# Patient Record
Sex: FEMALE | Race: WHITE | NOT HISPANIC OR LATINO | Employment: FULL TIME | URBAN - METROPOLITAN AREA
[De-identification: names, ages, dates, MRNs, and addresses within clinical notes are randomized per-mention and may not be internally consistent; named-entity substitution may affect disease eponyms.]

---

## 2017-01-26 ENCOUNTER — APPOINTMENT (OUTPATIENT)
Dept: LAB | Facility: HOSPITAL | Age: 32
End: 2017-01-26
Attending: OBSTETRICS & GYNECOLOGY
Payer: COMMERCIAL

## 2017-01-26 ENCOUNTER — TRANSCRIBE ORDERS (OUTPATIENT)
Dept: ADMINISTRATIVE | Facility: HOSPITAL | Age: 32
End: 2017-01-26

## 2017-01-26 DIAGNOSIS — N97.0 FEMALE INFERTILITY ASSOCIATED WITH ANOVULATION: Primary | ICD-10-CM

## 2017-01-26 DIAGNOSIS — N97.0 FEMALE INFERTILITY ASSOCIATED WITH ANOVULATION: ICD-10-CM

## 2017-01-26 LAB — PROGEST SERPL-MCNC: 13.8 NG/ML

## 2017-01-26 PROCEDURE — 36415 COLL VENOUS BLD VENIPUNCTURE: CPT

## 2017-01-26 PROCEDURE — 84144 ASSAY OF PROGESTERONE: CPT

## 2017-02-06 ENCOUNTER — APPOINTMENT (OUTPATIENT)
Dept: LAB | Facility: HOSPITAL | Age: 32
End: 2017-02-06
Attending: OBSTETRICS & GYNECOLOGY
Payer: COMMERCIAL

## 2017-02-06 ENCOUNTER — TRANSCRIBE ORDERS (OUTPATIENT)
Dept: ADMINISTRATIVE | Facility: HOSPITAL | Age: 32
End: 2017-02-06

## 2017-02-06 DIAGNOSIS — N91.2 ABSENCE OF MENSTRUATION: ICD-10-CM

## 2017-02-06 DIAGNOSIS — N91.2 ABSENCE OF MENSTRUATION: Primary | ICD-10-CM

## 2017-02-06 LAB
B-HCG SERPL-ACNC: 1948.9 MIU/ML
PROGEST SERPL-MCNC: 26.1 NG/ML
TSH SERPL DL<=0.05 MIU/L-ACNC: 1.98 UIU/ML (ref 0.36–3.74)

## 2017-02-06 PROCEDURE — 84702 CHORIONIC GONADOTROPIN TEST: CPT

## 2017-02-06 PROCEDURE — 84144 ASSAY OF PROGESTERONE: CPT

## 2017-02-06 PROCEDURE — 84443 ASSAY THYROID STIM HORMONE: CPT

## 2017-02-06 PROCEDURE — 36415 COLL VENOUS BLD VENIPUNCTURE: CPT

## 2017-02-10 ENCOUNTER — TRANSCRIBE ORDERS (OUTPATIENT)
Dept: ADMINISTRATIVE | Facility: HOSPITAL | Age: 32
End: 2017-02-10

## 2017-02-10 ENCOUNTER — APPOINTMENT (OUTPATIENT)
Dept: LAB | Facility: HOSPITAL | Age: 32
End: 2017-02-10
Attending: OBSTETRICS & GYNECOLOGY
Payer: COMMERCIAL

## 2017-02-10 DIAGNOSIS — Z34.01 ENCOUNTER FOR SUPERVISION OF NORMAL FIRST PREGNANCY IN FIRST TRIMESTER: ICD-10-CM

## 2017-02-10 DIAGNOSIS — Z34.01 ENCOUNTER FOR SUPERVISION OF NORMAL FIRST PREGNANCY IN FIRST TRIMESTER: Primary | ICD-10-CM

## 2017-02-10 LAB
B-HCG SERPL-ACNC: ABNORMAL MIU/ML
PROGEST SERPL-MCNC: 31.8 NG/ML

## 2017-02-10 PROCEDURE — 36415 COLL VENOUS BLD VENIPUNCTURE: CPT

## 2017-02-10 PROCEDURE — 84144 ASSAY OF PROGESTERONE: CPT

## 2017-02-10 PROCEDURE — 84702 CHORIONIC GONADOTROPIN TEST: CPT

## 2017-03-01 ENCOUNTER — APPOINTMENT (OUTPATIENT)
Dept: LAB | Facility: HOSPITAL | Age: 32
End: 2017-03-01
Attending: OBSTETRICS & GYNECOLOGY
Payer: COMMERCIAL

## 2017-03-01 ENCOUNTER — ALLSCRIPTS OFFICE VISIT (OUTPATIENT)
Dept: OTHER | Facility: OTHER | Age: 32
End: 2017-03-01

## 2017-03-01 DIAGNOSIS — Z34.90 ENCOUNTER FOR SUPERVISION OF NORMAL PREGNANCY: ICD-10-CM

## 2017-03-01 LAB
ABO GROUP BLD: NORMAL
BASOPHILS # BLD AUTO: 0.01 THOUSANDS/ΜL (ref 0–0.1)
BASOPHILS NFR BLD AUTO: 0 % (ref 0–1)
BILIRUB UR QL STRIP: NEGATIVE
BLD GP AB SCN SERPL QL: NEGATIVE
CLARITY UR: CLEAR
COLOR UR: YELLOW
EOSINOPHIL # BLD AUTO: 0.06 THOUSAND/ΜL (ref 0–0.61)
EOSINOPHIL NFR BLD AUTO: 1 % (ref 0–6)
ERYTHROCYTE [DISTWIDTH] IN BLOOD BY AUTOMATED COUNT: 11.8 % (ref 11.6–15.1)
GLUCOSE UR STRIP-MCNC: NEGATIVE MG/DL
HCT VFR BLD AUTO: 39.4 % (ref 34.8–46.1)
HGB BLD-MCNC: 13.9 G/DL (ref 11.5–15.4)
HGB UR QL STRIP.AUTO: NEGATIVE
KETONES UR STRIP-MCNC: NEGATIVE MG/DL
LEUKOCYTE ESTERASE UR QL STRIP: NEGATIVE
LYMPHOCYTES # BLD AUTO: 2.26 THOUSANDS/ΜL (ref 0.6–4.47)
LYMPHOCYTES NFR BLD AUTO: 28 % (ref 14–44)
MCH RBC QN AUTO: 30.1 PG (ref 26.8–34.3)
MCHC RBC AUTO-ENTMCNC: 35.3 G/DL (ref 31.4–37.4)
MCV RBC AUTO: 85 FL (ref 82–98)
MONOCYTES # BLD AUTO: 0.5 THOUSAND/ΜL (ref 0.17–1.22)
MONOCYTES NFR BLD AUTO: 6 % (ref 4–12)
NEUTROPHILS # BLD AUTO: 5.26 THOUSANDS/ΜL (ref 1.85–7.62)
NEUTS SEG NFR BLD AUTO: 65 % (ref 43–75)
NITRITE UR QL STRIP: NEGATIVE
PH UR STRIP.AUTO: 6 [PH] (ref 4.5–8)
PLATELET # BLD AUTO: 227 THOUSANDS/UL (ref 149–390)
PMV BLD AUTO: 10.5 FL (ref 8.9–12.7)
PROT UR STRIP-MCNC: NEGATIVE MG/DL
RBC # BLD AUTO: 4.62 MILLION/UL (ref 3.81–5.12)
RH BLD: POSITIVE
RUBV IGG SERPL IA-ACNC: >175 IU/ML
SP GR UR STRIP.AUTO: 1.02 (ref 1–1.03)
UROBILINOGEN UR QL STRIP.AUTO: 0.2 E.U./DL
WBC # BLD AUTO: 8.09 THOUSAND/UL (ref 4.31–10.16)

## 2017-03-01 PROCEDURE — 81220 CFTR GENE COM VARIANTS: CPT

## 2017-03-01 PROCEDURE — 36415 COLL VENOUS BLD VENIPUNCTURE: CPT

## 2017-03-01 PROCEDURE — 81003 URINALYSIS AUTO W/O SCOPE: CPT

## 2017-03-01 PROCEDURE — 80081 OBSTETRIC PANEL INC HIV TSTG: CPT

## 2017-03-01 PROCEDURE — 87086 URINE CULTURE/COLONY COUNT: CPT

## 2017-03-02 LAB
BACTERIA UR CULT: NORMAL
HBV SURFACE AG SER QL: NORMAL
RPR SER QL: NORMAL

## 2017-03-03 LAB — HIV 1+2 AB+HIV1 P24 AG SERPL QL IA: NORMAL

## 2017-03-07 LAB
CF COMMENT: NORMAL
CFTR MUT ANL BLD/T: NORMAL

## 2017-03-22 ENCOUNTER — GENERIC CONVERSION - ENCOUNTER (OUTPATIENT)
Dept: OTHER | Facility: OTHER | Age: 32
End: 2017-03-22

## 2017-03-22 PROCEDURE — 87591 N.GONORRHOEAE DNA AMP PROB: CPT | Performed by: OBSTETRICS & GYNECOLOGY

## 2017-03-22 PROCEDURE — G0145 SCR C/V CYTO,THINLAYER,RESCR: HCPCS | Performed by: OBSTETRICS & GYNECOLOGY

## 2017-03-22 PROCEDURE — 87491 CHLMYD TRACH DNA AMP PROBE: CPT | Performed by: OBSTETRICS & GYNECOLOGY

## 2017-03-23 ENCOUNTER — LAB REQUISITION (OUTPATIENT)
Dept: LAB | Facility: HOSPITAL | Age: 32
End: 2017-03-23
Payer: COMMERCIAL

## 2017-03-23 DIAGNOSIS — Z34.90 ENCOUNTER FOR SUPERVISION OF NORMAL PREGNANCY: ICD-10-CM

## 2017-03-27 LAB
CHLAMYDIA DNA CVX QL NAA+PROBE: NORMAL
N GONORRHOEA DNA GENITAL QL NAA+PROBE: NORMAL

## 2017-04-04 LAB
LAB AP GYN PRIMARY INTERPRETATION: NORMAL
LAB AP LMP: NORMAL
Lab: NORMAL
PATH INTERP SPEC-IMP: NORMAL

## 2017-04-06 ENCOUNTER — GENERIC CONVERSION - ENCOUNTER (OUTPATIENT)
Dept: OTHER | Facility: OTHER | Age: 32
End: 2017-04-06

## 2017-04-06 ENCOUNTER — ALLSCRIPTS OFFICE VISIT (OUTPATIENT)
Dept: PERINATAL CARE | Facility: CLINIC | Age: 32
End: 2017-04-06
Payer: COMMERCIAL

## 2017-04-06 PROCEDURE — 76813 OB US NUCHAL MEAS 1 GEST: CPT | Performed by: OBSTETRICS & GYNECOLOGY

## 2017-04-19 ENCOUNTER — GENERIC CONVERSION - ENCOUNTER (OUTPATIENT)
Dept: OTHER | Facility: OTHER | Age: 32
End: 2017-04-19

## 2017-04-29 ENCOUNTER — TRANSCRIBE ORDERS (OUTPATIENT)
Dept: ADMINISTRATIVE | Facility: HOSPITAL | Age: 32
End: 2017-04-29

## 2017-04-29 ENCOUNTER — APPOINTMENT (OUTPATIENT)
Dept: LAB | Facility: HOSPITAL | Age: 32
End: 2017-04-29
Attending: OBSTETRICS & GYNECOLOGY
Payer: COMMERCIAL

## 2017-04-29 DIAGNOSIS — Z3A.16 16 WEEKS GESTATION OF PREGNANCY: Primary | ICD-10-CM

## 2017-04-29 DIAGNOSIS — Z3A.16 16 WEEKS GESTATION OF PREGNANCY: ICD-10-CM

## 2017-04-29 PROCEDURE — 36415 COLL VENOUS BLD VENIPUNCTURE: CPT

## 2017-04-30 LAB — SCAN RESULT: NORMAL

## 2017-05-02 ENCOUNTER — GENERIC CONVERSION - ENCOUNTER (OUTPATIENT)
Dept: OTHER | Facility: OTHER | Age: 32
End: 2017-05-02

## 2017-05-17 ENCOUNTER — ALLSCRIPTS OFFICE VISIT (OUTPATIENT)
Dept: OTHER | Facility: OTHER | Age: 32
End: 2017-05-17

## 2017-05-31 ENCOUNTER — ALLSCRIPTS OFFICE VISIT (OUTPATIENT)
Dept: PERINATAL CARE | Facility: CLINIC | Age: 32
End: 2017-05-31
Payer: COMMERCIAL

## 2017-05-31 ENCOUNTER — GENERIC CONVERSION - ENCOUNTER (OUTPATIENT)
Dept: OTHER | Facility: OTHER | Age: 32
End: 2017-05-31

## 2017-05-31 PROCEDURE — 76811 OB US DETAILED SNGL FETUS: CPT | Performed by: OBSTETRICS & GYNECOLOGY

## 2017-05-31 PROCEDURE — 76817 TRANSVAGINAL US OBSTETRIC: CPT | Performed by: OBSTETRICS & GYNECOLOGY

## 2017-06-13 ENCOUNTER — ALLSCRIPTS OFFICE VISIT (OUTPATIENT)
Dept: OTHER | Facility: OTHER | Age: 32
End: 2017-06-13

## 2017-06-13 DIAGNOSIS — Z11.3 ENCOUNTER FOR SCREENING FOR INFECTIONS WITH PREDOMINANTLY SEXUAL MODE OF TRANSMISSION: ICD-10-CM

## 2017-06-13 DIAGNOSIS — O24.414 INSULIN CONTROLLED GESTATIONAL DIABETES MELLITUS DURING PREGNANCY: ICD-10-CM

## 2017-06-13 DIAGNOSIS — O99.212 OBESITY COMPLICATING PREGNANCY IN SECOND TRIMESTER: ICD-10-CM

## 2017-06-13 DIAGNOSIS — R73.09 OTHER ABNORMAL GLUCOSE: ICD-10-CM

## 2017-06-15 ENCOUNTER — APPOINTMENT (OUTPATIENT)
Dept: LAB | Facility: HOSPITAL | Age: 32
End: 2017-06-15
Attending: OBSTETRICS & GYNECOLOGY
Payer: COMMERCIAL

## 2017-06-15 PROCEDURE — 82950 GLUCOSE TEST: CPT | Performed by: OBSTETRICS & GYNECOLOGY

## 2017-06-15 PROCEDURE — 36415 COLL VENOUS BLD VENIPUNCTURE: CPT | Performed by: OBSTETRICS & GYNECOLOGY

## 2017-06-15 PROCEDURE — 86592 SYPHILIS TEST NON-TREP QUAL: CPT | Performed by: OBSTETRICS & GYNECOLOGY

## 2017-06-15 PROCEDURE — 85027 COMPLETE CBC AUTOMATED: CPT | Performed by: OBSTETRICS & GYNECOLOGY

## 2017-06-16 ENCOUNTER — TRANSCRIBE ORDERS (OUTPATIENT)
Dept: LAB | Facility: CLINIC | Age: 32
End: 2017-06-16

## 2017-06-16 ENCOUNTER — APPOINTMENT (OUTPATIENT)
Dept: LAB | Facility: CLINIC | Age: 32
End: 2017-06-16
Payer: COMMERCIAL

## 2017-06-16 DIAGNOSIS — R73.09 OTHER ABNORMAL GLUCOSE: ICD-10-CM

## 2017-06-16 LAB
GLUCOSE 1H P 100 G GLC PO SERPL-MCNC: 186 MG/DL (ref 65–179)
GLUCOSE 2H P 100 G GLC PO SERPL-MCNC: 149 MG/DL (ref 65–154)
GLUCOSE 3H P 100 G GLC PO SERPL-MCNC: 78 MG/DL (ref 65–139)
GLUCOSE P FAST SERPL-MCNC: 96 MG/DL (ref 65–99)
GLUCOSE SERPL-MCNC: 95 MG/DL (ref 65–140)

## 2017-06-16 PROCEDURE — 82948 REAGENT STRIP/BLOOD GLUCOSE: CPT

## 2017-06-16 PROCEDURE — 36415 COLL VENOUS BLD VENIPUNCTURE: CPT

## 2017-06-16 PROCEDURE — 82952 GTT-ADDED SAMPLES: CPT

## 2017-06-16 PROCEDURE — 82951 GLUCOSE TOLERANCE TEST (GTT): CPT

## 2017-06-26 ENCOUNTER — GENERIC CONVERSION - ENCOUNTER (OUTPATIENT)
Dept: OTHER | Facility: OTHER | Age: 32
End: 2017-06-26

## 2017-06-26 ENCOUNTER — APPOINTMENT (OUTPATIENT)
Dept: PERINATAL CARE | Facility: CLINIC | Age: 32
End: 2017-06-26
Payer: COMMERCIAL

## 2017-06-26 PROCEDURE — G0109 DIAB MANAGE TRN IND/GROUP: HCPCS | Performed by: OBSTETRICS & GYNECOLOGY

## 2017-06-29 ENCOUNTER — GENERIC CONVERSION - ENCOUNTER (OUTPATIENT)
Dept: OTHER | Facility: OTHER | Age: 32
End: 2017-06-29

## 2017-07-05 ENCOUNTER — APPOINTMENT (OUTPATIENT)
Dept: PERINATAL CARE | Facility: CLINIC | Age: 32
End: 2017-07-05
Payer: COMMERCIAL

## 2017-07-05 ENCOUNTER — LAB CONVERSION - ENCOUNTER (OUTPATIENT)
Dept: OTHER | Facility: OTHER | Age: 32
End: 2017-07-05

## 2017-07-05 PROCEDURE — G0108 DIAB MANAGE TRN  PER INDIV: HCPCS | Performed by: OBSTETRICS & GYNECOLOGY

## 2017-07-11 ENCOUNTER — APPOINTMENT (OUTPATIENT)
Dept: LAB | Facility: HOSPITAL | Age: 32
End: 2017-07-11
Payer: COMMERCIAL

## 2017-07-11 ENCOUNTER — TRANSCRIBE ORDERS (OUTPATIENT)
Dept: ADMINISTRATIVE | Facility: HOSPITAL | Age: 32
End: 2017-07-11

## 2017-07-11 DIAGNOSIS — O24.419 GESTATIONAL DIABETES MELLITUS, CLASS A2: ICD-10-CM

## 2017-07-11 DIAGNOSIS — Z00.8 HEALTH EXAMINATION IN POPULATION SURVEY: ICD-10-CM

## 2017-07-11 DIAGNOSIS — O24.414 INSULIN CONTROLLED GESTATIONAL DIABETES MELLITUS DURING PREGNANCY: ICD-10-CM

## 2017-07-11 DIAGNOSIS — O24.419 GESTATIONAL DIABETES MELLITUS, CLASS A2: Primary | ICD-10-CM

## 2017-07-11 DIAGNOSIS — Z00.8 HEALTH EXAMINATION IN POPULATION SURVEY: Primary | ICD-10-CM

## 2017-07-11 LAB
ALBUMIN SERPL BCP-MCNC: 2.8 G/DL (ref 3.5–5)
ALP SERPL-CCNC: 80 U/L (ref 46–116)
ALT SERPL W P-5'-P-CCNC: 11 U/L (ref 12–78)
ANION GAP SERPL CALCULATED.3IONS-SCNC: 9 MMOL/L (ref 4–13)
AST SERPL W P-5'-P-CCNC: 15 U/L (ref 5–45)
BILIRUB SERPL-MCNC: 0.21 MG/DL (ref 0.2–1)
BUN SERPL-MCNC: 7 MG/DL (ref 5–25)
CALCIUM SERPL-MCNC: 8.2 MG/DL (ref 8.3–10.1)
CHLORIDE SERPL-SCNC: 105 MMOL/L (ref 100–108)
CHOLEST SERPL-MCNC: 230 MG/DL (ref 50–200)
CO2 SERPL-SCNC: 23 MMOL/L (ref 21–32)
CREAT SERPL-MCNC: 0.44 MG/DL (ref 0.6–1.3)
EST. AVERAGE GLUCOSE BLD GHB EST-MCNC: 100 MG/DL
GFR SERPL CREATININE-BSD FRML MDRD: >60 ML/MIN/1.73SQ M
GLUCOSE P FAST SERPL-MCNC: 85 MG/DL (ref 65–99)
HBA1C MFR BLD: 5.1 % (ref 4.2–6.3)
HDLC SERPL-MCNC: 66 MG/DL (ref 40–60)
LDLC SERPL CALC-MCNC: 135 MG/DL (ref 0–100)
POTASSIUM SERPL-SCNC: 3.8 MMOL/L (ref 3.5–5.3)
PROT SERPL-MCNC: 6.5 G/DL (ref 6.4–8.2)
SODIUM SERPL-SCNC: 137 MMOL/L (ref 136–145)
TRIGL SERPL-MCNC: 146 MG/DL

## 2017-07-11 PROCEDURE — 83036 HEMOGLOBIN GLYCOSYLATED A1C: CPT

## 2017-07-11 PROCEDURE — 80061 LIPID PANEL: CPT

## 2017-07-11 PROCEDURE — 80053 COMPREHEN METABOLIC PANEL: CPT

## 2017-07-11 PROCEDURE — 36415 COLL VENOUS BLD VENIPUNCTURE: CPT

## 2017-07-12 ENCOUNTER — GENERIC CONVERSION - ENCOUNTER (OUTPATIENT)
Dept: OTHER | Facility: OTHER | Age: 32
End: 2017-07-12

## 2017-07-20 ENCOUNTER — ALLSCRIPTS OFFICE VISIT (OUTPATIENT)
Dept: PERINATAL CARE | Facility: CLINIC | Age: 32
End: 2017-07-20
Payer: COMMERCIAL

## 2017-07-20 ENCOUNTER — GENERIC CONVERSION - ENCOUNTER (OUTPATIENT)
Dept: OTHER | Facility: OTHER | Age: 32
End: 2017-07-20

## 2017-07-20 PROCEDURE — 76816 OB US FOLLOW-UP PER FETUS: CPT | Performed by: OBSTETRICS & GYNECOLOGY

## 2017-07-26 ENCOUNTER — GENERIC CONVERSION - ENCOUNTER (OUTPATIENT)
Dept: OTHER | Facility: OTHER | Age: 32
End: 2017-07-26

## 2017-08-03 ENCOUNTER — GENERIC CONVERSION - ENCOUNTER (OUTPATIENT)
Dept: OTHER | Facility: OTHER | Age: 32
End: 2017-08-03

## 2017-08-07 ENCOUNTER — GENERIC CONVERSION - ENCOUNTER (OUTPATIENT)
Dept: OTHER | Facility: OTHER | Age: 32
End: 2017-08-07

## 2017-08-18 ENCOUNTER — ALLSCRIPTS OFFICE VISIT (OUTPATIENT)
Dept: OTHER | Facility: OTHER | Age: 32
End: 2017-08-18

## 2017-08-22 ENCOUNTER — ALLSCRIPTS OFFICE VISIT (OUTPATIENT)
Dept: OTHER | Facility: OTHER | Age: 32
End: 2017-08-22

## 2017-08-24 ENCOUNTER — GENERIC CONVERSION - ENCOUNTER (OUTPATIENT)
Dept: OTHER | Facility: OTHER | Age: 32
End: 2017-08-24

## 2017-08-24 ENCOUNTER — APPOINTMENT (OUTPATIENT)
Dept: PERINATAL CARE | Facility: CLINIC | Age: 32
End: 2017-08-24
Payer: COMMERCIAL

## 2017-08-24 ENCOUNTER — ALLSCRIPTS OFFICE VISIT (OUTPATIENT)
Dept: PERINATAL CARE | Facility: CLINIC | Age: 32
End: 2017-08-24
Payer: COMMERCIAL

## 2017-08-24 PROCEDURE — 76817 TRANSVAGINAL US OBSTETRIC: CPT | Performed by: OBSTETRICS & GYNECOLOGY

## 2017-08-24 PROCEDURE — 59025 FETAL NON-STRESS TEST: CPT | Performed by: OBSTETRICS & GYNECOLOGY

## 2017-08-28 DIAGNOSIS — O24.414 INSULIN CONTROLLED GESTATIONAL DIABETES MELLITUS DURING PREGNANCY: ICD-10-CM

## 2017-08-29 ENCOUNTER — ALLSCRIPTS OFFICE VISIT (OUTPATIENT)
Dept: OTHER | Facility: OTHER | Age: 32
End: 2017-08-29

## 2017-08-31 ENCOUNTER — ALLSCRIPTS OFFICE VISIT (OUTPATIENT)
Dept: OTHER | Facility: OTHER | Age: 32
End: 2017-08-31

## 2017-09-07 ENCOUNTER — GENERIC CONVERSION - ENCOUNTER (OUTPATIENT)
Dept: OTHER | Facility: OTHER | Age: 32
End: 2017-09-07

## 2017-09-09 ENCOUNTER — GENERIC CONVERSION - ENCOUNTER (OUTPATIENT)
Dept: OTHER | Facility: OTHER | Age: 32
End: 2017-09-09

## 2017-09-09 ENCOUNTER — HOSPITAL ENCOUNTER (OUTPATIENT)
Facility: HOSPITAL | Age: 32
Discharge: HOME | End: 2017-09-09
Attending: OBSTETRICS & GYNECOLOGY | Admitting: OBSTETRICS & GYNECOLOGY
Payer: COMMERCIAL

## 2017-09-09 VITALS
RESPIRATION RATE: 17 BRPM | TEMPERATURE: 98 F | DIASTOLIC BLOOD PRESSURE: 82 MMHG | HEART RATE: 81 BPM | SYSTOLIC BLOOD PRESSURE: 135 MMHG

## 2017-09-09 DIAGNOSIS — R10.9 ABDOMINAL PAIN AFFECTING PREGNANCY: ICD-10-CM

## 2017-09-09 DIAGNOSIS — O26.899 ABDOMINAL PAIN AFFECTING PREGNANCY: ICD-10-CM

## 2017-09-09 PROCEDURE — 99203 OFFICE O/P NEW LOW 30 MIN: CPT

## 2017-09-13 ENCOUNTER — LAB REQUISITION (OUTPATIENT)
Dept: LAB | Facility: HOSPITAL | Age: 32
End: 2017-09-13
Payer: COMMERCIAL

## 2017-09-13 ENCOUNTER — GENERIC CONVERSION - ENCOUNTER (OUTPATIENT)
Dept: OTHER | Facility: OTHER | Age: 32
End: 2017-09-13

## 2017-09-13 DIAGNOSIS — O24.414 INSULIN CONTROLLED GESTATIONAL DIABETES MELLITUS DURING PREGNANCY: ICD-10-CM

## 2017-09-13 PROCEDURE — 87653 STREP B DNA AMP PROBE: CPT | Performed by: OBSTETRICS & GYNECOLOGY

## 2017-09-13 PROCEDURE — 87184 SC STD DISK METHOD PER PLATE: CPT | Performed by: OBSTETRICS & GYNECOLOGY

## 2017-09-14 ENCOUNTER — GENERIC CONVERSION - ENCOUNTER (OUTPATIENT)
Dept: OTHER | Facility: OTHER | Age: 32
End: 2017-09-14

## 2017-09-14 ENCOUNTER — APPOINTMENT (OUTPATIENT)
Dept: LAB | Facility: HOSPITAL | Age: 32
End: 2017-09-14
Attending: OBSTETRICS & GYNECOLOGY
Payer: COMMERCIAL

## 2017-09-14 DIAGNOSIS — O24.414 INSULIN CONTROLLED GESTATIONAL DIABETES MELLITUS DURING PREGNANCY: ICD-10-CM

## 2017-09-14 LAB
ALBUMIN SERPL BCP-MCNC: 2.6 G/DL (ref 3.5–5)
ALP SERPL-CCNC: 158 U/L (ref 46–116)
ALT SERPL W P-5'-P-CCNC: 14 U/L (ref 12–78)
ANION GAP SERPL CALCULATED.3IONS-SCNC: 9 MMOL/L (ref 4–13)
AST SERPL W P-5'-P-CCNC: 19 U/L (ref 5–45)
BILIRUB SERPL-MCNC: 0.2 MG/DL (ref 0.2–1)
BUN SERPL-MCNC: 12 MG/DL (ref 5–25)
CALCIUM SERPL-MCNC: 8.8 MG/DL (ref 8.3–10.1)
CHLORIDE SERPL-SCNC: 104 MMOL/L (ref 100–108)
CO2 SERPL-SCNC: 23 MMOL/L (ref 21–32)
CREAT SERPL-MCNC: 0.55 MG/DL (ref 0.6–1.3)
EST. AVERAGE GLUCOSE BLD GHB EST-MCNC: 108 MG/DL
GFR SERPL CREATININE-BSD FRML MDRD: 125 ML/MIN/1.73SQ M
GLUCOSE SERPL-MCNC: 76 MG/DL (ref 65–140)
HBA1C MFR BLD: 5.4 % (ref 4.2–6.3)
POTASSIUM SERPL-SCNC: 3.9 MMOL/L (ref 3.5–5.3)
PROT SERPL-MCNC: 6.5 G/DL (ref 6.4–8.2)
SODIUM SERPL-SCNC: 136 MMOL/L (ref 136–145)

## 2017-09-14 PROCEDURE — 80053 COMPREHEN METABOLIC PANEL: CPT

## 2017-09-14 PROCEDURE — 83036 HEMOGLOBIN GLYCOSYLATED A1C: CPT

## 2017-09-14 PROCEDURE — 36415 COLL VENOUS BLD VENIPUNCTURE: CPT

## 2017-09-18 ENCOUNTER — GENERIC CONVERSION - ENCOUNTER (OUTPATIENT)
Dept: OTHER | Facility: OTHER | Age: 32
End: 2017-09-18

## 2017-09-18 ENCOUNTER — ALLSCRIPTS OFFICE VISIT (OUTPATIENT)
Dept: PERINATAL CARE | Facility: CLINIC | Age: 32
End: 2017-09-18
Payer: COMMERCIAL

## 2017-09-18 ENCOUNTER — APPOINTMENT (OUTPATIENT)
Dept: PERINATAL CARE | Facility: CLINIC | Age: 32
End: 2017-09-18
Payer: COMMERCIAL

## 2017-09-18 LAB
GP B STREP DNA SPEC QL NAA+PROBE: ABNORMAL
GP B STREP DNA SPEC QL NAA+PROBE: ABNORMAL

## 2017-09-18 PROCEDURE — 59025 FETAL NON-STRESS TEST: CPT | Performed by: OBSTETRICS & GYNECOLOGY

## 2017-09-18 PROCEDURE — 76816 OB US FOLLOW-UP PER FETUS: CPT | Performed by: OBSTETRICS & GYNECOLOGY

## 2017-09-21 ENCOUNTER — GENERIC CONVERSION - ENCOUNTER (OUTPATIENT)
Dept: OTHER | Facility: OTHER | Age: 32
End: 2017-09-21

## 2017-09-25 ENCOUNTER — GENERIC CONVERSION - ENCOUNTER (OUTPATIENT)
Dept: OTHER | Facility: OTHER | Age: 32
End: 2017-09-25

## 2017-09-29 ENCOUNTER — GENERIC CONVERSION - ENCOUNTER (OUTPATIENT)
Dept: OTHER | Facility: OTHER | Age: 32
End: 2017-09-29

## 2017-10-02 ENCOUNTER — HOSPITAL ENCOUNTER (OUTPATIENT)
Facility: HOSPITAL | Age: 32
Discharge: HOME | End: 2017-10-02
Attending: OBSTETRICS & GYNECOLOGY | Admitting: OBSTETRICS & GYNECOLOGY
Payer: COMMERCIAL

## 2017-10-02 VITALS
TEMPERATURE: 98 F | RESPIRATION RATE: 16 BRPM | DIASTOLIC BLOOD PRESSURE: 75 MMHG | HEART RATE: 79 BPM | SYSTOLIC BLOOD PRESSURE: 121 MMHG

## 2017-10-02 PROCEDURE — 99211 OFF/OP EST MAY X REQ PHY/QHP: CPT

## 2017-10-02 NOTE — PROGRESS NOTES
OB Triage Note  Jeannine English 33 y o  female MRN: [id number]  Unit/Bed#: L&D 327-01     CASEY: Estimated Date of Delivery: 10/13/17    HPI: 33 y o   at 36w4d with c/o decreased fetal movement  She states she has episodic chapito kaba contractions , no LOF, and no VB  She states she has noted baby has been moving less than usual in the last 24 hrs  She states yesterday she did her kick counts and baby would not move 10 times in 2 hours until she ate some berries and had something to drink  Today she mentions baby started moving more after she had some juice  States her BS have been well controled and has been having twice weekly NST's and AFIs at office  Patient has IOL scheduled for next      OB Cx:   A2GDM on insulin    FHT: 120's Reactive, no decels, accels 15 x 15 BPM       TOCO:   None seen        Vitals:     /76  Pulse 75       Physical Exam:  General: AAOX3  No acute distress   Abdominal: Soft  NT/ND  Gravid      SVE: deferred     TAUS: JAZZY 8 4 , vertex, Anterior placenta, Vertex presentation, fetal movement observed during US      A/P:  at 38w3d here for decreased fetal movement  After evaluation, reassuring modified BPP  Fetal testing reassuring  · Discharge home with precautions  Instructions reviewed with patient  · F/u at her Dr's office on Thursday     Dr Deng Santiago aware  Signature/Title:  Donald Caceres  Date: 10/2/2017  Time: 1:11 PM

## 2017-10-05 ENCOUNTER — GENERIC CONVERSION - ENCOUNTER (OUTPATIENT)
Dept: OTHER | Facility: OTHER | Age: 32
End: 2017-10-05

## 2017-10-05 ENCOUNTER — HOSPITAL ENCOUNTER (INPATIENT)
Facility: HOSPITAL | Age: 32
LOS: 4 days | Discharge: HOME | End: 2017-10-09
Attending: OBSTETRICS & GYNECOLOGY | Admitting: OBSTETRICS & GYNECOLOGY
Payer: COMMERCIAL

## 2017-10-05 DIAGNOSIS — Z3A.38 38 WEEKS GESTATION OF PREGNANCY: ICD-10-CM

## 2017-10-05 DIAGNOSIS — O24.419 GESTATIONAL DIABETES MELLITUS, CLASS A2: ICD-10-CM

## 2017-10-05 DIAGNOSIS — O13.9 GESTATIONAL HYPERTENSION: ICD-10-CM

## 2017-10-05 LAB
ABO GROUP BLD: NORMAL
ALBUMIN SERPL BCP-MCNC: 2.7 G/DL (ref 3.5–5)
ALP SERPL-CCNC: 186 U/L (ref 46–116)
ALT SERPL W P-5'-P-CCNC: 15 U/L (ref 12–78)
ANION GAP SERPL CALCULATED.3IONS-SCNC: 9 MMOL/L (ref 4–13)
AST SERPL W P-5'-P-CCNC: 24 U/L (ref 5–45)
BASOPHILS # BLD AUTO: 0.02 THOUSANDS/ΜL (ref 0–0.1)
BASOPHILS NFR BLD AUTO: 0 % (ref 0–1)
BILIRUB SERPL-MCNC: 0.18 MG/DL (ref 0.2–1)
BILIRUB UR QL STRIP: NEGATIVE
BLD GP AB SCN SERPL QL: NEGATIVE
BUN SERPL-MCNC: 15 MG/DL (ref 5–25)
CALCIUM SERPL-MCNC: 9 MG/DL (ref 8.3–10.1)
CHLORIDE SERPL-SCNC: 104 MMOL/L (ref 100–108)
CLARITY UR: CLEAR
CO2 SERPL-SCNC: 23 MMOL/L (ref 21–32)
COLOR UR: YELLOW
CREAT SERPL-MCNC: 0.55 MG/DL (ref 0.6–1.3)
CREAT UR-MCNC: 58.3 MG/DL
EOSINOPHIL # BLD AUTO: 0.07 THOUSAND/ΜL (ref 0–0.61)
EOSINOPHIL NFR BLD AUTO: 1 % (ref 0–6)
ERYTHROCYTE [DISTWIDTH] IN BLOOD BY AUTOMATED COUNT: 13.2 % (ref 11.6–15.1)
GFR SERPL CREATININE-BSD FRML MDRD: 125 ML/MIN/1.73SQ M
GLUCOSE SERPL-MCNC: 71 MG/DL (ref 65–140)
GLUCOSE UR STRIP-MCNC: NEGATIVE MG/DL
HCT VFR BLD AUTO: 40.1 % (ref 34.8–46.1)
HGB BLD-MCNC: 13.7 G/DL (ref 11.5–15.4)
HGB UR QL STRIP.AUTO: NEGATIVE
KETONES UR STRIP-MCNC: NEGATIVE MG/DL
LEUKOCYTE ESTERASE UR QL STRIP: NEGATIVE
LYMPHOCYTES # BLD AUTO: 2.56 THOUSANDS/ΜL (ref 0.6–4.47)
LYMPHOCYTES NFR BLD AUTO: 21 % (ref 14–44)
MCH RBC QN AUTO: 29.5 PG (ref 26.8–34.3)
MCHC RBC AUTO-ENTMCNC: 34.2 G/DL (ref 31.4–37.4)
MCV RBC AUTO: 86 FL (ref 82–98)
MONOCYTES # BLD AUTO: 0.83 THOUSAND/ΜL (ref 0.17–1.22)
MONOCYTES NFR BLD AUTO: 7 % (ref 4–12)
NEUTROPHILS # BLD AUTO: 8.99 THOUSANDS/ΜL (ref 1.85–7.62)
NEUTS SEG NFR BLD AUTO: 71 % (ref 43–75)
NITRITE UR QL STRIP: NEGATIVE
NRBC BLD AUTO-RTO: 0 /100 WBCS
PH UR STRIP.AUTO: 5.5 [PH] (ref 4.5–8)
PLATELET # BLD AUTO: 142 THOUSANDS/UL (ref 149–390)
PMV BLD AUTO: 12.8 FL (ref 8.9–12.7)
POTASSIUM SERPL-SCNC: 4 MMOL/L (ref 3.5–5.3)
PROT SERPL-MCNC: 6.5 G/DL (ref 6.4–8.2)
PROT UR STRIP-MCNC: NEGATIVE MG/DL
PROT UR-MCNC: 17 MG/DL
PROT/CREAT UR: 0.29 MG/G{CREAT} (ref 0–0.1)
RBC # BLD AUTO: 4.64 MILLION/UL (ref 3.81–5.12)
RH BLD: POSITIVE
SODIUM SERPL-SCNC: 136 MMOL/L (ref 136–145)
SP GR UR STRIP.AUTO: 1.01 (ref 1–1.03)
SPECIMEN EXPIRATION DATE: NORMAL
UROBILINOGEN UR QL STRIP.AUTO: 0.2 E.U./DL
WBC # BLD AUTO: 12.47 THOUSAND/UL (ref 4.31–10.16)

## 2017-10-05 PROCEDURE — 86592 SYPHILIS TEST NON-TREP QUAL: CPT | Performed by: OBSTETRICS & GYNECOLOGY

## 2017-10-05 PROCEDURE — 3E0P7VZ INTRODUCTION OF HORMONE INTO FEMALE REPRODUCTIVE, VIA NATURAL OR ARTIFICIAL OPENING: ICD-10-PCS | Performed by: OBSTETRICS & GYNECOLOGY

## 2017-10-05 PROCEDURE — 4A1HXCZ MONITORING OF PRODUCTS OF CONCEPTION, CARDIAC RATE, EXTERNAL APPROACH: ICD-10-PCS | Performed by: OBSTETRICS & GYNECOLOGY

## 2017-10-05 PROCEDURE — 10H073Z INSERTION OF MONITORING ELECTRODE INTO PRODUCTS OF CONCEPTION, VIA NATURAL OR ARTIFICIAL OPENING: ICD-10-PCS | Performed by: OBSTETRICS & GYNECOLOGY

## 2017-10-05 PROCEDURE — 81003 URINALYSIS AUTO W/O SCOPE: CPT | Performed by: STUDENT IN AN ORGANIZED HEALTH CARE EDUCATION/TRAINING PROGRAM

## 2017-10-05 PROCEDURE — 4A1H7CZ MONITORING OF PRODUCTS OF CONCEPTION, CARDIAC RATE, VIA NATURAL OR ARTIFICIAL OPENING: ICD-10-PCS | Performed by: OBSTETRICS & GYNECOLOGY

## 2017-10-05 PROCEDURE — 85025 COMPLETE CBC W/AUTO DIFF WBC: CPT | Performed by: STUDENT IN AN ORGANIZED HEALTH CARE EDUCATION/TRAINING PROGRAM

## 2017-10-05 PROCEDURE — 86850 RBC ANTIBODY SCREEN: CPT | Performed by: OBSTETRICS & GYNECOLOGY

## 2017-10-05 PROCEDURE — 80053 COMPREHEN METABOLIC PANEL: CPT | Performed by: STUDENT IN AN ORGANIZED HEALTH CARE EDUCATION/TRAINING PROGRAM

## 2017-10-05 PROCEDURE — 99203 OFFICE O/P NEW LOW 30 MIN: CPT

## 2017-10-05 PROCEDURE — 86901 BLOOD TYPING SEROLOGIC RH(D): CPT | Performed by: OBSTETRICS & GYNECOLOGY

## 2017-10-05 PROCEDURE — 84156 ASSAY OF PROTEIN URINE: CPT | Performed by: STUDENT IN AN ORGANIZED HEALTH CARE EDUCATION/TRAINING PROGRAM

## 2017-10-05 PROCEDURE — 86900 BLOOD TYPING SEROLOGIC ABO: CPT | Performed by: OBSTETRICS & GYNECOLOGY

## 2017-10-05 PROCEDURE — 82570 ASSAY OF URINE CREATININE: CPT | Performed by: STUDENT IN AN ORGANIZED HEALTH CARE EDUCATION/TRAINING PROGRAM

## 2017-10-05 RX ORDER — OXYTOCIN/RINGER'S LACTATE 30/500 ML
2 PLASTIC BAG, INJECTION (ML) INTRAVENOUS
Status: DISCONTINUED | OUTPATIENT
Start: 2017-10-05 | End: 2017-10-06

## 2017-10-05 RX ORDER — INSULIN GLARGINE 100 [IU]/ML
33 INJECTION, SOLUTION SUBCUTANEOUS
Status: COMPLETED | OUTPATIENT
Start: 2017-10-05 | End: 2017-10-05

## 2017-10-05 RX ORDER — SODIUM CHLORIDE 9 MG/ML
125 INJECTION, SOLUTION INTRAVENOUS CONTINUOUS
Status: DISCONTINUED | OUTPATIENT
Start: 2017-10-05 | End: 2017-10-06

## 2017-10-05 RX ADMIN — MISOPROSTOL 25 MCG: 100 TABLET ORAL at 15:56

## 2017-10-05 RX ADMIN — INSULIN GLARGINE 33 UNITS: 100 INJECTION, SOLUTION SUBCUTANEOUS at 22:52

## 2017-10-05 NOTE — H&P
H&P Exam - Obstetrics   Frederic Sharma 33 y o  female MRN: [id number]  Unit/Bed#: L&D 321-01 Encounter: [id number]    Assessment/Plan     Assessment:  32 yo  at 38 6 weeks, with gHTN and A2GDM here for IOL  Plan:  Admit  CBC, RPR, Type and screen, CMP, UA, Uric acid, P/Cr ratio  Glucose monitoring per protocol w/ possible insulin   Epidural upon request  Cytotec for ripening then pitocin in the morning  Clindamycin for GBS + status        History of Present Illness   Chief Complaint: im here for IOL    HPI:  Frederic Sharma is a 33 y o   female with an CASEY of 10/13/2017, by Other Basis at 38w6d weeks gestation who is being admitted for IOL for gHTN  Her current obstetrical history is significant for A2GDM in insulin, gHTN, GBS pos with PCN allergy  Contractions: None  Leakage of fluid: None  Bleeding: None  Fetal movement: present  Pregnancy complications:   L0RTH on insulin  gHTN  GBS +      Review of Systems   Constitutional: Negative  Respiratory: Negative  Cardiovascular: Negative  Gastrointestinal: Negative  Genitourinary: Negative  Musculoskeletal: Negative  Historical Information   OB History    Para Term  AB Living   1             SAB TAB Ectopic Multiple Live Births                  # Outcome Date GA Lbr Vish/2nd Weight Sex Delivery Anes PTL Lv   1 Current                 Baby complications/comments: last Growth 54 percentile  Past Medical History:   Diagnosis Date    Abnormal Pap smear of cervix     Arthritis     Diabetes mellitus ([address] )     Irritable bowel syndrome     Migraine      Past Surgical History:   Procedure Laterality Date    COLPOSCOPY      OH COLONOSCOPY FLX DX W/COLLJ SPEC WHEN PFRMD N/A 2016    Procedure: COLONOSCOPY;  Surgeon: Sal Suazo MD;  Location: [facility];   Service: Gastroenterology    WISDOM TOOTH EXTRACTION       Social History   History   Alcohol Use    Yes     Comment: socially     History   Drug Use No     History   Smoking Status    Never Smoker   Smokeless Tobacco    Never Used     Family History: non-contributory    Meds/Allergies   all medications and allergies reviewed  Allergies   Allergen Reactions    Amoxicillin Rash    Penicillins Rash       Objective   Vitals: Blood pressure 137/85, pulse 85, temperature 97 9 °F (36 6 °C), temperature source Oral, last menstrual period 01/04/2016, currently breastfeeding  There is no height or weight on file to calculate BMI  Invasive Devices     Peripheral Intravenous Line            Peripheral IV [id number] days    Peripheral IV 10/05/17 Right Hand less than 1 day                Physical Exam   Constitutional: She is oriented to person, place, and time  She appears well-developed and well-nourished  HENT:   Head: Normocephalic and atraumatic  Cardiovascular: Normal rate, regular rhythm and normal heart sounds  Pulmonary/Chest: Effort normal and breath sounds normal    Abdominal: Soft  Bowel sounds are normal    Genitourinary: Vagina normal and uterus normal    Genitourinary Comments: Ft/Th/hi soft, mid position by Dr Jim Kent   Neurological: She is alert and oriented to person, place, and time  She has normal reflexes  Prenatal Labs:   Blood Type: O +  Antibody screen: Neg   RPR: Neg   Hep B: Neg   HIV: neg   Rubella: Immune  GBS: pos, sensitive to clindamycin   GC chlamydia: Neg   Glucola: 138  3h GTT: 96, 186, 149, 78        Imaging, EKG, Pathology, and Other Studies: I have personally reviewed pertinent reports

## 2017-10-06 ENCOUNTER — ANESTHESIA EVENT (INPATIENT)
Dept: LABOR AND DELIVERY | Facility: HOSPITAL | Age: 32
End: 2017-10-06
Payer: COMMERCIAL

## 2017-10-06 ENCOUNTER — ANESTHESIA (INPATIENT)
Dept: LABOR AND DELIVERY | Facility: HOSPITAL | Age: 32
End: 2017-10-06
Payer: COMMERCIAL

## 2017-10-06 LAB
BASE EXCESS BLDCOV CALC-SCNC: -3.3 MMOL/L (ref 1–9)
GLUCOSE SERPL-MCNC: 42 MG/DL (ref 65–140)
GLUCOSE SERPL-MCNC: 51 MG/DL (ref 65–140)
GLUCOSE SERPL-MCNC: 52 MG/DL (ref 65–140)
GLUCOSE SERPL-MCNC: 60 MG/DL (ref 65–140)
GLUCOSE SERPL-MCNC: 67 MG/DL (ref 65–140)
GLUCOSE SERPL-MCNC: 72 MG/DL (ref 65–140)
GLUCOSE SERPL-MCNC: 73 MG/DL (ref 65–140)
GLUCOSE SERPL-MCNC: 74 MG/DL (ref 65–140)
HCO3 BLDCOV-SCNC: 20.3 MMOL/L (ref 12.2–28.6)
OXYHGB MFR BLDCOV: 77.1 %
PCO2 BLDCOV: 32.7 MM HG (ref 27–43)
PH BLDCOV: 7.41 [PH] (ref 7.19–7.49)
PO2 BLDCOV: 33.3 MM HG (ref 15–45)
RPR SER QL: NORMAL
SAO2 % BLDCOV: 17.4 ML/DL

## 2017-10-06 PROCEDURE — 82805 BLOOD GASES W/O2 SATURATION: CPT | Performed by: OBSTETRICS & GYNECOLOGY

## 2017-10-06 PROCEDURE — 82948 REAGENT STRIP/BLOOD GLUCOSE: CPT

## 2017-10-06 PROCEDURE — 88307 TISSUE EXAM BY PATHOLOGIST: CPT | Performed by: OBSTETRICS & GYNECOLOGY

## 2017-10-06 PROCEDURE — 10907ZC DRAINAGE OF AMNIOTIC FLUID, THERAPEUTIC FROM PRODUCTS OF CONCEPTION, VIA NATURAL OR ARTIFICIAL OPENING: ICD-10-PCS | Performed by: OBSTETRICS & GYNECOLOGY

## 2017-10-06 RX ORDER — DIPHENHYDRAMINE HYDROCHLORIDE 50 MG/ML
25 INJECTION INTRAMUSCULAR; INTRAVENOUS EVERY 6 HOURS PRN
Status: DISCONTINUED | OUTPATIENT
Start: 2017-10-06 | End: 2017-10-06

## 2017-10-06 RX ORDER — ONDANSETRON 2 MG/ML
4 INJECTION INTRAMUSCULAR; INTRAVENOUS EVERY 8 HOURS PRN
Status: DISCONTINUED | OUTPATIENT
Start: 2017-10-06 | End: 2017-10-09 | Stop reason: HOSPADM

## 2017-10-06 RX ORDER — PROMETHAZINE HYDROCHLORIDE 25 MG/ML
25 INJECTION, SOLUTION INTRAMUSCULAR; INTRAVENOUS ONCE
Status: DISCONTINUED | OUTPATIENT
Start: 2017-10-06 | End: 2017-10-06

## 2017-10-06 RX ORDER — MEPERIDINE HYDROCHLORIDE 50 MG/ML
12.5 INJECTION INTRAMUSCULAR; INTRAVENOUS; SUBCUTANEOUS AS NEEDED
Status: DISCONTINUED | OUTPATIENT
Start: 2017-10-06 | End: 2017-10-06

## 2017-10-06 RX ORDER — MIDAZOLAM HYDROCHLORIDE 1 MG/ML
INJECTION INTRAMUSCULAR; INTRAVENOUS AS NEEDED
Status: DISCONTINUED | OUTPATIENT
Start: 2017-10-06 | End: 2017-10-06 | Stop reason: SURG

## 2017-10-06 RX ORDER — NALBUPHINE HCL 10 MG/ML
5 AMPUL (ML) INJECTION
Status: DISCONTINUED | OUTPATIENT
Start: 2017-10-06 | End: 2017-10-06

## 2017-10-06 RX ORDER — KETOROLAC TROMETHAMINE 30 MG/ML
INJECTION, SOLUTION INTRAMUSCULAR; INTRAVENOUS AS NEEDED
Status: DISCONTINUED | OUTPATIENT
Start: 2017-10-06 | End: 2017-10-06 | Stop reason: SURG

## 2017-10-06 RX ORDER — KETAMINE HYDROCHLORIDE 50 MG/ML
INJECTION, SOLUTION, CONCENTRATE INTRAMUSCULAR; INTRAVENOUS
Status: DISPENSED
Start: 2017-10-06 | End: 2017-10-07

## 2017-10-06 RX ORDER — DIPHENHYDRAMINE HCL 25 MG
25 TABLET ORAL EVERY 6 HOURS PRN
Status: DISCONTINUED | OUTPATIENT
Start: 2017-10-06 | End: 2017-10-09 | Stop reason: HOSPADM

## 2017-10-06 RX ORDER — LIDOCAINE HYDROCHLORIDE AND EPINEPHRINE 15; 5 MG/ML; UG/ML
INJECTION, SOLUTION EPIDURAL AS NEEDED
Status: DISCONTINUED | OUTPATIENT
Start: 2017-10-06 | End: 2017-10-06 | Stop reason: SURG

## 2017-10-06 RX ORDER — ONDANSETRON 2 MG/ML
4 INJECTION INTRAMUSCULAR; INTRAVENOUS EVERY 4 HOURS PRN
Status: DISCONTINUED | OUTPATIENT
Start: 2017-10-06 | End: 2017-10-06

## 2017-10-06 RX ORDER — HYDROMORPHONE HCL 110MG/55ML
0.5 PATIENT CONTROLLED ANALGESIA SYRINGE INTRAVENOUS
Status: DISCONTINUED | OUTPATIENT
Start: 2017-10-06 | End: 2017-10-06

## 2017-10-06 RX ORDER — BUTORPHANOL TARTRATE 1 MG/ML
1 INJECTION, SOLUTION INTRAMUSCULAR; INTRAVENOUS ONCE
Status: COMPLETED | OUTPATIENT
Start: 2017-10-06 | End: 2017-10-06

## 2017-10-06 RX ORDER — CLINDAMYCIN PHOSPHATE 150 MG/ML
INJECTION, SOLUTION INTRAVENOUS AS NEEDED
Status: DISCONTINUED | OUTPATIENT
Start: 2017-10-06 | End: 2017-10-06 | Stop reason: SURG

## 2017-10-06 RX ORDER — OXYTOCIN 10 [USP'U]/ML
INJECTION, SOLUTION INTRAMUSCULAR; INTRAVENOUS AS NEEDED
Status: DISCONTINUED | OUTPATIENT
Start: 2017-10-06 | End: 2017-10-06 | Stop reason: SURG

## 2017-10-06 RX ORDER — LIDOCAINE HYDROCHLORIDE AND EPINEPHRINE 20; 5 MG/ML; UG/ML
INJECTION, SOLUTION EPIDURAL; INFILTRATION; INTRACAUDAL; PERINEURAL AS NEEDED
Status: DISCONTINUED | OUTPATIENT
Start: 2017-10-06 | End: 2017-10-06 | Stop reason: SURG

## 2017-10-06 RX ORDER — CHLOROPROCAINE HYDROCHLORIDE 30 MG/ML
INJECTION, SOLUTION EPIDURAL; INFILTRATION; INTRACAUDAL; PERINEURAL AS NEEDED
Status: DISCONTINUED | OUTPATIENT
Start: 2017-10-06 | End: 2017-10-06 | Stop reason: SURG

## 2017-10-06 RX ORDER — KETAMINE HYDROCHLORIDE 50 MG/ML
INJECTION, SOLUTION, CONCENTRATE INTRAMUSCULAR; INTRAVENOUS AS NEEDED
Status: DISCONTINUED | OUTPATIENT
Start: 2017-10-06 | End: 2017-10-06 | Stop reason: SURG

## 2017-10-06 RX ORDER — METOCLOPRAMIDE HYDROCHLORIDE 5 MG/ML
5 INJECTION INTRAMUSCULAR; INTRAVENOUS EVERY 6 HOURS PRN
Status: DISCONTINUED | OUTPATIENT
Start: 2017-10-06 | End: 2017-10-06

## 2017-10-06 RX ORDER — DOCUSATE SODIUM 100 MG/1
100 CAPSULE, LIQUID FILLED ORAL 2 TIMES DAILY
Status: DISCONTINUED | OUTPATIENT
Start: 2017-10-06 | End: 2017-10-09 | Stop reason: HOSPADM

## 2017-10-06 RX ORDER — SODIUM CHLORIDE 9 MG/ML
125 INJECTION, SOLUTION INTRAVENOUS CONTINUOUS
Status: DISCONTINUED | OUTPATIENT
Start: 2017-10-06 | End: 2017-10-09 | Stop reason: HOSPADM

## 2017-10-06 RX ORDER — ONDANSETRON 2 MG/ML
INJECTION INTRAMUSCULAR; INTRAVENOUS AS NEEDED
Status: DISCONTINUED | OUTPATIENT
Start: 2017-10-06 | End: 2017-10-06 | Stop reason: SURG

## 2017-10-06 RX ORDER — TERBUTALINE SULFATE 1 MG/ML
INJECTION, SOLUTION SUBCUTANEOUS
Status: DISPENSED
Start: 2017-10-06 | End: 2017-10-06

## 2017-10-06 RX ORDER — OXYTOCIN/RINGER'S LACTATE 30/500 ML
62.5 PLASTIC BAG, INJECTION (ML) INTRAVENOUS CONTINUOUS
Status: ACTIVE | OUTPATIENT
Start: 2017-10-06 | End: 2017-10-07

## 2017-10-06 RX ORDER — FENTANYL CITRATE/PF 50 MCG/ML
25 SYRINGE (ML) INJECTION AS NEEDED
Status: DISCONTINUED | OUTPATIENT
Start: 2017-10-06 | End: 2017-10-06

## 2017-10-06 RX ORDER — CLINDAMYCIN PHOSPHATE 900 MG/50ML
900 INJECTION INTRAVENOUS EVERY 8 HOURS
Status: DISCONTINUED | OUTPATIENT
Start: 2017-10-06 | End: 2017-10-06

## 2017-10-06 RX ORDER — CALCIUM CARBONATE 200(500)MG
1000 TABLET,CHEWABLE ORAL DAILY PRN
Status: DISCONTINUED | OUTPATIENT
Start: 2017-10-06 | End: 2017-10-09 | Stop reason: HOSPADM

## 2017-10-06 RX ORDER — GENTAMICIN SULFATE 40 MG/ML
INJECTION, SOLUTION INTRAMUSCULAR; INTRAVENOUS AS NEEDED
Status: DISCONTINUED | OUTPATIENT
Start: 2017-10-06 | End: 2017-10-06 | Stop reason: SURG

## 2017-10-06 RX ORDER — KETOROLAC TROMETHAMINE 30 MG/ML
30 INJECTION, SOLUTION INTRAMUSCULAR; INTRAVENOUS EVERY 6 HOURS
Status: DISCONTINUED | OUTPATIENT
Start: 2017-10-06 | End: 2017-10-06

## 2017-10-06 RX ORDER — ROPIVACAINE HYDROCHLORIDE 5 MG/ML
INJECTION, SOLUTION EPIDURAL; INFILTRATION; PERINEURAL AS NEEDED
Status: DISCONTINUED | OUTPATIENT
Start: 2017-10-06 | End: 2017-10-06 | Stop reason: SURG

## 2017-10-06 RX ORDER — MORPHINE SULFATE 1 MG/ML
INJECTION, SOLUTION EPIDURAL; INTRATHECAL; INTRAVENOUS AS NEEDED
Status: DISCONTINUED | OUTPATIENT
Start: 2017-10-06 | End: 2017-10-06 | Stop reason: SURG

## 2017-10-06 RX ORDER — ACETAMINOPHEN 325 MG/1
650 TABLET ORAL EVERY 6 HOURS PRN
Status: DISCONTINUED | OUTPATIENT
Start: 2017-10-06 | End: 2017-10-09 | Stop reason: HOSPADM

## 2017-10-06 RX ORDER — ACETAMINOPHEN 325 MG/1
650 TABLET ORAL EVERY 6 HOURS
Status: DISCONTINUED | OUTPATIENT
Start: 2017-10-06 | End: 2017-10-06

## 2017-10-06 RX ORDER — MORPHINE SULFATE 1 MG/ML
INJECTION, SOLUTION EPIDURAL; INTRATHECAL; INTRAVENOUS
Status: DISPENSED
Start: 2017-10-06 | End: 2017-10-07

## 2017-10-06 RX ORDER — DIAPER,BRIEF,INFANT-TODD,DISP
1 EACH MISCELLANEOUS DAILY PRN
Status: DISCONTINUED | OUTPATIENT
Start: 2017-10-06 | End: 2017-10-09 | Stop reason: HOSPADM

## 2017-10-06 RX ORDER — NALOXONE HYDROCHLORIDE 0.4 MG/ML
0.1 INJECTION, SOLUTION INTRAMUSCULAR; INTRAVENOUS; SUBCUTANEOUS
Status: DISCONTINUED | OUTPATIENT
Start: 2017-10-06 | End: 2017-10-06

## 2017-10-06 RX ORDER — ALBUTEROL SULFATE 2.5 MG/3ML
2.5 SOLUTION RESPIRATORY (INHALATION) ONCE AS NEEDED
Status: DISCONTINUED | OUTPATIENT
Start: 2017-10-06 | End: 2017-10-06

## 2017-10-06 RX ORDER — MIDAZOLAM HYDROCHLORIDE 1 MG/ML
INJECTION INTRAMUSCULAR; INTRAVENOUS
Status: DISPENSED
Start: 2017-10-06 | End: 2017-10-07

## 2017-10-06 RX ADMIN — CLINDAMYCIN PHOSPHATE 900 MG: 150 INJECTION, SOLUTION INTRAMUSCULAR; INTRAVENOUS at 17:43

## 2017-10-06 RX ADMIN — ROPIVACAINE HYDROCHLORIDE 5 ML: 5 INJECTION, SOLUTION EPIDURAL; INFILTRATION; PERINEURAL at 15:44

## 2017-10-06 RX ADMIN — GENTAMICIN SULFATE 150 MG: 40 INJECTION, SOLUTION INTRAMUSCULAR; INTRAVENOUS at 17:18

## 2017-10-06 RX ADMIN — ROPIVACAINE HYDROCHLORIDE 10 ML: 5 INJECTION, SOLUTION EPIDURAL; INFILTRATION; PERINEURAL at 12:10

## 2017-10-06 RX ADMIN — ROPIVACAINE HYDROCHLORIDE: 2 INJECTION, SOLUTION EPIDURAL; INFILTRATION at 03:05

## 2017-10-06 RX ADMIN — ROPIVACAINE HYDROCHLORIDE 10 ML: 5 INJECTION, SOLUTION EPIDURAL; INFILTRATION; PERINEURAL at 08:41

## 2017-10-06 RX ADMIN — OXYTOCIN 30 UNITS: 10 INJECTION, SOLUTION INTRAMUSCULAR; INTRAVENOUS at 17:59

## 2017-10-06 RX ADMIN — LIDOCAINE HYDROCHLORIDE,EPINEPHRINE BITARTRATE 10 ML: 20; .005 INJECTION, SOLUTION EPIDURAL; INFILTRATION; INTRACAUDAL; PERINEURAL at 17:12

## 2017-10-06 RX ADMIN — CHLOROPROCAINE HYDROCHLORIDE 10 ML: 30 INJECTION, SOLUTION EPIDURAL; INFILTRATION at 18:02

## 2017-10-06 RX ADMIN — MORPHINE SULFATE 3 MG: 1 INJECTION, SOLUTION EPIDURAL; INTRATHECAL; INTRAVENOUS at 17:56

## 2017-10-06 RX ADMIN — ROPIVACAINE HYDROCHLORIDE 5 ML: 5 INJECTION, SOLUTION EPIDURAL; INFILTRATION; PERINEURAL at 15:40

## 2017-10-06 RX ADMIN — MORPHINE SULFATE 3 MG: 1 INJECTION, SOLUTION EPIDURAL; INTRATHECAL; INTRAVENOUS at 18:00

## 2017-10-06 RX ADMIN — KETAMINE HYDROCHLORIDE 30 MG: 50 INJECTION INTRAMUSCULAR; INTRAVENOUS at 18:08

## 2017-10-06 RX ADMIN — KETOROLAC TROMETHAMINE 30 MG: 30 INJECTION, SOLUTION INTRAMUSCULAR at 18:36

## 2017-10-06 RX ADMIN — ROPIVACAINE HYDROCHLORIDE: 2 INJECTION, SOLUTION EPIDURAL; INFILTRATION at 11:58

## 2017-10-06 RX ADMIN — LIDOCAINE HYDROCHLORIDE AND EPINEPHRINE 5 ML: 15; 5 INJECTION, SOLUTION EPIDURAL at 15:39

## 2017-10-06 RX ADMIN — FENTANYL CITRATE 25 MCG: 50 INJECTION INTRAMUSCULAR; INTRAVENOUS at 19:42

## 2017-10-06 RX ADMIN — ONDANSETRON HYDROCHLORIDE 4 MG: 2 INJECTION, SOLUTION INTRAVENOUS at 17:59

## 2017-10-06 RX ADMIN — SODIUM CHLORIDE 125 ML/HR: 0.9 INJECTION, SOLUTION INTRAVENOUS at 04:44

## 2017-10-06 RX ADMIN — GENTAMICIN SULFATE 150 MG: 40 INJECTION, SOLUTION INTRAMUSCULAR; INTRAVENOUS at 17:41

## 2017-10-06 RX ADMIN — BUTORPHANOL TARTRATE 1 MG: 1 INJECTION, SOLUTION INTRAMUSCULAR; INTRAVENOUS at 00:28

## 2017-10-06 RX ADMIN — Medication: at 22:16

## 2017-10-06 RX ADMIN — FENTANYL CITRATE 25 MCG: 50 INJECTION INTRAMUSCULAR; INTRAVENOUS at 19:11

## 2017-10-06 RX ADMIN — CLINDAMYCIN PHOSPHATE 900 MG: 18 INJECTION, SOLUTION INTRAMUSCULAR; INTRAVENOUS at 11:25

## 2017-10-06 RX ADMIN — ONDANSETRON 4 MG: 2 INJECTION INTRAMUSCULAR; INTRAVENOUS at 10:23

## 2017-10-06 RX ADMIN — SODIUM CHLORIDE 125 ML/HR: 0.9 INJECTION, SOLUTION INTRAVENOUS at 22:49

## 2017-10-06 RX ADMIN — MIDAZOLAM HYDROCHLORIDE 2 MG: 1 INJECTION, SOLUTION INTRAMUSCULAR; INTRAVENOUS at 18:08

## 2017-10-06 RX ADMIN — CLINDAMYCIN PHOSPHATE 900 MG: 18 INJECTION, SOLUTION INTRAMUSCULAR; INTRAVENOUS at 03:55

## 2017-10-06 RX ADMIN — Medication 2 MILLI-UNITS/MIN: at 00:15

## 2017-10-06 RX ADMIN — SODIUM CHLORIDE 125 ML/HR: 0.9 INJECTION, SOLUTION INTRAVENOUS at 00:28

## 2017-10-06 NOTE — OB LABOR/OXYTOCIN SAFETY PROGRESS
Patient was seen and evaluated by attending physician, Dr Bill Beyer  Patient is comfortable with epidural      Pitocin is currently at rate of 4 milliunits/min  FHT have improved with baseline of 120bpm, moderate variability  Intermittent late decelerations were observed earlier, but have since resolved  Currently FHT category 1  SVE 4-5/80/0  Doctor Phillips: q2-3min    Will continue with pitocin titration for induction of labor  Close monitoring of fetal heart tones  Kia Ramos MD  OBGYN, PGY3  10/6/2017 7:31 AM

## 2017-10-06 NOTE — OP NOTE
OPERATIVE REPORT  PATIENT NAME: Rhonda King    :  1985  MRN: [id number]  Pt Location: [facility] OR ROOM 01    SURGERY DATE: 10/6/2017    Surgeon(s) and Role:     * Gary Malave MD - Primary     * Jose Daniel Collier MD - Co-surgeon    Preop Diagnosis:  Pregnancy at 44 weeks  Gestational Hypertension  Gestational Diabetes Controlled with Insulin  Arrest of Dilatation  Fetal Intolerance to Labor  Intermittent Category 2 tracing    Post-Op Diagnosis  Same  Occiput transverse    Procedure(s) (LRB):   SECTION () (N/A)  Primary  Section via Pfannensteil skin incision    Specimen(s):  Arterial cord blood gas  Venous Cord blood gas  Cord Blood  Placenta to Pathology    Estimated Blood Loss:   700 mL    Drains:  Urethral Catheter 16 Fr  (Active)   Site Assessment Clean;Skin intact; Patent 10/6/2017  2:30 PM   Collection Container Leg bag 10/6/2017  3:00 PM   Output (mL) 200 mL 10/6/2017  5:25 PM   Number of days: 0       Anesthesia Type:   Epidural    Operative Indications:  Arrest of Dilatation  Fetal Intolerance to Labor  Intermittent Category 2 tracing    Operative Findings:  Female, occiput transverse  Normal uterus, tubes and ovaries  Clear amniotic fluid  No nuchal cord    Complications:   None    Procedure and Technique:    The patient was correctly identified and was taken to the OR where  epidural anesthesia was found to be adequate  A Medrano catheter was aseptically placed preoperatively during her labor course  She also had pneumatic compression boots placed   Her abdomen was then prepped using Chloraprep and was draped in the normal sterile fashion in the dorsal supine position with a leftward tilt   Anesthesia was tested and was found to be adequate          A Pfannensteil skin incision was then made with the scapel and carried through to the underlying layer of fascia with the scalpel       The fascia was incised in the midline and the incision extended laterally with the Albarado scissors   The superior aspect of the fascial incision was grasped with a pair of Kochers, elevated, and the underlying rectus muscles dissected off bluntly   The rectus muscles were then  in the midline, and the peritoneum identified, tented up, and entered sharply with the Metzenbaum scissors   The peritoneal incision was extended superiorly and inferiorly with good visualization of the bladder   The bladder blade was then inserted and the vesicouterine peritoneum identified, grasped with the pick-ups and entered sharply with the Metzenbaum scissors   The incision was then extended laterally and the bladder flap created digitally  The bladder blade was then reinserted and the lower uterine segment incised in a transverse fashion with the scalpel   Upon reaching the level of the membranes, the membranes were ruptured using a clamp  Clear fluid was noted  The vertex was presenting  The uterine incision was extended laterally in a curvilinear fashion by blunt dlssection  The bladder blade was removed and the infant's head was grasped, stabilized and was delivered through the uterine incision using gentle fundal pressure   The rest of the body was delivered without difficulty  There was no shoulder dystocia encountered  The nose and mouth were bulb suctioned, and the cord was doubly clamped and cut  The infant had spontaneous cry,color and tone  The infant was handed to a waiting nurse resuscitator  The fetus was delivered atraumatically  Cord gases, both arterial & venous as well as cord blood was sent to pathology for analysis  The placenta was gently removed from the patients uterus using gentle traction  Oxytocin infusion was started at this point to control postpartum bleeding and uterine atony  The uterus was repaired in situ due to patient discomfort  Hudson retractor was placed  Interiorly it was cleared of all clots and debris and curetted using a sponge       The uterus was closed in a 3 layer fashion  The first layer was repaired with  0 Vicryl in a running, interlocking stitch   The second layer was closed using an imbricating stitch of the same suture was used to obtain excellent hemostasis    3 o Vicryl was used to re approximated the uterine serosa  [place] pelvic lavage was performed at the posterior culdesac and this area was carefully suctioned and cleared of all clots  The uterus,bilateral tubes and ovaries appeared normal   The paracolic gutters were lavaged with warm saline, aspirated with a suction and cleared of all clots  Careful inspection of the uterine incision line revealed excellent hemostasis       The rectus muscles were re-approximated using 3 0 Vicryl  The fascia was reapproximated with 0 Vicryl in a running fashion       The subcutaneous layer was flushed with warm NSS  Hemostasis of bleeding small blood vessels was done using electrocautery  Subcutaneous layer was reappoximated with interrupted sutures using Vicryl 3 0  The skin was closed using running subcuticular sutures of Monocryl 4 0  Histoacryl was applied over the skin incision  Drapes were removed and patient was cleansed  Sterile dressing was applied  Medrano catheter was draining clear urine  The patient tolerated the procedure well  Sponge,instrument,needle counts were correct times 2            I was present for the entire procedure and A qualified resident physician was not available    Patient Disposition:  PACU  and hemodynamically stable    SIGNATURE: Anne Carrington MD  DATE: October 6, 2017  TIME: 6:57 PM

## 2017-10-06 NOTE — OB LABOR/OXYTOCIN SAFETY PROGRESS
Patient continues to be more more uncomfortable with contractions  Pitocin still at rate of 2 milliunits per minute  Patient reports short relief from contraction pain with IV medications stable and Phenergan  She is requesting epidural placement at this time for pain control  SVE:  3/70/-2  [place]:  Q 1-2 minutes  FHT:  120 bpm, moderate variability, no deceleration visualized, accelerations noted  Category 1    Will consult with anesthesia for epidural placement  While waiting for epidural placement will place hold on Pitocin for patient comfort and relief from uterine contractions  Patient evaluation plan discussed with Dr Whitney Arroyo MD  OBGYN, PGY3  10/6/2017 2:42 AM

## 2017-10-06 NOTE — PROGRESS NOTES
Fetal deceleration was observed on fetal heart tracings  Fetal deceleration began at 03:54 with deceleration kobe down to 65 bpm   Fetal heart tones return to baseline 130 beats per minute for approximately 40 seconds prior to having 2nd deceleration down to 70 beats per minute  Fetal heart tones did not return to baseline and deceleration was prolonged for approximately for 5 minutes  Prior to the occurrence of prolonged decelerations, late decelerations were observed and fetal heart tracings  Maternal repositioning and increase in IV fluids were being done and Pitocin had been turned off  Pitocin a previously been at rate of 2 milliunits per minute  During deceleration maternal resuscitation measures were performed with maternal repositioning,  supplemental O2 administered, increase in IV fluids  Cervical exam noted patient was dilated to 3-4 cm, 70% effacement, -2 station of fetus  With those resuscitative efforts not producing return to baseline of fetal heart tones, patient was repositioned to hands and knees  Fetal heart tones were difficult to assess severe external Doppler  At that time attending physician was paged to be alerted of situation and fetal scalp lead was attempted to be placed in order to assess more accurately fetal heart tones  Patient was repositioned on side for placement of fetal scalp lead fetal scalp lead was a site successfully placed however was not transmitting fetal heart tones consistently  External Doppler was replaced and fetal heart tones were noted to returned to baseline of 130 beats per minute with moderate variability  Situation was discussed with attending and plan going forward was to continue to hold Pitocin to allow for recovery of fetus  We will continue to closely monitor fetal heart tones as well as uterine contractions    Consider resumption of Pitocin for induction of labor once fetal heart tones returned to category 1 fetal heart tracings with moderate variability and normal baseline  Kia Rodriguez MD  OBGYN, PGY3  10/6/2017 5:15 AM

## 2017-10-06 NOTE — OB LABOR/OXYTOCIN SAFETY PROGRESS
Oxytocin Safety Progress Check Note - Matthew Cornell 33 y o  female MRN: [id number]    Unit/Bed#: L&D 321-01 Encounter: [id number]    Obstetric History       T0      L0     SAB0   TAB0   Ectopic0   Multiple0   Live Births0      Gestational Age: 39w0d  Dose (christen-units/min) Oxytocin: 8 christen-units/min  Contraction Frequency (minutes): 1-3  Contraction Quality: Moderate, Strong  Tachysystole: No   Dilation: 5        Effacement (%): 90  Station: 0  Baseline Rate: 120 bpm  Fetal Heart Rate: 140 BPM  FHR Category: Category II          Notes/comments:   Pt comfortable with epidural  Making small amounts of cervical change  FHT with prolonged variable deceleration at 1011 to 90s for 6 minutes  Maternal position changes, O2, IV fluid bolus, and pitocin held  Terbutaline obtained and in room but not administered as fetus recovered upon its arrival  Pitocin held at this time  There will be a Cat II huddle performed as soon as Dr Bhavna Tapia can be notified; he is currently in the OR  Plan at this time is to hold pitocin to give mother and fetus time to recover, then restart at half the previous dose and titrate accordingly  Recheck in 1-2hr, sooner if uncomfortable             [facility],  10/6/2017 10:23 AM

## 2017-10-06 NOTE — PROGRESS NOTES
Patient with minimal to no cervical change since 10:23AM   Fetal heart rate tracing also now Category 1 but has had Category 2 intermittently throughout the day  Patient with SROM since 8 am with oxytocin on and off throughout the day due to intermittent prolonged decelerations  Patient also with a very edematous cervix and caput noted on exam   Patient now exhausted and requests delivery by  section and refuses continued trial of labor  Because, fetus has exhibited fetal intolerance to labor, with category 2 tracing in the latent phase of labor and no cervical change for over 6 hours, I agreed to delivery by   Discussed with pt risks and benefits and possible complications

## 2017-10-06 NOTE — OB LABOR/OXYTOCIN SAFETY PROGRESS
Oxytocin Safety Progress Check Note - Santoervni Subhash 33 y o  female MRN: [id number]    Unit/Bed#: L&D 321-01 Encounter: [id number]    Obstetric History       T0      L0     SAB0   TAB0   Ectopic0   Multiple0   Live Births0      Gestational Age: 39w0d  Dose (christen-units/min) Oxytocin: 0 christen-units/min  Contraction Frequency (minutes): 3-4  Contraction Quality: Moderate, Strong  Tachysystole: No   Dilation: 5        Effacement (%): 80  Station: 0  Baseline Rate: 125 bpm  Fetal Heart Rate: 130 BPM  FHR Category: Category I          Notes/comments:   Pt uncomfortable with contractions, feeling rectal pressure  Minimal cervical change appreciated  Pit recently restarted  FHT Cat I currently  There was an episode of minimal variability with compensatory tachycardia after most recent variable deceleration   Now with moderate variability and accelerations with fetal scalp stimulation  Recheck in 1-2hr, sooner if uncomfortable      Carmen Bucio DO 10/6/2017 11:58 AM

## 2017-10-06 NOTE — DISCHARGE INSTRUCTIONS
Section   WHAT YOU SHOULD KNOW:   A  delivery, or , is abdominal surgery to deliver your baby  There are many reasons you may need a   · A  may be scheduled before labor if you had a  with your last baby  It may be scheduled if your baby is not positioned normally, or you are pregnant with more than 1 baby  · Your caregiver may perform an emergency  during labor to prevent life-threatening complications for you or your baby  A  may be done if your cervix does not dilate after several hours of active labor  · Other reasons for a  include maternal infections and problems with the placenta  AFTER YOU LEAVE:   Medicines:   · Prescription pain medicine  may be given  Ask how to take this medicine safely  · Acetaminophen  decreases pain and fever  It is available without a doctor's order  Ask how much to take and how often to take it  Follow directions  Acetaminophen can cause liver damage if not taken correctly  · NSAIDs  help decrease swelling and pain or fever  This medicine is available with or without a doctor's order  NSAIDs can cause stomach bleeding or kidney problems in certain people  If you take blood thinner medicine, always ask your obstetrician if NSAIDs are safe for you  Always read the medicine label and follow directions  · Take your medicine as directed  Contact your obstetrician (OB) if you think your medicine is not helping or if you have side effects  Tell him if you are allergic to any medicine  Keep a list of the medicines, vitamins, and herbs you take  Include the amounts, and when and why you take them  Bring the list or the pill bottles to follow-up visits  Carry your medicine list with you in case of an emergency  Follow up with your OB as directed: You may need to return to have your stitches or staples removed   Write down your questions so you remember to ask them during your visits  Wound care:  Carefully wash your wound with soap and water every day  Keep your wound clean and dry  Wear loose, comfortable clothes that do not rub against your wound  Ask your OB about bathing and showering  Drink plenty of liquids: You can lower your risk for a blood clot if you drink plenty of liquids  Ask how much liquid to drink each day and which liquids are best for you  Limit activity until you have fully recovered from surgery:   · Ask when it is safe for you to drive, walk up stairs, lift heavy objects, and have sex  · Ask when it is okay to exercise, and what types of exercise to do  Start slowly and do more as you get stronger  Contact your OB if:   · You have heavy vaginal bleeding that fills 1 or more sanitary pads in 1 hour  · You have a fever  · Your incision is swollen, red, or draining pus  · You have questions or concerns about yourself or your baby  Seek care immediately or call 911 if:   · Blood soaks through your bandage  · Your stitches come apart  · You feel lightheaded, short of breath, and have chest pain  · You cough up blood  · Your arm or leg feels warm, tender, and painful  It may look swollen and red  © 2014 [address] is for End User's use only and may not be sold, redistributed or otherwise used for commercial purposes  All illustrations and images included in CareNotes® are the copyrighted property of A D A M [employer] Inc  or [employer]  The above information is an  only  It is not intended as medical advice for individual conditions or treatments  Talk to your doctor, nurse or pharmacist before following any medical regimen to see if it is safe and effective for you

## 2017-10-06 NOTE — PROGRESS NOTES
Cat II Huddle    In attendance:  Dr Nohemi Velasquez Model    Pt had a prolonged deceleration to 90s for 6 minutes  Maternal position changes, O2, fluid bolus, and SVE obtained  Making small amounts of change  Pitocin stopped  Terbutaline obtained, in room but not given as pt recovered  Compensatory tachycardia present with minimal variability  Will let fetus recover and attempt another trial of labor  All in agreement with plan      Erum Gillette DO  OB/GYN, PGY2  10/6/2017, 10:35 AM

## 2017-10-06 NOTE — ANESTHESIA POSTPROCEDURE EVALUATION
Post-Op Assessment Note      CV Status:  Stable    Mental Status:  Alert and awake    Hydration Status:  Euvolemic    PONV Controlled:  Controlled    Airway Patency:  Patent    Post Op Vitals Reviewed: Yes          Staff: Anesthesiologist     Post-op block assessment: no complications        BP     Temp      Pulse     Resp      SpO2

## 2017-10-06 NOTE — OB LABOR/OXYTOCIN SAFETY PROGRESS
Patient seen and evaluated at both 20:00 and 22:00  Patient had received 1 dose of misoprostol 25 mcg per vagina at 16:00 today  Since that time patient states that her contractions began and have become closer together and more uncomfortable  She notes good fetal movement  SVE:  1-2/60/-2, soft, midposition  [place]:  Q 1-2 minutes, lasting approximately 60-90 seconds in length  FHT:  115 BP a m , moderate variability, 15 x 15 accelerations, no decelerations visualized  Category 1    Due to frequent contractions during evaluation at 20:00, 2nd dose of Cytotec was not placed and patient was observed for 2 hours prior to her evaluation at 22:00  At 22:00 patient notes that her contractions continued to be every still couple minutes and have become stronger  Cervical exam remained the same at 1-2 cm dilation  Patient evaluation was discussed with her primary OB gyn, Dr Catie Sabillon  At this time we will start low-dose Pitocin at a rate of 2 milliunits per minute for continued cervical ripening  Fetal heart tone tracing to be changed to continuous monitoring due to administration of Pitocin infusion  Kia La MD  OBGYN, PGY3  10/5/2017 10:37 PM

## 2017-10-06 NOTE — OB LABOR/OXYTOCIN SAFETY PROGRESS
Oxytocin Safety Progress Check Note - Sheba Singh 33 y o  female MRN: [id number]    Unit/Bed#: L&D 321-01 Encounter: [id number]    Obstetric History       T0      L0     SAB0   TAB0   Ectopic0   Multiple0   Live Births0      Gestational Age: 39w0d  Dose (christen-units/min) Oxytocin: 6 christen-units/min  Contraction Frequency (minutes): 4-5  Contraction Quality: Moderate  Tachysystole: No   Dilation: 5        Effacement (%): 90  Station: -1  Baseline Rate: 120 bpm  Fetal Heart Rate: 130 BPM  FHR Category: Category II          Notes/comments:   Pt with intermittent category 2 tracing with minimal change  Fetus is unable to tolerate levels of greater than 8 mu/min of oxytocin  Off oxytocin pt has a category 1 tracing  I discussed with pt high likelihood of needing CS for fetal intolerance to labor  Pt would like to try one more trial of oxytocin      Provider Notified: Yes  Provider Name: Dr Devin Hodgson MD 10/6/2017 2:10 PM

## 2017-10-06 NOTE — ANESTHESIA PROCEDURE NOTES
Epidural Block    Patient location during procedure: OB  Start time: 10/6/2017 3:38 PM  Staffing  Anesthesiologist: Lawrence Peters  Performed: anesthesiologist   Preanesthetic Checklist  Completed: patient identified, site marked, surgical consent, pre-op evaluation, timeout performed, IV checked, risks and benefits discussed and monitors and equipment checked  Epidural  Patient position: sitting  Prep: Betadine  Patient monitoring: frequent blood pressure checks  Approach: midline  Location: lumbar (1-5)  Injection technique: BRENDEN saline  Needle  Needle type: Tuohy   Needle gauge: 18 G  Catheter type: side hole  Catheter size: 20 G  Catheter at skin depth: 12 cm  Test dose: negative and lidocaine 1 5% with epinephrine 1-to-200,000negative aspiration for heme, negative aspiration for CSF and no paresthesia on injection  patient tolerated the procedure well with no immediate complications

## 2017-10-06 NOTE — DISCHARGE SUMMARY
Discharge Summary -   Rajan Rodriguez 33 y o  female MRN: [id number]  Unit/Bed#: LD OR  Encounter: [id number]    Admission Date: 10/5/2017     Discharge Date: 10/9/17    Attending: Belle Billy MD    Principal Diagnosis: Encounter for full-term uncomplicated delivery [M67]    Secondary Diagnosis: Gestational HTN  Gestational DM  Arrest of dilation  Category 2 tracing  Fetal intolerance to labor    Procedures: primary  section, low transverse incision    Hospital Course: Patient delivered via uncomplicated 1LTCS 2/2 fetal intolerance to labor and arrest of dilation and underwent normal post delivery care  She is ambulating well, voiding on her own, passing flatus, and tolerating oral intake  Will require 6 week oral glucose challenge 6 weeks postpartum  Her BPs continued to stay elevated though non-severe range  She was discharged home on nifedepine 30 Xl daily  Results from last 7 days  Lab Units 10/07/17  0902 10/05/17  1413   WBC Thousand/uL 16 33* 12 47*   HEMOGLOBIN g/dL 11 5 13 7   PLATELETS Thousands/uL 380* 854*     Complications: None    Condition at discharge: stable    Discharge Medications: For a complete list of the patient's medications, please refer to her med rec  Discharge instructions/Information to patient and family:   See after visit summary for information provided to patient and family  Provisions for Follow-Up Care:  See after visit summary for information related to follow-up care and any pertinent home health orders  Disposition: See After Visit Summary for discharge disposition information      Planned Readmission: No

## 2017-10-06 NOTE — PROGRESS NOTES
Alerted by nursing the patient becoming more uncomfortable with contractions  The patient is now requesting medication for pain  Pitocin infusion at rate of 2 milliunits per minute was initiated at 00:00  Due to patient request for pain medications, 1 mg stadol and 25mg Phenergan were administered intravenously for pain control  Will recheck patient if she becomes more uncomfortable, requires more pain medication, or other concerns for maternal or fetal distress  Kia Coleman MD  OBGYN, PGY3  10/6/2017 12:40 AM

## 2017-10-06 NOTE — OB LABOR/OXYTOCIN SAFETY PROGRESS
Oxytocin Safety Progress Check Note - Telly Bey 33 y o  female MRN: [id number]    Unit/Bed#: L&D 321-01 Encounter: [id number]    Obstetric History       T0      L0     SAB0   TAB0   Ectopic0   Multiple0   Live Births0      Gestational Age: 39w0d  Dose (christen-units/min) Oxytocin: 5 christen-units/min  Contraction Frequency (minutes): 4-5  Contraction Quality: Moderate  Tachysystole: No   Dilation: 5-6        Effacement (%): 80  Station: 0  Baseline Rate: 120 bpm  Fetal Heart Rate: 130 BPM  FHR Category: Category I          Notes/comments:   Pt with minimal cervical change  MVUS 180s, fetal strip Category 1  Continue oxytocin      Provider Notified: Yes  Provider Name: Dr Meseret Copeland MD 10/6/2017 3:37 PM

## 2017-10-06 NOTE — OB LABOR/OXYTOCIN SAFETY PROGRESS
Oxytocin Safety Progress Check Note - Rajan Rodriguez 33 y o  female MRN: [id number]    Unit/Bed#: L&D 321-01 Encounter: [id number]    Obstetric History       T0      L0     SAB0   TAB0   Ectopic0   Multiple0   Live Births0      Gestational Age: 39w0d  Dose (christen-units/min) Oxytocin: 5 christen-units/min  Contraction Frequency (minutes): 1-3  Contraction Quality: Moderate, Strong  Tachysystole: No   Dilation: 4-5        Effacement (%): 80  Station: 0  Baseline Rate: 120 bpm  Fetal Heart Rate: 125 BPM  FHR Category: Category I          Notes/comments:   Pt feeling uncomfortable with contractions despite epidural  Pt just received a bolus from anesthesia  Minimal cervical change  Pt felt large gush of fluid  FSE was placed earlier for ruptured membranes at 0500; this was her first large gush since placement  FHT Cat I currently  Occ variable decelerations noted earlier, however, these appear to have resolved    Cont gentle pitocin titration  Recheck in 2hr, sooner if uncomfortable          Marquita Ellis DO 10/6/2017 8:50 AM

## 2017-10-06 NOTE — OB LABOR/OXYTOCIN SAFETY PROGRESS
Oxytocin Safety Progress Check Note - Sheba Singh 33 y o  female MRN: [id number]    Unit/Bed#: L&D 321-01 Encounter: [id number]    Obstetric History       T0      L0     SAB0   TAB0   Ectopic0   Multiple0   Live Births0      Gestational Age: 39w0d  Dose (christen-units/min) Oxytocin: 9 christen-units/min  Contraction Frequency (minutes): 6-8  Contraction Quality: Moderate  Tachysystole: No   Dilation: 5-6        Effacement (%): 80  Station: 0  Baseline Rate: 120 bpm  Fetal Heart Rate: 120 BPM  FHR Category: Category I          Notes/comments:   No cervical change    Provider Notified: Yes  Provider Name: Dr Devin Hodgson MD 10/6/2017 5:07 PM

## 2017-10-06 NOTE — ANESTHESIA PREPROCEDURE EVALUATION
Review of Systems/Medical History  Patient summary reviewed        Cardiovascular  Negative cardio ROS Hypertension controlled,    Pulmonary  Negative pulmonary ROS ,        GI/Hepatic  Negative GI/hepatic ROS          Negative  ROS        Endo/Other  Negative endo/other ROS Diabetes well controlled gestational ,      GYN  Negative gynecology ROS          Hematology  Negative hematology ROS      Musculoskeletal  Negative musculoskeletal ROS        Neurology  Negative neurology ROS      Psychology   Negative psychology ROS            Physical Exam    Airway    Mallampati score: II  TM Distance: >3 FB  Neck ROM: full     Dental       Cardiovascular  Comment: Negative ROS, Cardiovascular exam normal    Pulmonary  Pulmonary exam normal     Other Findings        Anesthesia Plan  ASA Score- 3       Anesthesia Type- epidural        Induction-       Informed Consent  Anesthetic plan and risks discussed with patient

## 2017-10-06 NOTE — ANESTHESIA PROCEDURE NOTES
Epidural Block    Patient location during procedure: OB  Start time: 10/6/2017 2:56 AM  Reason for block: procedure for pain, at surgeon's request, post-op pain management and primary anesthetic  Staffing  Anesthesiologist: Sohail Murillo  Performed: anesthesiologist   Preanesthetic Checklist  Completed: patient identified, site marked, surgical consent, pre-op evaluation, timeout performed, IV checked, risks and benefits discussed and monitors and equipment checked  Epidural  Patient position: sitting  Prep: ChloraPrep  Patient monitoring: cardiac monitor and frequent blood pressure checks  Approach: midline  Location: lumbar (1-5)  Injection technique: BRENDEN air  Needle  Needle type: Tuohy   Needle gauge: 18 G  Catheter type: side hole  Catheter size: 20 G  Test dose: negative and lidocaine 1 5% with epinephrine 1-to-200,000  Assessment  Sensory level: [id number] aspiration for CSF, negative aspiration for heme and no paresthesia on injection  patient tolerated the procedure well with no immediate complications

## 2017-10-06 NOTE — OB LABOR/OXYTOCIN SAFETY PROGRESS
Oxytocin Safety Progress Check Note - Violetat Beard 33 y o  female MRN: [id number]    Unit/Bed#: L&D 321-01 Encounter: [id number]    Obstetric History       T0      L0     SAB0   TAB0   Ectopic0   Multiple0   Live Births0      Gestational Age: 39w0d  Dose (christen-units/min) Oxytocin: 8 christen-units/min  Contraction Frequency (minutes): 1-3  Contraction Quality: Moderate, Strong  Tachysystole: No   Dilation: 5        Effacement (%): 90  Station: 0  Baseline Rate: 120 bpm  Fetal Heart Rate: 130 BPM  FHR Category: Category I          Notes/comments:   Pt comfortable  Pitocin off  Having difficulty tracing FHT, FSE placed   Unchanged from previous exam  Will continue to closely monitor    Edilia Torre DO  OB/GYN, PGY2  10/6/2017, 10:48 AM            Edilia Torre DO 10/6/2017 10:47 AM

## 2017-10-07 LAB
BASOPHILS # BLD AUTO: 0.02 THOUSANDS/ΜL (ref 0–0.1)
BASOPHILS NFR BLD AUTO: 0 % (ref 0–1)
EOSINOPHIL # BLD AUTO: 0.04 THOUSAND/ΜL (ref 0–0.61)
EOSINOPHIL NFR BLD AUTO: 0 % (ref 0–6)
ERYTHROCYTE [DISTWIDTH] IN BLOOD BY AUTOMATED COUNT: 13.8 % (ref 11.6–15.1)
HCT VFR BLD AUTO: 34.5 % (ref 34.8–46.1)
HGB BLD-MCNC: 11.5 G/DL (ref 11.5–15.4)
LYMPHOCYTES # BLD AUTO: 2.5 THOUSANDS/ΜL (ref 0.6–4.47)
LYMPHOCYTES NFR BLD AUTO: 15 % (ref 14–44)
MCH RBC QN AUTO: 29.7 PG (ref 26.8–34.3)
MCHC RBC AUTO-ENTMCNC: 33.3 G/DL (ref 31.4–37.4)
MCV RBC AUTO: 89 FL (ref 82–98)
MONOCYTES # BLD AUTO: 1.13 THOUSAND/ΜL (ref 0.17–1.22)
MONOCYTES NFR BLD AUTO: 7 % (ref 4–12)
NEUTROPHILS # BLD AUTO: 12.64 THOUSANDS/ΜL (ref 1.85–7.62)
NEUTS SEG NFR BLD AUTO: 78 % (ref 43–75)
NRBC BLD AUTO-RTO: 0 /100 WBCS
PLATELET # BLD AUTO: 120 THOUSANDS/UL (ref 149–390)
PMV BLD AUTO: 12.4 FL (ref 8.9–12.7)
RBC # BLD AUTO: 3.87 MILLION/UL (ref 3.81–5.12)
WBC # BLD AUTO: 16.33 THOUSAND/UL (ref 4.31–10.16)

## 2017-10-07 PROCEDURE — 85025 COMPLETE CBC W/AUTO DIFF WBC: CPT | Performed by: OBSTETRICS & GYNECOLOGY

## 2017-10-07 RX ORDER — OXYCODONE HYDROCHLORIDE AND ACETAMINOPHEN 5; 325 MG/1; MG/1
2 TABLET ORAL EVERY 4 HOURS PRN
Status: DISCONTINUED | OUTPATIENT
Start: 2017-10-07 | End: 2017-10-09 | Stop reason: HOSPADM

## 2017-10-07 RX ORDER — KETOROLAC TROMETHAMINE 30 MG/ML
30 INJECTION, SOLUTION INTRAMUSCULAR; INTRAVENOUS EVERY 6 HOURS SCHEDULED
Status: COMPLETED | OUTPATIENT
Start: 2017-10-07 | End: 2017-10-08

## 2017-10-07 RX ORDER — OXYCODONE HYDROCHLORIDE AND ACETAMINOPHEN 5; 325 MG/1; MG/1
1 TABLET ORAL EVERY 4 HOURS PRN
Status: DISCONTINUED | OUTPATIENT
Start: 2017-10-07 | End: 2017-10-09 | Stop reason: HOSPADM

## 2017-10-07 RX ORDER — KETOROLAC TROMETHAMINE 30 MG/ML
30 INJECTION, SOLUTION INTRAMUSCULAR; INTRAVENOUS EVERY 6 HOURS PRN
Status: DISPENSED | OUTPATIENT
Start: 2017-10-07 | End: 2017-10-08

## 2017-10-07 RX ORDER — HYDROMORPHONE HCL 110MG/55ML
1 PATIENT CONTROLLED ANALGESIA SYRINGE INTRAVENOUS EVERY 6 HOURS PRN
Status: DISCONTINUED | OUTPATIENT
Start: 2017-10-07 | End: 2017-10-09 | Stop reason: HOSPADM

## 2017-10-07 RX ADMIN — DOCUSATE SODIUM 100 MG: 100 CAPSULE, LIQUID FILLED ORAL at 08:12

## 2017-10-07 RX ADMIN — KETOROLAC TROMETHAMINE 30 MG: 30 INJECTION, SOLUTION INTRAMUSCULAR at 20:02

## 2017-10-07 RX ADMIN — OXYCODONE HYDROCHLORIDE AND ACETAMINOPHEN 2 TABLET: 5; 325 TABLET ORAL at 20:56

## 2017-10-07 RX ADMIN — KETOROLAC TROMETHAMINE 30 MG: 30 INJECTION, SOLUTION INTRAMUSCULAR at 14:20

## 2017-10-07 RX ADMIN — DOCUSATE SODIUM 100 MG: 100 CAPSULE, LIQUID FILLED ORAL at 17:05

## 2017-10-07 RX ADMIN — OXYCODONE HYDROCHLORIDE AND ACETAMINOPHEN 2 TABLET: 5; 325 TABLET ORAL at 08:11

## 2017-10-07 RX ADMIN — SODIUM CHLORIDE 125 ML/HR: 0.9 INJECTION, SOLUTION INTRAVENOUS at 06:25

## 2017-10-07 RX ADMIN — OXYCODONE HYDROCHLORIDE AND ACETAMINOPHEN 2 TABLET: 5; 325 TABLET ORAL at 12:44

## 2017-10-07 RX ADMIN — OXYCODONE HYDROCHLORIDE AND ACETAMINOPHEN 2 TABLET: 5; 325 TABLET ORAL at 17:05

## 2017-10-07 RX ADMIN — KETOROLAC TROMETHAMINE 30 MG: 30 INJECTION, SOLUTION INTRAMUSCULAR at 07:58

## 2017-10-07 NOTE — PROGRESS NOTES
Progress Note - OB/GYN   Avery Rico 33 y o  female MRN: [id number]  Unit/Bed#: L&D 309-01 Encounter: [id number]    Subjective/Objective   Chief Complaint: Postpartum    Subjective: Pain is controlled with current analgesics  Medications being used: narcotic analgesics including Percocet  Eating a regular diet without difficulty  Flatus yes  Bowel movements absent    Voiding without difficulty  Ambulating yes Breastfeeding yes     Objective:    Vitals: Blood pressure 159/98, pulse 85, temperature 98 5 °F (36 9 °C), temperature source Oral, resp  rate 16, height 5' 3" (1 6 m), weight 97 5 kg (215 lb), last menstrual period 01/04/2016, SpO2 97 %, currently breastfeeding  ,Body mass index is 38 09 kg/m²  Intake/Output Summary (Last 24 hours) at 10/07/17 1002  Last data filed at 10/07/17 0201   Gross per 24 hour   Intake           2400 6 ml   Output             2950 ml   Net           -549 4 ml       Invasive Devices     Peripheral Intravenous Line            Peripheral IV 49038 days    Peripheral IV 10/06/17 Left Hand less than 1 day    Peripheral IV 10/06/17 Right Hand less than 1 day          Drain            Urethral Catheter 16 Fr  1 day                Physical Exam:  GEN: Avery Rico appears well, alert and oriented x 3, pleasant and cooperative      ABDOMEN: soft, no tenderness, no distention, Fundus non-tender, Incision  clean, dry, and intact  :Lochia minimal   EXTREMITIES: peripheral pulses normal; no clubbing, cyanosis, or edema  Negative Laurel's sign        Labs:   Admission on 10/05/2017   Component Date Value    WBC 10/05/2017 12 47*    RBC 10/05/2017 4 64     Hemoglobin 10/05/2017 13 7     Hematocrit 10/05/2017 40 1     MCV 10/05/2017 86     MCH 10/05/2017 29 5     MCHC 10/05/2017 34 2     RDW 10/05/2017 13 2     MPV 10/05/2017 12 8*    Platelets 05/26/6886 142*    nRBC 10/05/2017 0     Neutrophils Relative 10/05/2017 71     Lymphocytes Relative 10/05/2017 21     Monocytes Relative 10/05/2017 7     Eosinophils Relative 10/05/2017 1     Basophils Relative 10/05/2017 0     Neutrophils Absolute 10/05/2017 8 99*    Lymphocytes Absolute 10/05/2017 2 56     Monocytes Absolute 10/05/2017 0 83     Eosinophils Absolute 10/05/2017 0 07     Basophils Absolute 10/05/2017 0 02     Sodium 10/05/2017 136     Potassium 10/05/2017 4 0     Chloride 10/05/2017 104     CO2 10/05/2017 23     Anion Gap 10/05/2017 9     BUN 10/05/2017 15     Creatinine 10/05/2017 0 55*    Glucose 10/05/2017 71     Calcium 10/05/2017 9 0     AST 10/05/2017 24     ALT 10/05/2017 15     Alkaline Phosphatase 10/05/2017 186*    Total Protein 10/05/2017 6 5     Albumin 10/05/2017 2 7*    Total Bilirubin 10/05/2017 0 18*    eGFR 10/05/2017 125     Color, UA 10/05/2017 Yellow     Clarity, UA 10/05/2017 Clear     Specific Gravity, UA 10/05/2017 1 010     pH, UA 10/05/2017 5 5     Leukocytes, UA 10/05/2017 Negative     Nitrite, UA 10/05/2017 Negative     Protein, UA 10/05/2017 Negative     Glucose, UA 10/05/2017 Negative     Ketones, UA 10/05/2017 Negative     Urobilinogen, UA 10/05/2017 0 2     Bilirubin, UA 10/05/2017 Negative     Blood, UA 10/05/2017 Negative     Creatinine, Ur 10/05/2017 58 3     Protein Urine Random 10/05/2017 17     Prot/Creat Ratio, Ur 10/05/2017 0 29*    RPR 10/06/2017 Non-Reactive     ABO Grouping 10/05/2017 O     Rh Factor 10/05/2017 Positive     Antibody Screen 10/05/2017 Negative     Specimen Expiration Date 10/05/2017 [id number]     POC Glucose 10/06/2017 42*    POC Glucose 10/06/2017 72     pH, Cord Javier 10/06/2017 7 410     pCO2, Cord Javier 10/06/2017 32 7     pO2, Cord Javier 10/06/2017 33 3     HCO3, Cord Javier 10/06/2017 20 3     Base Exc, Cord Javier 10/06/2017 -3 3*    O2 Cont, Cord Javier 10/06/2017 17 4     O2 HGB,VENOUS CORD 10/06/2017 77 1     POC Glucose 10/06/2017 51*    POC Glucose 10/06/2017 52*    POC Glucose 10/06/2017 74     POC Glucose 10/06/2017 67     POC Glucose 10/06/2017 60*    POC Glucose 10/06/2017 73     WBC 10/07/2017 16 33*    RBC 10/07/2017 3 87     Hemoglobin 10/07/2017 11 5     Hematocrit 10/07/2017 34 5*    MCV 10/07/2017 89     MCH 10/07/2017 29 7     MCHC 10/07/2017 33 3     RDW 10/07/2017 13 8     MPV 10/07/2017 12 4     Platelets 00/24/0647 120*    nRBC 10/07/2017 0     Neutrophils Relative 10/07/2017 78*    Lymphocytes Relative 10/07/2017 15     Monocytes Relative 10/07/2017 7     Eosinophils Relative 10/07/2017 0     Basophils Relative 10/07/2017 0     Neutrophils Absolute 10/07/2017 12 64*    Lymphocytes Absolute 10/07/2017 2 50     Monocytes Absolute 10/07/2017 1 13     Eosinophils Absolute 10/07/2017 0 04     Basophils Absolute 10/07/2017 0 02        Lab, Imaging and other studies: I have personally reviewed pertinent reports      MEDS:   Current Facility-Administered Medications   Medication Dose Route Frequency    acetaminophen (TYLENOL) tablet 650 mg  650 mg Oral Q6H PRN    benzocaine-menthol-lanolin-aloe (DERMOPLAST) 20-0 5 % topical spray 1 application  1 application Topical O4X PRN    calcium carbonate (TUMS) chewable tablet 1,000 mg  1,000 mg Oral Daily PRN    diphenhydrAMINE (BENADRYL) tablet 25 mg  25 mg Oral Q6H PRN    docusate sodium (COLACE) capsule 100 mg  100 mg Oral BID    hydrocortisone 1 % cream 1 application  1 application Topical Daily PRN    HYDROmorphone (DILAUDID) 2 mg/mL injection 1 mg  1 mg Intravenous Q6H PRN    ketorolac (TORADOL) 30 mg/mL injection 30 mg  30 mg Intravenous Q6H [address]    ketorolac (TORADOL) 30 mg/mL injection 30 mg  30 mg Intravenous Q6H PRN    naloxone (NARCAN) 0 04 mg/mL syringe 0 04 mg  0 04 mg Intravenous Q1MIN PRN    ondansetron (ZOFRAN) injection 4 mg  4 mg Intravenous Q8H PRN    oxyCODONE-acetaminophen (PERCOCET) 5-325 mg per tablet 1 tablet  1 tablet Oral Q4H PRN    oxyCODONE-acetaminophen (PERCOCET) 5-325 mg per tablet 2 tablet  2 tablet Oral Q4H PRN    ROPivacaine 0 2% PCEA   Epidural Continuous    sodium chloride 0 9 % infusion  125 mL/hr Intravenous Continuous    witch hazel-glycerin (TUCKS) topical pad 1 pad  1 pad Topical Q4H PRN         Assessment:  38 weeks gestation of pregnancy     Plan:  POD #1  normal diet as tolerated  activity as tolerated  Pain control is adequate with Percocet and Toradol  PCA pump was stopped  She was encouraged to ambulate as tolerated    Routine post  care

## 2017-10-08 RX ORDER — SIMETHICONE 80 MG
80 TABLET,CHEWABLE ORAL EVERY 6 HOURS PRN
Status: DISCONTINUED | OUTPATIENT
Start: 2017-10-08 | End: 2017-10-09 | Stop reason: HOSPADM

## 2017-10-08 RX ORDER — IBUPROFEN 600 MG/1
600 TABLET ORAL EVERY 6 HOURS PRN
Status: DISCONTINUED | OUTPATIENT
Start: 2017-10-08 | End: 2017-10-09 | Stop reason: HOSPADM

## 2017-10-08 RX ADMIN — IBUPROFEN 600 MG: 600 TABLET, FILM COATED ORAL at 22:52

## 2017-10-08 RX ADMIN — IBUPROFEN 600 MG: 600 TABLET, FILM COATED ORAL at 10:03

## 2017-10-08 RX ADMIN — OXYCODONE HYDROCHLORIDE AND ACETAMINOPHEN 2 TABLET: 5; 325 TABLET ORAL at 07:14

## 2017-10-08 RX ADMIN — OXYCODONE HYDROCHLORIDE AND ACETAMINOPHEN 2 TABLET: 5; 325 TABLET ORAL at 10:38

## 2017-10-08 RX ADMIN — OXYCODONE HYDROCHLORIDE AND ACETAMINOPHEN 2 TABLET: 5; 325 TABLET ORAL at 22:53

## 2017-10-08 RX ADMIN — IBUPROFEN 600 MG: 600 TABLET, FILM COATED ORAL at 15:59

## 2017-10-08 RX ADMIN — OXYCODONE HYDROCHLORIDE AND ACETAMINOPHEN 2 TABLET: 5; 325 TABLET ORAL at 14:28

## 2017-10-08 RX ADMIN — OXYCODONE HYDROCHLORIDE AND ACETAMINOPHEN 2 TABLET: 5; 325 TABLET ORAL at 18:26

## 2017-10-08 RX ADMIN — KETOROLAC TROMETHAMINE 30 MG: 30 INJECTION, SOLUTION INTRAMUSCULAR at 02:52

## 2017-10-08 RX ADMIN — HYDROMORPHONE HYDROCHLORIDE 1 MG: 2 INJECTION INTRAMUSCULAR; INTRAVENOUS; SUBCUTANEOUS at 02:47

## 2017-10-08 RX ADMIN — SIMETHICONE CHEW TAB 80 MG 80 MG: 80 TABLET ORAL at 10:39

## 2017-10-08 RX ADMIN — DOCUSATE SODIUM 100 MG: 100 CAPSULE, LIQUID FILLED ORAL at 18:27

## 2017-10-08 RX ADMIN — DOCUSATE SODIUM 100 MG: 100 CAPSULE, LIQUID FILLED ORAL at 07:15

## 2017-10-08 NOTE — PROGRESS NOTES
Progress Note - OB/GYN   Santa Galvez 33 y o  female MRN: [id number]  Unit/Bed#: L&D 309-01 Encounter: [id number]    Assessment:  Term pregnancy, s/p 1LTCS, POD#2  A2GDM    Plan:  Continue routine postpartum care  Encourage ambulation  Encourage increased PO water intake  Pain control as needed  A2GDM: Will need 6w glucola postpartum    Subjective/Objective   Chief Complaint: I'm doing better    Subjective: Pt reports feeling well today  Ambulating  Tolerating PO  Voiding without difficulty  No BM yet but passing flatus  Lochia improving  Denies HA, CP, SOB, extremity pain  Objective:   Vitals: Blood pressure 145/100, pulse 96, temperature 97 6 °F (36 4 °C), temperature source Oral, resp  rate 19, height 5' 3" (1 6 m), weight 97 5 kg (215 lb), last menstrual period 01/04/2016, SpO2 95 %, currently breastfeeding  ,Body mass index is 38 09 kg/m²  Intake/Output Summary (Last 24 hours) at 10/08/17 0835  Last data filed at 10/07/17 2300   Gross per 24 hour   Intake                0 ml   Output             2200 ml   Net            -2200 ml       Invasive Devices     Peripheral Intravenous Line            Peripheral IV 07571 days    Peripheral IV 10/06/17 Left Hand 1 day    Peripheral IV 10/06/17 Right Hand 1 day                Physical Exam:   Gen: AaOx3, NAD, pleasant  Abd: Soft, nontender, nondistended  Incision is clean, dry, and intact  : Fundus firm, nontender, located at umbilicus  Extremities: +1 nonpitting lower extremity edema, nontender, Negative Laurel's bilaterally      Lab, Imaging and other studies: I have personally reviewed pertinent reports              Glenis Dunaway DO  OB/GYN, PGY2  10/8/2017, 8:37 AM

## 2017-10-09 VITALS
HEIGHT: 63 IN | SYSTOLIC BLOOD PRESSURE: 149 MMHG | BODY MASS INDEX: 38.09 KG/M2 | OXYGEN SATURATION: 97 % | WEIGHT: 215 LBS | HEART RATE: 102 BPM | RESPIRATION RATE: 20 BRPM | DIASTOLIC BLOOD PRESSURE: 103 MMHG | TEMPERATURE: 97.8 F

## 2017-10-09 RX ORDER — IBUPROFEN 600 MG/1
600 TABLET ORAL EVERY 6 HOURS PRN
Qty: 40 TABLET | Refills: 1 | Status: SHIPPED | OUTPATIENT
Start: 2017-10-09 | End: 2018-08-15

## 2017-10-09 RX ORDER — OXYCODONE HYDROCHLORIDE AND ACETAMINOPHEN 5; 325 MG/1; MG/1
1 TABLET ORAL EVERY 4 HOURS PRN
Qty: 20 TABLET | Refills: 0 | Status: SHIPPED | OUTPATIENT
Start: 2017-10-09 | End: 2017-10-09 | Stop reason: HOSPADM

## 2017-10-09 RX ORDER — IBUPROFEN 600 MG/1
600 TABLET ORAL EVERY 6 HOURS PRN
Qty: 30 TABLET | Refills: 0 | Status: SHIPPED | OUTPATIENT
Start: 2017-10-09 | End: 2017-10-09

## 2017-10-09 RX ORDER — ACETAMINOPHEN 325 MG/1
TABLET ORAL
Qty: 30 TABLET | Refills: 0 | Status: SHIPPED | OUTPATIENT
Start: 2017-10-09 | End: 2019-03-24 | Stop reason: HOSPADM

## 2017-10-09 RX ORDER — NIFEDIPINE 30 MG/1
30 TABLET, FILM COATED, EXTENDED RELEASE ORAL DAILY
Qty: 30 TABLET | Refills: 1 | Status: SHIPPED | OUTPATIENT
Start: 2017-10-09 | End: 2018-08-15

## 2017-10-09 RX ORDER — DOCUSATE SODIUM 100 MG/1
100 CAPSULE, LIQUID FILLED ORAL 2 TIMES DAILY
Qty: 10 CAPSULE | Refills: 0 | Status: SHIPPED | OUTPATIENT
Start: 2017-10-09 | End: 2019-02-14 | Stop reason: ALTCHOICE

## 2017-10-09 RX ORDER — NIFEDIPINE 30 MG/1
30 TABLET, EXTENDED RELEASE ORAL DAILY
Status: DISCONTINUED | OUTPATIENT
Start: 2017-10-09 | End: 2017-10-09 | Stop reason: HOSPADM

## 2017-10-09 RX ORDER — OXYCODONE HYDROCHLORIDE AND ACETAMINOPHEN 5; 325 MG/1; MG/1
2 TABLET ORAL EVERY 4 HOURS PRN
Qty: 30 TABLET | Refills: 0 | Status: SHIPPED | OUTPATIENT
Start: 2017-10-09 | End: 2017-10-19

## 2017-10-09 RX ADMIN — OXYCODONE HYDROCHLORIDE AND ACETAMINOPHEN 2 TABLET: 5; 325 TABLET ORAL at 04:05

## 2017-10-09 RX ADMIN — IBUPROFEN 600 MG: 600 TABLET, FILM COATED ORAL at 07:52

## 2017-10-09 RX ADMIN — OXYCODONE HYDROCHLORIDE AND ACETAMINOPHEN 2 TABLET: 5; 325 TABLET ORAL at 13:08

## 2017-10-09 RX ADMIN — OXYCODONE HYDROCHLORIDE AND ACETAMINOPHEN 2 TABLET: 5; 325 TABLET ORAL at 08:03

## 2017-10-09 RX ADMIN — SIMETHICONE CHEW TAB 80 MG 80 MG: 80 TABLET ORAL at 07:52

## 2017-10-09 RX ADMIN — DOCUSATE SODIUM 100 MG: 100 CAPSULE, LIQUID FILLED ORAL at 07:52

## 2017-10-09 NOTE — LACTATION NOTE
This note was copied from a baby's chart  CONSULT - LACTATION  Baby [place] 3 days female MRN: [id number]    [address] Room / Bed: L&D 309(N)/L&D 309(N) Encounter: [id number]    Maternal Information     MOTHER:  Pepe Rivera  Maternal Age: 33 y o    OB History: #: 1, Date: 10/06/17, Sex: Female, Weight: 3232 g (7 lb 2 oz), GA: 39w0d, Delivery: , Low Transverse, Apgar1: 8, Apgar5: 9, Living: Living, Birth Comments: None   Previouse breast reduction surgery? No    Lactation history:   Has patient previously breast fed: No   How long had patient previously breast fed:     Previous breast feeding complications:       Past Surgical History:   Procedure Laterality Date    COLPOSCOPY      MT  DELIVERY ONLY Bilateral 10/6/2017    Procedure:  SECTION (); Surgeon: Heraclio Armstrong MD;  Location: [facility] ;  Service: Obstetrics    MT COLONOSCOPY FLX DX W/[facility] WHEN PFRMD N/A 2016    Procedure: COLONOSCOPY;  Surgeon: Heaven Romero MD;  [facility];   Service: Gastroenterology    WISDOM TOOTH EXTRACTION         Birth information:  YOB: 2016   Time of birth: 5:57 PM   Sex: female   Delivery type: , Low Transverse   Birth Weight: 3232 g (7 lb 2 oz)   Percent of Weight Change: -9%     Gestational Age: 39w0d   [unfilled]    Assessment     Breast and nipple assessment: normal assessment     Assessment: normal assessment    Feeding assessment: feeding well  LATCH:  Latch: Grasps breast, tongue down, lips flanged, rhythmic sucking   Audible Swallowing: Spontaneous and intermittent (24 hours old)   Type of Nipple: Everted (After stimulation)   Comfort (Breast/Nipple): Filling, red/small blisters/bruises, mild/moderate discomfort   Hold (Positioning): Full assist, teach one side, mother does other, staff holds   [facility] Score: 8          Feeding recommendations:  breast feed on demand Breastfeeding booklet given and reviewed with patient  Patient verbalized breastfeeding is going well  Observed baby at breast with a good latch  Enc to call for assistance as needed,phone # given      Xavier Valentin RN 10/9/2017 10:22 AM

## 2017-10-09 NOTE — PROGRESS NOTES
All discharge materials and AVS reviewed with pt per unit routine  Pt verbalized understanding and has no questions

## 2017-10-09 NOTE — PROGRESS NOTES
Progress Note - OB/GYN   [place] 33 y o  female MRN: [id number]  Unit/Bed#: L&D 309-01 Encounter: [id number]    Assessment:  33 y o   s/p Primary low transverse  section and Repeat low transverse  section post-operative day 3  A2GDM  Gestational HTN- continue to have elevated BP postpartum in 150s/80-90s overnight, no severe range requiring treatment  Patient recovering well, Stable    Plan:  Continue routine post partum care  Pain management PRN  Encourage ambulation  Encourage breastfeeding  6 week glucola postpartum    Subjective/Objective   Chief Complaint:    Postpartum state    Subjective:   Patient doing well this morning  She has no complaints and looks forward to going home today      Pain: yes, cramping, improved with meds  Tolerating PO: yes  Voiding: yes  Flatus: yes  BM: no  Ambulating: yes  Chest pain: no  Shortness of breath: no  Leg pain: no  Lochia: minimal    Objective:     Vitals: Temp:  [97 6 °F (36 4 °C)-98 °F (36 7 °C)] 98 °F (36 7 °C)  HR:  [] 103  Resp:  [18-19] 18  BP: (145-151)/() 151/96       Intake/Output Summary (Last 24 hours) at 10/09/17 1759  Last data filed at 10/08/17 1401   Gross per 24 hour   Intake                0 ml   Output             2400 ml   Net            -2400 ml     Physical Exam:   General: NAD, alert, oriented  Cardio: Regular rate and rhythm, no murmur  Resp: nonlabored breathing, clear to auscultation bilaterally  Abdomen: Soft, no distension/rebound/guarding/tenderness   Fundus: Firm, non-tender, fundus: at umbilicus  Incision: clean dry intact sutures with overlying histoacryl  G/U: minimal lochia noted on pad  Lower Extremities: Non-tender, no palpable cords    Medications:  Current Facility-Administered Medications   Medication Dose Route Frequency    acetaminophen (TYLENOL) tablet 650 mg  650 mg Oral Q6H PRN    benzocaine-menthol-lanolin-aloe (DERMOPLAST) 20-0 5 % topical spray 1 application  1 application Topical Q6H PRN    calcium carbonate (TUMS) chewable tablet 1,000 mg  1,000 mg Oral Daily PRN    diphenhydrAMINE (BENADRYL) tablet 25 mg  25 mg Oral Q6H PRN    docusate sodium (COLACE) capsule 100 mg  100 mg Oral BID    hydrocortisone 1 % cream 1 application  1 application Topical Daily PRN    HYDROmorphone (DILAUDID) 2 mg/mL injection 1 mg  1 mg Intravenous Q6H PRN    ibuprofen (MOTRIN) tablet 600 mg  600 mg Oral Q6H PRN    naloxone (NARCAN) 0 04 mg/mL syringe 0 04 mg  0 04 mg Intravenous Q1MIN PRN    ondansetron (ZOFRAN) injection 4 mg  4 mg Intravenous Q8H PRN    oxyCODONE-acetaminophen (PERCOCET) 5-325 mg per tablet 1 tablet  1 tablet Oral Q4H PRN    oxyCODONE-acetaminophen (PERCOCET) 5-325 mg per tablet 2 tablet  2 tablet Oral Q4H PRN    ROPivacaine 0 2% PCEA   Epidural Continuous    simethicone (MYLICON) chewable tablet 80 mg  80 mg Oral Q6H PRN    sodium chloride 0 9 % infusion  125 mL/hr Intravenous Continuous    witch hazel-glycerin (TUCKS) topical pad 1 pad  1 pad Topical Q4H PRN       Labs:   No results found for this or any previous visit (from the past 24 hour(s))        Bibi Dougherty  10/9/2017  6:11 AM

## 2017-10-12 ENCOUNTER — GENERIC CONVERSION - ENCOUNTER (OUTPATIENT)
Dept: OTHER | Facility: OTHER | Age: 32
End: 2017-10-12

## 2017-10-13 ENCOUNTER — GENERIC CONVERSION - ENCOUNTER (OUTPATIENT)
Dept: OBGYN CLINIC | Facility: CLINIC | Age: 32
End: 2017-10-13

## 2017-10-13 ENCOUNTER — GENERIC CONVERSION - ENCOUNTER (OUTPATIENT)
Dept: OTHER | Facility: OTHER | Age: 32
End: 2017-10-13

## 2017-11-17 ENCOUNTER — GENERIC CONVERSION - ENCOUNTER (OUTPATIENT)
Dept: OTHER | Facility: OTHER | Age: 32
End: 2017-11-17

## 2017-11-17 ENCOUNTER — ALLSCRIPTS OFFICE VISIT (OUTPATIENT)
Dept: OTHER | Facility: OTHER | Age: 32
End: 2017-11-17

## 2018-01-09 ENCOUNTER — GENERIC CONVERSION - ENCOUNTER (OUTPATIENT)
Dept: OTHER | Facility: OTHER | Age: 33
End: 2018-01-09

## 2018-01-09 PROCEDURE — 88175 CYTOPATH C/V AUTO FLUID REDO: CPT | Performed by: OBSTETRICS & GYNECOLOGY

## 2018-01-09 PROCEDURE — 87624 HPV HI-RISK TYP POOLED RSLT: CPT | Performed by: OBSTETRICS & GYNECOLOGY

## 2018-01-09 NOTE — PROGRESS NOTES
AUG 24 2017         RE: Ac Tsang                            To: Zeke Kang GYN   MR#: [id number]                                    [address]   : APR [address]   ENC: [id number]                             [place], [address]   (Exam #: [id number])                           Fax: [phone]      The LMP of this 33year old,  G1, P0-0-0-0 patient was 2017, giving   her an CASEY of OCT 13 2017 and a current gestational age of 26 weeks 6 days   by dates  A sonographic examination was performed on AUG 24 2017 using   real time equipment  The ultrasound examination was performed using   abdominal technique  The patient has a BMI of 35 6  Her blood pressure   today was 126/82  Earliest ultrasound found in her record: 3/1/2017   8w0d  10/11/2017 CASEY      Problem list   1  GDMa2 on Levemir      Cardiac motion was observed at 134 bpm       INDICATIONS      obesity   diabetes, gestational, class A2   fetal growth   Evaluate missed anatomy      Exam Types      Level I      RESULTS      Fetus # 1 of 1   Vertex presentation   Fetal growth appeared normal   Placenta Location = Anterior   No placenta previa   Placenta Grade = II      MEASUREMENTS (* Included In Average GA)      AC              30 5 cm        34 weeks 4 days* (75%)   BPD              8 9 cm        36 weeks 1 day * (>95%)   HC              30 3 cm        33 weeks 1 day * (40%)   Femur            6 2 cm        31 weeks 6 days* (33%)      HC/AC           0 99   FL/AC           0 20   FL/BPD          0 69   EFW (Ac/Fl/Hc)  2223 grams - 4 lbs 14 oz                 (53%)      THE AVERAGE GESTATIONAL AGE is 33 weeks 6 days +/- 21 days        AMNIOTIC FLUID      Q1: 4 0      Q2: 3 9      Q3: 2 9      Q4: 2 6   JAZZY Total = 13 4 cm   Amniotic Fluid: Normal      ANATOMY DETAILS      Visualized Appearing Sonographically Normal:   HEAD: (Calvarium, BPD Level, Lateral Ventricles, Choroid Plexus);    TH    CAV  : (Lungs, Diaphragm); HEART: (Kiptronic, Cardiac Axis,   Cardiac Position);    STOMACH, RIGHT KIDNEY, LEFT KIDNEY, BLADDER, PLACENTA      Suboptimally Visualized:   HEART: (Proximal Left Outflow, Proximal Right Outflow)      Not Visualized:   HEAD: (Cavum, Cerebellum, Cisterna Magna); HEART: (Ductal Arch)      ANATOMY COMMENTS      The prior fetal anatomic survey was limited the area of the ductal arch  The views of the ductal arch which were limited in the prior anatomic   evaluation are still limited today and therefore the fetal anatomic survey   could still not be completed  IMPRESSION      Macias IUP   33 weeks and 6 days by this ultrasound  (CASEY=OCT 6 2017)   Vertex presentation   Fetal growth appeared normal   Regular fetal heart rate of 134 bpm   Anterior placenta   No placenta previa      GENERAL COMMENT            There has been appropriate interval fetal growth  Good fetal movement and   tone are seen  The amniotic fluid volume appears normal   The placenta is   anterior and it appears sonographically normal   The anatomic structures   listed above could not be optimally imaged today because of fetal   position; however, these structures were seen on a prior ultrasound and   appeared sonographically normal   The patient was informed of today's   findings and all of her questions were answered  We talked about the patient's current state of her diabetes  She   continues to maintain close contact with our diabetes in pregnancy program   and she continues to do a very good job at controlling her blood sugars  Recommend continuation of twice weekly  surveillance, which the   patient informs me is being done in her doctor's office  I recommend she   return in 4 weeks for a fetal growth evaluation  Thank you very much for allowing us to participate in the care of this   very nice patient    Should you have any questions, please do not hesitate   to contact our office  ZURDO Conti , ZURDO GILL S  PINA Mcgregor     Electronically signed 08/24/17 16:17

## 2018-01-10 ENCOUNTER — LAB REQUISITION (OUTPATIENT)
Dept: LAB | Facility: HOSPITAL | Age: 33
End: 2018-01-10
Payer: COMMERCIAL

## 2018-01-10 DIAGNOSIS — R87.612 LOW GRADE SQUAMOUS INTRAEPITHELIAL LESION ON CYTOLOGIC SMEAR OF CERVIX (LGSIL): ICD-10-CM

## 2018-01-10 NOTE — MISCELLANEOUS
Message   Recorded as Task   Date: 06/15/2017 11:52 AM, Created By: Joselyn Pitts   Task Name: Care Coordination   Assigned To: [facility]   Regarding Patient: Renetta Salazar, Status: Complete   Comment:    Joselyn Pitts - 15 Joselo 2017 11:52 AM     TASK CREATED  Please notify patient her one-hour Glucola was elevated she needs a three-hour test prescription authorized  Kelli Arteaga - 15 Joselo 2017 12:47 PM     TASK COMPLETED   pt notified and labslip given      Plan  Screen for STD (sexually transmitted disease)    · (1) RPR; Status:Active;  Requested ECL:[id number];     Signatures   Electronically signed by : PINA Acevedo ; Joselo 15 2017 12:47PM EST                       (Author)

## 2018-01-11 NOTE — PROGRESS NOTES
2017         RE: Allie Bah                            To: Se Beulah GYN   MR#: [id number]                                    [address]   :  [address]   ENC: [id number]                             [place], [address]   (Exam #: [id number])                           Fax: [phone]      The LMP of this 28year old,  G1, P0-0-0-0 patient was 2017, giving   her an CASEY of OCT 13 2017 and a current gestational age of 16 weeks 6 days   by dates  A sonographic examination was performed on 2017 using real   time equipment  The ultrasound examination was performed using abdominal   technique  The patient has a BMI of 29 4  Her blood pressure today was   113/75  Earliest ultrasound found in her record: 3/1/2017   8w0d  10/11/2017 CASEY   Multiple longitudinal and transverse sections revealed a kwok   intrauterine pregnancy with the fetus in variable presentation  The   placenta is anterior in implantation, grade 0 in appearance  Cardiac motion was observed at 146 bpm       INDICATIONS      first trimester genetic screening      Exam Types      Level I   sequential screen      RESULTS      Fetus # 1 of 1   Variable presentation      MEASUREMENTS (* Included In Average GA)      CRL              7 5 cm        13 weeks 2 days*   Nuchal Trans    1 60 mm      THE AVERAGE GESTATIONAL AGE is 13 weeks 2 days +/- 7 days  ANATOMY COMMENTS      Anatomic detail is limited at this gestational age  The yolk sac was not   noted  The fetal cranium appeared normal in shape and the nuchal   translucency was normal in size (1 6mm)  The nasal bone appears to be   present  The intracranial anatomy was unremarkable  Evaluation of the   spine revealed no obvious evidence for a neural tube defect  Anatomy of   the fetal thorax appeared within normal limits  The cardiac rhythm was   regular    Within the abdomen, stomach & bladder were visualized and the   abdominal wall appeared intact  A three vessel cord appears to be present  Active movement of the fetal body & extremities was seen  There is no   suspicion of a subchorionic bleed  The placental cord insertion was   normal    There is no suspicion of a uterine myoma  Free fluid is not seen   in the posterior cul-de-sac  ADNEXA      The left ovary was not visualized  The right ovary appeared normal and   measured 3 2 x 2 4 x 2 0 cm with a volume of 8 0 cc       AMNIOTIC FLUID         Largest Vertical Pocket = 4 5 cm   Amniotic Fluid: Normal      IMPRESSION      Macias IUP   13 weeks and 2 days by this ultrasound  (CASEY=OCT 10 2017)   Variable presentation   Regular fetal heart rate of 146 bpm   Anterior placenta      CONSULT COMMENT      Thank you very much for requesting a consultation this very nice patient   for the indication of genetic screening  This is the patient's first   pregnancy  She has occasional arthritis in her hands and her neck as well   as occasional migraines  She has irritable bowel primarily constipation   which has not been an active issue and has not required medications  Her   surgical history is significant for was some deep extraction  She denies   the current use of tobacco, alcohol, or drugs  She currently takes   prenatal vitamins and has an allergy to penicillin  She has a maternal   grandmother with thalassemia although the patient herself has never had   anemia  The patient herself is currently not anemic and she has a normal   MCV  No other family members appear to have significant congenital birth   defects  A review of systems is otherwise negative  On exam, the patient   appears well, in no acute distress, and her abdomen is nontender        We discussed the options for genetic screening, including but not limited   to first trimester screening, second trimester screening, combined first   and second trimester screening, noninvasive prenatal testing (NIPT) for   patients at high risk and diagnostic screening through the use of CVS and   amniocentesis  We discussed the risks and benefits of each approach   including the sensitivities and false positive rates as well as the   difference between a screening test and a diagnostic test   At the   conclusion of our discussion the patient elected Sequential Screening to   delineate her risk for fetal aneuploidy  A maternal blood sample was   obtained on the day of sonogram   The first trimester portion of the   screening results, encompassing age, nuchal translucency, and biochemistry   should be available within one week of testing and will be reported from   [address] [place] [address]   The second stage of sequential screening should be completed   between the 15th and 21st week of pregnancy (ideally between 16-18 weeks)  We discussed follow-up in detail and I recommend an anatomy ultrasound be   scheduled for 20 weeks gestation  Thank you very much for allowing us to participate in the care of this   very nice patient  Should you have any questions, please do not hesitate   to contact our office  Please note, in addition to the time spent discussing the results of the   ultrasound, I spent approximately 15 minutes of face-to-face time with the   patient, greater than 50% of which was spent in counseling and the   coordination of care for this patient  Portions of the record may have been created with voice recognition   software  Occasional wrong word or "sound a like" substitutions may have   occurred due to the inherent limitations of voice recognition software  Read the chart carefully and recognize, using context, where substitutions   have occurred  ZURDO Perez M D     Electronically signed 04/06/17 14:53

## 2018-01-11 NOTE — RESULT NOTES
Message  The patient was seen today after episode of abdominal trauma  Her dog, weighing about 70 lb, jumped onto her upper abdomen/superior fundal region  She did note some discomfort in this area but denied any contractions or severe pain or vaginal bleeding  Good fetal movement was noted  She was sent to [facility] and monitored for 1 hours time with reactive category 1 fetal heart tones in the 130s with no decelerations  Rare contraction was appreciated at less than 10 minute intervals  The patient was stable and wished to be discharged so she could go to awaiting and she was discharged in satisfactory condition  She was given strict instructions to call with any bleeding, significant contractions, decreased fetal movement, orthostatic symptoms or any other significant signs/symptoms  Otherwise, she will follow up with [facility] as recommended by them        Signatures   Electronically signed by : PINA Mercado ; Sep 10 2017  8:00AM EST                       (Author)

## 2018-01-11 NOTE — PROGRESS NOTES
2017         RE: Rey Cole                            To: Edilia Marshall GYN   MR#: [id number]                                    [address]   :  Ethan Kern   ENC: [id number]                             [place], [address]   (Exam #: [id number])                           Fax: [phone]      The LMP of this 33year old,  G1, P0-0-0-0 patient was 2017, giving   her an CASEY of OCT 13 2017 and a current gestational age of 32 weeks 6 days   by dates  A sonographic examination was performed on 2017 using   real time equipment  The ultrasound examination was performed using   abdominal technique  The patient has a BMI of 33 7  Her blood pressure   today was 118/71  Earliest ultrasound found in her record: 3/1/2017   8w0d  10/11/2017 CASEY      Problem list   1  GDMa2 on Levemir      Cardiac motion was observed at 140 bpm       INDICATIONS      obesity   diabetes, gestational, class A2      Exam Types      Level I      RESULTS      Fetus # 1 of 1   Vertex presentation   Fetal growth appeared normal   Placenta Location = Anterior   No placenta previa   Placenta Grade = I      MEASUREMENTS (* Included In Average GA)      AC              24 9 cm        29 weeks 1 day * (67%)   BPD              7 4 cm        29 weeks 4 days* (71%)   HC              26 9 cm        29 weeks 0 days* (58%)   Femur            5 4 cm        28 weeks 4 days* (49%)      Cerebellum       3 6 cm        31 weeks 3 days      HC/AC           1 08   FL/AC           0 22   FL/BPD          0 73   EFW (Ac/Fl/Hc)  1309 grams - 2 lbs 14 oz                 (63%)      THE AVERAGE GESTATIONAL AGE is 29 weeks 0 days +/- 18 days        AMNIOTIC FLUID      Q1: 2 8      Q2: 5 3      Q3: 3 5      Q4: 5 0   JAZZY Total = 16 6 cm   Amniotic Fluid: Normal      ANATOMY DETAILS      Visualized Appearing Sonographically Normal:   HEAD: (Calvarium, BPD Level, Lateral Ventricles, Cerebellum);      FACE/NECK: (Palate);    TH  CAV : (Diaphragm); HEART: ([employer], Proximal Right Outflow, 3VV, Cardiac Axis, Cardiac Position);      STOMACH, RIGHT KIDNEY, LEFT KIDNEY, BLADDER, PLACENTA      Not Visualized:   HEART: (Ductal Arch)      ANATOMY COMMENTS      The prior fetal anatomic survey was limited the area of the palate, 3   vessel view and ductal arch  Views of the palate and 3 vessel view were   seen today as sonographically normal within the inherent limitations of   fetal ultrasound; however, views of the ductal arch were still limited by   fetal position  IMPRESSION      Macias IUP   29 weeks and 0 days by this ultrasound  (CASEY=OCT 5 2017)   Vertex presentation   Fetal growth appeared normal   Regular fetal heart rate of 140 bpm   Anterior placenta   No placenta previa      GENERAL COMMENT      Thank you for allowing me to participate in the care of your patient  Ms Maria D Sweet was seen today for fetal growth secondary to gestational   diabetes  The patient was informed of the findings and counseled about the   limitations of the exam in detecting all forms of fetal congenital   abnormalities  She denies any vaginal bleeding or uterine cramping/contractions  She does   feel fetal movement  She was recently increased 3 days ago on her Levemir   from 34-40 units at nighttime for elevated fasting blood sugars  Since   then she reports her latest 2 fasting blood sugars were both less than 90  Exam shows she is comfortable and her abdomen is non tender  Follow up recommended:   1  She was very anxious today about her blood sugar control  She was   reassured that she's being very compliant with calling in her blood sugars   and the elevations that we see are only very mild and quickly improve with   the insulin titration given to her  I reassured her that the baby shows   normal fetal growth and normal fluid     2  Recommend follow-up growth scan in 4 weeks  We will also start her   twice weekly nonstress tests and weekly fluid scans at that same visit  3  Recommend delivery between 39 and 40 weeks  The face to face time with her and her , in addition to time spent   discussing ultrasound results, was approximately 10 minutes, greater than   50% of which was spent during counseling and coordination of care  ZURDO Low M D     Maternal-Fetal Medicine   Electronically signed 07/20/17 13:17

## 2018-01-12 VITALS
WEIGHT: 203 LBS | BODY MASS INDEX: 35.97 KG/M2 | SYSTOLIC BLOOD PRESSURE: 116 MMHG | DIASTOLIC BLOOD PRESSURE: 72 MMHG | HEIGHT: 63 IN

## 2018-01-12 VITALS
BODY MASS INDEX: 36.86 KG/M2 | SYSTOLIC BLOOD PRESSURE: 106 MMHG | DIASTOLIC BLOOD PRESSURE: 71 MMHG | WEIGHT: 208 LBS | HEIGHT: 63 IN

## 2018-01-12 VITALS — DIASTOLIC BLOOD PRESSURE: 74 MMHG | BODY MASS INDEX: 35.96 KG/M2 | SYSTOLIC BLOOD PRESSURE: 120 MMHG | WEIGHT: 203 LBS

## 2018-01-12 VITALS
SYSTOLIC BLOOD PRESSURE: 110 MMHG | HEIGHT: 63 IN | DIASTOLIC BLOOD PRESSURE: 62 MMHG | WEIGHT: 170.5 LBS | BODY MASS INDEX: 30.21 KG/M2

## 2018-01-12 VITALS
DIASTOLIC BLOOD PRESSURE: 75 MMHG | WEIGHT: 166.4 LBS | SYSTOLIC BLOOD PRESSURE: 113 MMHG | HEIGHT: 63 IN | BODY MASS INDEX: 29.48 KG/M2

## 2018-01-12 LAB
HPV RRNA GENITAL QL NAA+PROBE: NORMAL
LAB AP GYN PRIMARY INTERPRETATION: NORMAL
LAB AP LMP: NORMAL
Lab: NORMAL

## 2018-01-13 ENCOUNTER — GENERIC CONVERSION - ENCOUNTER (OUTPATIENT)
Dept: OTHER | Facility: OTHER | Age: 33
End: 2018-01-13

## 2018-01-13 VITALS — SYSTOLIC BLOOD PRESSURE: 120 MMHG | WEIGHT: 200.5 LBS | BODY MASS INDEX: 35.52 KG/M2 | DIASTOLIC BLOOD PRESSURE: 74 MMHG

## 2018-01-13 NOTE — PROGRESS NOTES
Active Problems    1  Abnormal glucose (790 29) (R73 09)   2  Arthritis (716 90) (M19 90)   3  Encounter for fetal anatomic survey (V28 81) (Z36)   4  Encounter for screening for risk of pre-term labor (V28 82) (Z36)   5  First trimester pregnancy (V22 2) (Z33 1)   6  LGSIL on Pap smear of cervix (795 03) (R87 612)   7  Maternal obesity, antepartum, second trimester (649 13,278 00) (H21 446)   8  Normal intrauterine pregnancy, antepartum (V22 1) (Z34 90)   9  Screen for STD (sexually transmitted disease) (V74 5) (Z11 3)    Past Medical History    1  History of Contraceptives (V25 02)   2  History of abdominal pain (V13 89) (Z87 898)   3  History of constipation (V12 79) (Z87 19)    Surgical History    1  History of Oral Surgery Tooth Extraction Lead Tooth    Family History  Mother    1  Family history of Diverticulitis of intestine without perforation or abscess without   bleeding, unspecified part of intestinal tract   2  Family history of irritable bowel syndrome (V18 59) (Z83 79)  Sister    3  Family history of irritable bowel syndrome (V18 59) (Z83 79)    Social History    · Never a smoker   · Social alcohol use (Z78 9)    Current Meds   1  Excedrin Tension Headache TABS; Therapy: (Recorded:06Apr2017) to Recorded   2  Peppermint Oil OIL; Therapy: (Recorded:06Apr2017) to Recorded   3  Prenatal Vitamin TABS; Therapy: (Rishabh Moe) to Recorded   4  Prenate Mini 18-0 6-0 4-350 MG Oral Capsule; Take 1 tablet PO daily; Therapy: 83AVJ8775 to (Last Rx:22Mar2017) Ordered   5  Tylenol TABS; Therapy: (Recorded:06Apr2017) to Recorded    Allergies    1  Amoxicillin CAPS   2  Amoxil CAPS    3  No Known Environmental Allergies   4  No Known Food Allergies    Provider Comments  Provider Comments:   CASEY: 10/13/2017  EGA: 24 3/7 weeks             Dear Dr Mukund Preston: Thank you for referring your patient to the Diabetes and Pregnancy Program at [address]   The patient attended Class 1 received the following education:    Â· Pathophysiology of diabetes and pregnancy  This includes maternal-fetal complications such as fetal macrosomia,  hypoglycemia, polyhydramnios, increased incidence of  section, pre-term labor and in severe cases, fetal demise and stillbirth  Â· Self-monitoring of blood glucose levels: fasting (goal 60mg/dl to 90mg/dl) and two hours after the start of the meal less (goal less than 120mg/dl)  The patient is already monitoring with a Verio Flex blood glucose meter and supplies  Â· Medical Nutrition Therapy for diabetes and pregnancy  The patient was provided with a 2000 calorie gestational diabetes meal plan and the following was reviewed:   o Basic review of macronutrients   o Meal pattern should consist of three small meals and three snacks daily  o Carbohydrate gram amounts per meal   o Instructions for how to read a food label  o Appropriate serving sizes for carbohydrates and proteins  o Incorporating protein at each meal and snack  o Maintain a three day food diary and bring to class 2  Â· Report blood glucose levels to the [facility] Maternal-Fetal Medicine office weekly or as directed:  o Phone : [phone]  If no response in 24 hours, call [phone]   o Fax: [phone]  o Email: [email or web address]    The patient is scheduled to attend class 2 to be scheduled  Additionally, fetal ultrasound evaluation by the Perinatologist has been scheduled to assure continuity of care  Please contact the Diabetes and Pregnancy Program at [phone] if you have any questions  Time spent with patient 10:10-11:20 AM; time spent face to face counseling greater than 50% of the appointment    Sincerely,   Khai Leone MS, RD, CDE, LDN  Diabetes Educator   Diabetes and Pregnancy Program          Future Appointments    Date/Time Provider Specialty Site   2017 11:00 AM  [facility], [address] [place] [address] 06/29/2017 11:00 AM Maria Esther Gabriel DO Obstetrics/Gynecology [facility]     Signatures   Electronically signed by : Jesus Victor RD; Jun 26 2017  5:26PM EST                       (Author)    Electronically signed by : Carlos Wolff; Jun 27 2017  8:32AM EST                       (Author)

## 2018-01-14 VITALS
SYSTOLIC BLOOD PRESSURE: 118 MMHG | BODY MASS INDEX: 35.08 KG/M2 | WEIGHT: 198 LBS | DIASTOLIC BLOOD PRESSURE: 80 MMHG | HEIGHT: 63 IN

## 2018-01-14 VITALS
SYSTOLIC BLOOD PRESSURE: 122 MMHG | WEIGHT: 185.25 LBS | BODY MASS INDEX: 32.82 KG/M2 | HEIGHT: 63 IN | DIASTOLIC BLOOD PRESSURE: 72 MMHG

## 2018-01-14 VITALS
BODY MASS INDEX: 33.73 KG/M2 | DIASTOLIC BLOOD PRESSURE: 71 MMHG | SYSTOLIC BLOOD PRESSURE: 118 MMHG | WEIGHT: 190.4 LBS | HEIGHT: 63 IN

## 2018-01-14 NOTE — RESULT NOTES
Verified Results  (1) SEQUENTIAL SCREEN 2 29Apr2017 07:24AM Jaquan Lara     Test Name Result Flag Reference   SCAN RESULT      Results available in the [facility]

## 2018-01-15 VITALS
HEIGHT: 63 IN | SYSTOLIC BLOOD PRESSURE: 119 MMHG | BODY MASS INDEX: 31.54 KG/M2 | DIASTOLIC BLOOD PRESSURE: 78 MMHG | WEIGHT: 178 LBS

## 2018-01-15 NOTE — MISCELLANEOUS
Message  Mariely Bear called to discuss concerns regarding her baby, Danielle Montero, not seeming content after feeds and wanting to nurse "constantly"  Baby latches eagerly to the breast Catracho Xavier has pain initially which resolves quickly), Baby falls asleep at the breast but wakes up immediately if mom puts her down  She is voiding and stooling very often  Mom is exhausted as baby wants to nurse all night  Her nipples are cracked and bleeding  I offered appointment here at the center to evaluate latch and provide support for mom  Mariely Bear [facility]  She prefers to see her as she is not allowed to drive and [place] more convenient and she was also working with Amy while she was in the hospital  If she does not hear back from [employer], she will call us back for appointment  Active Problems    1  Arthritis (716 90) (M19 90)   2  Insulin controlled gestational diabetes mellitus (GDM) in third trimester (648 83)   (O24 414)   3  LGSIL on Pap smear of cervix (795 03) (R87 612)   4  Maternal obesity, antepartum, second trimester (649 13,278 00) (L26 155)   5  Migraines (346 90) (G43 909)   6  Denied: History of Tinea corporis    Current Meds   1  BD Pen Needle Mini U/F 31G X 5 MM Miscellaneous; daily with insulin injection; Therapy: 58RWX1439 to (Maryruth Pallas)  Requested for: 08AKS6970; Last   Rx:27Mzr6780 Ordered   2  Butalbital-APAP-Caffeine -40 MG Oral Capsule (Fioricet); 1 tablet po q4 hours prn   pain; Therapy: 28Wvr0603 to (Riley Darby)  Requested for: 61Iyj3408 Ordered   3  Excedrin Tension Headache TABS; Therapy: (Tonja Hope) to Recorded   4  Lantus SoloStar 100 UNIT/ML Subcutaneous Solution Pen-injector; inject 60 units at   bedtime; titrate weekly as directed; Therapy: 58Ytl1528 to (Evaluate:80Zsr3468)  Requested for: 07Bne6000; Last   Rx:76Wyp4634 Ordered   5   NovoLOG FlexPen 100 UNIT/ML Subcutaneous Solution Pen-injector; 4 units with lunch and dinner; titrate weekly as directed; Therapy: 21Jgp1257 to (Evaluate:31Iqs2094)  Requested for: 74Qql0833; Last   Rx:03Aug2017 Ordered   6  Peppermint Oil OIL; Therapy: (Jasmine Masservin) to Recorded   7  Prenatal Vitamin TABS; Therapy: (Jasmine Massy) to Recorded   8  Prenate Mini 18-0 6-0 4-350 MG Oral Capsule; Take 1 tablet PO daily; Therapy: 82RBU9983 to (Last Rx:22Mar2017) Ordered   9  Tylenol TABS; Therapy: (Recorded:76Ikf0082) to Recorded    Allergies    1  Amoxicillin CAPS   2  Amoxil CAPS   3  Penicillins    4  No Known Environmental Allergies   5   No Known Food Allergies    Signatures   Electronically signed by : Cecilia Conley RN; Oct 12 2017  4:21PM EST                       (Author)

## 2018-01-15 NOTE — PROGRESS NOTES
MAY 31 2017         RE: Ralph Trjuillo                            To: Raegan Flaco GYN   MR#: [id number]                                    [address]   : APR [address]   ENC: [id number]                             [place], [address]   (Exam #: [id number])                           Fax: [phone]      The LMP of this 33year old,  G1, P0-0-0-0 patient was 2017, giving   her an CASEY of OCT 13 2017 and a current gestational age of 25 weeks 5 days   by dates  A sonographic examination was performed on MAY 31 2017 using   real time equipment  The ultrasound examination was performed using   abdominal & vaginal techniques  The patient has a BMI of 31 5  Her blood   pressure today was 119/78  Earliest ultrasound found in her record: 3/1/2017   8w0d  10/11/2017 CASEY      Malinda Brand has no complaints today  She reports fetal movements and denies   vaginal bleeding  Her recently completed Sequential Screen revealed a   Down syndrome risk of 1:1,100, Trisomy 18 risk of 1:10,000, and ONTD risk   of 1:6,000  Evaluation of the individual analyte levels reveals no   increased risk for abnormal placentation        Cardiac motion was observed at 140 bpm       INDICATIONS      fetal anatomical survey   obesity      Exam Types      LEVEL II   Transvaginal      RESULTS      Fetus # 1 of 1   Breech presentation   Fetal growth appeared normal   Placenta Location = Anterior   No placenta previa   Placenta Grade = I      MEASUREMENTS (* Included In Average GA)      AC              15 1 cm        20 weeks 1 day * (38%)   BPD              5 1 cm        21 weeks 3 days* (65%)   HC              19 0 cm        21 weeks 1 day * (60%)   Femur            3 3 cm        20 weeks 4 days* (40%)      Nuchal Fold      3 9 mm   NBL              5 6 mm      Humerus          3 7 cm        22 weeks 6 days  (92%)      Cerebellum       2 2 cm        21 weeks 4 days   Biorbit 3 4 cm        21 weeks 6 days   CisternaMagna    6 0 mm      HC/AC           1 26   FL/AC           0 22   FL/BPD          0 65   EFW (Ac/Fl/Hc)   356 grams - 0 lbs 13 oz      THE AVERAGE GESTATIONAL AGE is 20 weeks 6 days +/- 10 days  AMNIOTIC FLUID         Largest Vertical Pocket = 4 7 cm   Amniotic Fluid: Normal      CERVICAL EVALUATION      The cervix appeared normal (Ultrasound Examination)  SUPINE      Cervical Length: 4 35 cm      OTHER TEST RESULTS           Funneling?: No             Dynamic Changes?: No        Resp  To TFP?: No                      Debris?: No      ANATOMY      Head                                    Normal   Face/Neck                               See Details   Th  Cav  Normal   Heart                                   See Details   Abd  Cav  Normal   Stomach                                 Normal   Right Kidney                            Normal   Left Kidney                             Normal   Bladder                                 Normal   Abd  Wall                               Normal   Spine                                   Normal   Extrems                                 Normal   Genitalia                               Normal   Placenta                                Normal   Umbl  Cord                              Normal   Uterus                                  Normal   PCI                                     Normal      ANATOMY DETAILS      Visualized Appearing Sonographically Normal:   HEAD: (Calvarium, BPD Level, Cavum, Lateral Ventricles, Choroid Plexus,   Cerebellum, Cisterna Magna);    FACE/NECK: (Neck, Nuchal Fold, Profile,   Orbits, Nose/Lips, Face);    TH  CAV  : (Lungs, Diaphragm);     HEART: (Four   Chamber View, Proximal Left Outflow, Proximal Right Outflow, 3 Vessel   Trachea, Short Axis of Greater Vessels, Aortic Arch, Interventricular   Septum, Interatrial Septum, IVC, SVC, Cardiac Axis, Cardiac Position);      ABD  CAV : (Liver);    STOMACH, RIGHT KIDNEY, LEFT KIDNEY, BLADDER, ABD  WALL, SPINE: (Cervical Spine, Thoracic Spine, Lumbar Spine, Sacrum);      EXTREMS: (Lt Humerus, Rt Humerus, Lt Forearm, Rt Forearm, Lt Hand, Rt   Hand, Lt Femur, Rt Femur, Lt Low Leg, Rt Low Leg, Lt Foot, Rt Foot);      GENITALIA (Female), PLACENTA, UMBL  CORD, UTERUS, PCI      Suboptimally Visualized:   FACE/NECK: (Palate)      Not Visualized:   HEART: (3VV, Ductal Arch)      ADNEXA      The left ovary appeared normal and measured 2 5 x 1 7 x 1 6 cm with a   volume of 3 6 cc  The right ovary appeared normal and measured 2 7 x 2 3 x   2 0 cm with a volume of 6 5 cc  IMPRESSION      Macias IUP   20 weeks and 6 days by this ultrasound  (CASEY=OCT 12 2017)   Breech presentation   Fetal growth appeared normal   Regular fetal heart rate of 140 bpm   Anterior placenta   No placenta previa      GENERAL COMMENT      No fetal structural abnormality or ultrasound marker for aneuploidy is   identified on the Level II ultrasound study today  The palate, and cardiac   three-vessel and ductal arch views are suboptimally imaged secondary to   the constraints related to unfavorable fetal position  Fetal growth and   amniotic fluid volume are normal   The placenta is normal in appearance  The cervix is normal in appearance by transvaginal sonography  The   cervical length is normal   Cervical debris is not present  Cervical   funneling is not present  Neither provocative nor dynamic change is   appreciated  Today's ultrasound findings and suggested follow-up were discussed in   detail with [facility]  We discussed that prenatal ultrasound cannot rule out   all congenital abnormalities  Her Sequential Screen results were discussed   in detail  [facility] will return to the [facility], [facility]  at 32 weeks   gestation to assess interval growth and evaluate anatomic targets not   imaged well today        The face to face time, in addition to time spent discussing ultrasound   results, was approximately 10 minutes, greater than 50% of which was spent   during counseling and coordination of care  ZURDO Samano M D     Maternal-Fetal Medicine   Electronically signed 06/01/17 09:55

## 2018-01-15 NOTE — PROGRESS NOTES
Active Problems    1  Abnormal glucose (790 29) (R73 09)   2  Arthritis (716 90) (M19 90)   3  Encounter for fetal anatomic survey (V28 81) (Z36)   4  Encounter for screening for risk of pre-term labor (V28 82) (Z36)   5  First trimester pregnancy (V22 2) (Z33 1)   6  Insulin controlled gestational diabetes mellitus (GDM) in second trimester (648 83)   (O24 414)   7  LGSIL on Pap smear of cervix (795 03) (R87 612)   8  Maternal obesity, antepartum, second trimester (649 13,278 00) (B95 142)   9  Normal intrauterine pregnancy, antepartum (V22 1) (Z34 90)   10  Screen for STD (sexually transmitted disease) (V74 5) (Z11 3)    Past Medical History    1  History of Contraceptives (V25 02)   2  History of abdominal pain (V13 89) (Z87 898)   3  History of constipation (V12 79) (Z87 19)    Surgical History    1  History of Oral Surgery Tooth Extraction [place] Tooth    Family History  Mother    1  Family history of Diverticulitis of intestine without perforation or abscess without   bleeding, unspecified part of intestinal tract   2  Family history of irritable bowel syndrome (V18 59) (Z83 79)  Sister    3  Family history of irritable bowel syndrome (V18 59) (Z83 79)    Social History    · Never a smoker   · Social alcohol use (Z78 9)    Current Meds   1  Excedrin Tension Headache TABS; Therapy: (Recorded:06Apr2017) to Recorded   2  Peppermint Oil OIL; Therapy: (Recorded:06Apr2017) to Recorded   3  Prenatal Vitamin TABS; Therapy: (Megan Baker) to Recorded   4  Prenate Mini 18-0 6-0 4-350 MG Oral Capsule; Take 1 tablet PO daily; Therapy: 70SHP2997 to (Last Rx:22Mar2017) Ordered   5  Tylenol TABS; Therapy: (Recorded:06Apr2017) to Recorded    Allergies    1  Amoxicillin CAPS   2  Amoxil CAPS    3  No Known Environmental Allergies   4  No Known Food Allergies    Plan    1  BD Pen Needle Mini U/F 31G X 5 MM Miscellaneous; daily with insulin injection   2   Levemir FlexTouch 100 UNIT/ML Subcutaneous Solution Pen-injector; Inject 22   units Levemir at bedtime   3  (1) COMPREHENSIVE METABOLIC PANEL; Status:Active - Retrospective Authorization; Requested for:[id number];    4  (Q) HEMOGLOBIN A1c WITH eAG; Status:Active - Retrospective Authorization; Requested   XFY:[id number];     Provider Comments  Provider Comments:   CASEY: 10/13/2017  EGA: 25 5/7 weeks    Dear Dr Sarah Espinoza: Thank you for referring your patient to the Diabetes and Pregnancy Program at [address]  The patient attended Class 2 received the following education:    Â· Weight gain during in pregnancy  Based on the patient's height of 63 inches, pre-pregnancy weight of 160 pounds (BMI-28  3) we would recommend a total weight gain of 15-25 pounds for the pregnancy  o The patient's current weight is 189 pounds, and her weight gain to date is 29 pounds  Based on this, we are recommending the patient maintain her weight for the remainder of the pregnancy  Â· Medical Nutrition Therapy for diabetes and pregnancy  The patient's three day food diary was reviewed and discussed  The patient was instructed on the following:  o Individualized meal plan    o Use of food diary to maintain a meal plan    o Importance of protein as it relates to blood glucose control  Â· Review of blood glucose log  Reinforcement of blood glucose goals and reporting guidelines  Â· Ultrasounds every four weeks in the [address] [place] [address] to evaluate fetal growth  Â· Exercise Guidelines:   o Walking up to thirty minutes daily can reduce blood glucose levels  o Monitor for greater than four contractions per hour    o The patient has been instructed not to begin physical activity if she has been instructed not to exercise by your office  Â· Sick day guidelines and hypoglycemia with treatment  Â· Post-partum guidelines:  o Completion of a 75 gram glucose tolerance test at 6 weeks post-partum to check for type 2 diabetes    o 20% weight loss and 30 minutes of exercise 5 times per week reduces the risk of type 2 diabetes  Amanda Bess Breastfeeding guidelines  Begin 22 units Levemir at HS  Reviewed injection procedure, storage, refill process, and potential allergic reaction  Prescriptions sent to pharmacy  Scheduled antepartum monitoring to begin at 32 weeks  Gave prescriptions for HbA1c and CMP  Â· Report blood glucose levels to Maternal-Fetal Medicine office weekly or as directed  o Phone: [phone]  If no response in 24 hours, call [phone]   o Fax: [phone]  o Email: [email or web address]     Please contact the Diabetes and Pregnancy Program at [phone] if you have questions  Time spent with patient 1-2 PM; time spent face to face counseling greater than 50% of the appointment  Sincerely,   Jemma De León MS, RD, CDE, LDN  Diabetes Educator  Diabetes and Pregnancy Program        Future Appointments    Date/Time Provider Specialty Site   2017 11:30 AM  Daniel, Schedule  [address]   2017 11:00 AM  Alan, Schedule  [facility]   2017 11:30 AM PINA Terrell   Obstetrics/Gynecology [facility]     Signatures   Electronically signed by : Wili Calzada RD; 2017  2:57PM EST                       (Author)

## 2018-01-17 NOTE — PROGRESS NOTES
SEP 18 2017         RE: Jamari Benito                            To: Riki Bonner GYN   MR#: [id number]                                    [address]   : APR [address]   ENC: [id number]:SANDRO Sanchez, [address]   (Exam #: [id number])                           Fax: [phone]      The LMP of this 33year old,  G1, P0-0-0-0 patient was 2017, giving   her an CASEY of OCT 13 2017 and a current gestational age of 42 weeks 3 days   by dates  A sonographic examination was performed on SEP 18 2017 using   real time equipment  The ultrasound examination was performed using   abdominal technique  The patient has a BMI of 36 8  Her blood pressure   today was 106/77  Earliest ultrasound found in her record: 3/1/2017   8w0d  10/11/2017 CASEY         Soheila Wharton has no complaints today  She reports regular fetal movements and   denies problems related to vaginal bleeding,  labor, or   hypertension  She is currently treated with 66 units of Lantus at night   along with 5 units of NovoLog before lunch and 6 units before dinner  Cardiac motion was observed at 130 bpm       INDICATIONS      diabetes, gestational, class A2      Exam Types      Level I      RESULTS      Fetus # 1 of 1   Vertex presentation   Fetal growth appeared normal   Placenta Location = Anterior   No placenta previa   Placenta Grade = I      The NST was reactive with no decelerations  MEASUREMENTS (* Included In Average GA)      AC              32 8 cm        36 weeks 6 days* (60%)   BPD              9 1 cm        37 weeks 0 days* (64%)   HC              33 3 cm        37 weeks 3 days* (57%)   Femur            7 1 cm        36 weeks 0 days* (52%)      HC/AC           1 02   FL/AC           0 22   FL/BPD          0 78   EFW (Ac/Fl/Hc)  3029 grams - 6 lbs 11 oz                 (55%)      THE AVERAGE GESTATIONAL AGE is 36 weeks 6 days +/- 21 days  AMNIOTIC FLUID      Q1: 3 4      Q2: 2 0      Q3: 1 1      Q4: 4 3   JAZZY Total = 10 8 cm   Amniotic Fluid: Normal      ANATOMY DETAILS      Visualized Appearing Sonographically Normal:   HEAD: (Calvarium, BPD Level, Lateral Ventricles);    TH  CAV :   (Diaphragm); HEART: (Ductal Arch);    STOMACH, BLADDER, PLACENTA      Not Visualized:   HEAD: (Cavum, Cerebellum); HEART: (Four Chamber View, Proximal Left   Outflow, Proximal Right Outflow); RIGHT KIDNEY, LEFT KIDNEY      ANATOMY COMMENTS      The prior fetal anatomic survey was limited with respect to evaluation of   the ductal arch  This anatomic view was seen today as sonographically   normal within the inherent limitations of fetal ultrasound  BIOPHYSICAL PROFILE      The Biophysical Profile score was 10/10  Breathin  Movement: 2  Tone: 2  AFV: 2  NST: 2   The NST was reactive with no decelerations  IMPRESSION      Macias IUP   36 weeks and 6 days by this ultrasound  (CASEY=OCT 10 2017)   Vertex presentation   Fetal growth appeared normal   Regular fetal heart rate of 130 bpm   Anterior placenta   No placenta previa      GENERAL COMMENT      No fetal structural abnormality is identified on the Level I survey today   in a difficult and limited study secondary to the constraints related to   the late gestational age and maternal obesity  Fetal interval growth and   amniotic fluid volume are normal       Today's ultrasound findings and suggested follow-up were discussed in   detail with [facility]  Suggested follow-up fetal surveillance includes   continuation of twice per week NST's, weekly JAZZY's, and daily kick counts  [facility] should continue to maintain contact with the Diabetes and   Pregnancy program in the [facility], [facility]  at least once a week for review   of her blood glucose values and adjustment of insulin doses  Follow-up   [facility] ultrasound evaluation is otherwise recommended as clinically   indicated   Delivery is recommended at 44 to 40 weeks gestation, sooner if   otherwise clinically indicated  The history of gestational diabetes during this pregnancy is associated   with an increased risk for the development of overt, Type II diabetes   later in Layne's life  Her risk for developing Type II diabetes is as   high as 70%  A 75 gram, two-hour, OGTT is suggested as initial follow up   6-12 weeks following delivery  The face to face time, in addition to time spent discussing ultrasound   results, was approximately 10 minutes, greater than 50% of which was spent   during counseling and coordination of care  ZURDO Craig M D     Maternal-Fetal Medicine   Electronically signed 09/18/17 11:35

## 2018-01-18 NOTE — RESULT NOTES
Verified Results  (1) HEMOGLOBIN A1C [id number] 01:14PM Niya Listen Order Number: [id number]     Test Name Result Flag Reference   HEMOGLOBIN A1C 5 4 %  4 2-6 3   EST  AVG  GLUCOSE 108 mg/dl       (1) COMPREHENSIVE METABOLIC PANEL [id number] 87:69EC Niya Listen Order Number: [id number]     Test Name Result Flag Reference   GLUCOSE,RANDM 76 mg/dL     If the patient is fasting, the ADA then defines impaired fasting glucose as > 100 mg/dL and diabetes as > or equal to 123 mg/dL  Specimen collection should occur prior to Sulfasalazine administration due to the potential for falsely depressed results  Specimen collection should occur prior to Sulfapyridine administration due to the potential for falsely elevated results  SODIUM 136 mmol/L  136-145   POTASSIUM 3 9 mmol/L  3 5-5 3   CHLORIDE 104 mmol/L  100-108   CARBON DIOXIDE 23 mmol/L  21-32   ANION GAP (CALC) 9 mmol/L  4-13   BLOOD UREA NITROGEN 12 mg/dL  5-25   CREATININE 0 55 mg/dL L 0 60-1 30   Standardized to IDMS reference method   CALCIUM 8 8 mg/dL  8 3-10 1   BILI, TOTAL 0 20 mg/dL  0 20-1 00   ALK PHOSPHATAS 158 U/L H    ALT (SGPT) 14 U/L  12-78   Specimen collection should occur prior to Sulfasalazine administration due to the potential for falsely depressed results  AST(SGOT) 19 U/L  5-45   Specimen collection should occur prior to Sulfasalazine administration due to the potential for falsely depressed results  ALBUMIN 2 6 g/dL L 3 5-5 0   TOTAL PROTEIN 6 5 g/dL  6 4-8 2   eGFR 125 ml/min/1 73sq [facility] Disease Education Program recommendations are as follows:  GFR calculation is accurate only with a steady state creatinine  Chronic Kidney disease less than 60 ml/min/1 73 sq  meters  Kidney failure less than 15 ml/min/1 73 sq  meters

## 2018-01-22 VITALS — DIASTOLIC BLOOD PRESSURE: 70 MMHG | BODY MASS INDEX: 34.81 KG/M2 | WEIGHT: 196.5 LBS | SYSTOLIC BLOOD PRESSURE: 116 MMHG

## 2018-01-22 VITALS
SYSTOLIC BLOOD PRESSURE: 130 MMHG | BODY MASS INDEX: 29.83 KG/M2 | DIASTOLIC BLOOD PRESSURE: 70 MMHG | SYSTOLIC BLOOD PRESSURE: 114 MMHG | DIASTOLIC BLOOD PRESSURE: 80 MMHG | WEIGHT: 168.38 LBS

## 2018-01-22 VITALS
SYSTOLIC BLOOD PRESSURE: 126 MMHG | BODY MASS INDEX: 38.66 KG/M2 | DIASTOLIC BLOOD PRESSURE: 94 MMHG | WEIGHT: 218.25 LBS

## 2018-01-22 VITALS — BODY MASS INDEX: 29.1 KG/M2 | WEIGHT: 164.25 LBS | DIASTOLIC BLOOD PRESSURE: 76 MMHG | SYSTOLIC BLOOD PRESSURE: 120 MMHG

## 2018-01-22 VITALS
BODY MASS INDEX: 32.96 KG/M2 | SYSTOLIC BLOOD PRESSURE: 117 MMHG | HEIGHT: 63 IN | WEIGHT: 186 LBS | DIASTOLIC BLOOD PRESSURE: 70 MMHG

## 2018-01-22 VITALS
DIASTOLIC BLOOD PRESSURE: 78 MMHG | BODY MASS INDEX: 37.27 KG/M2 | SYSTOLIC BLOOD PRESSURE: 112 MMHG | WEIGHT: 210.38 LBS

## 2018-01-22 VITALS
HEIGHT: 63 IN | SYSTOLIC BLOOD PRESSURE: 118 MMHG | BODY MASS INDEX: 36.59 KG/M2 | DIASTOLIC BLOOD PRESSURE: 64 MMHG | WEIGHT: 206.5 LBS

## 2018-01-22 VITALS
HEIGHT: 63 IN | BODY MASS INDEX: 37.21 KG/M2 | DIASTOLIC BLOOD PRESSURE: 70 MMHG | WEIGHT: 210 LBS | SYSTOLIC BLOOD PRESSURE: 112 MMHG

## 2018-01-22 VITALS
DIASTOLIC BLOOD PRESSURE: 82 MMHG | WEIGHT: 188 LBS | BODY MASS INDEX: 33.31 KG/M2 | SYSTOLIC BLOOD PRESSURE: 122 MMHG | HEIGHT: 63 IN

## 2018-01-22 VITALS — WEIGHT: 192.5 LBS | BODY MASS INDEX: 34.1 KG/M2 | SYSTOLIC BLOOD PRESSURE: 112 MMHG | DIASTOLIC BLOOD PRESSURE: 60 MMHG

## 2018-01-22 VITALS
DIASTOLIC BLOOD PRESSURE: 80 MMHG | SYSTOLIC BLOOD PRESSURE: 122 MMHG | BODY MASS INDEX: 35.79 KG/M2 | HEIGHT: 63 IN | WEIGHT: 202 LBS

## 2018-01-22 VITALS — WEIGHT: 209 LBS | DIASTOLIC BLOOD PRESSURE: 64 MMHG | SYSTOLIC BLOOD PRESSURE: 116 MMHG | BODY MASS INDEX: 37.02 KG/M2

## 2018-01-22 VITALS
WEIGHT: 201 LBS | DIASTOLIC BLOOD PRESSURE: 82 MMHG | HEIGHT: 63 IN | SYSTOLIC BLOOD PRESSURE: 126 MMHG | BODY MASS INDEX: 35.61 KG/M2

## 2018-01-22 VITALS
HEIGHT: 63 IN | BODY MASS INDEX: 33.33 KG/M2 | WEIGHT: 188.13 LBS | SYSTOLIC BLOOD PRESSURE: 112 MMHG | DIASTOLIC BLOOD PRESSURE: 78 MMHG

## 2018-01-22 VITALS
DIASTOLIC BLOOD PRESSURE: 75 MMHG | SYSTOLIC BLOOD PRESSURE: 120 MMHG | WEIGHT: 213 LBS | BODY MASS INDEX: 37.74 KG/M2 | HEIGHT: 63 IN

## 2018-01-24 VITALS
HEIGHT: 63 IN | SYSTOLIC BLOOD PRESSURE: 112 MMHG | DIASTOLIC BLOOD PRESSURE: 80 MMHG | WEIGHT: 188 LBS | BODY MASS INDEX: 33.31 KG/M2

## 2018-01-27 ENCOUNTER — HOSPITAL ENCOUNTER (EMERGENCY)
Facility: HOSPITAL | Age: 33
Discharge: HOME | End: 2018-01-27
Admitting: EMERGENCY MEDICINE
Payer: OTHER MISCELLANEOUS

## 2018-01-27 VITALS
DIASTOLIC BLOOD PRESSURE: 72 MMHG | OXYGEN SATURATION: 98 % | TEMPERATURE: 97.7 F | BODY MASS INDEX: 28.34 KG/M2 | HEART RATE: 90 BPM | WEIGHT: 160 LBS | SYSTOLIC BLOOD PRESSURE: 161 MMHG | RESPIRATION RATE: 18 BRPM

## 2018-01-27 DIAGNOSIS — Z77.21 EXPOSURE TO BLOOD OR BODY FLUID: Primary | ICD-10-CM

## 2018-01-27 LAB — ALT SERPL W P-5'-P-CCNC: 17 U/L (ref 12–78)

## 2018-01-27 PROCEDURE — 36415 COLL VENOUS BLD VENIPUNCTURE: CPT | Performed by: PHYSICIAN ASSISTANT

## 2018-01-27 PROCEDURE — 87389 HIV-1 AG W/HIV-1&-2 AB AG IA: CPT | Performed by: PHYSICIAN ASSISTANT

## 2018-01-27 PROCEDURE — 99283 EMERGENCY DEPT VISIT LOW MDM: CPT

## 2018-01-27 PROCEDURE — 86706 HEP B SURFACE ANTIBODY: CPT | Performed by: PHYSICIAN ASSISTANT

## 2018-01-27 PROCEDURE — 86803 HEPATITIS C AB TEST: CPT | Performed by: PHYSICIAN ASSISTANT

## 2018-01-27 PROCEDURE — 84460 ALANINE AMINO (ALT) (SGPT): CPT | Performed by: PHYSICIAN ASSISTANT

## 2018-01-27 PROCEDURE — 87340 HEPATITIS B SURFACE AG IA: CPT | Performed by: PHYSICIAN ASSISTANT

## 2018-01-27 NOTE — ED NOTES
Pts eye was flushed with 100cc of fluid at this time    PT tolerated this well        Janie Ventura RN  01/27/18 [id number]

## 2018-01-27 NOTE — ED PROVIDER NOTES
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 the doctor pulled out an IUCP monitor from the vagina and she was splashed in the left eye with the vaginal secretions/blood/amniotic fluid  She states she was wearing a mask but no eye protection  She states she felt the spash in her left eye and states it was blurry for a second which resolved  She attempted to flush and came here for exposure  She states the patient (source) has had prenatal care and has no known HIV, hepatitis, or STDs  The patient herself has no known infectious disease hx  She does not wear contact lenses  She denies any vision changes, eye pain, photophobia, discharge  She states she is up to date on tetanus and has had hep B immunizations  She denies fever, vomiting, diarrhea, neck pain, rash, headache  Prior to Admission Medications   Prescriptions Last Dose Informant Patient Reported? Taking?    NIFEdipine (ADALAT CC) 30 MG 24 hr tablet   No No   Sig: Take 1 tablet by mouth daily for 42 days   Prenatal MV-Min-Fe Fum-FA-DHA (PRENATAL 1 PO)   Yes No   Sig: Take 1 tablet by mouth daily   acetaminophen (TYLENOL) 325 mg tablet   No No   Sig: Headache   docusate sodium (COLACE) 100 mg capsule   No No   Sig: Take 1 capsule by mouth 2 (two) times a day   ibuprofen (MOTRIN) 600 mg tablet   No No   Sig: Take 1 tablet by mouth every 6 (six) hours as needed for mild pain      Facility-Administered Medications: None       Past Medical History:   Diagnosis Date    Abnormal Pap smear of cervix     Arthritis     Diabetes mellitus ([facility] [address] )     Irritable bowel syndrome     Migraine        Past Surgical History:   Procedure Laterality Date    COLPOSCOPY      CO  DELIVERY ONLY Bilateral 10/6/2017    Procedure:  SECTION (); Surgeon: Maria Dolores Ortiz MD;  Location: [facility] ;  Service: Obstetrics    SD COLONOSCOPY FLX DX [facility] WHEN PFRMD N/A 2016    Procedure: COLONOSCOPY;  Surgeon: Shivam Landa MD;  [facility]: [facility]; Service: Gastroenterology    WISDOM TOOTH EXTRACTION         History reviewed  No pertinent family history  I have reviewed and agree with the history as documented  Social History   Substance Use Topics    Smoking status: Never Smoker    Smokeless tobacco: Never Used    Alcohol use Yes      Comment: socially        Review of Systems   Constitutional: Negative for fever  Eyes: Negative for pain, discharge and visual disturbance  Respiratory: Negative for shortness of breath  Cardiovascular: Negative for chest pain  Gastrointestinal: Negative for abdominal pain, nausea and vomiting  Musculoskeletal: Negative for neck pain and neck stiffness  Skin: Negative for rash  Neurological: Negative for headaches  All other systems reviewed and are negative  Physical Exam  ED Triage Vitals [18 1801]   Temperature Pulse Respirations Blood Pressure SpO2   97 7 °F (36 5 °C) 90 18 161/72 98 %      Temp Source Heart Rate Source Patient Position - Orthostatic VS BP Location FiO2 (%)   Tymp Core Monitor Sitting Right arm --      Pain Score       No Pain           Orthostatic Vital Signs  Vitals:    18 1801   BP: 161/72   Pulse: 90   Patient Position - Orthostatic VS: Sitting       Physical Exam   Constitutional: She is oriented to person, place, and time  She appears well-developed and well-nourished  No distress  HENT:   Head: Normocephalic and atraumatic  Right Ear: External ear normal    Left Ear: External ear normal    Nose: Nose normal    Eyes: Conjunctivae and EOM are normal  Pupils are equal, round, and reactive to light  Neck: Normal range of motion  Neck supple  Cardiovascular: Normal rate, regular rhythm and normal heart sounds    Exam reveals no gallop and no friction rub  No murmur heard  Pulmonary/Chest: Effort normal and breath sounds normal  No respiratory distress  She has no wheezes  She has no rales  Abdominal: Soft  Bowel sounds are normal  She exhibits no distension  There is no tenderness  There is no guarding  Musculoskeletal: Normal range of motion  Lymphadenopathy:     She has no cervical adenopathy  Neurological: She is alert and oriented to person, place, and time  She is not disoriented  GCS eye subscore is 4  GCS verbal subscore is 5  GCS motor subscore is 6  Skin: Skin is warm and dry  She is not diaphoretic  Psychiatric: She has a normal mood and affect  Her behavior is normal  Judgment and thought content normal    Nursing note and vitals reviewed  ED Medications  Medications - No data to display    Diagnostic Studies  Results Reviewed     Procedure Component Value Units Date/Time    ALT [[id number]]  (Normal) Collected:  01/27/18 1827    Lab Status:  Final result Specimen:  Blood from Arm, Left Updated:  01/27/18 1848     ALT 17 U/L     Hepatitis B surface antigen [[id number]] Collected:  01/27/18 1827    Lab Status: In process Specimen:  Blood from Arm, Left Updated:  01/27/18 1830    Hepatitis C antibody [[id number]] Collected:  01/27/18 1827    Lab Status: In process Specimen:  Blood from Arm, Left Updated:  01/27/18 1830    Hepatitis B surface antibody [[id number]] Collected:  01/27/18 1827    Lab Status: In process Specimen:  Blood from Arm, Left Updated:  01/27/18 1830    HIV 1/2 AG-AB combo [[id number]] Collected:  01/27/18 1827    Lab Status:   In process Specimen:  Blood from Arm, Left Updated:  01/27/18 1830                 No orders to display              Procedures  Procedures       Phone Contacts  ED Phone Contact    ED Course  ED Course                                MDM  Number of Diagnoses or Management Options  Exposure to blood or body fluid:   Diagnosis management comments: Exposure forms were filled out  The eye was flushed by RN with NSS and the patient was able to tolerate 100ml flush  Exposure labs were drawn  Source patient was documented  Discussed with OB resident Dr Shaan Salvador, taking care of the source patient who will discuss with the source patient and order source exposure panel bloodwork  Instructed patient to follow up with employee health in 1-2 days for follow up  Return to the ED if symptoms worsen or new symptoms arise  Patient states understanding and agrees with plan  Amount and/or Complexity of Data Reviewed  Clinical lab tests: ordered    Risk of Complications, Morbidity, and/or Mortality  Presenting problems: low  Diagnostic procedures: low  Management options: low    Patient Progress  Patient progress: stable    CritCare Time    Disposition  Final diagnoses:   Exposure to blood or body fluid     Time reflects when diagnosis was documented in both MDM as applicable and the Disposition within this note     Time User Action Codes Description Comment    1/27/2018  6:31 PM Leninjabari Yenanthony Add [Z77 21] Exposure to blood or body fluid       ED Disposition     ED Disposition Condition Comment    Discharge  Gorman Sat discharge to home/self care      Condition at discharge: Good        Follow-up Information     Follow up With Specialties Details Why Contact Info Additional Information    Santo Ledesma MD Internal Medicine Schedule an appointment as soon as possible for a visit  [address]    [address] [facility], [address] [facility] Emergency Department Emergency Medicine  If symptoms worsen or new symptoms arise as discussed  [address]  [address] [facility] ED, [address] [place], [place], [address]        Discharge Medication List as of 1/27/2018  6:33 PM      CONTINUE these medications which have NOT CHANGED    Details   acetaminophen (TYLENOL) 325 mg tablet Headache, Print      docusate sodium (COLACE) 100 mg capsule Take 1 capsule by mouth 2 (two) times a day, Starting Mon 10/9/2017, Print      ibuprofen (MOTRIN) 600 mg tablet Take 1 tablet by mouth every 6 (six) hours as needed for mild pain, Starting Mon 10/9/2017, Print      NIFEdipine (ADALAT CC) 30 MG 24 hr tablet Take 1 tablet by mouth daily for 42 days, Starting Mon 10/9/2017, Until Mon 11/20/2017, Print      Prenatal MV-Min-Fe Fum-FA-DHA (PRENATAL 1 PO) Take 1 tablet by mouth daily, Historical Med           No discharge procedures on file      ED Provider  Electronically Signed by           Irene Webster PA-C  01/27/18 9737

## 2018-01-27 NOTE — DISCHARGE INSTRUCTIONS
Body Substance Exposure   WHAT YOU NEED TO KNOW:   Body substance exposure is when you come in contact with another person's blood or body fluid that contains blood  Semen or vaginal fluid can also spread infection  Contact may place you at risk for hepatitis B virus (HBV), human immunodeficiency virus (HIV), or hepatitis C virus (HCV)  DISCHARGE INSTRUCTIONS:   Ways that body substance exposure can occur:   · A needle stick or a cut from a sharp object    · Contact with an open wound, such as a cut, chapped skin, or an abrasion     · Contact with the eyes or mucus membrane, such as the lining of the mouth or nose    · Human bite  What to do when exposed to a body substance:   · Clean the area immediately  Wash an open wound with soap and clean water  Flush your eyes with saline solution or water  Rinse mucus membranes with water or saline solution  · Contact your healthcare provider as soon as possible  He will ask how the exposure happened and where the blood or body fluid touched your body  If possible, tell your healthcare provider about the person's health status and history, such as vaccinations  Treatment works best if started as soon as possible  Treatment that may be given for body substance exposure:  Postexposure prophylaxis (PEP) is medical treatment that may protect a person from infection after exposure to another person's body fluids  PEP may be needed if the person whose fluids you were exposed to has a known infection  Do not donate blood, organs, tissues, or semen until your follow-up is completed at 6 months  · PEP for HBV  may include HBV vaccinations or medicine to prevent HVB  This treatment works best if started within 24 hours of exposure  · PEP for HIV  may include 2 or 3 types of medicine to prevent HIV  This treatment works best if started within 72 hours of exposure  Continue treatment for 4 weeks   Practice safe sex to prevent spreading HIV and to prevent pregnancy during the follow-up period  If you are breastfeeding, your healthcare provider may recommend that you stop  Ask your healthcare provider if you can breastfeed  · PEP for HCV  is not  available  You will need to be tested for HCV and treated if you were infected  Follow up with your healthcare provider as directed: You will need more blood tests  PEP for HIV often causes side effects  Talk with your healthcare provider about your symptoms  He will need to make sure you are taking the medicine correctly  Write down your questions so you remember to ask them during your visits  Prevent body substance exposure: If you care for another person who has HBV, HIV, or HCV, protect yourself and others from infection:  · Wash your hands thoroughly  before and after you provide medical care  · Use protective equipment  Gloves or a face mask may protect your hands, nose, and mouth from splashes of blood or body fluid  · Do not recap needles after use  Recapping needles increases your risk of a needle stick  · Throw away needles in a safe container  A hard container with a lid may prevent accidental needle sticks  Contact your healthcare provider if:   · You have a fever  · You have a rash  · You have weakness or muscle pain  · You have abdominal pain, nausea, or vomiting  · You have diarrhea  · You have a headache  · You have questions or concerns about your condition or care  © 2017 [employer]  Information is for End User's use only and may not be sold, redistributed or otherwise used for commercial purposes  All illustrations and images included in CareNotes® are the copyrighted property of [employer]  or [employer]  The above information is an  only  It is not intended as medical advice for individual conditions or treatments  Talk to your doctor, nurse or pharmacist before following any medical regimen to see if it is safe and effective for you

## 2018-01-28 LAB
HBV SURFACE AB SER-ACNC: 584.26 MIU/ML
HBV SURFACE AG SER QL: NORMAL
HCV AB SER QL: NORMAL

## 2018-01-29 LAB — HIV 1+2 AB+HIV1 P24 AG SERPL QL IA: NORMAL

## 2018-02-26 NOTE — MISCELLANEOUS
Message  Return to work or school:   Jenny Yoder is under my professional care  She was seen in my office on 1/9/18   She is able to return to work on  1/2/18    She can perform work without limitations  Weight Bearing Status: Full Weight-Bearing           Signatures   Electronically signed by : PINA Sims ; Jan 13 2018  9:30AM EST                       (Author)

## 2018-04-05 ENCOUNTER — TELEPHONE (OUTPATIENT)
Dept: OBGYN CLINIC | Facility: CLINIC | Age: 33
End: 2018-04-05

## 2018-05-22 DIAGNOSIS — Z30.011 ENCOUNTER FOR INITIAL PRESCRIPTION OF CONTRACEPTIVE PILLS: Primary | ICD-10-CM

## 2018-06-28 ENCOUNTER — TRANSCRIBE ORDERS (OUTPATIENT)
Dept: ADMINISTRATIVE | Facility: HOSPITAL | Age: 33
End: 2018-06-28

## 2018-06-28 ENCOUNTER — APPOINTMENT (OUTPATIENT)
Dept: LAB | Facility: HOSPITAL | Age: 33
End: 2018-06-28
Attending: OBSTETRICS & GYNECOLOGY
Payer: COMMERCIAL

## 2018-06-28 DIAGNOSIS — Z00.8 HEALTH EXAMINATION IN POPULATION SURVEY: Primary | ICD-10-CM

## 2018-06-28 DIAGNOSIS — G43.809 OTHER MIGRAINE WITHOUT STATUS MIGRAINOSUS, NOT INTRACTABLE: Primary | ICD-10-CM

## 2018-06-28 DIAGNOSIS — N91.2 AMENORRHEA: ICD-10-CM

## 2018-06-28 DIAGNOSIS — Z00.8 HEALTH EXAMINATION IN POPULATION SURVEY: ICD-10-CM

## 2018-06-28 DIAGNOSIS — N91.2 AMENORRHEA: Primary | ICD-10-CM

## 2018-06-28 LAB
B-HCG SERPL-ACNC: <2 MIU/ML
CHOLEST SERPL-MCNC: 308 MG/DL (ref 50–200)
EST. AVERAGE GLUCOSE BLD GHB EST-MCNC: 114 MG/DL
HBA1C MFR BLD: 5.6 % (ref 4.2–6.3)
HDLC SERPL-MCNC: 43 MG/DL (ref 40–60)
LDLC SERPL CALC-MCNC: 235 MG/DL (ref 0–100)
NONHDLC SERPL-MCNC: 265 MG/DL
PROLACTIN SERPL-MCNC: 1.8 NG/ML
TRIGL SERPL-MCNC: 149 MG/DL
TSH SERPL DL<=0.05 MIU/L-ACNC: 1.43 UIU/ML (ref 0.36–3.74)

## 2018-06-28 PROCEDURE — 83036 HEMOGLOBIN GLYCOSYLATED A1C: CPT

## 2018-06-28 PROCEDURE — 84146 ASSAY OF PROLACTIN: CPT

## 2018-06-28 PROCEDURE — 36415 COLL VENOUS BLD VENIPUNCTURE: CPT

## 2018-06-28 PROCEDURE — 84443 ASSAY THYROID STIM HORMONE: CPT

## 2018-06-28 PROCEDURE — 84702 CHORIONIC GONADOTROPIN TEST: CPT

## 2018-06-28 PROCEDURE — 80061 LIPID PANEL: CPT

## 2018-06-28 RX ORDER — SUMATRIPTAN 25 MG/1
25 TABLET, FILM COATED ORAL ONCE AS NEEDED
Qty: 60 TABLET | Refills: 2 | Status: SHIPPED | OUTPATIENT
Start: 2018-06-28 | End: 2018-08-15

## 2018-08-13 ENCOUNTER — APPOINTMENT (OUTPATIENT)
Dept: LAB | Facility: HOSPITAL | Age: 33
End: 2018-08-13
Attending: OBSTETRICS & GYNECOLOGY
Payer: COMMERCIAL

## 2018-08-13 ENCOUNTER — DOCUMENTATION (OUTPATIENT)
Dept: OTHER | Facility: HOSPITAL | Age: 33
End: 2018-08-13

## 2018-08-13 DIAGNOSIS — Z32.01 PREGNANCY TEST POSITIVE: ICD-10-CM

## 2018-08-13 DIAGNOSIS — Z32.01 PREGNANCY TEST POSITIVE: Primary | ICD-10-CM

## 2018-08-13 LAB
B-HCG SERPL-ACNC: ABNORMAL MIU/ML
PROGEST SERPL-MCNC: 7.7 NG/ML

## 2018-08-13 PROCEDURE — 36415 COLL VENOUS BLD VENIPUNCTURE: CPT

## 2018-08-13 PROCEDURE — 84702 CHORIONIC GONADOTROPIN TEST: CPT

## 2018-08-13 PROCEDURE — 84144 ASSAY OF PROGESTERONE: CPT

## 2018-08-13 NOTE — PROGRESS NOTES
Patient reports a positive pregnancy test   Laboratory testing was ordered    Patient will go to the lab today

## 2018-08-14 ENCOUNTER — TELEPHONE (OUTPATIENT)
Dept: OBGYN CLINIC | Facility: CLINIC | Age: 33
End: 2018-08-14

## 2018-08-14 DIAGNOSIS — Z34.90 EARLY STAGE OF PREGNANCY: Primary | ICD-10-CM

## 2018-08-14 PROBLEM — Z3A.38 38 WEEKS GESTATION OF PREGNANCY: Status: RESOLVED | Noted: 2017-10-05 | Resolved: 2018-08-14

## 2018-08-15 ENCOUNTER — OFFICE VISIT (OUTPATIENT)
Dept: OBGYN CLINIC | Facility: CLINIC | Age: 33
End: 2018-08-15
Payer: COMMERCIAL

## 2018-08-15 VITALS
SYSTOLIC BLOOD PRESSURE: 104 MMHG | WEIGHT: 174.2 LBS | HEIGHT: 63 IN | DIASTOLIC BLOOD PRESSURE: 62 MMHG | BODY MASS INDEX: 30.87 KG/M2

## 2018-08-15 DIAGNOSIS — O34.219 PREVIOUS CESAREAN DELIVERY AFFECTING PREGNANCY, ANTEPARTUM: ICD-10-CM

## 2018-08-15 DIAGNOSIS — Z3A.01 LESS THAN 8 WEEKS GESTATION OF PREGNANCY: Primary | ICD-10-CM

## 2018-08-15 DIAGNOSIS — Z86.32 HISTORY OF INSULIN CONTROLLED GESTATIONAL DIABETES MELLITUS (GDM): ICD-10-CM

## 2018-08-15 DIAGNOSIS — O09.891 SHORT INTERVAL BETWEEN PREGNANCIES AFFECTING PREGNANCY IN FIRST TRIMESTER, ANTEPARTUM: ICD-10-CM

## 2018-08-15 DIAGNOSIS — N91.2 AMENORRHEA: ICD-10-CM

## 2018-08-15 PROBLEM — O13.9 GESTATIONAL HYPERTENSION: Status: RESOLVED | Noted: 2017-10-05 | Resolved: 2018-08-15

## 2018-08-15 PROBLEM — O24.419 GESTATIONAL DIABETES MELLITUS, CLASS A2: Status: RESOLVED | Noted: 2017-10-05 | Resolved: 2018-08-15

## 2018-08-15 LAB — SL AMB POCT URINE HCG: ABNORMAL

## 2018-08-15 PROCEDURE — 99214 OFFICE O/P EST MOD 30 MIN: CPT | Performed by: OBSTETRICS & GYNECOLOGY

## 2018-08-15 PROCEDURE — 81025 URINE PREGNANCY TEST: CPT | Performed by: OBSTETRICS & GYNECOLOGY

## 2018-08-15 RX ORDER — PEPPERMINT OIL
OIL (ML) MISCELLANEOUS
COMMUNITY
End: 2018-08-15

## 2018-08-15 RX ORDER — VITAMIN C, CALCIUM, IRON, VITAMIN D3, VITAMIN E, THIAMIN, RIBOFLAVIN, NIACINAMIDE, VITAMIN B6, FOLIC ACID, IODINE, ZINC, COPPER, DOCUSATE SODIUM 120; 85; 30; 3; 20; 20; 1; 25; 2; 50; 159; 4.54; 150; 5; 400; 3.4 MG/1; MG/1; [IU]/1; MG/1; MG/1; MG/1; MG/1; MG/1; MG/1; MG/1; MG/1; MG/1; UG/1; MG/1; [IU]/1; MG/1
1 TABLET ORAL DAILY
Qty: 60 EACH | Refills: 6 | Status: SHIPPED | OUTPATIENT
Start: 2018-08-15 | End: 2020-02-15

## 2018-08-15 NOTE — PATIENT INSTRUCTIONS
Pregnancy   WHAT YOU NEED TO KNOW:   What do I need to know about pregnancy? A normal pregnancy lasts about 40 weeks  The first trimester lasts from your last period through the 12th week of pregnancy  The second trimester lasts from the 13th week of your pregnancy through the 23rd week  The third trimester lasts from your 24th week of pregnancy until your baby is born  If you know the date of your last period, your healthcare provider can estimate your due date  You may give birth to your baby any time from 37 weeks to 2 weeks after your due date  What is prenatal care? Prenatal care is a series of visits with your healthcare provider throughout your pregnancy  Prenatal care can help prevent problems during pregnancy and childbirth  At each prenatal visit, your healthcare provider will weigh you and check your blood pressure  Your healthcare provider will also check your baby's heartbeat and growth  You may also need the following at some visits:  · A pelvic exam  allows your healthcare provider to see your cervix (the bottom part of your uterus)  Your healthcare provider uses a speculum to gently open your vagina  He will check the size and shape of your uterus  · Blood tests  may be done to check for gestational diabetes and anemia (low iron level)  You may need other blood tests, such as blood type, Rh factor, or tests to check for birth defects  · A fetal ultrasound  shows pictures of your baby inside your uterus  It shows your baby's development  The movement and position of your baby can also be seen  Your healthcare provider may be able to tell you what your baby's gender is during the ultrasound  What can I do to have a healthy pregnancy? · Eat a variety of healthy foods  Healthy foods include fruits, vegetables, whole-grain breads, low-fat dairy foods, beans, lean meats, and fish  Drink liquids as directed  Ask how much liquid to drink each day and which liquids are best for you   Limit caffeine to less than 200 milligrams each day  Limit your intake of fish to 2 servings each week  Choose fish low in mercury such as canned light tuna, shrimp, crab, salmon, cod, or tilapia  Do not  eat fish high in mercury such as swordfish, tilefish, ann marie mackerel, and shark  · Take prenatal vitamins as directed  Your need for certain vitamins and minerals, such as folic acid, increases during pregnancy  Prenatal vitamins provide some of the extra vitamins and minerals you need  Prenatal vitamins may also help to decrease the risk of certain birth defects  · Ask how much weight you should gain during your pregnancy  Too much or too little weight gain can be unhealthy for you and your baby  · Talk to your healthcare provider about exercise  Moderate exercise can help you stay fit  Your healthcare provider will help you plan an exercise program that is safe for you during pregnancy  · Do not smoke  If you smoke, it is never too late to quit  Smoking increases your risk of a miscarriage and other health problems during your pregnancy  Smoking can cause your baby to be born too early or weigh less at birth  Ask your healthcare provider for information if you need help quitting  · Do not drink alcohol  Alcohol passes from your body to your baby through the placenta  It can affect your baby's brain development and cause fetal alcohol syndrome (FAS)  FAS is a group of conditions that causes mental, behavior, and growth problems  · Talk to your healthcare provider before you take any medicines  Many medicines may harm your baby if you take them when you are pregnant  Do not take any medicines, vitamins, herbs, or supplements without first talking to your healthcare provider  Never use illegal or street drugs (such as marijuana or cocaine) while you are pregnant  What body changes may happen during my pregnancy?    · Breast changes  you will experience include tenderness and tingling during the early part of your pregnancy  Your breasts will become larger  You may need to use a support bra  You may see a thin, yellow fluid, called colostrum, leak from your nipples during the second trimester  Colostrum is a liquid that changes to milk about 3 days after you give birth  · Skin changes and stretch marks  may occur during your pregnancy  You may have red marks, called stretch marks, on your skin  Stretch marks will usually fade after pregnancy  Use lotion if your skin is dry and itchy  The skin on your face, around your nipples, and below your belly button may darken  Most of the time, your skin will return to its normal color after your baby is born  · Morning sickness  is nausea and vomiting that can happen at any time of day  Avoid fatty and spicy foods  Eat small meals throughout the day instead of large meals  Kelly may help to decrease nausea  Ask your healthcare provider about other ways of decreasing nausea and vomiting  · Heartburn  may be caused by changes in your hormones during pregnancy  Your growing uterus may also push your stomach upward and force stomach acid to back up into your esophagus  Eat 4 or 5 small meals each day instead of large meals  Avoid spicy foods  Avoid eating right before bedtime  · Constipation  may develop during your pregnancy  To treat constipation, eat foods high in fiber such as fiber cereals, beans, fruits, vegetables, whole-grain breads, and prune juice  Get regular exercise and drink plenty of water  Your healthcare provider may also suggest a fiber supplement to soften your bowel movements  Talk to your healthcare provider before you use any medicines to decrease constipation  · Hemorrhoids  are enlarged veins in the rectal area  They may cause pain, itching, and bright red bleeding from your rectum  To decrease your risk of hemorrhoids, prevent constipation and do not strain to have a bowel movement   If you have hemorrhoids, soak in a tub of warm water to ease discomfort  Ask your healthcare provider how you can treat hemorrhoids  · Leg cramps and swelling  may be caused by low calcium levels or the added weight of pregnancy  Raise your legs above the level of your heart to decrease swelling  During a leg cramp, stretch or massage the muscle that has the cramp  Heat may help decrease pain and muscle spasms  Apply heat on your muscle for 20 to 30 minutes every 2 hours for as many days as directed  · Back pain  may occur as your baby grows  Do not stand for long periods of time or lift heavy items  Use good posture while you stand, squat, or bend  Wear low-heeled shoes with good support  Rest may also help to relieve back pain  Ask your healthcare provider about exercises you can do to strengthen your back muscles  What are some safety tips during pregnancy? · Avoid hot tubs and saunas  Do not use a hot tub or sauna while you are pregnant, especially during your first trimester  Hot tubs and saunas may raise your baby's temperature and increase the risk of birth defects  · Avoid toxoplasmosis  This is an infection caused by eating raw meat or being around infected cat feces  It can cause birth defects, miscarriages, and other problems  Wash your hands after you touch raw meat  Make sure any meat is well-cooked before you eat it  Avoid raw eggs and unpasteurized milk  Use gloves or ask someone else to clean your cat's litter box while you are pregnant  · Ask your healthcare provider about travel  The most comfortable time to travel is during the second trimester  Ask your healthcare provider if you can travel after 36 weeks  You may not be able to travel in an airplane after 36 weeks  He may also recommend that you avoid long road trips  When should I seek immediate care? · You develop a severe headache that does not go away  · You have new or increased vision changes, such as blurred or spotted vision      · You have new or increased swelling in your face or hands  · You have pain or cramping in your abdomen or low back  · You have vaginal bleeding  When should I contact my healthcare provider? · You have abdominal cramps, pressure, or tightening  · You have a change in vaginal discharge  · You cannot keep food or drinks down, and you are losing weight  · You have chills or a fever  · You have vaginal itching, burning, or pain  · You have yellow, green, white, or foul-smelling vaginal discharge  · You have pain or burning when you urinate, less urine than usual, or pink or bloody urine  · You have questions or concerns about your condition or care  CARE AGREEMENT:   You have the right to help plan your care  Learn about your health condition and how it may be treated  Discuss treatment options with your caregivers to decide what care you want to receive  You always have the right to refuse treatment  The above information is an  only  It is not intended as medical advice for individual conditions or treatments  Talk to your doctor, nurse or pharmacist before following any medical regimen to see if it is safe and effective for you  © 2017 [employer]  Information is for End User's use only and may not be sold, redistributed or otherwise used for commercial purposes  All illustrations and images included in CareNotes® are the copyrighted property of [employer]  or [employer]

## 2018-08-15 NOTE — PROGRESS NOTES
Assessment/Plan     Diagnoses and all orders for this visit:    Less than 8 weeks gestation of pregnancy  -     POCT urine HCG  -     Prenatal Panel; Future  -     Glucose, 1H PG; Future  -     US OB < 14 weeks with transvaginal; Future  -     Prenat w/o A-FeCbGl-[id number]-FA-DHA (CITRANATAL 90 DHA) 90-1 & 300 MG MISC; Take 1 kit by mouth daily    Amenorrhea    History of insulin controlled gestational diabetes mellitus (GDM)    Previous  delivery affecting pregnancy, antepartum    Short interval between pregnancies affecting pregnancy in first trimester, antepartum    Other orders  -     Discontinue: peppermint oil; by Does not apply route        This viable intrauterine pregnancy at 7 weeks by crown-rump length  Fetal heart rate is 156 beats per minute  Yolk sac was seen  Patient with history of gestational diabetes  Early Glucola was ordered  She is also have of short interval pregnancy and had 1 prior  section  She is not interested in a trial of labor after   Formal dating ultrasound was ordered for 2 weeks  Prenatal panel was ordered as well as prenatal vitamins  Asked patient to call if with any problems or concerns  She is on progesterone supplementation  Shanon Ramires is an 29 y o  woman who presents for pregnancy confirmation  Patient is here for a pregnancy confirmation  Her LMP was on 18  She denies vaginal bleeding or pelvic pain  She has some cramping  She has some nausea but no vomiting  She is taking prenatal vitamins  Her first positive pregnancy test was 2 days ago  Her HCG is 49, 826 8  She missed a period the month prior  She was previously on Anne Marie  She is 10 months postpartum and delivered via CS for arrest of dilation and fetal intolerance    She also had A2GDM on insulin      OB History      Para Term  AB Living    1 1 1     1    SAB TAB Ectopic Multiple Live Births          0 1            The following portions of the patient's history were reviewed and updated as appropriate: allergies, current medications, past family history, past medical history, past social history, past surgical history and problem list       Review of Systems   Constitutional: Negative  HENT: Negative  Eyes: Negative  Respiratory: Negative  Cardiovascular: Negative  Gastrointestinal: Negative  Endocrine: Negative  Genitourinary:        As noted in HPI   Musculoskeletal: Negative  Skin: Negative  Allergic/Immunologic: Negative  Neurological: Negative  Hematological: Negative  Psychiatric/Behavioral: Negative  Physical Exam   Constitutional: She is oriented to person, place, and time  She appears well-developed and well-nourished  Genitourinary: Vagina normal    Abdominal: Soft  There is no tenderness  There is no guarding  Neurological: She is alert and oriented to person, place, and time  Skin: Skin is warm and dry  Psychiatric: She has a normal mood and affect  Counseling / Coordination of Care  Total time spent today25  minutes  Greater than 50% of total time was spent with the patient and / or family counseling and / or coordination of care

## 2018-08-22 ENCOUNTER — TELEPHONE (OUTPATIENT)
Dept: OBGYN CLINIC | Facility: CLINIC | Age: 33
End: 2018-08-22

## 2018-08-22 DIAGNOSIS — O20.0 THREATENED ABORTION: Primary | ICD-10-CM

## 2018-08-22 NOTE — TELEPHONE ENCOUNTER
Patient with first trimester bleeding, after intercourse last night  She was seen personally by me at [address] and D and she has only minimal brown spotting  TVUS performed showing viable IUP -169 bpm   Ordered HCG quant, progesterone, prenatal panel ordered as well as formal US at [facility] at [employer]   Advised pelvic rest

## 2018-08-23 ENCOUNTER — TELEPHONE (OUTPATIENT)
Dept: OBGYN CLINIC | Facility: CLINIC | Age: 33
End: 2018-08-23

## 2018-08-23 ENCOUNTER — LAB (OUTPATIENT)
Dept: LAB | Facility: CLINIC | Age: 33
End: 2018-08-23
Payer: COMMERCIAL

## 2018-08-23 ENCOUNTER — HOSPITAL ENCOUNTER (OUTPATIENT)
Dept: ULTRASOUND IMAGING | Facility: HOSPITAL | Age: 33
Discharge: HOME | End: 2018-08-23
Payer: COMMERCIAL

## 2018-08-23 DIAGNOSIS — O20.0 THREATENED ABORTION: ICD-10-CM

## 2018-08-23 DIAGNOSIS — Z3A.01 LESS THAN 8 WEEKS GESTATION OF PREGNANCY: ICD-10-CM

## 2018-08-23 DIAGNOSIS — Z86.32 HISTORY OF INSULIN CONTROLLED GESTATIONAL DIABETES MELLITUS (GDM): Primary | ICD-10-CM

## 2018-08-23 LAB
ABO GROUP BLD: NORMAL
B-HCG SERPL-ACNC: ABNORMAL MIU/ML
BASOPHILS # BLD AUTO: 0.02 THOUSANDS/ΜL (ref 0–0.1)
BASOPHILS NFR BLD AUTO: 0 % (ref 0–1)
BILIRUB UR QL STRIP: NEGATIVE
BLD GP AB SCN SERPL QL: NEGATIVE
CLARITY UR: CLEAR
COLOR UR: YELLOW
EOSINOPHIL # BLD AUTO: 0.04 THOUSAND/ΜL (ref 0–0.61)
EOSINOPHIL NFR BLD AUTO: 1 % (ref 0–6)
ERYTHROCYTE [DISTWIDTH] IN BLOOD BY AUTOMATED COUNT: 12 % (ref 11.6–15.1)
EXTERNAL HIV-1 ANTIBODY: NEGATIVE
GLUCOSE 1H P 50 G GLC PO SERPL-MCNC: 93 MG/DL
GLUCOSE UR STRIP-MCNC: NEGATIVE MG/DL
HCT VFR BLD AUTO: 38.8 % (ref 34.8–46.1)
HGB BLD-MCNC: 13.3 G/DL (ref 11.5–15.4)
HGB UR QL STRIP.AUTO: NEGATIVE
IMM GRANULOCYTES # BLD AUTO: 0.03 THOUSAND/UL (ref 0–0.2)
IMM GRANULOCYTES NFR BLD AUTO: 0 % (ref 0–2)
KETONES UR STRIP-MCNC: NEGATIVE MG/DL
LEUKOCYTE ESTERASE UR QL STRIP: NEGATIVE
LYMPHOCYTES # BLD AUTO: 2.27 THOUSANDS/ΜL (ref 0.6–4.47)
LYMPHOCYTES NFR BLD AUTO: 34 % (ref 14–44)
MCH RBC QN AUTO: 28.9 PG (ref 26.8–34.3)
MCHC RBC AUTO-ENTMCNC: 34.3 G/DL (ref 31.4–37.4)
MCV RBC AUTO: 84 FL (ref 82–98)
MONOCYTES # BLD AUTO: 0.33 THOUSAND/ΜL (ref 0.17–1.22)
MONOCYTES NFR BLD AUTO: 5 % (ref 4–12)
NEUTROPHILS # BLD AUTO: 4.08 THOUSANDS/ΜL (ref 1.85–7.62)
NEUTS SEG NFR BLD AUTO: 60 % (ref 43–75)
NITRITE UR QL STRIP: NEGATIVE
NRBC BLD AUTO-RTO: 0 /100 WBCS
PH UR STRIP.AUTO: 7.5 [PH] (ref 4.5–8)
PLATELET # BLD AUTO: 206 THOUSANDS/UL (ref 149–390)
PMV BLD AUTO: 10.4 FL (ref 8.9–12.7)
PROGEST SERPL-MCNC: 27 NG/ML
PROT UR STRIP-MCNC: NEGATIVE MG/DL
RBC # BLD AUTO: 4.61 MILLION/UL (ref 3.81–5.12)
RH BLD: POSITIVE
RUBV IGG SERPL IA-ACNC: >175 IU/ML
SP GR UR STRIP.AUTO: 1.01 (ref 1–1.03)
SPECIMEN EXPIRATION DATE: NORMAL
UROBILINOGEN UR QL STRIP.AUTO: 0.2 E.U./DL
WBC # BLD AUTO: 6.77 THOUSAND/UL (ref 4.31–10.16)

## 2018-08-23 PROCEDURE — 36415 COLL VENOUS BLD VENIPUNCTURE: CPT

## 2018-08-23 PROCEDURE — 84144 ASSAY OF PROGESTERONE: CPT

## 2018-08-23 PROCEDURE — 84702 CHORIONIC GONADOTROPIN TEST: CPT

## 2018-08-23 PROCEDURE — 87086 URINE CULTURE/COLONY COUNT: CPT

## 2018-08-23 PROCEDURE — 80081 OBSTETRIC PANEL INC HIV TSTG: CPT

## 2018-08-23 PROCEDURE — 76801 OB US < 14 WKS SINGLE FETUS: CPT

## 2018-08-23 PROCEDURE — 82950 GLUCOSE TEST: CPT

## 2018-08-23 PROCEDURE — 81003 URINALYSIS AUTO W/O SCOPE: CPT

## 2018-08-23 NOTE — TELEPHONE ENCOUNTER
Notified patient of lab test results  HCG and progesterone were appropriate  Her glucose was 93  This was not a 1 hour Glucola and she states she was fasting  I will order an A1c as well as a 1 hour Glucola

## 2018-08-24 LAB
BACTERIA UR CULT: NORMAL
HBV SURFACE AG SER QL: NORMAL
RPR SER QL: NORMAL

## 2018-08-25 ENCOUNTER — APPOINTMENT (OUTPATIENT)
Dept: LAB | Facility: HOSPITAL | Age: 33
End: 2018-08-25
Attending: OBSTETRICS & GYNECOLOGY
Payer: COMMERCIAL

## 2018-08-25 DIAGNOSIS — Z86.32 HISTORY OF INSULIN CONTROLLED GESTATIONAL DIABETES MELLITUS (GDM): ICD-10-CM

## 2018-08-25 LAB
GLUCOSE 1H P 50 G GLC PO SERPL-MCNC: 129 MG/DL
HIV 1+2 AB+HIV1 P24 AG SERPL QL IA: NORMAL

## 2018-08-25 PROCEDURE — 36415 COLL VENOUS BLD VENIPUNCTURE: CPT

## 2018-08-25 PROCEDURE — 82950 GLUCOSE TEST: CPT

## 2018-08-28 ENCOUNTER — TELEPHONE (OUTPATIENT)
Dept: OBGYN CLINIC | Facility: CLINIC | Age: 33
End: 2018-08-28

## 2018-08-28 DIAGNOSIS — B96.89 BACTERIAL VAGINOSIS: ICD-10-CM

## 2018-08-28 DIAGNOSIS — N76.0 BACTERIAL VAGINOSIS: ICD-10-CM

## 2018-08-28 DIAGNOSIS — O20.9 VAGINAL BLEEDING IN PREGNANCY, FIRST TRIMESTER: Primary | ICD-10-CM

## 2018-08-28 RX ORDER — CLINDAMYCIN HYDROCHLORIDE 300 MG/1
300 CAPSULE ORAL 2 TIMES DAILY
Qty: 14 CAPSULE | Refills: 0 | Status: SHIPPED | OUTPATIENT
Start: 2018-08-28 | End: 2018-09-04

## 2018-08-28 NOTE — TELEPHONE ENCOUNTER
Patient seen on L and D due to continued bleeding, dark brown blood with wiping  She has no pain  TVUS performed shows IUP with  bpm   Speculum shows dark old blood, no active bleeding  Cervix closed  Advised to stay out of work until Thursday/  Pelvic rest   [employer] shows bacterial vaginosis  Rx: Clindamycin 300 mg po BID x 7 days  Follow-up next week 9/5/18

## 2018-08-28 NOTE — LETTER
August 28, 2018     Patient: Rose Marie Stark   YOB: 1985   Date of Visit: 8/28/2018       To Whom It May Concern: It is my medical opinion that Zehra Moreira should remain out of work until 8/30/18     Patient is no longer allowed to work today  If you have any questions or concerns, please don't hesitate to call           Sincerely,        Beny Cueva MD    CC: No Recipients

## 2018-09-05 ENCOUNTER — ULTRASOUND (OUTPATIENT)
Dept: OBGYN CLINIC | Facility: CLINIC | Age: 33
End: 2018-09-05
Payer: COMMERCIAL

## 2018-09-05 ENCOUNTER — INITIAL PRENATAL (OUTPATIENT)
Dept: OBGYN CLINIC | Facility: CLINIC | Age: 33
End: 2018-09-05
Payer: COMMERCIAL

## 2018-09-05 DIAGNOSIS — Z3A.11 11 WEEKS GESTATION OF PREGNANCY: Primary | ICD-10-CM

## 2018-09-05 DIAGNOSIS — Z36.9 ANTENATAL SCREENING ENCOUNTER: ICD-10-CM

## 2018-09-05 PROCEDURE — OBC

## 2018-09-05 PROCEDURE — 76817 TRANSVAGINAL US OBSTETRIC: CPT | Performed by: OBSTETRICS & GYNECOLOGY

## 2018-09-05 NOTE — PROGRESS NOTES
New OB counseling, Baby and Me reviewed,  Pregnancy Essentials booklet given  Referral was given for MFM and pt will call and make appointments for 13 wk and 20 wk ultrasounds  Pt had prenatal labs and glucola drawn prior to today's visit  Pt denies any problems or concerns at this time

## 2018-09-08 ENCOUNTER — DOCUMENTATION (OUTPATIENT)
Dept: OBGYN CLINIC | Facility: CLINIC | Age: 33
End: 2018-09-08

## 2018-09-08 DIAGNOSIS — Z34.90 EARLY STAGE OF PREGNANCY: ICD-10-CM

## 2018-09-19 ENCOUNTER — HOSPITAL ENCOUNTER (OUTPATIENT)
Dept: ULTRASOUND IMAGING | Facility: HOSPITAL | Age: 33
Discharge: HOME | End: 2018-09-19
Attending: OBSTETRICS & GYNECOLOGY
Payer: COMMERCIAL

## 2018-09-19 ENCOUNTER — ROUTINE PRENATAL (OUTPATIENT)
Dept: PERINATAL CARE | Facility: CLINIC | Age: 33
End: 2018-09-19
Payer: COMMERCIAL

## 2018-09-19 ENCOUNTER — ROUTINE PRENATAL (OUTPATIENT)
Dept: OBGYN CLINIC | Facility: CLINIC | Age: 33
End: 2018-09-19

## 2018-09-19 ENCOUNTER — APPOINTMENT (OUTPATIENT)
Dept: LAB | Facility: HOSPITAL | Age: 33
End: 2018-09-19
Attending: OBSTETRICS & GYNECOLOGY
Payer: COMMERCIAL

## 2018-09-19 VITALS
BODY MASS INDEX: 30.65 KG/M2 | SYSTOLIC BLOOD PRESSURE: 119 MMHG | HEART RATE: 85 BPM | DIASTOLIC BLOOD PRESSURE: 77 MMHG | WEIGHT: 173 LBS

## 2018-09-19 VITALS
DIASTOLIC BLOOD PRESSURE: 80 MMHG | WEIGHT: 173.8 LBS | SYSTOLIC BLOOD PRESSURE: 120 MMHG | HEART RATE: 84 BPM | HEIGHT: 63 IN | BODY MASS INDEX: 30.79 KG/M2

## 2018-09-19 DIAGNOSIS — R10.11 RIGHT UPPER QUADRANT PAIN: ICD-10-CM

## 2018-09-19 DIAGNOSIS — O09.891 PREVIOUS PREGNANCY COMPLICATED BY PREGNANCY-INDUCED HYPERTENSION IN FIRST TRIMESTER, ANTEPARTUM: ICD-10-CM

## 2018-09-19 DIAGNOSIS — O34.219 PREVIOUS CESAREAN DELIVERY AFFECTING PREGNANCY, ANTEPARTUM: Primary | ICD-10-CM

## 2018-09-19 DIAGNOSIS — O34.219 PREVIOUS CESAREAN DELIVERY AFFECTING PREGNANCY, ANTEPARTUM: ICD-10-CM

## 2018-09-19 DIAGNOSIS — Z36.82 NUCHAL TRANSLUCENCY OF FETUS ON PRENATAL ULTRASOUND: ICD-10-CM

## 2018-09-19 DIAGNOSIS — O09.891 SHORT INTERVAL BETWEEN PREGNANCIES AFFECTING PREGNANCY IN FIRST TRIMESTER, ANTEPARTUM: Primary | ICD-10-CM

## 2018-09-19 DIAGNOSIS — O09.891 SHORT INTERVAL BETWEEN PREGNANCIES AFFECTING PREGNANCY IN FIRST TRIMESTER, ANTEPARTUM: ICD-10-CM

## 2018-09-19 DIAGNOSIS — Z86.32 HISTORY OF INSULIN CONTROLLED GESTATIONAL DIABETES MELLITUS (GDM): ICD-10-CM

## 2018-09-19 DIAGNOSIS — Z3A.12 12 WEEKS GESTATION OF PREGNANCY: ICD-10-CM

## 2018-09-19 DIAGNOSIS — Z36.9 ANTENATAL SCREENING ENCOUNTER: ICD-10-CM

## 2018-09-19 DIAGNOSIS — Z11.3 SCREENING FOR STD (SEXUALLY TRANSMITTED DISEASE): ICD-10-CM

## 2018-09-19 PROBLEM — Z34.90 EARLY STAGE OF PREGNANCY: Status: RESOLVED | Noted: 2018-08-14 | Resolved: 2018-09-19

## 2018-09-19 PROBLEM — B96.89 BACTERIAL VAGINOSIS: Status: RESOLVED | Noted: 2018-08-28 | Resolved: 2018-09-19

## 2018-09-19 PROBLEM — N76.0 BACTERIAL VAGINOSIS: Status: RESOLVED | Noted: 2018-08-28 | Resolved: 2018-09-19

## 2018-09-19 PROBLEM — O20.0 THREATENED ABORTION: Status: RESOLVED | Noted: 2018-08-22 | Resolved: 2018-09-19

## 2018-09-19 PROBLEM — N91.2 AMENORRHEA: Status: RESOLVED | Noted: 2018-06-28 | Resolved: 2018-09-19

## 2018-09-19 LAB
ALBUMIN SERPL BCP-MCNC: 3.4 G/DL (ref 3.5–5)
ALP SERPL-CCNC: 61 U/L (ref 46–116)
ALT SERPL W P-5'-P-CCNC: 24 U/L (ref 12–78)
AMYLASE SERPL-CCNC: 35 IU/L (ref 25–115)
ANION GAP SERPL CALCULATED.3IONS-SCNC: 8 MMOL/L (ref 4–13)
AST SERPL W P-5'-P-CCNC: 24 U/L (ref 5–45)
BASOPHILS # BLD AUTO: 0.02 THOUSANDS/ΜL (ref 0–0.1)
BASOPHILS NFR BLD AUTO: 0 % (ref 0–1)
BILIRUB SERPL-MCNC: 0.32 MG/DL (ref 0.2–1)
BUN SERPL-MCNC: 9 MG/DL (ref 5–25)
CALCIUM SERPL-MCNC: 8.8 MG/DL (ref 8.3–10.1)
CHLAMYDIA DNA CVX QL NAA+PROBE: NORMAL
CHLORIDE SERPL-SCNC: 104 MMOL/L (ref 100–108)
CO2 SERPL-SCNC: 23 MMOL/L (ref 21–32)
CREAT SERPL-MCNC: 0.42 MG/DL (ref 0.6–1.3)
EOSINOPHIL # BLD AUTO: 0.03 THOUSAND/ΜL (ref 0–0.61)
EOSINOPHIL NFR BLD AUTO: 0 % (ref 0–6)
ERYTHROCYTE [DISTWIDTH] IN BLOOD BY AUTOMATED COUNT: 12.2 % (ref 11.6–15.1)
GFR SERPL CREATININE-BSD FRML MDRD: 136 ML/MIN/1.73SQ M
GLUCOSE P FAST SERPL-MCNC: 91 MG/DL (ref 65–99)
HCT VFR BLD AUTO: 39.1 % (ref 34.8–46.1)
HGB BLD-MCNC: 13.3 G/DL (ref 11.5–15.4)
IMM GRANULOCYTES # BLD AUTO: 0.03 THOUSAND/UL (ref 0–0.2)
IMM GRANULOCYTES NFR BLD AUTO: 0 % (ref 0–2)
LIPASE SERPL-CCNC: 80 U/L (ref 73–393)
LYMPHOCYTES # BLD AUTO: 2.21 THOUSANDS/ΜL (ref 0.6–4.47)
LYMPHOCYTES NFR BLD AUTO: 28 % (ref 14–44)
MCH RBC QN AUTO: 29.6 PG (ref 26.8–34.3)
MCHC RBC AUTO-ENTMCNC: 34 G/DL (ref 31.4–37.4)
MCV RBC AUTO: 87 FL (ref 82–98)
MONOCYTES # BLD AUTO: 0.34 THOUSAND/ΜL (ref 0.17–1.22)
MONOCYTES NFR BLD AUTO: 4 % (ref 4–12)
N GONORRHOEA DNA GENITAL QL NAA+PROBE: NORMAL
NEUTROPHILS # BLD AUTO: 5.34 THOUSANDS/ΜL (ref 1.85–7.62)
NEUTS SEG NFR BLD AUTO: 68 % (ref 43–75)
NRBC BLD AUTO-RTO: 0 /100 WBCS
PLATELET # BLD AUTO: 186 THOUSANDS/UL (ref 149–390)
PMV BLD AUTO: 10.7 FL (ref 8.9–12.7)
POTASSIUM SERPL-SCNC: 3.9 MMOL/L (ref 3.5–5.3)
PROT SERPL-MCNC: 7.3 G/DL (ref 6.4–8.2)
RBC # BLD AUTO: 4.5 MILLION/UL (ref 3.81–5.12)
SODIUM SERPL-SCNC: 135 MMOL/L (ref 136–145)
WBC # BLD AUTO: 7.97 THOUSAND/UL (ref 4.31–10.16)

## 2018-09-19 PROCEDURE — 76801 OB US < 14 WKS SINGLE FETUS: CPT | Performed by: OBSTETRICS & GYNECOLOGY

## 2018-09-19 PROCEDURE — 76813 OB US NUCHAL MEAS 1 GEST: CPT | Performed by: OBSTETRICS & GYNECOLOGY

## 2018-09-19 PROCEDURE — 82150 ASSAY OF AMYLASE: CPT

## 2018-09-19 PROCEDURE — 83690 ASSAY OF LIPASE: CPT

## 2018-09-19 PROCEDURE — 36415 COLL VENOUS BLD VENIPUNCTURE: CPT

## 2018-09-19 PROCEDURE — 99241 PR OFFICE CONSULTATION NEW/ESTAB PATIENT 15 MIN: CPT | Performed by: OBSTETRICS & GYNECOLOGY

## 2018-09-19 PROCEDURE — 80053 COMPREHEN METABOLIC PANEL: CPT

## 2018-09-19 PROCEDURE — 76705 ECHO EXAM OF ABDOMEN: CPT

## 2018-09-19 PROCEDURE — 85025 COMPLETE CBC W/AUTO DIFF WBC: CPT

## 2018-09-19 PROCEDURE — PNV: Performed by: OBSTETRICS & GYNECOLOGY

## 2018-09-19 PROCEDURE — 87491 CHLMYD TRACH DNA AMP PROBE: CPT | Performed by: OBSTETRICS & GYNECOLOGY

## 2018-09-19 PROCEDURE — 87591 N.GONORRHOEAE DNA AMP PROB: CPT | Performed by: OBSTETRICS & GYNECOLOGY

## 2018-09-19 NOTE — PATIENT INSTRUCTIONS
Pregnancy at [address] to [address]:   You are now at the end of your first trimester and entering your second trimester  Morning sickness usually goes away by this time  You may have other symptoms such as fatigue, frequent urination, and headaches  You may have gained between 2 to 4 pounds by now  DISCHARGE INSTRUCTIONS:   Return to the emergency department if:   · You have pain or cramping in your abdomen or low back  · You have heavy vaginal bleeding or clotting  · You pass material that looks like tissue or large clots  Collect the material and bring it with you  Contact your healthcare provider if:   · You cannot keep food or drinks down, and you are losing weight  · You have light bleeding  · You have chills or a fever  · You have vaginal itching, burning, or pain  · You have yellow, green, white, or foul-smelling vaginal discharge  · You have pain or burning when you urinate, less urine than usual, or pink or bloody urine  · You have questions or concerns about your condition or care  How to care for yourself at this stage of your pregnancy:   · Get plenty of rest   You may feel more tired than normal  You may need to take naps or go to bed earlier  · Manage nausea and vomiting  Avoid fatty and spicy foods  Eat small meals throughout the day instead of large meals  Kelly may help to decrease nausea  Ask your healthcare provider about other ways of decreasing nausea and vomiting  · Eat a variety of healthy foods  Healthy foods include fruits, vegetables, whole-grain breads, low-fat dairy foods, beans, lean meats, and fish  Drink liquids as directed  Ask how much liquid to drink each day and which liquids are best for you  Limit caffeine to less than 200 milligrams each day  Limit your intake of fish to 2 servings each week  Choose fish low in mercury such as canned light tuna, shrimp, salmon, cod, or tilapia   Do not  eat fish high in mercury such as swordfish, tilefish, ann marie mackerel, and shark  · Take prenatal vitamins as directed  Your need for certain vitamins and minerals, such as folic acid, increases during pregnancy  Prenatal vitamins provide some of the extra vitamins and minerals you need  Prenatal vitamins may also help to decrease the risk of certain birth defects  · Do not smoke  If you smoke, it is never too late to quit  Smoking increases your risk of a miscarriage and other health problems during your pregnancy  Smoking can cause your baby to be born too early or weigh less at birth  Ask your healthcare provider for information if you need help quitting  · Do not drink alcohol  Alcohol passes from your body to your baby through the placenta  It can affect your baby's brain development and cause fetal alcohol syndrome (FAS)  FAS is a group of conditions that causes mental, behavior, and growth problems  · Talk to your healthcare provider before you take any medicines  Many medicines may harm your baby if you take them when you are pregnant  Do not take any medicines, vitamins, herbs, or supplements without first talking to your healthcare provider  Never use illegal or street drugs (such as marijuana or cocaine) while you are pregnant  Safety tips during pregnancy:   · Avoid hot tubs and saunas  Do not use a hot tub or sauna while you are pregnant, especially during your first trimester  Hot tubs and saunas may raise your baby's temperature and increase the risk of birth defects  · Avoid toxoplasmosis  This is an infection caused by eating raw meat or being around infected cat feces  It can cause birth defects, miscarriages, and other problems  Wash your hands after you touch raw meat  Make sure any meat is well-cooked before you eat it  Avoid raw eggs and unpasteurized milk  Use gloves or ask someone else to clean your cat's litter box while you are pregnant  Changes that are happening with your baby:   Your baby has fully formed fingernails and toenails  Your baby's heartbeat can now be heard  Ask your healthcare provider if you can listen to your baby's heartbeat  By week 14, your baby is over 4 inches long from the top of the head to the rump (baby's bottom)  Your baby weighs over 3 ounces  What you need to know about prenatal care:  During the first 28 weeks of your pregnancy, you will see your healthcare provider once a month  Prenatal care can help prevent problems during pregnancy and childbirth  Your healthcare provider will check your blood pressure and weight  You may also need any of the following:  · A urine test  may also be done to check for sugar and protein  These can be signs of gestational diabetes or infection  · Genetic disorders screening tests  may be offered to you  This screening test checks your baby's risk of genetic disorders such as Down syndrome  The screening test includes a blood test and ultrasound  · Your baby's heart rate  will be checked  © 2017 [address] [employer] Information is for End User's use only and may not be sold, redistributed or otherwise used for commercial purposes  All illustrations and images included in CareNotes® are the copyrighted property of [employer]  or [employer]  The above information is an  only  It is not intended as medical advice for individual conditions or treatments  Talk to your doctor, nurse or pharmacist before following any medical regimen to see if it is safe and effective for you

## 2018-09-19 NOTE — PROGRESS NOTES
Patient is here for routine OB visit  She has a  Center appointment today for sequential screening  Fetal heart tones were noted on Doppler but did not record fetal heart rate  Patient declines any bleeding  She has a history of gestational diabetes and normal Glucola in the 1st trimester  She had 1 prior  section and requests repeat   Patient complained of right upper quadrant discomfort as well as nausea  She states that the pain is 6 to 7/10  On examination, she has some right upper quadrant tenderness  Right upper quadrant ultrasound was ordered including CBC with diff as well as CM P   I asked patient to call if with worsening pain  She was instructed to continue using Tylenol and or heating pad as needed  Follow-up in 4 weeks  Physical examination was performed  Gonorrhea and Chlamydia PCR was obtained

## 2018-09-20 PROBLEM — O09.891 PREVIOUS PREGNANCY COMPLICATED BY PREGNANCY-INDUCED HYPERTENSION IN FIRST TRIMESTER, ANTEPARTUM: Status: ACTIVE | Noted: 2017-10-05

## 2018-09-20 RX ORDER — ASPIRIN 81 MG/1
81 TABLET, CHEWABLE ORAL DAILY
Qty: 30 TABLET | Refills: 7
Start: 2018-09-20 | End: 2019-03-24 | Stop reason: HOSPADM

## 2018-09-21 LAB
CHLAMYDIA DNA CVX QL NAA+PROBE: NORMAL
N GONORRHOEA DNA GENITAL QL NAA+PROBE: NORMAL

## 2018-09-22 ENCOUNTER — OB ABSTRACT (OUTPATIENT)
Dept: OBGYN CLINIC | Facility: CLINIC | Age: 33
End: 2018-09-22

## 2018-09-22 PROBLEM — D18.03 HEPATIC HEMANGIOMA: Status: ACTIVE | Noted: 2018-09-22

## 2018-10-02 ENCOUNTER — TELEPHONE (OUTPATIENT)
Dept: PERINATAL CARE | Facility: CLINIC | Age: 33
End: 2018-10-02

## 2018-10-15 ENCOUNTER — LAB (OUTPATIENT)
Dept: LAB | Facility: HOSPITAL | Age: 33
End: 2018-10-15
Attending: OBSTETRICS & GYNECOLOGY
Payer: COMMERCIAL

## 2018-10-15 ENCOUNTER — TRANSCRIBE ORDERS (OUTPATIENT)
Dept: ADMINISTRATIVE | Facility: HOSPITAL | Age: 33
End: 2018-10-15

## 2018-10-15 DIAGNOSIS — Z34.90 PREGNANCY, UNSPECIFIED GESTATIONAL AGE: Primary | ICD-10-CM

## 2018-10-15 DIAGNOSIS — Z34.90 PREGNANCY, UNSPECIFIED GESTATIONAL AGE: ICD-10-CM

## 2018-10-15 PROCEDURE — 36415 COLL VENOUS BLD VENIPUNCTURE: CPT

## 2018-10-16 ENCOUNTER — ROUTINE PRENATAL (OUTPATIENT)
Dept: OBGYN CLINIC | Facility: CLINIC | Age: 33
End: 2018-10-16

## 2018-10-16 VITALS
HEART RATE: 95 BPM | SYSTOLIC BLOOD PRESSURE: 102 MMHG | DIASTOLIC BLOOD PRESSURE: 64 MMHG | WEIGHT: 179 LBS | BODY MASS INDEX: 31.71 KG/M2 | HEIGHT: 63 IN

## 2018-10-16 DIAGNOSIS — D18.03 HEPATIC HEMANGIOMA: ICD-10-CM

## 2018-10-16 DIAGNOSIS — Z3A.16 16 WEEKS GESTATION OF PREGNANCY: Primary | ICD-10-CM

## 2018-10-16 DIAGNOSIS — O34.219 PREVIOUS CESAREAN DELIVERY AFFECTING PREGNANCY, ANTEPARTUM: ICD-10-CM

## 2018-10-16 DIAGNOSIS — Z86.32 HISTORY OF INSULIN CONTROLLED GESTATIONAL DIABETES MELLITUS (GDM): ICD-10-CM

## 2018-10-16 DIAGNOSIS — Z87.59 HISTORY OF GESTATIONAL HYPERTENSION: ICD-10-CM

## 2018-10-16 DIAGNOSIS — O09.891 PREVIOUS PREGNANCY COMPLICATED BY PREGNANCY-INDUCED HYPERTENSION IN FIRST TRIMESTER, ANTEPARTUM: ICD-10-CM

## 2018-10-16 LAB — SCAN RESULT: NORMAL

## 2018-10-16 PROCEDURE — PNV: Performed by: OBSTETRICS & GYNECOLOGY

## 2018-10-16 NOTE — PATIENT INSTRUCTIONS
Pregnancy at 15 to 18 Weeks   [address]:   What changes are happening in my body? Now that you are in your second trimester, you have more energy  You may also feel hungrier than usual  You may start to experience other symptoms, such as heartburn or dizziness  You may be gaining about ½ to 1 pound a week, and your pregnancy is beginning to show  You may need to start wearing maternity clothes  How do I care for myself at this stage of my pregnancy? · Manage heartburn  by eating 4 or 5 small meals each day instead of large meals  Avoid spicy foods  Avoid eating right before bedtime  · Manage nausea and vomiting  Avoid fatty and spicy foods  Eat small meals throughout the day instead of large meals  Kelly may help to decrease nausea  Ask your healthcare provider about other ways of decreasing nausea and vomiting  · Eat a variety of healthy foods  Healthy foods include fruits, vegetables, whole-grain breads, low-fat dairy foods, beans, lean meats, and fish  Drink liquids as directed  Ask how much liquid to drink each day and which liquids are best for you  Limit caffeine to less than 200 milligrams each day  Limit your intake of fish to 2 servings each week  Choose fish low in mercury such as canned light tuna, shrimp, salmon, cod, or tilapia  Do not  eat fish high in mercury such as swordfish, tilefish, ann marie mackerel, and shark  · Take prenatal vitamins as directed  Your need for certain vitamins and minerals, such as folic acid, increases during pregnancy  Prenatal vitamins provide some of the extra vitamins and minerals you need  Prenatal vitamins may also help to decrease the risk of certain birth defects  · Do not smoke  If you smoke, it is never too late to quit  Smoking increases your risk of a miscarriage and other health problems during your pregnancy  Smoking can cause your baby to be born too early or weigh less at birth   Ask your healthcare provider for information if you need help quitting  · Do not drink alcohol  Alcohol passes from your body to your baby through the placenta  It can affect your baby's brain development and cause fetal alcohol syndrome (FAS)  FAS is a group of conditions that causes mental, behavior, and growth problems  · Talk to your healthcare provider before you take any medicines  Many medicines may harm your baby if you take them when you are pregnant  Do not take any medicines, vitamins, herbs, or supplements without first talking to your healthcare provider  Never use illegal or street drugs (such as marijuana or cocaine) while you are pregnant  What are some safety tips during pregnancy? · Avoid hot tubs and saunas  Do not use a hot tub or sauna while you are pregnant, especially during your first trimester  Hot tubs and saunas may raise your baby's temperature and increase the risk of birth defects  · Avoid toxoplasmosis  This is an infection caused by eating raw meat or being around infected cat feces  It can cause birth defects, miscarriages, and other problems  Wash your hands after you touch raw meat  Make sure any meat is well-cooked before you eat it  Avoid raw eggs and unpasteurized milk  Use gloves or ask someone else to clean your cat's litter box while you are pregnant  What changes are happening with my baby? By 18 weeks, your baby may be about 6 inches long from the top of the head to the rump (baby's bottom)  Your baby may weigh about 11 ounces  You may be able to feel your baby's movement at about 18 weeks or later  The first movements may not be that noticeable  They may feel like a fluttering sensation  Your baby also makes sucking movements and can hear certain sounds  What do I need to know about prenatal care? During the first 28 weeks of your pregnancy, you will see your healthcare provider once a month  Your healthcare provider will check your blood pressure and weight   You may also need any of the following:  · A urine test  may also be done to check for sugar and protein  These can be signs of gestational diabetes or infection  · A blood test  may be done to check for anemia (low iron level)  · Fundal height check  is a measurement of your uterus to check your baby's growth  This number is usually the same as the number of weeks that you have been pregnant  · An ultrasound  may be done to check your baby's development  Your healthcare provider may be able to tell you what your baby's gender is during the ultrasound  · Your baby's heart rate  will be checked  When should I seek immediate care? · You have pain or cramping in your abdomen or low back  · You have heavy vaginal bleeding or clotting  · You pass material that looks like tissue or large clots  Collect the material and bring it with you  When should I contact my healthcare provider? · You cannot keep food or drinks down, and you are losing weight  · You have light bleeding  · You have chills or a fever  · You have vaginal itching, burning, or pain  · You have yellow, green, white, or foul-smelling vaginal discharge  · You have pain or burning when you urinate, less urine than usual, or pink or bloody urine  · You have questions or concerns about your condition or care  CARE AGREEMENT:   You have the right to help plan your care  Learn about your health condition and how it may be treated  Discuss treatment options with your caregivers to decide what care you want to receive  You always have the right to refuse treatment  The above information is an  only  It is not intended as medical advice for individual conditions or treatments  Talk to your doctor, nurse or pharmacist before following any medical regimen to see if it is safe and effective for you    © 2017 [employer] Information is for End User's use only and may not be sold, redistributed or otherwise used for commercial purposes  All illustrations and images included in CareNotes® are the copyrighted property of A [employer] A M [employer] Inc  or [employer]

## 2018-10-17 NOTE — PROGRESS NOTES
Patient had blood work drawn and formal results are expected 7-10 days from the blood draw  Nursing will have formal result reviewed by a [facility] provider when it returns and then will call patient with her result       Erasmo Villagran MD

## 2018-10-19 ENCOUNTER — TELEPHONE (OUTPATIENT)
Dept: PERINATAL CARE | Facility: CLINIC | Age: 33
End: 2018-10-19

## 2018-11-08 ENCOUNTER — ROUTINE PRENATAL (OUTPATIENT)
Dept: PERINATAL CARE | Facility: CLINIC | Age: 33
End: 2018-11-08
Payer: COMMERCIAL

## 2018-11-08 VITALS
WEIGHT: 183 LBS | BODY MASS INDEX: 32.43 KG/M2 | HEART RATE: 99 BPM | DIASTOLIC BLOOD PRESSURE: 78 MMHG | HEIGHT: 63 IN | SYSTOLIC BLOOD PRESSURE: 118 MMHG

## 2018-11-08 DIAGNOSIS — O99.210 OBESITY AFFECTING PREGNANCY, ANTEPARTUM: ICD-10-CM

## 2018-11-08 DIAGNOSIS — O09.891 PREVIOUS PREGNANCY COMPLICATED BY PREGNANCY-INDUCED HYPERTENSION IN FIRST TRIMESTER, ANTEPARTUM: Primary | ICD-10-CM

## 2018-11-08 DIAGNOSIS — O44.42 LOW LYING PLACENTA NOS OR WITHOUT HEMORRHAGE, SECOND TRIMESTER: ICD-10-CM

## 2018-11-08 DIAGNOSIS — O34.219 PREVIOUS CESAREAN DELIVERY AFFECTING PREGNANCY, ANTEPARTUM: ICD-10-CM

## 2018-11-08 DIAGNOSIS — Z3A.19 19 WEEKS GESTATION OF PREGNANCY: ICD-10-CM

## 2018-11-08 PROCEDURE — 99212 OFFICE O/P EST SF 10 MIN: CPT | Performed by: OBSTETRICS & GYNECOLOGY

## 2018-11-08 PROCEDURE — 76817 TRANSVAGINAL US OBSTETRIC: CPT | Performed by: OBSTETRICS & GYNECOLOGY

## 2018-11-08 PROCEDURE — 76811 OB US DETAILED SNGL FETUS: CPT | Performed by: OBSTETRICS & GYNECOLOGY

## 2018-11-08 NOTE — PROGRESS NOTES
A transvaginal ultrasound was performed  Sonographer note on use of High Level Disinfection Process (Trophon) for transvaginal probe# 2 used, serial [id number]    Marcela Reinoso

## 2018-11-15 ENCOUNTER — ROUTINE PRENATAL (OUTPATIENT)
Dept: OBGYN CLINIC | Facility: CLINIC | Age: 33
End: 2018-11-15

## 2018-11-15 VITALS
BODY MASS INDEX: 32.67 KG/M2 | SYSTOLIC BLOOD PRESSURE: 112 MMHG | WEIGHT: 184.4 LBS | DIASTOLIC BLOOD PRESSURE: 70 MMHG | HEIGHT: 63 IN | HEART RATE: 88 BPM

## 2018-11-15 DIAGNOSIS — Z98.891 STATUS POST CESAREAN SECTION: ICD-10-CM

## 2018-11-15 DIAGNOSIS — Z3A.20 20 WEEKS GESTATION OF PREGNANCY: Primary | ICD-10-CM

## 2018-11-15 PROCEDURE — PNV: Performed by: OBSTETRICS & GYNECOLOGY

## 2018-11-27 ENCOUNTER — APPOINTMENT (OUTPATIENT)
Dept: LAB | Facility: HOSPITAL | Age: 33
End: 2018-11-27
Attending: OBSTETRICS & GYNECOLOGY
Payer: COMMERCIAL

## 2018-11-27 DIAGNOSIS — Z87.59 HISTORY OF GESTATIONAL HYPERTENSION: ICD-10-CM

## 2018-11-27 LAB
ALBUMIN SERPL BCP-MCNC: 2.7 G/DL (ref 3.5–5)
ALP SERPL-CCNC: 69 U/L (ref 46–116)
ALT SERPL W P-5'-P-CCNC: 12 U/L (ref 12–78)
ANION GAP SERPL CALCULATED.3IONS-SCNC: 11 MMOL/L (ref 4–13)
AST SERPL W P-5'-P-CCNC: 13 U/L (ref 5–45)
BILIRUB SERPL-MCNC: 0.18 MG/DL (ref 0.2–1)
BUN SERPL-MCNC: 7 MG/DL (ref 5–25)
CALCIUM SERPL-MCNC: 8.3 MG/DL (ref 8.3–10.1)
CHLORIDE SERPL-SCNC: 104 MMOL/L (ref 100–108)
CO2 SERPL-SCNC: 21 MMOL/L (ref 21–32)
CREAT SERPL-MCNC: 0.51 MG/DL (ref 0.6–1.3)
CREAT UR-MCNC: 107 MG/DL
ERYTHROCYTE [DISTWIDTH] IN BLOOD BY AUTOMATED COUNT: 12.5 % (ref 11.6–15.1)
GFR SERPL CREATININE-BSD FRML MDRD: 128 ML/MIN/1.73SQ M
GLUCOSE P FAST SERPL-MCNC: 106 MG/DL (ref 65–99)
HCT VFR BLD AUTO: 38.1 % (ref 34.8–46.1)
HGB BLD-MCNC: 12.8 G/DL (ref 11.5–15.4)
MCH RBC QN AUTO: 30.1 PG (ref 26.8–34.3)
MCHC RBC AUTO-ENTMCNC: 33.6 G/DL (ref 31.4–37.4)
MCV RBC AUTO: 90 FL (ref 82–98)
PLATELET # BLD AUTO: 190 THOUSANDS/UL (ref 149–390)
PMV BLD AUTO: 10.2 FL (ref 8.9–12.7)
POTASSIUM SERPL-SCNC: 3.8 MMOL/L (ref 3.5–5.3)
PROT SERPL-MCNC: 6.5 G/DL (ref 6.4–8.2)
PROT UR-MCNC: 15 MG/DL
PROT/CREAT UR: 0.14 MG/G{CREAT} (ref 0–0.1)
RBC # BLD AUTO: 4.25 MILLION/UL (ref 3.81–5.12)
SODIUM SERPL-SCNC: 136 MMOL/L (ref 136–145)
URATE SERPL-MCNC: 3.2 MG/DL (ref 2–6.8)
WBC # BLD AUTO: 9.43 THOUSAND/UL (ref 4.31–10.16)

## 2018-11-27 PROCEDURE — 84156 ASSAY OF PROTEIN URINE: CPT | Performed by: OBSTETRICS & GYNECOLOGY

## 2018-11-27 PROCEDURE — 84550 ASSAY OF BLOOD/URIC ACID: CPT

## 2018-11-27 PROCEDURE — 80053 COMPREHEN METABOLIC PANEL: CPT

## 2018-11-27 PROCEDURE — 82570 ASSAY OF URINE CREATININE: CPT | Performed by: OBSTETRICS & GYNECOLOGY

## 2018-11-27 PROCEDURE — 85027 COMPLETE CBC AUTOMATED: CPT

## 2018-11-27 PROCEDURE — 36415 COLL VENOUS BLD VENIPUNCTURE: CPT

## 2018-12-14 ENCOUNTER — ROUTINE PRENATAL (OUTPATIENT)
Dept: OBGYN CLINIC | Facility: CLINIC | Age: 33
End: 2018-12-14

## 2018-12-14 VITALS
HEART RATE: 97 BPM | BODY MASS INDEX: 35.29 KG/M2 | SYSTOLIC BLOOD PRESSURE: 118 MMHG | DIASTOLIC BLOOD PRESSURE: 74 MMHG | WEIGHT: 199.2 LBS

## 2018-12-14 DIAGNOSIS — O44.40 LOW-LYING PLACENTA: ICD-10-CM

## 2018-12-14 DIAGNOSIS — O26.02 EXCESSIVE WEIGHT GAIN DURING PREGNANCY IN SECOND TRIMESTER: ICD-10-CM

## 2018-12-14 DIAGNOSIS — Z3A.25 25 WEEKS GESTATION OF PREGNANCY: Primary | ICD-10-CM

## 2018-12-14 DIAGNOSIS — Z98.891 HISTORY OF CESAREAN SECTION: ICD-10-CM

## 2018-12-14 PROCEDURE — PNV: Performed by: OBSTETRICS & GYNECOLOGY

## 2018-12-14 NOTE — PROGRESS NOTES
IUP at 25 weeks 0 days  Patient reports positive fetal movement  Denies contractions or leakage of fluid does have some crampy type pain along her Pfannenstiel incision scar  Blood pressure is normal approximately 15 lb weight gain between visits will order 1 hr Glucola patient will get done soon  Discussed diet and exercise  Her blood pressure was normal she is taking baby aspirin every day  She does have a follow-up scan at the  Center for low-lying placenta  Reviewed signs of  labor and kick counts

## 2018-12-18 ENCOUNTER — APPOINTMENT (OUTPATIENT)
Dept: LAB | Facility: CLINIC | Age: 33
End: 2018-12-18
Payer: COMMERCIAL

## 2018-12-18 DIAGNOSIS — R73.09 ABNORMAL GLUCOSE: Primary | ICD-10-CM

## 2018-12-18 DIAGNOSIS — Z3A.25 25 WEEKS GESTATION OF PREGNANCY: ICD-10-CM

## 2018-12-18 LAB
BASOPHILS # BLD AUTO: 0.02 THOUSANDS/ΜL (ref 0–0.1)
BASOPHILS NFR BLD AUTO: 0 % (ref 0–1)
EOSINOPHIL # BLD AUTO: 0.08 THOUSAND/ΜL (ref 0–0.61)
EOSINOPHIL NFR BLD AUTO: 1 % (ref 0–6)
ERYTHROCYTE [DISTWIDTH] IN BLOOD BY AUTOMATED COUNT: 12.3 % (ref 11.6–15.1)
GLUCOSE 1H P 50 G GLC PO SERPL-MCNC: 158 MG/DL
HCT VFR BLD AUTO: 38.8 % (ref 34.8–46.1)
HGB BLD-MCNC: 13 G/DL (ref 11.5–15.4)
IMM GRANULOCYTES # BLD AUTO: 0.11 THOUSAND/UL (ref 0–0.2)
IMM GRANULOCYTES NFR BLD AUTO: 1 % (ref 0–2)
LYMPHOCYTES # BLD AUTO: 1.32 THOUSANDS/ΜL (ref 0.6–4.47)
LYMPHOCYTES NFR BLD AUTO: 15 % (ref 14–44)
MCH RBC QN AUTO: 29.7 PG (ref 26.8–34.3)
MCHC RBC AUTO-ENTMCNC: 33.5 G/DL (ref 31.4–37.4)
MCV RBC AUTO: 89 FL (ref 82–98)
MONOCYTES # BLD AUTO: 0.45 THOUSAND/ΜL (ref 0.17–1.22)
MONOCYTES NFR BLD AUTO: 5 % (ref 4–12)
NEUTROPHILS # BLD AUTO: 6.76 THOUSANDS/ΜL (ref 1.85–7.62)
NEUTS SEG NFR BLD AUTO: 78 % (ref 43–75)
NRBC BLD AUTO-RTO: 0 /100 WBCS
PLATELET # BLD AUTO: 193 THOUSANDS/UL (ref 149–390)
PMV BLD AUTO: 10.1 FL (ref 8.9–12.7)
RBC # BLD AUTO: 4.38 MILLION/UL (ref 3.81–5.12)
WBC # BLD AUTO: 8.74 THOUSAND/UL (ref 4.31–10.16)

## 2018-12-18 PROCEDURE — 86592 SYPHILIS TEST NON-TREP QUAL: CPT

## 2018-12-18 PROCEDURE — 82950 GLUCOSE TEST: CPT

## 2018-12-18 PROCEDURE — 36415 COLL VENOUS BLD VENIPUNCTURE: CPT

## 2018-12-18 PROCEDURE — 85025 COMPLETE CBC W/AUTO DIFF WBC: CPT

## 2018-12-19 LAB — RPR SER QL: NORMAL

## 2018-12-20 ENCOUNTER — TELEPHONE (OUTPATIENT)
Dept: OBGYN CLINIC | Facility: CLINIC | Age: 33
End: 2018-12-20

## 2018-12-20 NOTE — TELEPHONE ENCOUNTER
----- Message from Barbar Boast, DO sent at 12/18/2018  2:06 PM EST -----  Regarding: Abnormal 1 hr Glucola  Please notify patient her 1 hr GTT was abnormal at 158, 3 hr GTT has been ordered  Recommend she get this done before the week gets over

## 2018-12-21 ENCOUNTER — APPOINTMENT (OUTPATIENT)
Dept: LAB | Facility: CLINIC | Age: 33
End: 2018-12-21
Payer: COMMERCIAL

## 2018-12-21 DIAGNOSIS — R73.09 ABNORMAL GLUCOSE: ICD-10-CM

## 2018-12-21 LAB
EST. AVERAGE GLUCOSE BLD GHB EST-MCNC: 114 MG/DL
GLUCOSE P FAST SERPL-MCNC: 103 MG/DL (ref 65–99)
HBA1C MFR BLD: 5.6 % (ref 4.2–6.3)

## 2018-12-21 PROCEDURE — 36415 COLL VENOUS BLD VENIPUNCTURE: CPT

## 2018-12-21 PROCEDURE — 83036 HEMOGLOBIN GLYCOSYLATED A1C: CPT

## 2018-12-21 PROCEDURE — 82951 GLUCOSE TOLERANCE TEST (GTT): CPT

## 2018-12-26 ENCOUNTER — TELEPHONE (OUTPATIENT)
Dept: OBGYN CLINIC | Facility: CLINIC | Age: 33
End: 2018-12-26

## 2018-12-26 DIAGNOSIS — O24.419 GESTATIONAL DIABETES MELLITUS (GDM) IN SECOND TRIMESTER, GESTATIONAL DIABETES METHOD OF CONTROL UNSPECIFIED: Primary | ICD-10-CM

## 2018-12-26 NOTE — TELEPHONE ENCOUNTER
Called in glucometer ang glucose test strips to [address] in \[facility]\""   Asked pt to check FBS and 2 hour pp

## 2018-12-27 ENCOUNTER — TELEPHONE (OUTPATIENT)
Dept: PERINATAL CARE | Facility: CLINIC | Age: 33
End: 2018-12-27

## 2018-12-30 NOTE — TELEPHONE ENCOUNTER
This is ok  She just had a baby 14 months ago, If we can just hopefully make some accommodations for her, and make some minor adjustments to your rules, that would be great   Thank you

## 2019-01-02 PROBLEM — Z3A.27 27 WEEKS GESTATION OF PREGNANCY: Status: ACTIVE | Noted: 2018-08-15

## 2019-01-02 PROBLEM — O20.9 VAGINAL BLEEDING IN PREGNANCY, FIRST TRIMESTER: Status: RESOLVED | Noted: 2018-08-28 | Resolved: 2019-01-02

## 2019-01-02 PROBLEM — O09.892 SHORT INTERVAL BETWEEN PREGNANCIES AFFECTING PREGNANCY IN SECOND TRIMESTER, ANTEPARTUM: Status: ACTIVE | Noted: 2018-08-15

## 2019-01-02 PROBLEM — O09.891 PREVIOUS PREGNANCY COMPLICATED BY PREGNANCY-INDUCED HYPERTENSION IN FIRST TRIMESTER, ANTEPARTUM: Status: RESOLVED | Noted: 2017-10-05 | Resolved: 2019-01-02

## 2019-01-03 ENCOUNTER — ROUTINE PRENATAL (OUTPATIENT)
Dept: OBGYN CLINIC | Facility: CLINIC | Age: 34
End: 2019-01-03

## 2019-01-03 ENCOUNTER — ULTRASOUND (OUTPATIENT)
Dept: PERINATAL CARE | Facility: CLINIC | Age: 34
End: 2019-01-03
Payer: COMMERCIAL

## 2019-01-03 VITALS
HEIGHT: 63 IN | WEIGHT: 199.6 LBS | DIASTOLIC BLOOD PRESSURE: 72 MMHG | BODY MASS INDEX: 35.37 KG/M2 | HEART RATE: 109 BPM | SYSTOLIC BLOOD PRESSURE: 121 MMHG

## 2019-01-03 VITALS
BODY MASS INDEX: 35.12 KG/M2 | DIASTOLIC BLOOD PRESSURE: 80 MMHG | WEIGHT: 198.2 LBS | SYSTOLIC BLOOD PRESSURE: 120 MMHG | HEIGHT: 63 IN

## 2019-01-03 DIAGNOSIS — O09.892 SHORT INTERVAL BETWEEN PREGNANCIES AFFECTING PREGNANCY IN SECOND TRIMESTER, ANTEPARTUM: ICD-10-CM

## 2019-01-03 DIAGNOSIS — Z03.72 PLACENTAL PROBLEM SUSPECTED BUT NOT FOUND: ICD-10-CM

## 2019-01-03 DIAGNOSIS — O24.419 GESTATIONAL DIABETES MELLITUS (GDM) IN SECOND TRIMESTER, GESTATIONAL DIABETES METHOD OF CONTROL UNSPECIFIED: ICD-10-CM

## 2019-01-03 DIAGNOSIS — O09.891 PREVIOUS PREGNANCY COMPLICATED BY PREGNANCY-INDUCED HYPERTENSION IN FIRST TRIMESTER, ANTEPARTUM: ICD-10-CM

## 2019-01-03 DIAGNOSIS — Z3A.27 27 WEEKS GESTATION OF PREGNANCY: Primary | ICD-10-CM

## 2019-01-03 DIAGNOSIS — Z3A.27 27 WEEKS GESTATION OF PREGNANCY: ICD-10-CM

## 2019-01-03 DIAGNOSIS — O24.419 GESTATIONAL DIABETES MELLITUS (GDM) IN SECOND TRIMESTER, GESTATIONAL DIABETES METHOD OF CONTROL UNSPECIFIED: Primary | ICD-10-CM

## 2019-01-03 DIAGNOSIS — O34.219 PREVIOUS CESAREAN DELIVERY AFFECTING PREGNANCY, ANTEPARTUM: ICD-10-CM

## 2019-01-03 DIAGNOSIS — D18.03 HEPATIC HEMANGIOMA: ICD-10-CM

## 2019-01-03 PROCEDURE — 76816 OB US FOLLOW-UP PER FETUS: CPT | Performed by: OBSTETRICS & GYNECOLOGY

## 2019-01-03 PROCEDURE — 99212 OFFICE O/P EST SF 10 MIN: CPT | Performed by: OBSTETRICS & GYNECOLOGY

## 2019-01-03 PROCEDURE — 76817 TRANSVAGINAL US OBSTETRIC: CPT | Performed by: OBSTETRICS & GYNECOLOGY

## 2019-01-03 PROCEDURE — PNV: Performed by: OBSTETRICS & GYNECOLOGY

## 2019-01-03 NOTE — PROGRESS NOTES
Patient is seen today for follow-up growth and missed anatomy which was completed  She also had a low-lying placenta on a previous ultrasound which resolved  She recently was diagnosed with gestational diabetes and reports that her fastings are elevated and has a follow-up visit on   She has a prior  and is planning a repeat  however should she go into labor she would consider a        Recommend a follow-up ultrasound for four weeks

## 2019-01-03 NOTE — PROGRESS NOTES
Patient is here for routine OB visit  Patient has completed a growth scan today  Patient recently diagnosed with gestational diabetes  She states that her fastings were slightly elevated around 100  She will e-mail her blood sugar values 2 diabetes coordination and she will have her education next week on   She will be scheduled for repeat  at 39 weeks  This is scheduled for 2019  If patient does go into labor prior to her scheduled , she is considering TOLAC  She declines TDAP

## 2019-01-04 ENCOUNTER — OB ABSTRACT (OUTPATIENT)
Dept: PERINATAL CARE | Facility: CLINIC | Age: 34
End: 2019-01-04

## 2019-01-04 NOTE — PROGRESS NOTES
Date:  19  RE: David Bhatt    : 1985  Estimated Date of Delivery: 3/29/19  EGA: 28w0d  OB/GYN: Luisa Arce    History of Insulin controlled gestational diabetes 2017  Current regimen:  Patient reported she has been testing her blood sugars prior to first diabetes education appointment  Advised patient that blood sugars reported may not be valid if she is using her meter and supplies from 2017  Patient was using a Verio Flex glucose meter with her last pregnancy  The  recommends not using strips 6 months after opening or after the expiration date printed on the vial       Plan:  Patient is scheduled for diabetes education on 19      Date due to report next:      Alvaro Montero, RD,LDN,CDE  Diabetes Educator   [facility]

## 2019-01-08 ENCOUNTER — OFFICE VISIT (OUTPATIENT)
Dept: PERINATAL CARE | Facility: CLINIC | Age: 34
End: 2019-01-08
Payer: COMMERCIAL

## 2019-01-08 VITALS
SYSTOLIC BLOOD PRESSURE: 127 MMHG | WEIGHT: 203.3 LBS | BODY MASS INDEX: 36.02 KG/M2 | DIASTOLIC BLOOD PRESSURE: 74 MMHG | HEIGHT: 63 IN | HEART RATE: 96 BPM

## 2019-01-08 DIAGNOSIS — O24.414 INSULIN CONTROLLED GESTATIONAL DIABETES MELLITUS (GDM) IN THIRD TRIMESTER: Primary | ICD-10-CM

## 2019-01-08 DIAGNOSIS — Z3A.28 28 WEEKS GESTATION OF PREGNANCY: ICD-10-CM

## 2019-01-08 DIAGNOSIS — D18.03 HEPATIC HEMANGIOMA: ICD-10-CM

## 2019-01-08 DIAGNOSIS — Z3A.27 27 WEEKS GESTATION OF PREGNANCY: ICD-10-CM

## 2019-01-08 PROCEDURE — G0108 DIAB MANAGE TRN  PER INDIV: HCPCS

## 2019-01-08 RX ORDER — BLOOD-GLUCOSE METER
1 EACH MISCELLANEOUS 4 TIMES DAILY
COMMUNITY
Start: 2018-12-26 | End: 2020-02-15

## 2019-01-08 RX ORDER — BLOOD SUGAR DIAGNOSTIC
1 STRIP MISCELLANEOUS
COMMUNITY
Start: 2018-12-26 | End: 2019-03-24 | Stop reason: HOSPADM

## 2019-01-08 NOTE — PROGRESS NOTES
DATE:  19  RE: Ludy Carlson    : 1985    CASEY: Estimated Date of Delivery: 3/29/19    EGA: [id number]    Dear Dr Andrew Leal: Thank you for referring your patient to the formerly [facility], [facility]  at [address]  The patient received the following education for diabetes and pregnancy   Pathophysiology of diabetes and pregnancy  This includes maternal-fetal complications such as fetal macrosomia,  hypoglycemia, polyhydramnios, increased incidence of  section, pre-term labor and in severe cases, fetal demise and stillbirth   Self-monitoring of blood glucose levels: fasting (goal 60mg/dl to 90mg/dl) and two hours after the start of the meal less (goal less than 120mg/dl)  The patient is already using a One Touch Verio blood glucose meter and has current supplies   Weight gain during in pregnancy  Based on the patients height of 63 inches, pre-pregnancy weight of 175 pounds (BMI 35 11) we would recommend a total weight gain of 11-20 pounds for the pregnancy  o The patients current weight is 92 2 kg (203 lb 4 8 oz) pounds, and her weight gain to date is 28 pounds  Based on this, we are recommending the patient maintain or gain minimal weight for the remainder of the pregnancy   Medical Nutrition Therapy for Diabetes and Pregnancy:  o Basic review of macronutrients   o Meal pattern should consist of three small meals and three snacks daily  o Carbohydrate gram amounts per meal   o Instructions on how to read a food label  o Appropriate serving size of foods  o Incorporating protein at each meal and snack in the importance of protein in relationship to blood glucose control   o Individualized meal plan: 2000 calorie gestational diabetes diet  o Use of food diary to maintain a meal plan   Ultrasounds every 4 weeks at the [address] to evaluate fetal growth   Sick day guidelines   Breastfeeding guidelines     Post-partum diet recommendations   Exercise Guidelines   Report blood glucose levels to [address] weekly or as directed  o Phone: [phone]  If no response in 24 hours, call [phone]   o Fax: [phone]  o Email: [email or web address]      Blood glucose values copied from patient log  Fasting blood sugars are consistently over target ranges therefore insulin use and administration technique also reviewed today  Height: 5' 3" (1 6 m)   Weight: 92 2 kg (203 lb 4 8 oz)    The patient was instructed on the following:     Discussed SoloStar Pen use but 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  Initiate Lantus at 27 units at bedtime   Insulin administration times, insulin action   Hypoglycemia signs, symptoms and treatment   Increase in maternal-fetal surveillance with insulin initiation   Side effects of hyperglycemia in pregnancy including macrosomia,  hypoglycemia, polyhydramnios, pre-term labor and stillbirth   Continue to monitor blood glucoses via fingerstick fasting (goal 60 mg/dl to 90 mg/dl) and two hours post prandial (goal less than 120 mg/dl)   Follow up with our office on 19 for evaluation of blood glucose levels   Non-stress testing two times weekly and JAZZY testing beginning at 32 weeks gestation   Ultrasounds every 4 weeks at the [address]   HbA1c every 6 to 8 weeks, CMP every trimester; prescriptions provided to be completed during weeks of -19  GALO Mcmanus provided diet review/instruction aspects during education session  Please contact our office at [phone] if you have questions  Time spent with patient [phone]; time spent face to face counseling greater than 50% of the appointment      Sincerely,     Ashli Cruz RN  Diabetes Educator  Diabetes and Pregnancy Program

## 2019-01-09 RX ORDER — PEN NEEDLE, DIABETIC 31 G X1/4"
NEEDLE, DISPOSABLE MISCELLANEOUS
Qty: 100 EACH | Refills: 3 | Status: SHIPPED | OUTPATIENT
Start: 2019-01-09 | End: 2020-02-15

## 2019-01-09 RX ORDER — INSULIN GLARGINE 100 [IU]/ML
INJECTION, SOLUTION SUBCUTANEOUS
Qty: 5 PEN | Refills: 0 | Status: SHIPPED | OUTPATIENT
Start: 2019-01-09 | End: 2019-02-01 | Stop reason: SDUPTHER

## 2019-01-11 ENCOUNTER — OB ABSTRACT (OUTPATIENT)
Dept: PERINATAL CARE | Facility: CLINIC | Age: 34
End: 2019-01-11

## 2019-01-11 NOTE — PROGRESS NOTES
Date:  19  RE: Tiffanie Marcum    : 1985  Estimated Date of Delivery: 3/29/19  EGA: 29w0d  OB/GYN: Sherlyn Kent  Insulin controlled gestational diabetes      Date Fasting Post-  breakfast Post-  lunch Post-  dinner Before bedtime Carbs Comments   19 108 109 119 103      19 112 108 119 142-1 hr       111 101 106 123      19 100 83            Current regimen:  Lantus--27 units at bedtime  C/O red, itchy welts at injection site  Advised patient to use hydrocortisone cream   History of same problem at injection site with Levemir usage during last pregnancy   calorie gestational diabetes diet with 3 meals & 3 snacks,  Self-blood glucose monitoring 4 times daily  Plan:  Lantus--Increase form 27 to 30 units at beditme  Continue diet & testing      Date due to report next:  Tuesday, 1/15/19    Irena Oneill  Diabetes Educator   Diabetes and Pregnancy Program

## 2019-01-15 ENCOUNTER — OB ABSTRACT (OUTPATIENT)
Dept: PERINATAL CARE | Facility: CLINIC | Age: 34
End: 2019-01-15

## 2019-01-15 NOTE — PROGRESS NOTES
Date:  01/15/19  RE: Tiffanie Marcum    : 1985  Estimated Date of Delivery: 3/29/19  EGA: 29w4d  OB/GYN: Sherlyn Kent      Blood glucose log from patient e-mail  Current regimen:  Lantus- 30 units at bedtime (C/O red, itchy welts at injection site, using hydrocortisone cream, history of same issue with Levemir during last pregnancy)  2000 calorie gestational diabetes diet with 3 meals & 3 snacks,  Self-blood glucose monitoring fasting and 2 hours after start of meals with One Touch Verio meter        Plan:  Lantus- increase to 36 units at bedtime, advised to split dose to minimize localized reaction  Continue diet & SMBG  Encouraged to complete labs      Date due to report next:  Friday, 19    Sonya Siegel RN  Diabetes Educator   Diabetes and Pregnancy Program

## 2019-01-17 ENCOUNTER — ROUTINE PRENATAL (OUTPATIENT)
Dept: OBGYN CLINIC | Facility: CLINIC | Age: 34
End: 2019-01-17
Payer: COMMERCIAL

## 2019-01-17 VITALS
SYSTOLIC BLOOD PRESSURE: 122 MMHG | DIASTOLIC BLOOD PRESSURE: 70 MMHG | WEIGHT: 202.4 LBS | HEART RATE: 108 BPM | BODY MASS INDEX: 35.85 KG/M2

## 2019-01-17 DIAGNOSIS — Z3A.29 29 WEEKS GESTATION OF PREGNANCY: Primary | ICD-10-CM

## 2019-01-17 DIAGNOSIS — O36.8130 DECREASED FETAL MOVEMENT AFFECTING MANAGEMENT OF PREGNANCY IN THIRD TRIMESTER, SINGLE OR UNSPECIFIED FETUS: ICD-10-CM

## 2019-01-17 DIAGNOSIS — O24.414 INSULIN CONTROLLED GESTATIONAL DIABETES MELLITUS (GDM) IN THIRD TRIMESTER: ICD-10-CM

## 2019-01-17 PROCEDURE — 76815 OB US LIMITED FETUS(S): CPT | Performed by: OBSTETRICS & GYNECOLOGY

## 2019-01-17 PROCEDURE — PNV: Performed by: OBSTETRICS & GYNECOLOGY

## 2019-01-17 PROCEDURE — 59025 FETAL NON-STRESS TEST: CPT | Performed by: OBSTETRICS & GYNECOLOGY

## 2019-01-17 NOTE — PROGRESS NOTES
IUP at 29 weeks and 6 days  Patient concern with slight decreased movement over the past 24 hr   Nonstress test today was reactive baseline heart rate in the 140s JAZZY checked also was 12 cm  Patient is on insulin Lantus 36 units q h s  Her fasting blood sugar today was 92  Patient monitoring her blood sugars per protocol and faxing them to the diabetic nurse  Reviewed  labor signs and symptoms  She is scheduled for repeat elective  in March  Patient otherwise doing well follow-up in 2 weeks and p r n     All questions answered

## 2019-01-18 ENCOUNTER — OB ABSTRACT (OUTPATIENT)
Dept: PERINATAL CARE | Facility: CLINIC | Age: 34
End: 2019-01-18

## 2019-01-18 NOTE — PROGRESS NOTES
Date:  19  RE: Keya Brito    : 1985  Estimated Date of Delivery: 3/29/19  EGA: 30w0d  OB/GYN: Hema Floyd      Blood glucose log from patient e-mail  Current regimen:  Lantus- 36 units at bedtime, advised to split dose to minimize localized reaction  (C/O red, itchy welts at injection site, using hydrocortisone cream, history of same issue with Levemir during last pregnancy)  2000 calorie gestational diabetes diet with 3 meals & 3 snacks,  Self-blood glucose monitoring fasting and 2 hours after start of meals with One Touch Verio meter        Plan:  Lantus- increase to 40 units at bedtime, advised to split dose to minimize localized reaction  Continue diet & SMBG  Encouraged to complete labs      Date due to report next:  Monday, 19    Roma Britton, RD,LDN,CDE  Diabetes Educator   Diabetes and Pregnancy Program

## 2019-01-22 ENCOUNTER — OB ABSTRACT (OUTPATIENT)
Dept: PERINATAL CARE | Facility: CLINIC | Age: 34
End: 2019-01-22

## 2019-01-22 NOTE — PROGRESS NOTES
Date:  19  RE: Davon Jose    : 1985  Estimated Date of Delivery: 3/29/19  EGA: 30w4d  OB/GYN: Melisa Chavis      Blood glucose log from patient e-mail      Current regimen:  Lantus- 40 units at bedtime, advised to split dose to minimize localized reaction  (C/O red, itchy welts at injection site, using hydrocortisone cream, history of same issue with Levemir during last pregnancy)  2000 calorie gestational diabetes diet with 3 meals & 3 snacks,  Self-blood glucose monitoring fasting and 2 hours after start of meals with One Touch Verio meter        Plan:  Lantus- increase to 44 units at bedtime, advised to split dose to minimize localized reaction  Continue diet & SMBG  19 HbA1c- 5 6%, repeat labs week of 2-3 or 2-10, patient has prescriptions      Date due to report next:  Friday, 19    Fredo Munoz, RN  Diabetes Educator   Diabetes and Pregnancy Program

## 2019-01-25 ENCOUNTER — OB ABSTRACT (OUTPATIENT)
Dept: PERINATAL CARE | Facility: CLINIC | Age: 34
End: 2019-01-25

## 2019-01-25 NOTE — PROGRESS NOTES
Date:  19  RE: Gorman Sat    : 1985  Estimated Date of Delivery: 3/29/19  EGA: 31w0d  OB/GYN: Eduardo Dunbar  Insulin controlled gestational diabetes          Blood glucose log from patient e-mail      Current regimen:  Lantus- 44 units at bedtime, advised to split dose to minimize localized reaction  (C/O red, itchy welts at injection site, using hydrocortisone cream, history of same issue with Levemir during last pregnancy)  2000 calorie gestational diabetes diet with 3 meals & 3 snacks,  Self-blood glucose monitoring fasting and 2 hours after start of meals with One Touch Verio meter        Plan:  Lantus- increase from 44 to 48 units at bedtime, advised to split dose into 24 units each to minimize localized reaction  Continue diet & SMBG  19 HbA1c- 5 6%, repeat labs week of 2-3 or 2-10, patient has prescriptions      Date due to report next:  Tuesday, 19    Yue Wolfe  Diabetes Educator   Diabetes and Pregnancy Program

## 2019-01-30 ENCOUNTER — OB ABSTRACT (OUTPATIENT)
Dept: PERINATAL CARE | Facility: CLINIC | Age: 34
End: 2019-01-30

## 2019-01-30 ENCOUNTER — ROUTINE PRENATAL (OUTPATIENT)
Dept: OBGYN CLINIC | Facility: CLINIC | Age: 34
End: 2019-01-30
Payer: COMMERCIAL

## 2019-01-30 ENCOUNTER — TELEPHONE (OUTPATIENT)
Dept: PERINATAL CARE | Facility: CLINIC | Age: 34
End: 2019-01-30

## 2019-01-30 VITALS
DIASTOLIC BLOOD PRESSURE: 70 MMHG | SYSTOLIC BLOOD PRESSURE: 112 MMHG | HEART RATE: 103 BPM | BODY MASS INDEX: 37.13 KG/M2 | WEIGHT: 209.6 LBS

## 2019-01-30 DIAGNOSIS — Z3A.31 31 WEEKS GESTATION OF PREGNANCY: Primary | ICD-10-CM

## 2019-01-30 DIAGNOSIS — O24.414 INSULIN CONTROLLED GESTATIONAL DIABETES MELLITUS (GDM) IN THIRD TRIMESTER: ICD-10-CM

## 2019-01-30 DIAGNOSIS — O34.219 PREVIOUS CESAREAN DELIVERY AFFECTING PREGNANCY, ANTEPARTUM: ICD-10-CM

## 2019-01-30 PROBLEM — O09.892 SHORT INTERVAL BETWEEN PREGNANCIES AFFECTING PREGNANCY IN SECOND TRIMESTER, ANTEPARTUM: Status: RESOLVED | Noted: 2018-08-15 | Resolved: 2019-01-30

## 2019-01-30 PROBLEM — Z86.32 HISTORY OF INSULIN CONTROLLED GESTATIONAL DIABETES MELLITUS (GDM): Status: RESOLVED | Noted: 2018-08-15 | Resolved: 2019-01-30

## 2019-01-30 PROCEDURE — PNV: Performed by: OBSTETRICS & GYNECOLOGY

## 2019-01-30 PROCEDURE — 59025 FETAL NON-STRESS TEST: CPT | Performed by: OBSTETRICS & GYNECOLOGY

## 2019-01-30 PROCEDURE — 76815 OB US LIMITED FETUS(S): CPT | Performed by: OBSTETRICS & GYNECOLOGY

## 2019-01-30 RX ORDER — PEN NEEDLE, DIABETIC 31 GX5/16"
NEEDLE, DISPOSABLE MISCELLANEOUS
COMMUNITY
Start: 2019-01-09 | End: 2020-02-15

## 2019-01-30 NOTE — PROGRESS NOTES
Date:  19  RE: Sophie Justice    : 1985  Estimated Date of Delivery: 3/29/19  EGA: 31w5d  OB/GYN: Tracey Pena  Insulin controlled gestational diabetes      Blood glucose log from patient e-mail      Current regimen:  Lantus- 48 units at bedtime, previously advised to split dose to minimize localized reaction  2000 calorie gestational diabetes diet with 3 meals and 3 snacks  Self- monitoring blood glucose fasting and 2 hours after start of meals with One Touch Verio meter        Plan: (Pending return phone call will consider increasing Lantus from 48 to 56 units at 9 or 10 pm daily)  E-mail sent to patient to assess if patient eating every 2 to 3 hours during the day including recommended combination of protein, carb and fat per meal/snack and no more than 9 to 10 hours of fasting overnight  Also to assess if patient is currently splitting Lantus dose by 2 with injecting one after another in 2 different sites and if consistent with timing of insulin every night  Encouraged patient to call me to discuss and will recommend patient to schedule follow-up appointment with dietitian and me for further evaluation  Continue SMBG  19 HbA1c- 5 6%, will need repeat A1c  Date due to report next:  Monday, 19 or sooner if needed       CAS Moran  Diabetes Educator   Diabetes and Pregnancy Program

## 2019-01-30 NOTE — TELEPHONE ENCOUNTER
Deanna Richardson returned phone call to office regarding multiple questions e-mailed to her  She does report eating 3 meals and 1 to 2 snacks a day depending on her schedule  Due to eating dinner late around 830 pm, she will skip bedtime snack and then reports will have breakfast around 930 am  Reports hunger at times if going greater than 3 hours without eating  Encouraged Deanna Richardson to try to eat 3 meals and 3 snacks including recommended combination of carb, protein and fat per meal/snack and not to fast for more than 9 to 10 hours overnight  Consider having a snack around 10 pm or her thought was possibly having a snack in am since she wakes up by 545 am but does not have breakfast until later  Informed her to try bedtime snack before increasing Lantus from 48 to 56 units and to contact office in am to discuss  Made aware insulin requirements increase week by week and insulin resistance increases  Possibly post meal glucose may be secondary to hunger triggering liver to release glucose or potentially overeating due to hunger  Eating every 2 to 3 hours during the day will decrease hunger   Deanna Richardson will follow up in am

## 2019-01-30 NOTE — PROGRESS NOTES
The patient is here for a routine OB visit  She has no complaints  She reports no contractions, vaginal bleeding or leakage of fluid  She reports good fetal movement  She is insulin dependent gestational diabetic  Lantus 48 u at night  She is scheduled for a repeat CS on 3/22/19    Antepartum fetal surveillance started  NST reactive JAZZY 16 99 cm  Return on Monday at [facility] for NST and growth scan and here on 2/7/19 for NST

## 2019-01-31 DIAGNOSIS — O24.414 INSULIN CONTROLLED GESTATIONAL DIABETES MELLITUS (GDM) IN THIRD TRIMESTER: ICD-10-CM

## 2019-01-31 RX ORDER — INSULIN GLARGINE 100 [IU]/ML
INJECTION, SOLUTION SUBCUTANEOUS
Qty: 15 ML | Refills: 0 | Status: CANCELLED | OUTPATIENT
Start: 2019-01-31

## 2019-02-01 ENCOUNTER — OB ABSTRACT (OUTPATIENT)
Dept: PERINATAL CARE | Facility: CLINIC | Age: 34
End: 2019-02-01

## 2019-02-01 DIAGNOSIS — O24.414 INSULIN CONTROLLED GESTATIONAL DIABETES MELLITUS (GDM) IN THIRD TRIMESTER: ICD-10-CM

## 2019-02-01 RX ORDER — INSULIN GLARGINE 100 [IU]/ML
INJECTION, SOLUTION SUBCUTANEOUS
Qty: 30 ML | Refills: 0 | Status: SHIPPED | OUTPATIENT
Start: 2019-02-01 | End: 2019-03-19 | Stop reason: SDUPTHER

## 2019-02-01 RX ORDER — INSULIN GLARGINE 100 [IU]/ML
INJECTION, SOLUTION SUBCUTANEOUS
Qty: 30 ML | Refills: 0 | Status: SHIPPED | OUTPATIENT
Start: 2019-02-01 | End: 2019-02-01 | Stop reason: SDUPTHER

## 2019-02-04 ENCOUNTER — ULTRASOUND (OUTPATIENT)
Dept: PERINATAL CARE | Facility: CLINIC | Age: 34
End: 2019-02-04
Payer: COMMERCIAL

## 2019-02-04 VITALS
HEIGHT: 63 IN | HEART RATE: 112 BPM | DIASTOLIC BLOOD PRESSURE: 63 MMHG | WEIGHT: 211.6 LBS | SYSTOLIC BLOOD PRESSURE: 101 MMHG | BODY MASS INDEX: 37.49 KG/M2

## 2019-02-04 DIAGNOSIS — O99.210 OBESITY AFFECTING PREGNANCY, ANTEPARTUM: ICD-10-CM

## 2019-02-04 DIAGNOSIS — O34.219 PREVIOUS CESAREAN DELIVERY AFFECTING PREGNANCY, ANTEPARTUM: ICD-10-CM

## 2019-02-04 DIAGNOSIS — Z3A.32 32 WEEKS GESTATION OF PREGNANCY: Primary | ICD-10-CM

## 2019-02-04 DIAGNOSIS — O24.414 INSULIN CONTROLLED GESTATIONAL DIABETES MELLITUS (GDM) IN THIRD TRIMESTER: ICD-10-CM

## 2019-02-04 PROCEDURE — 59025 FETAL NON-STRESS TEST: CPT | Performed by: OBSTETRICS & GYNECOLOGY

## 2019-02-04 PROCEDURE — 76816 OB US FOLLOW-UP PER FETUS: CPT | Performed by: OBSTETRICS & GYNECOLOGY

## 2019-02-04 NOTE — PATIENT INSTRUCTIONS

## 2019-02-04 NOTE — PROGRESS NOTES
Ms Elvia Tony is a 30 yo  at 28 3/7   weeks gestation who presents for a fetal ultrasound  examination to evaluate growth  She is asymptomatic  A2 GDM on insulin  Obesity  Previous C section  See Ob procedures  Ultrasound:  1  Live fetus in vertex presentation  2    Fetal size = dates  3   No fetal anomalies observed  4  Normal JAZZY  5  Placenta is anterior   and not a placenta previa  Reactive NST  I reviewed the results of this ultrasound and answered  all the questions  Recommendations:  1  Follow-up ultrasound in 4 weeks to monitor fetal growth and development which was scheduled today  2  NST twice a week and once a week JAZZY Lancaster MD

## 2019-02-07 ENCOUNTER — OB ABSTRACT (OUTPATIENT)
Dept: PERINATAL CARE | Facility: CLINIC | Age: 34
End: 2019-02-07

## 2019-02-07 NOTE — PROGRESS NOTES
Date:  19  RE: Nance Sever    : 1985  Estimated Date of Delivery: 3/29/19  EGA: 32w6d  OB/GYN: Hao Serrano  Insulin controlled gestational diabetes      Blood glucose log from patient e-mail      Current regimen:  Lantus- 56 units at bedtime, previously advised to split dose to minimize localized reaction into 2 equal doses of 28 units each  2000 calorie gestational diabetes diet with 3 meals and 3 snacks  Self- monitoring blood glucose fasting and 2 hours after start of meals with One Touch Verio meter        Plan:   Lantus--Increase from 56 to 62 units at 9 or 10 pm daily & split into 2 even doses of 31 units each  Patient did not address in her latest e-mail if she is eating (every 2-3 hrs) a well-balanced diet, allowing 9-10 hrs fast overnight, or if she is splitting her Lantus dose  Recommend patient to schedule follow-up appointment with dietitian or CDE CRNP soon  Continue SMBG  19 HbA1c- 5 6%, encouraged patient to have her blood work completed next week  Noted 19 US: fetal growth and JAZZY appeared normal     Date due to report next:  Tuesday, 19 or sooner if needed       Lieutenant Belloh  Diabetes Educator   Diabetes and Pregnancy Program

## 2019-02-08 ENCOUNTER — HOSPITAL ENCOUNTER (OUTPATIENT)
Facility: HOSPITAL | Age: 34
Discharge: HOME | End: 2019-02-08
Attending: OBSTETRICS & GYNECOLOGY | Admitting: OBSTETRICS & GYNECOLOGY
Payer: COMMERCIAL

## 2019-02-08 PROCEDURE — 99201 HB OFFICE/OUTPATIENT VISIT NEW (BRIEF): CPT

## 2019-02-08 NOTE — PROGRESS NOTES
Patient has been seen at [facility]  She decline vaginal bleeding, contraction and fluid leakage  Reportin good fetal meetings  Pregnancy complicated via D2JMG       FHT : 125/Moderate acceleration, 15x15 accels and no decels; reactive  toco : no contractions     Giovanni Beck MD  OBGYN, PGY-1  2/8/2019  6:56 PM

## 2019-02-09 ENCOUNTER — APPOINTMENT (OUTPATIENT)
Dept: LAB | Facility: HOSPITAL | Age: 34
End: 2019-02-09
Payer: COMMERCIAL

## 2019-02-09 DIAGNOSIS — O24.414 INSULIN CONTROLLED GESTATIONAL DIABETES MELLITUS (GDM) IN THIRD TRIMESTER: ICD-10-CM

## 2019-02-09 LAB
ALBUMIN SERPL BCP-MCNC: 2.6 G/DL (ref 3.5–5)
ALP SERPL-CCNC: 76 U/L (ref 46–116)
ALT SERPL W P-5'-P-CCNC: 13 U/L (ref 12–78)
ANION GAP SERPL CALCULATED.3IONS-SCNC: 12 MMOL/L (ref 4–13)
AST SERPL W P-5'-P-CCNC: 15 U/L (ref 5–45)
BILIRUB SERPL-MCNC: 0.21 MG/DL (ref 0.2–1)
BUN SERPL-MCNC: 13 MG/DL (ref 5–25)
CALCIUM SERPL-MCNC: 8.4 MG/DL (ref 8.3–10.1)
CHLORIDE SERPL-SCNC: 103 MMOL/L (ref 100–108)
CO2 SERPL-SCNC: 23 MMOL/L (ref 21–32)
CREAT SERPL-MCNC: 0.52 MG/DL (ref 0.6–1.3)
EST. AVERAGE GLUCOSE BLD GHB EST-MCNC: 108 MG/DL
GFR SERPL CREATININE-BSD FRML MDRD: 127 ML/MIN/1.73SQ M
GLUCOSE P FAST SERPL-MCNC: 90 MG/DL (ref 65–99)
HBA1C MFR BLD: 5.4 % (ref 4.2–6.3)
POTASSIUM SERPL-SCNC: 3.9 MMOL/L (ref 3.5–5.3)
PROT SERPL-MCNC: 6.5 G/DL (ref 6.4–8.2)
SODIUM SERPL-SCNC: 138 MMOL/L (ref 136–145)

## 2019-02-09 PROCEDURE — 83036 HEMOGLOBIN GLYCOSYLATED A1C: CPT

## 2019-02-09 PROCEDURE — 80053 COMPREHEN METABOLIC PANEL: CPT

## 2019-02-09 PROCEDURE — 36415 COLL VENOUS BLD VENIPUNCTURE: CPT

## 2019-02-11 ENCOUNTER — ROUTINE PRENATAL (OUTPATIENT)
Dept: OBGYN CLINIC | Facility: CLINIC | Age: 34
End: 2019-02-11
Payer: COMMERCIAL

## 2019-02-11 ENCOUNTER — DOCUMENTATION (OUTPATIENT)
Dept: PERINATAL CARE | Facility: CLINIC | Age: 34
End: 2019-02-11

## 2019-02-11 VITALS
BODY MASS INDEX: 37.56 KG/M2 | HEART RATE: 105 BPM | SYSTOLIC BLOOD PRESSURE: 120 MMHG | DIASTOLIC BLOOD PRESSURE: 82 MMHG | WEIGHT: 212 LBS | HEIGHT: 63 IN

## 2019-02-11 DIAGNOSIS — O24.414 INSULIN CONTROLLED GESTATIONAL DIABETES MELLITUS (GDM) IN THIRD TRIMESTER: Primary | ICD-10-CM

## 2019-02-11 DIAGNOSIS — Z3A.33 33 WEEKS GESTATION OF PREGNANCY: ICD-10-CM

## 2019-02-11 DIAGNOSIS — O34.219 PREVIOUS CESAREAN DELIVERY AFFECTING PREGNANCY, ANTEPARTUM: ICD-10-CM

## 2019-02-11 PROBLEM — O99.210 OBESITY AFFECTING PREGNANCY, ANTEPARTUM: Status: RESOLVED | Noted: 2018-11-08 | Resolved: 2019-02-11

## 2019-02-11 PROBLEM — O44.42 LOW LYING PLACENTA NOS OR WITHOUT HEMORRHAGE, SECOND TRIMESTER: Status: RESOLVED | Noted: 2018-11-08 | Resolved: 2019-02-11

## 2019-02-11 PROCEDURE — PNV: Performed by: OBSTETRICS & GYNECOLOGY

## 2019-02-11 PROCEDURE — 76815 OB US LIMITED FETUS(S): CPT | Performed by: OBSTETRICS & GYNECOLOGY

## 2019-02-11 PROCEDURE — 59025 FETAL NON-STRESS TEST: CPT | Performed by: OBSTETRICS & GYNECOLOGY

## 2019-02-11 NOTE — PROGRESS NOTES
The patient is here for a routine OB visit/NST and JAZZY  She is A2GDM on 62 units of Lantus  She has no complaints  She reports no contractions, vaginal bleeding or leakage of fluid  She reports good fetal movement  She is scheduled for a repeat CS at 39 weeks

## 2019-02-11 NOTE — PROGRESS NOTES
Date:  19  RE: Yodit Sylvester    : 1985  Estimated Date of Delivery: 3/29/19  EGA: 33w3d  OB/GYN: Sonny Eisenmenger  Insulin controlled gestational diabetes          Blood glucose log from patient e-mail      Current regimen:  Lantus- 62 units at bedtime, previously advised to split dose to minimize localized reaction into 2 equal doses of 31 units each  2000 calorie gestational diabetes diet with 3 meals and 3 snacks  Self- monitoring blood glucose fasting and 2 hours after start of meals with One Touch Verio meter     Reports splitting Lantus dose, having site reaction to Lantus and hydrocortisone helps sometimes      Plan:   FBS has improved but not 90 or less as recommended, e-mail sent to patient to assess if reaction to Lantus occurs within 15 minutes or is it a delayed reaction  If immediate will need to discontinue Lantus and refer to allergist for evaluation  If delayed reaction, please find out if your OB will consider adding an anti-histamine for daily use to decrease immune response  Encouraged not to scratch injection site, use ice vs scratching site and continue topical hydrocortisone cream as needed  For now, no dose adjustment to insulin secondary to site reaction  But encouraged to contact office if FBS trends above current 92 reading  Continue diet and SMBG   19 A1c 5 4% improved from 5 6%, repeat A1c in 4 to 8 weeks  19 US: fetal growth and JAZZY appeared normal     Date due to report next:  Wednesday, 19 or sooner if needed       CAS Ashby, CDE  Diabetes Educator   Diabetes and Pregnancy Program

## 2019-02-13 ENCOUNTER — DOCUMENTATION (OUTPATIENT)
Dept: PERINATAL CARE | Facility: CLINIC | Age: 34
End: 2019-02-13

## 2019-02-13 NOTE — PROGRESS NOTES
Date:  19  RE: Dejuan May    : 1985  Estimated Date of Delivery: 3/29/19  EGA: 33w5d  OB/GYN: Melva Simpson  Insulin controlled gestational diabetes        Blood glucose log from patient e-mail  Reports a delayed injection site reaction but does report pain with injections  Hydrocortisone cream as recommended by her OB helps with site reaction  No response regarding permission from her OB to use anti-histamine orally        Current regimen:  Lantus- 62 units at bedtime, split into 2 equal doses of 31 units each injected in 2 different sites  2000 calorie gestational diabetes diet with 3 meals and 3 snacks  Self- monitoring blood glucose fasting and 2 hours after start of meals with One Touch Verio meter     Reports splitting Lantus dose, having site reaction to Lantus and hydrocortisone helps sometimes      Plan:   Continue Lantus 62 units at bedtime as above  Informed via e-mail Lantus does burn and causes discomfort but it should subside quickly  Continue using hydrocortisone cream as needed and find out if OB will recommend anti-histamine for daily use to assist with insulin site reactions  Continue diet and SMBG   19 A1c 5 4% improved from 5 6%, repeat A1c in 4 to 8 weeks  19 US: fetal growth and JAZZY appeared normal     Date due to report next:  Monday, 19 or sooner if needed       CAS Hilliard, CDE  Diabetes Educator   Diabetes and Pregnancy Program

## 2019-02-14 ENCOUNTER — HOSPITAL ENCOUNTER (OUTPATIENT)
Facility: HOSPITAL | Age: 34
Discharge: HOME | End: 2019-02-14
Attending: OBSTETRICS & GYNECOLOGY | Admitting: OBSTETRICS & GYNECOLOGY
Payer: COMMERCIAL

## 2019-02-14 PROCEDURE — 99201 HB OFFICE/OUTPATIENT VISIT NEW (BRIEF): CPT

## 2019-02-14 PROCEDURE — 59025 FETAL NON-STRESS TEST: CPT | Performed by: OBSTETRICS & GYNECOLOGY

## 2019-02-14 NOTE — PROCEDURES
Sangeeta Rene, a  at 33w6d with an CASEY of 3/29/2019, by Ultrasound, was seen at [facility] for the following procedure(s): $Procedure Type: NST]    Nonstress Test  Reason for NST: Other (Comment)(A2GDM)  Variability: Moderate  Decelerations: None  Accelerations: Yes  Acoustic Stimulator: No  Baseline: 140 BPM  Uterine Irritability: No                   Interpretation  Nonstress Test Interpretation: Reactive  Overall Impression: Reassuring

## 2019-02-18 ENCOUNTER — ROUTINE PRENATAL (OUTPATIENT)
Dept: OBGYN CLINIC | Facility: CLINIC | Age: 34
End: 2019-02-18
Payer: COMMERCIAL

## 2019-02-18 VITALS
BODY MASS INDEX: 38.37 KG/M2 | SYSTOLIC BLOOD PRESSURE: 130 MMHG | WEIGHT: 216.6 LBS | HEART RATE: 92 BPM | DIASTOLIC BLOOD PRESSURE: 70 MMHG

## 2019-02-18 DIAGNOSIS — O24.414 INSULIN CONTROLLED GESTATIONAL DIABETES MELLITUS (GDM) IN THIRD TRIMESTER: ICD-10-CM

## 2019-02-18 DIAGNOSIS — O13.3 GESTATIONAL HYPERTENSION, THIRD TRIMESTER: ICD-10-CM

## 2019-02-18 DIAGNOSIS — O34.219 PREVIOUS CESAREAN DELIVERY AFFECTING PREGNANCY, ANTEPARTUM: ICD-10-CM

## 2019-02-18 DIAGNOSIS — Z3A.34 34 WEEKS GESTATION OF PREGNANCY: Primary | ICD-10-CM

## 2019-02-18 PROBLEM — O24.419 GESTATIONAL DIABETES: Status: RESOLVED | Noted: 2018-12-26 | Resolved: 2019-02-18

## 2019-02-18 PROCEDURE — 59025 FETAL NON-STRESS TEST: CPT | Performed by: OBSTETRICS & GYNECOLOGY

## 2019-02-18 PROCEDURE — 76815 OB US LIMITED FETUS(S): CPT | Performed by: OBSTETRICS & GYNECOLOGY

## 2019-02-18 PROCEDURE — PNV: Performed by: OBSTETRICS & GYNECOLOGY

## 2019-02-18 NOTE — PATIENT INSTRUCTIONS
Pregnancy at 31 to 34 [facility] 38:   You may continue to have symptoms such as shortness of breath, heartburn, contractions, or swelling of your ankles and feet  You may be gaining about 1 pound a week now  DISCHARGE INSTRUCTIONS:   Return to the emergency department if:   · You develop a severe headache that does not go away  · You have new or increased vision changes, such as blurred or spotted vision  · You have new or increased swelling in your face or hands  · You have vaginal spotting or bleeding  · Your water broke or you feel warm water gushing or trickling from your vagina  Contact your healthcare provider if:   · You have more than 5 contractions in 1 hour  · You notice any changes in your baby's movements  · You have abdominal cramps, pressure, or tightening  · You have a change in vaginal discharge  · You have chills or a fever  · You have vaginal itching, burning, or pain  · You have yellow, green, white, or foul-smelling vaginal discharge  · You have pain or burning when you urinate, less urine than usual, or pink or bloody urine  · You have questions or concerns about your condition or care  How to care for yourself at this stage of your pregnancy:   · Eat a variety of healthy foods  Healthy foods include fruits, vegetables, whole-grain breads, low-fat dairy foods, beans, lean meats, and fish  Drink liquids as directed  Ask how much liquid to drink each day and which liquids are best for you  Limit caffeine to less than 200 milligrams each day  Limit your intake of fish to 2 servings each week  Choose fish low in mercury such as canned light tuna, shrimp, salmon, cod, or tilapia  Do not  eat fish high in mercury such as swordfish, tilefish, ann marie mackerel, and shark  · Manage heartburn  by eating 4 or 5 small meals each day instead of large meals  Avoid spicy food  · Manage swelling  by lying down and putting your feet up       · Take prenatal vitamins as directed  Your need for certain vitamins and minerals, such as folic acid, increases during pregnancy  Prenatal vitamins provide some of the extra vitamins and minerals you need  Prenatal vitamins may also help to decrease the risk of certain birth defects  · Talk to your healthcare provider about exercise  Moderate exercise can help you stay fit  Your healthcare provider will help you plan an exercise program that is safe for you during pregnancy  · Do not smoke  If you smoke, it is never too late to quit  Smoking increases your risk of a miscarriage and other health problems during your pregnancy  Smoking can cause your baby to be born too early or weigh less at birth  Ask your healthcare provider for information if you need help quitting  · Do not drink alcohol  Alcohol passes from your body to your baby through the placenta  It can affect your baby's brain development and cause fetal alcohol syndrome (FAS)  FAS is a group of conditions that causes mental, behavior, and growth problems  · Talk to your healthcare provider before you take any medicines  Many medicines may harm your baby if you take them when you are pregnant  Do not take any medicines, vitamins, herbs, or supplements without first talking to your healthcare provider  Never use illegal or street drugs (such as marijuana or cocaine) while you are pregnant  Safety tips during pregnancy:   · Avoid hot tubs and saunas  Do not use a hot tub or sauna while you are pregnant, especially during your first trimester  Hot tubs and saunas may raise your baby's temperature and increase the risk of birth defects  · Avoid toxoplasmosis  This is an infection caused by eating raw meat or being around infected cat feces  It can cause birth defects, miscarriages, and other problems  Wash your hands after you touch raw meat  Make sure any meat is well-cooked before you eat it  Avoid raw eggs and unpasteurized milk   Use gloves or ask someone else to clean your cat's litter box while you are pregnant  Changes that are happening with your baby:  By 34 weeks, your baby may weigh more than 5 pounds  Your baby will be about 12 ½ inches long from the top of the head to the rump (baby's bottom)  Your baby is gaining about ½ pound a week  Your baby's eyes open and close now  Your baby's kicks and movements are more forceful at this time  What you need to know about prenatal care: Your healthcare provider will check your blood pressure and weight  You may also need the following:  · A urine test  may also be done to check for sugar and protein  These can be signs of gestational diabetes or infection  Protein in your urine may also be a sign of preeclampsia  Preeclampsia is a condition that can develop during week 20 or later of your pregnancy  It causes high blood pressure, and it can cause problems with your kidneys and other organs  · A Tdap vaccine  may be recommended by your healthcare provider  · Fundal height  is a measurement of your uterus to check your baby's growth  This number is usually the same as the number of weeks that you have been pregnant  Your healthcare provider may also check your baby's position  · Your baby's heart rate  will be checked  © 2017 [employer]  Information is for End User's use only and may not be sold, redistributed or otherwise used for commercial purposes  All illustrations and images included in CareNotes® are the copyrighted property of [employer]  or [employer]  The above information is an  only  It is not intended as medical advice for individual conditions or treatments  Talk to your doctor, nurse or pharmacist before following any medical regimen to see if it is safe and effective for you

## 2019-02-18 NOTE — PROGRESS NOTES
Patient is here for routine OB visit  She has no complaints today  She is currently on 62 units of Lantus  Patient is on low-dose aspirin for history of gestational hypertension with her previous pregnancy  She is scheduled for repeat  at 39 weeks  Patient initial blood pressure is 140/70  Recheck was 130/70  She denies any headache or vision changes  She had initial protein creatinine ratio November that was 0 17  She was asked to have a CBC, CMP, uric acid and protein creatinine ratio drawn again  Patient to return to the office in 1 week for NST and JAZZY  She will return to labor and delivery this Thursday or Friday for NST  Discussed with patient recommendation for delivery at 37 weeks if she meets criteria for gestational hypertension or preeclampsia  Currently, she does not meet this criteria

## 2019-02-19 ENCOUNTER — DOCUMENTATION (OUTPATIENT)
Dept: PERINATAL CARE | Facility: CLINIC | Age: 34
End: 2019-02-19

## 2019-02-19 NOTE — PROGRESS NOTES
Date:  19  RE: Dejuan May    : 1985  Estimated Date of Delivery: 3/29/19  EGA: 34w4d  OB/GYN: Melva Simpson  Insulin controlled gestational diabetes        Blood glucose log from patient e-mail  Reports a delayed injection site reaction but does report pain with injections  Hydrocortisone cream as recommended by her OB helps with site reaction  No response regarding permission from her OB to use anti-histamine orally        Current regimen:  Lantus- 62 units at bedtime, split into 2 equal doses of 31 units each injected in 2 different sites  2000 calorie gestational diabetes diet with 3 meals and 3 snacks  Self- monitoring blood glucose fasting and 2 hours after start of meals with One Touch Verio meter     Reports splitting Lantus dose, having site reaction to Lantus and hydrocortisone helps sometimes      Plan:   Due to FBS>90 increase Lantus from 62 to 68 units at bedtime, split into 2 even doses 34 units each injected in 2 different sites one after another  Continue diet and SMBG   19 A1c 5 4% improved from 5 6%, repeat A1c in 4 to 8 weeks from last lab  19 US: fetal growth and JAZZY appeared normal     Date due to report next:  Friday, 19 or sooner if needed       CAS Hilliard, CDE  Diabetes Educator   [facility]abetes [facility] Pregnancy [facility]

## 2019-02-20 ENCOUNTER — APPOINTMENT (OUTPATIENT)
Dept: LAB | Facility: HOSPITAL | Age: 34
End: 2019-02-20
Attending: OBSTETRICS & GYNECOLOGY
Payer: COMMERCIAL

## 2019-02-20 DIAGNOSIS — Z3A.34 34 WEEKS GESTATION OF PREGNANCY: ICD-10-CM

## 2019-02-20 LAB
ALBUMIN SERPL BCP-MCNC: 2.6 G/DL (ref 3.5–5)
ALP SERPL-CCNC: 79 U/L (ref 46–116)
ALT SERPL W P-5'-P-CCNC: 12 U/L (ref 12–78)
ANION GAP SERPL CALCULATED.3IONS-SCNC: 11 MMOL/L (ref 4–13)
AST SERPL W P-5'-P-CCNC: 17 U/L (ref 5–45)
BILIRUB SERPL-MCNC: 0.23 MG/DL (ref 0.2–1)
BUN SERPL-MCNC: 11 MG/DL (ref 5–25)
CALCIUM SERPL-MCNC: 8.2 MG/DL (ref 8.3–10.1)
CHLORIDE SERPL-SCNC: 105 MMOL/L (ref 100–108)
CO2 SERPL-SCNC: 22 MMOL/L (ref 21–32)
CREAT SERPL-MCNC: 0.5 MG/DL (ref 0.6–1.3)
CREAT UR-MCNC: 51.6 MG/DL
ERYTHROCYTE [DISTWIDTH] IN BLOOD BY AUTOMATED COUNT: 13.1 % (ref 11.6–15.1)
GFR SERPL CREATININE-BSD FRML MDRD: 128 ML/MIN/1.73SQ M
GLUCOSE P FAST SERPL-MCNC: 83 MG/DL (ref 65–99)
HCT VFR BLD AUTO: 40.8 % (ref 34.8–46.1)
HGB BLD-MCNC: 13.3 G/DL (ref 11.5–15.4)
MCH RBC QN AUTO: 29.4 PG (ref 26.8–34.3)
MCHC RBC AUTO-ENTMCNC: 32.6 G/DL (ref 31.4–37.4)
MCV RBC AUTO: 90 FL (ref 82–98)
PLATELET # BLD AUTO: 154 THOUSANDS/UL (ref 149–390)
PMV BLD AUTO: 10.8 FL (ref 8.9–12.7)
POTASSIUM SERPL-SCNC: 4 MMOL/L (ref 3.5–5.3)
PROT SERPL-MCNC: 6.4 G/DL (ref 6.4–8.2)
PROT UR-MCNC: 15 MG/DL
PROT/CREAT UR: 0.29 MG/G{CREAT} (ref 0–0.1)
RBC # BLD AUTO: 4.52 MILLION/UL (ref 3.81–5.12)
SODIUM SERPL-SCNC: 138 MMOL/L (ref 136–145)
URATE SERPL-MCNC: 4.1 MG/DL (ref 2–6.8)
WBC # BLD AUTO: 9.59 THOUSAND/UL (ref 4.31–10.16)

## 2019-02-20 PROCEDURE — 82570 ASSAY OF URINE CREATININE: CPT | Performed by: OBSTETRICS & GYNECOLOGY

## 2019-02-20 PROCEDURE — 80053 COMPREHEN METABOLIC PANEL: CPT

## 2019-02-20 PROCEDURE — 85027 COMPLETE CBC AUTOMATED: CPT

## 2019-02-20 PROCEDURE — 84156 ASSAY OF PROTEIN URINE: CPT | Performed by: OBSTETRICS & GYNECOLOGY

## 2019-02-20 PROCEDURE — 84550 ASSAY OF BLOOD/URIC ACID: CPT

## 2019-02-20 PROCEDURE — 36415 COLL VENOUS BLD VENIPUNCTURE: CPT

## 2019-02-21 ENCOUNTER — TELEPHONE (OUTPATIENT)
Dept: OBGYN CLINIC | Facility: CLINIC | Age: 34
End: 2019-02-21

## 2019-02-21 NOTE — TELEPHONE ENCOUNTER
I message patient yesterday regarding her test results  Patient currently does not meet criteria for preeclampsia based on blood pressures  However, her protein creatinine ratio did increased 2 29    She was instructed to have her blood pressure checked every time she came to Labor and delivery for nonstress test

## 2019-02-22 ENCOUNTER — HOSPITAL ENCOUNTER (OUTPATIENT)
Facility: HOSPITAL | Age: 34
Discharge: HOME | End: 2019-02-22
Attending: OBSTETRICS & GYNECOLOGY | Admitting: OBSTETRICS & GYNECOLOGY
Payer: COMMERCIAL

## 2019-02-22 ENCOUNTER — DOCUMENTATION (OUTPATIENT)
Dept: PERINATAL CARE | Facility: CLINIC | Age: 34
End: 2019-02-22

## 2019-02-22 VITALS — HEART RATE: 99 BPM | SYSTOLIC BLOOD PRESSURE: 128 MMHG | DIASTOLIC BLOOD PRESSURE: 69 MMHG

## 2019-02-22 PROCEDURE — 99211 OFF/OP EST MAY X REQ PHY/QHP: CPT

## 2019-02-22 PROCEDURE — 59025 FETAL NON-STRESS TEST: CPT | Performed by: OBSTETRICS & GYNECOLOGY

## 2019-02-22 NOTE — PROGRESS NOTES
Date:  19  RE: Henry Suazo    : 1985  Estimated Date of Delivery: 3/29/19  EGA: 35w0d  OB/GYN: Radha Jones  Insulin controlled gestational diabetes        Blood glucose log from patient e-mail  Reports a delayed injection site reaction but does report pain with injections  Hydrocortisone cream as recommended by her OB helps with site reaction  No response regarding permission from her OB to use anti-histamine orally        Current regimen:  Lantus- 68 units at bedtime, split into 2 equal doses of 34 units each injected in 2 different sites  2000 calorie gestational diabetes diet with 3 meals and 3 snacks  Self- monitoring blood glucose fasting and 2 hours after start of meals with One Touch Verio meter     Reports splitting Lantus dose, having site reaction to Lantus and hydrocortisone helps sometimes      Plan:   Due to FBS>90 slightly increase Lantus from 68 to 72 units at bedtime, split into 2 even doses 36 units each injected in 2 different sites one after another  Continue diet and SMBG   19 A1c 5 4% improved from 5 6%, repeat A1c in 4 to 8 weeks from last lab      19 US: fetal growth and JAZZY appeared normal     Date due to report next:  Monday, 19      LUIGI Julian  Diabetes Educator   [facility]abetes [facility] Pregnancy [facility]

## 2019-02-22 NOTE — PROGRESS NOTES
Jose Alejandro Mcclellan 36yo  at 35w0d, here today for NST -- she is well appearing without any current complaints  History of  section, A2 GDM on insulin  /69   Pulse 99   LMP 2018 (Approximate)     Fetal heart tracing significant for baseline 130 beats per minute, moderate variability, reactive (accels 15x15 x 2)  No decelerations were noted  On tocometry, no contractions noted  Overall, NST is reactive and reassuring      Case discussed with Dr Lew Hernandez MD  OB/GYN PGY-3  2019 12:01 PM

## 2019-02-25 ENCOUNTER — HOSPITAL ENCOUNTER (OUTPATIENT)
Facility: HOSPITAL | Age: 34
Discharge: HOME | End: 2019-02-25
Attending: OBSTETRICS & GYNECOLOGY | Admitting: OBSTETRICS & GYNECOLOGY
Payer: COMMERCIAL

## 2019-02-25 VITALS — HEART RATE: 96 BPM | DIASTOLIC BLOOD PRESSURE: 72 MMHG | SYSTOLIC BLOOD PRESSURE: 134 MMHG

## 2019-02-25 PROBLEM — Z3A.35 35 WEEKS GESTATION OF PREGNANCY: Status: ACTIVE | Noted: 2018-08-15

## 2019-02-25 PROCEDURE — 76815 OB US LIMITED FETUS(S): CPT | Performed by: OBSTETRICS & GYNECOLOGY

## 2019-02-25 PROCEDURE — 59025 FETAL NON-STRESS TEST: CPT | Performed by: OBSTETRICS & GYNECOLOGY

## 2019-02-25 PROCEDURE — 99212 OFFICE O/P EST SF 10 MIN: CPT

## 2019-02-25 NOTE — PROGRESS NOTES
OB Triage Note  Patient of Dr India Kurtz    Subjective:  28 yo  in triage for NST and JAZZY for pregnancy with A2GDM  CC:  I am here for fetal testing  My JAZZY has usually been around 11 cm      HPI:  Contractions:  no  Fetal movement:  yes  Vaginal bleeding:   no  Leaking of fluid:  no    OB complications:  V2RLV  Hx Gest HTN  Obesity      PMH:  Past Medical History:   Diagnosis Date    Abnormal Pap smear of cervix     Arthritis     Diabetes mellitus ([facility] [address] )     Gestational hypertension affecting first pregnancy     Irritable bowel syndrome     Migraine     Varicella          Objective    Vital signs: /72  SVE: deferred  FHT: 135 reactive, cat 1  [place]: none  JAZZY: 10 5 cm    Assessment and Plan:  Reactive NST  Normal JAZZY  Follow up outpatient per routine  Dr India Kurtz aware

## 2019-02-25 NOTE — DISCHARGE INSTRUCTIONS
Pregnancy at 28 to [address]:   You are considered full term at the beginning of 37 weeks  Your breathing may be easier if your baby has moved down into a head-down position  You may need to urinate more often because the baby may be pressing on your bladder  You may also feel more discomfort and get tired easily  DISCHARGE INSTRUCTIONS:   Return to the emergency department if:   · You develop a severe headache that does not go away  · You have new or increased vision changes, such as blurred or spotted vision  · You have new or increased swelling in your face or hands  · You have vaginal spotting or bleeding  · Your water broke or you feel warm water gushing or trickling from your vagina  Contact your healthcare provider if:   · You have more than 5 contractions in 1 hour  · You notice any changes in your baby's movements  · You have abdominal cramps, pressure, or tightening  · You have a change in vaginal discharge  · You have chills or a fever  · You have vaginal itching, burning, or pain  · You have yellow, green, white, or foul-smelling vaginal discharge  · You have pain or burning when you urinate, less urine than usual, or pink or bloody urine  · You have questions or concerns about your condition or care  How to care for yourself at this stage of your pregnancy:   · Eat a variety of healthy foods  Healthy foods include fruits, vegetables, whole-grain breads, low-fat dairy foods, beans, lean meats, and fish  Drink liquids as directed  Ask how much liquid to drink each day and which liquids are best for you  Limit caffeine to less than 200 milligrams each day  Limit your intake of fish to 2 servings each week  Choose fish low in mercury such as canned light tuna, shrimp, crab, salmon, cod, or tilapia  Do not  eat fish high in mercury such as swordfish, tilefish, ann marie mackerel, and shark  · Take prenatal vitamins as directed    Your need for certain vitamins and minerals, such as folic acid, increases during pregnancy  Prenatal vitamins provide some of the extra vitamins and minerals you need  Prenatal vitamins may also help to decrease the risk of certain birth defects  · Rest as needed  Put your feet up if you have swelling in your ankles and feet  · Do not smoke  If you smoke, it is never too late to quit  Smoking increases your risk of a miscarriage and other health problems during your pregnancy  Smoking can cause your baby to be born too early or weigh less at birth  Ask your healthcare provider for information if you need help quitting  · Do not drink alcohol  Alcohol passes from your body to your baby through the placenta  It can affect your baby's brain development and cause fetal alcohol syndrome (FAS)  FAS is a group of conditions that causes mental, behavior, and growth problems  · Talk to your healthcare provider before you take any medicines  Many medicines may harm your baby if you take them when you are pregnant  Do not take any medicines, vitamins, herbs, or supplements without first talking to your healthcare provider  Never use illegal or street drugs (such as marijuana or cocaine) while you are pregnant  · Talk to your healthcare provider before you travel  You may not be able to travel in an airplane after 36 weeks  He may also recommend that you avoid long road trips  Safety tips:   · Avoid hot tubs and saunas  Do not use a hot tub or sauna while you are pregnant, especially during your first trimester  Hot tubs and saunas may raise your baby's temperature and increase the risk of birth defects  · Avoid toxoplasmosis  This is an infection caused by eating raw meat or being around infected cat feces  It can cause birth defects, miscarriages, and other problems  Wash your hands after you touch raw meat  Make sure any meat is well-cooked before you eat it  Avoid raw eggs and unpasteurized milk   Use gloves or ask someone else to clean your cat's litter box while you are pregnant  · Ask your healthcare provider about travel  The most comfortable time to travel is during the second trimester  Ask your healthcare provider if you can travel after 36 weeks  You may not be able to travel in an airplane after 36 weeks  He may also recommend that you avoid long road trips  Changes that are happening with your baby:  By 38 weeks, your baby may weigh between 6 and 9 pounds  Your baby may be about 14 inches long from the top of the head to the rump (baby's bottom)  Your baby hears well enough to know your voice  As your baby gets larger, you may feel fewer kicks and more stretching and rolling  Your baby may move into a head-down position  Your baby will also rest lower in your abdomen  What you need to know about prenatal care: Your healthcare provider will check your blood pressure and weight  You may also need the following:  · A urine test  may also be done to check for sugar and protein  These can be signs of gestational diabetes or infection  Protein in your urine may also be a sign of preeclampsia  Preeclampsia is a condition that can develop during week 20 or later of your pregnancy  It causes high blood pressure, and it can cause problems with your kidneys and other organs  · A blood test  may be done to check for anemia (low iron level)  · A Tdap vaccine  may be recommended by your healthcare provider  · A group B strep test  is a test that is done to check for group B strep infection  Group B strep is a type of bacteria that may be found in the vagina or rectum  It can be passed to your baby during delivery if you have it  Your healthcare provider will take swab your vagina or rectum and send the sample to the lab for tests  · Fundal height  is a measurement of your uterus to check your baby's growth  This number is usually the same as the number of weeks that you have been pregnant   Your healthcare provider may also check your baby's position  · Your baby's heart rate  will be checked  © 2017 [facility] Information is for End User's use only and may not be sold, redistributed or otherwise used for commercial purposes  All illustrations and images included in CareNotes® are the copyrighted property of A D A M , Inc  or [employer]  The above information is an  only  It is not intended as medical advice for individual conditions or treatments  Talk to your doctor, nurse or pharmacist before following any medical regimen to see if it is safe and effective for you

## 2019-02-26 ENCOUNTER — DOCUMENTATION (OUTPATIENT)
Dept: PERINATAL CARE | Facility: CLINIC | Age: 34
End: 2019-02-26

## 2019-02-26 NOTE — PROCEDURES
Rona Slade, a  at 35w4d with an CASEY of 3/29/2019, by Ultrasound, was seen at [address] [facility] for the following procedure(s): $Procedure Type: NST]    Nonstress Test  Reason for NST: Routine  Variability: Moderate  Decelerations: None  Accelerations: Yes  Acoustic Stimulator: No  Baseline: 130 BPM  Uterine Irritability: No  Contractions: Not present    Interpretation  Nonstress Test Interpretation: Reactive  Overall Impression: Reassuring  Comments: Kimmie Browne MD  OB/GYN PGY-3  2019 3:08 PM

## 2019-02-26 NOTE — PROGRESS NOTES
Date:  19  RE: Jessica Tong    : 1985  Estimated Date of Delivery: 3/29/19  EGA: 35w4d  OB/GYN: Efrain Antonio  Insulin controlled gestational diabetes      Blood glucose log from patient e-mail  Reports a delayed injection site reaction but does report pain with injections  Hydrocortisone cream as recommended by her OB helps with site reaction  Continued no response regarding permission from her OB to use anti-histamine orally        Current regimen:  Lantus- 72 units at bedtime, split into 2 equal doses injected in 2 different sites  2000 calorie gestational diabetes diet with 3 meals and 3 snacks    Self- monitoring blood glucose fasting and 2 hours after start of meals with One Touch Verio meter     Reports splitting Lantus dose, having site reaction to Lantus and hydrocortisone helps sometimes      Plan:   Lantus- increase to 80 units at bedtime, in split dose as noted    Continue diet and SMBG   [id number] 5 4%, re-order week of 3-24-19     2/4/19 US: fetal growth and JAZZY appeared normal     Date due to report next:  19      Lu Saldana, RN, CDE  Diabetes Educator   Diabetes and Pregnancy Program

## 2019-02-28 ENCOUNTER — ROUTINE PRENATAL (OUTPATIENT)
Dept: OBGYN CLINIC | Facility: CLINIC | Age: 34
End: 2019-02-28
Payer: COMMERCIAL

## 2019-02-28 VITALS — BODY MASS INDEX: 38.44 KG/M2 | DIASTOLIC BLOOD PRESSURE: 80 MMHG | SYSTOLIC BLOOD PRESSURE: 120 MMHG | WEIGHT: 217 LBS

## 2019-02-28 DIAGNOSIS — Z3A.35 35 WEEKS GESTATION OF PREGNANCY: ICD-10-CM

## 2019-02-28 DIAGNOSIS — Z3A.36 36 WEEKS GESTATION OF PREGNANCY: Primary | ICD-10-CM

## 2019-02-28 DIAGNOSIS — Z98.891 HISTORY OF CESAREAN SECTION: ICD-10-CM

## 2019-02-28 DIAGNOSIS — O24.414 INSULIN CONTROLLED GESTATIONAL DIABETES MELLITUS (GDM) IN THIRD TRIMESTER: ICD-10-CM

## 2019-02-28 PROCEDURE — 76815 OB US LIMITED FETUS(S): CPT | Performed by: OBSTETRICS & GYNECOLOGY

## 2019-02-28 PROCEDURE — 59025 FETAL NON-STRESS TEST: CPT | Performed by: OBSTETRICS & GYNECOLOGY

## 2019-02-28 PROCEDURE — PNV: Performed by: OBSTETRICS & GYNECOLOGY

## 2019-02-28 NOTE — PROGRESS NOTES
IUP at 35 weeks and 6 days, patient reports positive fetal movement  Denies any contractions or leakage of fluid bleeding or discharge  Her blood pressure today is normal 120/80  Her urine dip is negative weight gain is minimal   Fasting blood sugar today was 95 is on nighttime insulin only  You had signs of  labor and kick counts  She has a previous  x1 she is scheduled for elective repeat  section on 2019  All questions answered follow-up next week continue biweekly testing

## 2019-03-01 ENCOUNTER — DOCUMENTATION (OUTPATIENT)
Dept: PERINATAL CARE | Facility: CLINIC | Age: 34
End: 2019-03-01

## 2019-03-01 NOTE — PROGRESS NOTES
Date:  19  RE: Keya Brito    : 1985  Estimated Date of Delivery: 3/29/19  EGA: 36w0d  OB/GYN: Hema Floyd  Insulin controlled gestational diabetes      Blood glucose log from patient e-mail  Reports a delayed injection site reaction but does report pain with injections  Hydrocortisone cream as recommended by her OB helps with site reaction  Continued no response regarding permission from her OB to use anti-histamine orally        Current regimen:  Lantus- 80 units at bedtime, split into 2 equal doses injected in 2 different sites  2000 calorie gestational diabetes diet with 3 meals and 3 snacks    Self- monitoring blood glucose fasting and 2 hours after start of meals with One Touch Verio meter     Reports splitting Lantus dose, having site reaction to Lantus and hydrocortisone helps sometimes      Plan:   Lantus- increase to 84 units at bedtime, in split dose as noted    Continue diet and SMBG   [id number] 5 4%, re-order week of 3-24-19     2/4/19 US: fetal growth and JAZZY appeared normal   Next US 3/5  Date due to report next:  Tuesday, 3/5       LUIGI Lane  Diabetes Educator   Diabetes and Pregnancy Program

## 2019-03-05 ENCOUNTER — ULTRASOUND (OUTPATIENT)
Dept: PERINATAL CARE | Facility: CLINIC | Age: 34
End: 2019-03-05
Payer: COMMERCIAL

## 2019-03-05 VITALS
DIASTOLIC BLOOD PRESSURE: 83 MMHG | HEIGHT: 63 IN | SYSTOLIC BLOOD PRESSURE: 136 MMHG | BODY MASS INDEX: 39.23 KG/M2 | HEART RATE: 108 BPM | WEIGHT: 221.4 LBS

## 2019-03-05 DIAGNOSIS — Z3A.36 36 WEEKS GESTATION OF PREGNANCY: ICD-10-CM

## 2019-03-05 DIAGNOSIS — O24.414 INSULIN CONTROLLED GESTATIONAL DIABETES MELLITUS (GDM) IN THIRD TRIMESTER: Primary | ICD-10-CM

## 2019-03-05 DIAGNOSIS — O13.3 GESTATIONAL HYPERTENSION, THIRD TRIMESTER: ICD-10-CM

## 2019-03-05 DIAGNOSIS — D18.03 HEPATIC HEMANGIOMA: ICD-10-CM

## 2019-03-05 PROCEDURE — 76816 OB US FOLLOW-UP PER FETUS: CPT | Performed by: OBSTETRICS & GYNECOLOGY

## 2019-03-05 PROCEDURE — 59025 FETAL NON-STRESS TEST: CPT | Performed by: OBSTETRICS & GYNECOLOGY

## 2019-03-05 PROCEDURE — 99212 OFFICE O/P EST SF 10 MIN: CPT | Performed by: OBSTETRICS & GYNECOLOGY

## 2019-03-05 NOTE — PROGRESS NOTES
The patient was seen today for an ultrasound and NST  Please see ultrasound report (located under OB Procedures tab) for additional details  The patient appears to have gestational hypertension  Delivery is recommended to be considered as early as 37 weeks in the setting of gestational hypertension

## 2019-03-06 ENCOUNTER — DOCUMENTATION (OUTPATIENT)
Dept: PERINATAL CARE | Facility: CLINIC | Age: 34
End: 2019-03-06

## 2019-03-08 ENCOUNTER — ROUTINE PRENATAL (OUTPATIENT)
Dept: OBGYN CLINIC | Facility: CLINIC | Age: 34
End: 2019-03-08

## 2019-03-08 VITALS
SYSTOLIC BLOOD PRESSURE: 130 MMHG | HEIGHT: 63 IN | BODY MASS INDEX: 38.8 KG/M2 | DIASTOLIC BLOOD PRESSURE: 70 MMHG | HEART RATE: 95 BPM | WEIGHT: 219 LBS

## 2019-03-08 DIAGNOSIS — Z98.891 HISTORY OF CESAREAN SECTION: ICD-10-CM

## 2019-03-08 DIAGNOSIS — Z3A.37 37 WEEKS GESTATION OF PREGNANCY: Primary | ICD-10-CM

## 2019-03-08 DIAGNOSIS — Z3A.36 36 WEEKS GESTATION OF PREGNANCY: ICD-10-CM

## 2019-03-08 DIAGNOSIS — O24.419 GESTATIONAL DIABETES MELLITUS (GDM) IN THIRD TRIMESTER, GESTATIONAL DIABETES METHOD OF CONTROL UNSPECIFIED: ICD-10-CM

## 2019-03-08 PROCEDURE — PNV: Performed by: OBSTETRICS & GYNECOLOGY

## 2019-03-08 NOTE — PROGRESS NOTES
IUP at 37 weeks and 0 days  Patient with history of  section times 1, insulin-dependent gestational diabetes fasting blood sugar this morning was 70  Patient did have some loose stools yesterday  Symptoms have resolved still has some mild GI upset discussed symptomatic relief measures  Reviewed signs of  labor and kick counts  Nonstress test today was reactive  She had JAZZY done earlier this week which was also normal   All questions answered follow-up in 1 week nataliia Vazquez Patient has elective repeat  section scheduled for 2019

## 2019-03-11 ENCOUNTER — HOSPITAL ENCOUNTER (OUTPATIENT)
Facility: HOSPITAL | Age: 34
Discharge: HOME | End: 2019-03-11
Attending: OBSTETRICS & GYNECOLOGY | Admitting: OBSTETRICS & GYNECOLOGY
Payer: COMMERCIAL

## 2019-03-11 VITALS — SYSTOLIC BLOOD PRESSURE: 131 MMHG | DIASTOLIC BLOOD PRESSURE: 76 MMHG | HEART RATE: 104 BPM

## 2019-03-11 PROBLEM — Z3A.37 37 WEEKS GESTATION OF PREGNANCY: Status: ACTIVE | Noted: 2019-03-11

## 2019-03-11 PROCEDURE — 76815 OB US LIMITED FETUS(S): CPT | Performed by: OBSTETRICS & GYNECOLOGY

## 2019-03-11 PROCEDURE — 99211 OFF/OP EST MAY X REQ PHY/QHP: CPT

## 2019-03-11 PROCEDURE — 59025 FETAL NON-STRESS TEST: CPT | Performed by: OBSTETRICS & GYNECOLOGY

## 2019-03-11 NOTE — PROGRESS NOTES
29year old  at 37w3d presents for NST/JAZZY  History of  section x 1, insulin-dependent gestational diabetes  Denies contractions, leakage of fluid or vaginal bleeding  Reports good fetal movement  NST reactive 120 / moderate variability / 15x15 accelerations / no  decelerations  JAZZY: 11 36, MVP 4 86    Blood pressure: 131/76    Continue antepartum surveillance  Scheduled for repeat  section 3/22/2019  Labor & kick count precautions reviewed      Dr Melisa Chavis aware

## 2019-03-12 ENCOUNTER — DOCUMENTATION (OUTPATIENT)
Dept: PERINATAL CARE | Facility: CLINIC | Age: 34
End: 2019-03-12

## 2019-03-12 NOTE — PROGRESS NOTES
Date:  19  RE: Sophie Justice    : 1985  Estimated Date of Delivery: 3/29/19  EGA: 37w4d  OB/GYN: Tracey Perfect  Insulin controlled gestational diabetes      Blood glucose log from patient e-mail  Reports a delayed injection site reaction but does report pain with injections  Hydrocortisone cream as recommended by her OB helps with site reaction  Continued no response regarding permission from her OB to use anti-histamine orally        Current regimen:  Lantus- 84 units at bedtime, split into 2 equal doses injected in 2 different sites  2000 calorie gestational diabetes diet with 3 meals and 3 snacks  Self- monitoring blood glucose fasting and 2 hours after start of meals with One Touch Verio meter     Reports splitting Lantus dose, having site reaction to Lantus and hydrocortisone helps sometimes      Plan:   Continue current regiemn  [id number] 5 4%, re-order week of 3-24-19     3-5-19 U/S showed appropriate interval fetal growth      Date due to report next:  Tuesday, 3-19-19     Javier Sewell, RN,RD,LDN, CDE  Diabetes Educator   Diabetes and Pregnancy Program

## 2019-03-14 ENCOUNTER — HOSPITAL ENCOUNTER (OUTPATIENT)
Facility: HOSPITAL | Age: 34
Discharge: HOME | End: 2019-03-14
Attending: OBSTETRICS & GYNECOLOGY | Admitting: OBSTETRICS & GYNECOLOGY
Payer: COMMERCIAL

## 2019-03-14 PROCEDURE — 59025 FETAL NON-STRESS TEST: CPT | Performed by: OBSTETRICS & GYNECOLOGY

## 2019-03-14 PROCEDURE — 76815 OB US LIMITED FETUS(S): CPT | Performed by: OBSTETRICS & GYNECOLOGY

## 2019-03-14 PROCEDURE — 99211 OFF/OP EST MAY X REQ PHY/QHP: CPT

## 2019-03-14 NOTE — PROCEDURES
Ariel Sahu, a  at 37w6d with an CASEY of 3/29/2019, by Ultrasound, was seen at [address] [facility] for the following procedure(s): $Procedure Type: JAZZY, NST]    Nonstress Test  Reason for NST: Diabetes  Variability: Moderate  Decelerations: None  Accelerations: Yes  Acoustic Stimulator: No  Baseline: 125 BPM  Uterine Irritability: No  Contractions: Not present    4 Quadrant JAZZY  JAZZY Q1 (cm): 1 6 cm  JAZZY Q2 (cm): 3 4 cm  JAZZY Q3 (cm): 2 5 cm  JAZZY Q4 (cm): 1 7 cm  JAZZY TOTAL (cm): 9 2 cm              Interpretation  Nonstress Test Interpretation: Reactive  Overall Impression: Reassuring

## 2019-03-18 ENCOUNTER — HOSPITAL ENCOUNTER (OUTPATIENT)
Facility: HOSPITAL | Age: 34
Discharge: HOME | End: 2019-03-18
Attending: OBSTETRICS & GYNECOLOGY | Admitting: OBSTETRICS & GYNECOLOGY
Payer: COMMERCIAL

## 2019-03-18 PROCEDURE — 59025 FETAL NON-STRESS TEST: CPT | Performed by: OBSTETRICS & GYNECOLOGY

## 2019-03-18 PROCEDURE — 99211 OFF/OP EST MAY X REQ PHY/QHP: CPT

## 2019-03-18 PROCEDURE — 76815 OB US LIMITED FETUS(S): CPT | Performed by: OBSTETRICS & GYNECOLOGY

## 2019-03-18 NOTE — PROCEDURES
Henry Suazo, a  at 38w3d with an CASEY of 3/29/2019, by Ultrasound, was seen at [address] [facility] for the following procedure(s): $Procedure Type: JAZZY, NST]    Nonstress Test  Reason for NST: Diabetes, Hypertension  Variability: Moderate  Decelerations: None  Accelerations: Yes  Acoustic Stimulator: No  Baseline: 135 BPM  Uterine Irritability: No  Contractions: Not present    4 Quadrant JAZZY  JAZZY Q1 (cm): 3 2 cm  JAZZY Q2 (cm): 2 3 cm  JAZZY Q3 (cm): 1 6 cm  JAZZY Q4 (cm): 3 2 cm  JAZZY TOTAL (cm): 10 3 cm              Interpretation  Nonstress Test Interpretation: Reactive  Overall Impression: Reassuring

## 2019-03-19 ENCOUNTER — DOCUMENTATION (OUTPATIENT)
Dept: PERINATAL CARE | Facility: CLINIC | Age: 34
End: 2019-03-19

## 2019-03-19 DIAGNOSIS — O24.414 INSULIN CONTROLLED GESTATIONAL DIABETES MELLITUS (GDM) IN THIRD TRIMESTER: ICD-10-CM

## 2019-03-19 RX ORDER — INSULIN GLARGINE 100 [IU]/ML
INJECTION, SOLUTION SUBCUTANEOUS
Qty: 30 ML | Refills: 0 | Status: SHIPPED | OUTPATIENT
Start: 2019-03-19 | End: 2019-03-24 | Stop reason: HOSPADM

## 2019-03-20 ENCOUNTER — ROUTINE PRENATAL (OUTPATIENT)
Dept: OBGYN CLINIC | Facility: CLINIC | Age: 34
End: 2019-03-20
Payer: COMMERCIAL

## 2019-03-20 VITALS — SYSTOLIC BLOOD PRESSURE: 120 MMHG | DIASTOLIC BLOOD PRESSURE: 82 MMHG | WEIGHT: 223 LBS | BODY MASS INDEX: 39.5 KG/M2

## 2019-03-20 DIAGNOSIS — O34.219 PREVIOUS CESAREAN DELIVERY AFFECTING PREGNANCY, ANTEPARTUM: ICD-10-CM

## 2019-03-20 DIAGNOSIS — Z3A.38 38 WEEKS GESTATION OF PREGNANCY: Primary | ICD-10-CM

## 2019-03-20 DIAGNOSIS — Z87.59 HISTORY OF GESTATIONAL HYPERTENSION: ICD-10-CM

## 2019-03-20 DIAGNOSIS — O24.414 INSULIN CONTROLLED GESTATIONAL DIABETES MELLITUS (GDM) IN THIRD TRIMESTER: ICD-10-CM

## 2019-03-20 PROBLEM — O13.3 GESTATIONAL HYPERTENSION, THIRD TRIMESTER: Status: RESOLVED | Noted: 2019-02-18 | Resolved: 2019-03-20

## 2019-03-20 PROBLEM — Z3A.37 37 WEEKS GESTATION OF PREGNANCY: Status: RESOLVED | Noted: 2019-03-11 | Resolved: 2019-03-20

## 2019-03-20 PROCEDURE — 87653 STREP B DNA AMP PROBE: CPT | Performed by: OBSTETRICS & GYNECOLOGY

## 2019-03-20 PROCEDURE — 59025 FETAL NON-STRESS TEST: CPT | Performed by: OBSTETRICS & GYNECOLOGY

## 2019-03-20 PROCEDURE — PNV: Performed by: OBSTETRICS & GYNECOLOGY

## 2019-03-20 NOTE — PROGRESS NOTES
Patient is here for routine OB visit  She is scheduled for repeat  on Friday at 7:30 a m  Corinna Chicas She is currently on 84 units of Lantus at night  She was instructed to take half her does the evening before her scheduled   GBS was collected  Patient is allergic to penicillin  Patient has rash to penicillin

## 2019-03-21 LAB — GP B STREP DNA SPEC QL NAA+PROBE: NORMAL

## 2019-03-21 NOTE — H&P
H&P Exam - Obstetrics   Keya Brito 29 y o  female MRN: [id number]  Unit/Bed#:  Encounter: [id number]    Assessment/Plan     Assessment:  39 weeks  Prior CS  A2GDM  Requests Repeat CS    Plan:  Repeat  delivery  Consult anesthesia  Accu-Chek for A2GDM  Gentamicin and clindamycin for antibiotic prophylaxis due to history of penicillin allergy    History of Present Illness   Chief Complaint: Elective     HPI:  Keya Brito is a 29 y o   female with an CASEY of 3/29/2019, by Ultrasound at 38w6d weeks gestation who is being admitted for Elective   Her current obstetrical history is significant for gestational DM  Contractions: None  Leakage of fluid: None  Bleeding: None  Fetal movement: present  Pregnancy complications: gestational DM  Review of Systems  Noncontributory    Historical Information   OB History    Para Term  AB Living   2 1 1     1   SAB TAB Ectopic Multiple Live Births         0 1      # Outcome Date GA Lbr Vish/2nd Weight Sex Delivery Anes PTL Lv   2 Current            1 Term 10/06/17 39w0d  3232 g (7 lb 2 oz) F CS-LTranv EPI N IGNACIO      Complications: Fetal Intolerance, Failure to Progress in First Stage       Past Medical History:   Diagnosis Date    Abnormal Pap smear of cervix     Arthritis     Diabetes mellitus ([address] )     Gestational hypertension affecting first pregnancy     Irritable bowel syndrome     Migraine     Varicella      Past Surgical History:   Procedure Laterality Date     SECTION  10/06/2017    COLPOSCOPY      RI  DELIVERY ONLY Bilateral 10/6/2017    Procedure:  SECTION (); Surgeon: Brent Valverde MD;  Location: [place] ;  Service: Obstetrics    RI COLONOSCOPY FLX DX W/COLLJ [address] [facility] [address] WHEN PFRMD N/A 2016    Procedure: COLONOSCOPY;  Surgeon: Bishnu Caceres MD;  [facility];   Service: Gastroenterology    WISDOM TOOTH EXTRACTION       Social History   Social History Substance and Sexual Activity   Alcohol Use Yes    Comment: socially prior to pregnancy     Social History     Substance and Sexual Activity   Drug Use No     Social History     Tobacco Use   Smoking Status Never Smoker   Smokeless Tobacco Never Used     Family History: non-contributory    Meds/Allergies   all medications and allergies reviewed  Allergies   Allergen Reactions    Amoxicillin Rash    Penicillins Rash       Objective   Vitals: Last menstrual period 07/14/2018, currently breastfeeding  There is no height or weight on file to calculate BMI      Invasive Devices          None          Physical Exam     Physical Exam     Gen Appearance: AAO x 3  Normocephalic  Chest/Lungs: Normal breath sounds    Uterus gravid, non-tender, contractions palpated, resting tone is soft  EFW: 7 5 lbs  Cervix: deferred  Muse:No  FHR Category 1 130s, reactive        Prenatal Labs:   Blood Type:   Lab Results   Component Value Date/Time    ABO Grouping O 08/23/2018 10:16 AM     , D (Rh type):   Lab Results   Component Value Date/Time    Rh Factor Positive 08/23/2018 10:16 AM     , Antibody Screen: No results found for: ANTIBODYSCR , HCT/HGB:   Lab Results   Component Value Date/Time    Hematocrit 40 8 02/20/2019 08:49 AM    Hemoglobin 13 3 02/20/2019 08:49 AM      , MCV:   Lab Results   Component Value Date/Time    MCV 90 02/20/2019 08:49 AM      , Platelets:   Lab Results   Component Value Date/Time    Platelets 489 53/28/2793 08:49 AM      , 1 hour Glucola:   Lab Results   Component Value Date/Time    Glucose 158 (H) 12/18/2018 09:52 AM   , 3 hour GTT:   Lab Results   Component Value Date/Time    Glucose, GTT - 3 Hour 78 06/16/2017 11:28 AM   , Varicella: No results found for: VARICELLAIGG    , Rubella:   Lab Results   Component Value Date/Time    Rubella IgG Quant >175 0 08/23/2018 10:16 AM        , VDRL/RPR:   Lab Results   Component Value Date/Time    RPR Non-Reactive 12/18/2018 09:52 AM      , Urine Culture/Screen: Lab Results   Component Value Date/Time    Urine Culture No Growth <1000 cfu/mL 08/23/2018 10:16 AM       , Hep B:   Lab Results   Component Value Date/Time    Hepatitis B Surface Ag Non-reactive 08/23/2018 10:16 AM     , HIV:   Lab Results   Component Value Date/Time    HIV-1/HIV-2 Ab Non-Reactive 08/23/2018 10:16 AM     , Chlamydia: No results found for: EXTCHLAMYDIA  , Gonorrhea:   Lab Results   Component Value Date/Time    N gonorrhoeae, DNA Probe N  gonorrhoeae Amplified DNA Negative 09/19/2018 01:22 PM     , Group B Strep:    Lab Results   Component Value Date/Time    Strep Grp B PCR Positive for Beta Hemolytic Strep Grp B by PCR (A) 09/13/2017 05:52 PM    Strep Grp B PCR Beta Hemolytic Streptococcus Group B 09/13/2017 05:52 PM          Imaging, EKG, Pathology, and Other Studies: I have personally reviewed pertinent reports

## 2019-03-22 ENCOUNTER — ANESTHESIA (INPATIENT)
Dept: LABOR AND DELIVERY | Facility: HOSPITAL | Age: 34
End: 2019-03-22
Payer: COMMERCIAL

## 2019-03-22 ENCOUNTER — HOSPITAL ENCOUNTER (INPATIENT)
Facility: HOSPITAL | Age: 34
LOS: 2 days | Discharge: HOME | End: 2019-03-24
Attending: OBSTETRICS & GYNECOLOGY | Admitting: OBSTETRICS & GYNECOLOGY
Payer: COMMERCIAL

## 2019-03-22 ENCOUNTER — ANESTHESIA EVENT (INPATIENT)
Dept: LABOR AND DELIVERY | Facility: HOSPITAL | Age: 34
End: 2019-03-22
Payer: COMMERCIAL

## 2019-03-22 DIAGNOSIS — Z98.891 S/P CESAREAN SECTION: Primary | ICD-10-CM

## 2019-03-22 PROBLEM — Z3A.39 39 WEEKS GESTATION OF PREGNANCY: Status: ACTIVE | Noted: 2018-08-15

## 2019-03-22 LAB
ABO GROUP BLD: NORMAL
BASE EXCESS BLDCOV CALC-SCNC: -2.4 MMOL/L (ref 1–9)
BASOPHILS # BLD AUTO: 0.03 THOUSANDS/ΜL (ref 0–0.1)
BASOPHILS NFR BLD AUTO: 0 % (ref 0–1)
BLD GP AB SCN SERPL QL: NEGATIVE
EOSINOPHIL # BLD AUTO: 0.08 THOUSAND/ΜL (ref 0–0.61)
EOSINOPHIL NFR BLD AUTO: 1 % (ref 0–6)
ERYTHROCYTE [DISTWIDTH] IN BLOOD BY AUTOMATED COUNT: 12.9 % (ref 11.6–15.1)
GLUCOSE SERPL-MCNC: 72 MG/DL (ref 65–140)
HCO3 BLDCOV-SCNC: 23.3 MMOL/L (ref 12.2–28.6)
HCT VFR BLD AUTO: 42.8 % (ref 34.8–46.1)
HGB BLD-MCNC: 14.3 G/DL (ref 11.5–15.4)
IMM GRANULOCYTES # BLD AUTO: 0.13 THOUSAND/UL (ref 0–0.2)
IMM GRANULOCYTES NFR BLD AUTO: 1 % (ref 0–2)
LYMPHOCYTES # BLD AUTO: 2.16 THOUSANDS/ΜL (ref 0.6–4.47)
LYMPHOCYTES NFR BLD AUTO: 23 % (ref 14–44)
MCH RBC QN AUTO: 29.3 PG (ref 26.8–34.3)
MCHC RBC AUTO-ENTMCNC: 33.4 G/DL (ref 31.4–37.4)
MCV RBC AUTO: 88 FL (ref 82–98)
MONOCYTES # BLD AUTO: 0.64 THOUSAND/ΜL (ref 0.17–1.22)
MONOCYTES NFR BLD AUTO: 7 % (ref 4–12)
NEUTROPHILS # BLD AUTO: 6.52 THOUSANDS/ΜL (ref 1.85–7.62)
NEUTS SEG NFR BLD AUTO: 68 % (ref 43–75)
NRBC BLD AUTO-RTO: 0 /100 WBCS
OXYHGB MFR BLDCOV: 64.8 %
PCO2 BLDCOV: 43.6 MM HG (ref 27–43)
PH BLDCOV: 7.35 [PH] (ref 7.19–7.49)
PLATELET # BLD AUTO: 159 THOUSANDS/UL (ref 149–390)
PMV BLD AUTO: 11.1 FL (ref 8.9–12.7)
PO2 BLDCOV: 27.1 MM HG (ref 15–45)
RBC # BLD AUTO: 4.88 MILLION/UL (ref 3.81–5.12)
RH BLD: POSITIVE
RPR SER QL: NORMAL
SAO2 % BLDCOV: 13.7 ML/DL
SPECIMEN EXPIRATION DATE: NORMAL
WBC # BLD AUTO: 9.56 THOUSAND/UL (ref 4.31–10.16)

## 2019-03-22 PROCEDURE — 82805 BLOOD GASES W/O2 SATURATION: CPT | Performed by: OBSTETRICS & GYNECOLOGY

## 2019-03-22 PROCEDURE — 86901 BLOOD TYPING SEROLOGIC RH(D): CPT | Performed by: OBSTETRICS & GYNECOLOGY

## 2019-03-22 PROCEDURE — 82948 REAGENT STRIP/BLOOD GLUCOSE: CPT

## 2019-03-22 PROCEDURE — 86850 RBC ANTIBODY SCREEN: CPT | Performed by: OBSTETRICS & GYNECOLOGY

## 2019-03-22 PROCEDURE — 85025 COMPLETE CBC W/AUTO DIFF WBC: CPT | Performed by: OBSTETRICS & GYNECOLOGY

## 2019-03-22 PROCEDURE — 59514 CESAREAN DELIVERY ONLY: CPT | Performed by: OBSTETRICS & GYNECOLOGY

## 2019-03-22 PROCEDURE — 59510 CESAREAN DELIVERY: CPT | Performed by: OBSTETRICS & GYNECOLOGY

## 2019-03-22 PROCEDURE — 86900 BLOOD TYPING SEROLOGIC ABO: CPT | Performed by: OBSTETRICS & GYNECOLOGY

## 2019-03-22 PROCEDURE — 86592 SYPHILIS TEST NON-TREP QUAL: CPT | Performed by: OBSTETRICS & GYNECOLOGY

## 2019-03-22 PROCEDURE — 4A1HXCZ MONITORING OF PRODUCTS OF CONCEPTION, CARDIAC RATE, EXTERNAL APPROACH: ICD-10-PCS | Performed by: OBSTETRICS & GYNECOLOGY

## 2019-03-22 RX ORDER — METOCLOPRAMIDE HYDROCHLORIDE 5 MG/ML
10 INJECTION INTRAMUSCULAR; INTRAVENOUS EVERY 6 HOURS PRN
Status: DISCONTINUED | OUTPATIENT
Start: 2019-03-22 | End: 2019-03-24 | Stop reason: HOSPADM

## 2019-03-22 RX ORDER — SODIUM CHLORIDE 9 MG/ML
125 INJECTION, SOLUTION INTRAVENOUS CONTINUOUS
Status: DISCONTINUED | OUTPATIENT
Start: 2019-03-22 | End: 2019-03-24 | Stop reason: HOSPADM

## 2019-03-22 RX ORDER — OXYCODONE HYDROCHLORIDE AND ACETAMINOPHEN 5; 325 MG/1; MG/1
1 TABLET ORAL EVERY 4 HOURS PRN
Status: DISCONTINUED | OUTPATIENT
Start: 2019-03-23 | End: 2019-03-24 | Stop reason: HOSPADM

## 2019-03-22 RX ORDER — OXYCODONE HYDROCHLORIDE AND ACETAMINOPHEN 5; 325 MG/1; MG/1
1 TABLET ORAL EVERY 4 HOURS PRN
Status: ACTIVE | OUTPATIENT
Start: 2019-03-22 | End: 2019-03-23

## 2019-03-22 RX ORDER — ONDANSETRON 2 MG/ML
4 INJECTION INTRAMUSCULAR; INTRAVENOUS ONCE AS NEEDED
Status: DISCONTINUED | OUTPATIENT
Start: 2019-03-22 | End: 2019-03-24 | Stop reason: HOSPADM

## 2019-03-22 RX ORDER — ONDANSETRON 2 MG/ML
4 INJECTION INTRAMUSCULAR; INTRAVENOUS EVERY 4 HOURS PRN
Status: ACTIVE | OUTPATIENT
Start: 2019-03-22 | End: 2019-03-23

## 2019-03-22 RX ORDER — DIAPER,BRIEF,INFANT-TODD,DISP
1 EACH MISCELLANEOUS DAILY PRN
Status: DISCONTINUED | OUTPATIENT
Start: 2019-03-22 | End: 2019-03-24 | Stop reason: HOSPADM

## 2019-03-22 RX ORDER — CLINDAMYCIN PHOSPHATE 900 MG/50ML
900 INJECTION INTRAVENOUS ONCE
Status: COMPLETED | OUTPATIENT
Start: 2019-03-22 | End: 2019-03-22

## 2019-03-22 RX ORDER — DEXAMETHASONE SODIUM PHOSPHATE 4 MG/ML
8 INJECTION, SOLUTION INTRA-ARTICULAR; INTRALESIONAL; INTRAMUSCULAR; INTRAVENOUS; SOFT TISSUE ONCE AS NEEDED
Status: DISPENSED | OUTPATIENT
Start: 2019-03-22 | End: 2019-03-23

## 2019-03-22 RX ORDER — NALOXONE HYDROCHLORIDE 0.4 MG/ML
0.1 INJECTION, SOLUTION INTRAMUSCULAR; INTRAVENOUS; SUBCUTANEOUS
Status: ACTIVE | OUTPATIENT
Start: 2019-03-22 | End: 2019-03-23

## 2019-03-22 RX ORDER — DOCUSATE SODIUM 100 MG/1
100 CAPSULE, LIQUID FILLED ORAL 2 TIMES DAILY
Status: DISCONTINUED | OUTPATIENT
Start: 2019-03-22 | End: 2019-03-24 | Stop reason: HOSPADM

## 2019-03-22 RX ORDER — OXYCODONE HYDROCHLORIDE AND ACETAMINOPHEN 5; 325 MG/1; MG/1
2 TABLET ORAL EVERY 4 HOURS PRN
Status: DISCONTINUED | OUTPATIENT
Start: 2019-03-23 | End: 2019-03-24 | Stop reason: HOSPADM

## 2019-03-22 RX ORDER — KETOROLAC TROMETHAMINE 30 MG/ML
30 INJECTION, SOLUTION INTRAMUSCULAR; INTRAVENOUS EVERY 6 HOURS PRN
Status: DISPENSED | OUTPATIENT
Start: 2019-03-22 | End: 2019-03-23

## 2019-03-22 RX ORDER — NALBUPHINE HCL 10 MG/ML
5 AMPUL (ML) INJECTION
Status: ACTIVE | OUTPATIENT
Start: 2019-03-22 | End: 2019-03-23

## 2019-03-22 RX ORDER — OXYTOCIN/RINGER'S LACTATE 30/500 ML
PLASTIC BAG, INJECTION (ML) INTRAVENOUS CONTINUOUS PRN
Status: DISCONTINUED | OUTPATIENT
Start: 2019-03-22 | End: 2019-03-22 | Stop reason: SURG

## 2019-03-22 RX ORDER — GENTAMICIN SULFATE 80 MG/50ML
1.5 INJECTION, SOLUTION INTRAVENOUS ONCE
Status: COMPLETED | OUTPATIENT
Start: 2019-03-22 | End: 2019-03-22

## 2019-03-22 RX ORDER — BUPIVACAINE HYDROCHLORIDE 7.5 MG/ML
INJECTION, SOLUTION EPIDURAL; RETROBULBAR AS NEEDED
Status: DISCONTINUED | OUTPATIENT
Start: 2019-03-22 | End: 2019-03-22 | Stop reason: SURG

## 2019-03-22 RX ORDER — MORPHINE SULFATE 0.5 MG/ML
INJECTION, SOLUTION EPIDURAL; INTRATHECAL; INTRAVENOUS AS NEEDED
Status: DISCONTINUED | OUTPATIENT
Start: 2019-03-22 | End: 2019-03-22 | Stop reason: SURG

## 2019-03-22 RX ORDER — EPHEDRINE SULFATE 50 MG/ML
INJECTION INTRAVENOUS AS NEEDED
Status: DISCONTINUED | OUTPATIENT
Start: 2019-03-22 | End: 2019-03-22 | Stop reason: SURG

## 2019-03-22 RX ORDER — HYDROMORPHONE HCL/PF 1 MG/ML
0.5 SYRINGE (ML) INJECTION
Status: DISPENSED | OUTPATIENT
Start: 2019-03-22 | End: 2019-03-23

## 2019-03-22 RX ORDER — DIPHENHYDRAMINE HCL 25 MG
25 TABLET ORAL EVERY 6 HOURS PRN
Status: DISCONTINUED | OUTPATIENT
Start: 2019-03-22 | End: 2019-03-24 | Stop reason: HOSPADM

## 2019-03-22 RX ORDER — ACETAMINOPHEN 325 MG/1
650 TABLET ORAL EVERY 6 HOURS PRN
Status: DISCONTINUED | OUTPATIENT
Start: 2019-03-22 | End: 2019-03-24 | Stop reason: HOSPADM

## 2019-03-22 RX ORDER — FENTANYL CITRATE/PF 50 MCG/ML
50 SYRINGE (ML) INJECTION
Status: DISCONTINUED | OUTPATIENT
Start: 2019-03-22 | End: 2019-03-24 | Stop reason: HOSPADM

## 2019-03-22 RX ORDER — ONDANSETRON 2 MG/ML
4 INJECTION INTRAMUSCULAR; INTRAVENOUS EVERY 8 HOURS PRN
Status: DISCONTINUED | OUTPATIENT
Start: 2019-03-22 | End: 2019-03-24 | Stop reason: HOSPADM

## 2019-03-22 RX ORDER — HYDROMORPHONE HCL/PF 1 MG/ML
1 SYRINGE (ML) INJECTION EVERY 2 HOUR PRN
Status: DISCONTINUED | OUTPATIENT
Start: 2019-03-23 | End: 2019-03-24 | Stop reason: HOSPADM

## 2019-03-22 RX ORDER — OXYTOCIN/RINGER'S LACTATE 30/500 ML
250 PLASTIC BAG, INJECTION (ML) INTRAVENOUS CONTINUOUS
Status: DISCONTINUED | OUTPATIENT
Start: 2019-03-22 | End: 2019-03-24 | Stop reason: HOSPADM

## 2019-03-22 RX ORDER — ONDANSETRON 2 MG/ML
INJECTION INTRAMUSCULAR; INTRAVENOUS AS NEEDED
Status: DISCONTINUED | OUTPATIENT
Start: 2019-03-22 | End: 2019-03-22 | Stop reason: SURG

## 2019-03-22 RX ORDER — IBUPROFEN 600 MG/1
600 TABLET ORAL EVERY 6 HOURS PRN
Status: DISCONTINUED | OUTPATIENT
Start: 2019-03-22 | End: 2019-03-23

## 2019-03-22 RX ORDER — CALCIUM CARBONATE 200(500)MG
1000 TABLET,CHEWABLE ORAL DAILY PRN
Status: DISCONTINUED | OUTPATIENT
Start: 2019-03-22 | End: 2019-03-24 | Stop reason: HOSPADM

## 2019-03-22 RX ORDER — MORPHINE SULFATE 0.5 MG/ML
INJECTION, SOLUTION EPIDURAL; INTRATHECAL; INTRAVENOUS
Status: COMPLETED
Start: 2019-03-22 | End: 2019-03-22

## 2019-03-22 RX ORDER — METOCLOPRAMIDE HYDROCHLORIDE 5 MG/ML
5 INJECTION INTRAMUSCULAR; INTRAVENOUS EVERY 6 HOURS PRN
Status: ACTIVE | OUTPATIENT
Start: 2019-03-22 | End: 2019-03-23

## 2019-03-22 RX ORDER — DIPHENHYDRAMINE HYDROCHLORIDE 50 MG/ML
25 INJECTION INTRAMUSCULAR; INTRAVENOUS EVERY 6 HOURS PRN
Status: ACTIVE | OUTPATIENT
Start: 2019-03-22 | End: 2019-03-23

## 2019-03-22 RX ADMIN — PHENYLEPHRINE HYDROCHLORIDE 100 MCG: 10 INJECTION INTRAVENOUS at 07:31

## 2019-03-22 RX ADMIN — PHENYLEPHRINE HYDROCHLORIDE 100 MCG: 10 INJECTION INTRAVENOUS at 07:34

## 2019-03-22 RX ADMIN — SODIUM CHLORIDE: 0.9 INJECTION, SOLUTION INTRAVENOUS at 08:22

## 2019-03-22 RX ADMIN — BUPIVACAINE HYDROCHLORIDE 1.6 ML: 7.5 INJECTION, SOLUTION EPIDURAL; RETROBULBAR at 07:32

## 2019-03-22 RX ADMIN — SODIUM CHLORIDE 999 ML/HR: 0.9 INJECTION, SOLUTION INTRAVENOUS at 05:57

## 2019-03-22 RX ADMIN — MORPHINE SULFATE 0.1 MG: 0.5 INJECTION, SOLUTION EPIDURAL; INTRATHECAL; INTRAVENOUS at 07:32

## 2019-03-22 RX ADMIN — DEXAMETHASONE SODIUM PHOSPHATE 8 MG: 4 INJECTION, SOLUTION INTRAMUSCULAR; INTRAVENOUS at 11:06

## 2019-03-22 RX ADMIN — PHENYLEPHRINE HYDROCHLORIDE 100 MCG: 10 INJECTION INTRAVENOUS at 07:57

## 2019-03-22 RX ADMIN — Medication: at 08:13

## 2019-03-22 RX ADMIN — SODIUM CHLORIDE 999 ML/HR: 0.9 INJECTION, SOLUTION INTRAVENOUS at 07:05

## 2019-03-22 RX ADMIN — PHENYLEPHRINE HYDROCHLORIDE 100 MCG: 10 INJECTION INTRAVENOUS at 07:40

## 2019-03-22 RX ADMIN — KETOROLAC TROMETHAMINE 30 MG: 30 INJECTION, SOLUTION INTRAMUSCULAR; INTRAVENOUS at 18:54

## 2019-03-22 RX ADMIN — HYDROMORPHONE HYDROCHLORIDE 0.5 MG: 1 INJECTION, SOLUTION INTRAMUSCULAR; INTRAVENOUS; SUBCUTANEOUS at 10:00

## 2019-03-22 RX ADMIN — PHENYLEPHRINE HYDROCHLORIDE 50 MCG: 10 INJECTION INTRAVENOUS at 07:33

## 2019-03-22 RX ADMIN — CLINDAMYCIN PHOSPHATE 900 MG: 18 INJECTION, SOLUTION INTRAMUSCULAR; INTRAVENOUS at 07:34

## 2019-03-22 RX ADMIN — METOCLOPRAMIDE 10 MG: 5 INJECTION, SOLUTION INTRAMUSCULAR; INTRAVENOUS at 13:26

## 2019-03-22 RX ADMIN — EPHEDRINE SULFATE 5 MG: 50 INJECTION, SOLUTION INTRAVENOUS at 07:40

## 2019-03-22 RX ADMIN — GENTAMICIN SULFATE 80 MG: 80 INJECTION, SOLUTION INTRAVENOUS at 07:07

## 2019-03-22 RX ADMIN — KETOROLAC TROMETHAMINE 30 MG: 30 INJECTION, SOLUTION INTRAMUSCULAR; INTRAVENOUS at 12:36

## 2019-03-22 RX ADMIN — ONDANSETRON 4 MG: 2 INJECTION INTRAMUSCULAR; INTRAVENOUS at 07:57

## 2019-03-22 RX ADMIN — SODIUM CHLORIDE 125 ML/HR: 0.9 INJECTION, SOLUTION INTRAVENOUS at 14:31

## 2019-03-22 RX ADMIN — PHENYLEPHRINE HYDROCHLORIDE 100 MCG: 10 INJECTION INTRAVENOUS at 07:42

## 2019-03-22 RX ADMIN — Medication 250 MILLI-UNITS/MIN: at 07:55

## 2019-03-22 RX ADMIN — ONDANSETRON 4 MG: 2 INJECTION INTRAMUSCULAR; INTRAVENOUS at 07:25

## 2019-03-22 RX ADMIN — SODIUM CHLORIDE 125 ML/HR: 0.9 INJECTION, SOLUTION INTRAVENOUS at 21:52

## 2019-03-22 NOTE — ANESTHESIA POSTPROCEDURE EVALUATION
Post-Op Assessment Note    CV Status:  Stable  Pain Score: 6    Pain management: adequate     Mental Status:  Alert and awake   Hydration Status:  Euvolemic   PONV Controlled:  Controlled   Airway Patency:  Patent   Post Op Vitals Reviewed: Yes      Staff: Anesthesiologist           BP      Temp     Pulse     Resp      SpO2

## 2019-03-22 NOTE — LACTATION NOTE
This note was copied from a baby's chart  Assisted mom with breastfeeding  Positioned baby in cross cradle position and he latched well  Enc to call for assistance as needed,phone # provided

## 2019-03-22 NOTE — ANESTHESIA PROCEDURE NOTES
Spinal Block    Patient location during procedure: OR  Start time: 3/22/2019 7:32 AM  Reason for block: at surgeon's request and primary anesthetic  Staffing  Anesthesiologist: Bary Lanes, DO  Performed: anesthesiologist   Preanesthetic Checklist  Completed: patient identified, surgical consent, pre-op evaluation, timeout performed, IV checked, risks and benefits discussed and monitors and equipment checked  Spinal Block  Patient position: sitting  Prep: Betadine  Patient monitoring: frequent blood pressure checks, continuous pulse ox and heart rate  Approach: midline  Location: L3-4  Injection technique: single-shot  Needle  Needle type: pencil-tip   Needle gauge: 24 G  Needle length: 5 cm  Assessment  Injection Assessment:  negative aspiration for heme, no paresthesia on injection and positive aspiration for clear CSF    Post-procedure:  site cleaned

## 2019-03-22 NOTE — PLAN OF CARE
Problem: PAIN - ADULT  Goal: Verbalizes/displays adequate comfort level or baseline comfort level  Description  Interventions:  - Encourage patient to monitor pain and request assistance  - Assess pain using appropriate pain scale  - Administer analgesics based on type and severity of pain and evaluate response  - Implement non-pharmacological measures as appropriate and evaluate response  - Consider cultural and social influences on pain and pain management  - Notify physician/advanced practitioner if interventions unsuccessful or patient reports new pain  Outcome: Progressing     Problem: INFECTION - ADULT  Goal: Absence or prevention of progression during hospitalization  Description  INTERVENTIONS:  - Assess and monitor for signs and symptoms of infection  - Monitor lab/diagnostic results  - Monitor all insertion sites, i e  indwelling lines, tubes, and drains  - Monitor endotracheal (as able) and nasal secretions for changes in amount and color  - [place] appropriate cooling/warming therapies per order  - Administer medications as ordered  - Instruct and encourage patient and family to use good hand hygiene technique  - Identify and instruct in appropriate isolation precautions for identified infection/condition  Outcome: Progressing  Goal: Absence of fever/infection during neutropenic period  Description  INTERVENTIONS:  - Monitor WBC  - Implement neutropenic guidelines  Outcome: Progressing     Problem: SAFETY ADULT  Goal: Patient will remain free of falls  Description  INTERVENTIONS:  - Assess patient frequently for physical needs  -  Identify cognitive and physical deficits and behaviors that affect risk of falls    -  [place] fall precautions as indicated by assessment   - Educate patient/family on patient safety including physical limitations  - Instruct patient to call for assistance with activity based on assessment  - Modify environment to reduce risk of injury  - Consider OT/PT consult to assist with strengthening/mobility  Outcome: Progressing  Goal: Maintain or return to baseline ADL function  Description  INTERVENTIONS:  -  Assess patient's ability to carry out ADLs; assess patient's baseline for ADL function and identify physical deficits which impact ability to perform ADLs (bathing, care of mouth/teeth, toileting, grooming, dressing, etc )  - Assess/evaluate cause of self-care deficits   - Assess range of motion  - Assess patient's mobility; develop plan if impaired  - Assess patient's need for assistive devices and provide as appropriate  - Encourage maximum independence but intervene and supervise when necessary  ¯ Involve family in performance of ADLs  ¯ Assess for home care needs following discharge   ¯ Request OT consult to assist with ADL evaluation and planning for discharge  ¯ Provide patient education as appropriate  Outcome: Progressing  Goal: Maintain or return mobility status to optimal level  Description  INTERVENTIONS:  - Assess patient's baseline mobility status (ambulation, transfers, stairs, etc )    - Identify cognitive and physical deficits and behaviors that affect mobility  - Identify mobility aids required to assist with transfers and/or ambulation (gait belt, sit-to-stand, lift, walker, cane, etc )  - [place] fall precautions as indicated by assessment  - Record patient progress and toleration of activity level on Mobility SBAR; progress patient to next Phase/Stage  - Instruct patient to call for assistance with activity based on assessment  - Request Rehabilitation consult to assist with strengthening/weightbearing, etc   Outcome: Progressing     Problem: Knowledge Deficit  Goal: Patient/family/caregiver demonstrates understanding of disease process, treatment plan, medications, and discharge instructions  Description  Complete learning assessment and assess knowledge base    Interventions:  - Provide teaching at level of understanding  - Provide teaching via preferred learning methods  Outcome: Progressing     Problem: DISCHARGE PLANNING  Goal: Discharge to home or other facility with appropriate resources  Description  INTERVENTIONS:  - Identify barriers to discharge w/patient and caregiver  - Arrange for needed discharge resources and transportation as appropriate  - Identify discharge learning needs (meds, wound care, etc )  - Arrange for interpretive services to assist at discharge as needed  - Refer to Case Management Department for coordinating discharge planning if the patient needs post-hospital services based on physician/advanced practitioner order or complex needs related to functional status, cognitive ability, or social support system  Outcome: Progressing     Problem: POSTPARTUM  Goal: Experiences normal postpartum course  Description  INTERVENTIONS:  - Monitor maternal vital signs  - Assess uterine involution and lochia  Outcome: Progressing  Goal: Appropriate maternal -  bonding  Description  INTERVENTIONS:  - Identify family support  - Assess for appropriate maternal/infant bonding   -Encourage maternal/infant bonding opportunities  - Referral to  or  as needed  Outcome: Progressing  Goal: Establishment of infant feeding pattern  Description  INTERVENTIONS:  - Assess breast/bottle feeding  - Refer to lactation as needed  Outcome: Progressing  Goal: Incision(s), wounds(s) or drain site(s) healing without S/S of infection  Description  INTERVENTIONS  - Assess and document risk factors for skin impairment   - Assess and document dressing, incision, wound bed, drain sites and surrounding tissue  - Initiate Nutrition services consult and/or wound management as needed  Outcome: Progressing

## 2019-03-22 NOTE — PLAN OF CARE
Problem: PAIN - ADULT  Goal: Verbalizes/displays adequate comfort level or baseline comfort level  Description  Interventions:  - Encourage patient to monitor pain and request assistance  - Assess pain using appropriate pain scale  - Administer analgesics based on type and severity of pain and evaluate response  - Implement non-pharmacological measures as appropriate and evaluate response  - Consider cultural and social influences on pain and pain management  - Notify physician/advanced practitioner if interventions unsuccessful or patient reports new pain  Outcome: Progressing     Problem: INFECTION - ADULT  Goal: Absence or prevention of progression during hospitalization  Description  INTERVENTIONS:  - Assess and monitor for signs and symptoms of infection  - Monitor lab/diagnostic results  - Monitor all insertion sites, i e  indwelling lines, tubes, and drains  - Monitor endotracheal (as able) and nasal secretions for changes in amount and color  - [place] appropriate cooling/warming therapies per order  - Administer medications as ordered  - Instruct and encourage patient and family to use good hand hygiene technique  - Identify and instruct in appropriate isolation precautions for identified infection/condition  Outcome: Progressing  Goal: Absence of fever/infection during neutropenic period  Description  INTERVENTIONS:  - Monitor WBC  - Implement neutropenic guidelines  Outcome: Progressing     Problem: SAFETY ADULT  Goal: Patient will remain free of falls  Description  INTERVENTIONS:  - Assess patient frequently for physical needs  -  Identify cognitive and physical deficits and behaviors that affect risk of falls    -  [place] fall precautions as indicated by assessment   - Educate patient/family on patient safety including physical limitations  - Instruct patient to call for assistance with activity based on assessment  - Modify environment to reduce risk of injury  - Consider OT/PT consult to assist with strengthening/mobility  Outcome: Progressing  Goal: Maintain or return to baseline ADL function  Description  INTERVENTIONS:  -  Assess patient's ability to carry out ADLs; assess patient's baseline for ADL function and identify physical deficits which impact ability to perform ADLs (bathing, care of mouth/teeth, toileting, grooming, dressing, etc )  - Assess/evaluate cause of self-care deficits   - Assess range of motion  - Assess patient's mobility; develop plan if impaired  - Assess patient's need for assistive devices and provide as appropriate  - Encourage maximum independence but intervene and supervise when necessary  ¯ Involve family in performance of ADLs  ¯ Assess for home care needs following discharge   ¯ Request OT consult to assist with ADL evaluation and planning for discharge  ¯ Provide patient education as appropriate  Outcome: Progressing  Goal: Maintain or return mobility status to optimal level  Description  INTERVENTIONS:  - Assess patient's baseline mobility status (ambulation, transfers, stairs, etc )    - Identify cognitive and physical deficits and behaviors that affect mobility  - Identify mobility aids required to assist with transfers and/or ambulation (gait belt, sit-to-stand, lift, walker, cane, etc )  - [place] fall precautions as indicated by assessment  - Record patient progress and toleration of activity level on Mobility SBAR; progress patient to next Phase/Stage  - Instruct patient to call for assistance with activity based on assessment  - Request Rehabilitation consult to assist with strengthening/weightbearing, etc   Outcome: Progressing     Problem: Knowledge Deficit  Goal: Patient/family/caregiver demonstrates understanding of disease process, treatment plan, medications, and discharge instructions  Description  Complete learning assessment and assess knowledge base    Interventions:  - Provide teaching at level of understanding  - Provide teaching via preferred learning methods  Outcome: Progressing     Problem: DISCHARGE PLANNING  Goal: Discharge to home or other facility with appropriate resources  Description  INTERVENTIONS:  - Identify barriers to discharge w/patient and caregiver  - Arrange for needed discharge resources and transportation as appropriate  - Identify discharge learning needs (meds, wound care, etc )  - Arrange for interpretive services to assist at discharge as needed  - Refer to Case Management Department for coordinating discharge planning if the patient needs post-hospital services based on physician/advanced practitioner order or complex needs related to functional status, cognitive ability, or social support system  Outcome: Progressing

## 2019-03-22 NOTE — DISCHARGE SUMMARY
Discharge Summary - OB/GYN   Angelo Vera 29 y o  female MRN: [id number]  Unit/Bed#: L&D 908-05 Encounter: [id number]      Admission Date: 3/22/2019     Discharge Date: 3/24/19    Admitting Diagnosis:   1  Pregnancy at 39w0d  2  Prior CS x 1  3  A2GDM    Discharge Diagnosis:   Same, delivered      Procedures: repeat  section, low transverse incision    Attending: Jaja Hayden MD    Hospital Course:     Angelo Vera is a 29 y o   at 39w0d wks who was initially admitted for repeat CS    She received routine post-op care  On day of discharge, she was ambulating and able to reasonably perform all ADLs  She was voiding and had appropriate bowel function  Pain was well controlled  She was discharged home on post-operative day 2 without complications  Patient was instructed to follow up with her OB as an outpatient and was given appropriate warnings to call provider if she develops signs of infection or uncontrolled pain  Complications: none apparent    Condition at discharge: stable     Discharge instructions/Information to patient and family:   See after visit summary for information provided to patient and family  Provisions for Follow-Up Care:  See after visit summary for information related to follow-up care and any pertinent home health orders  Disposition: See After Visit Summary for discharge disposition information  Planned Readmission: No    Discharge Medications: For a complete list of the patient's medications, please refer to her med rec

## 2019-03-22 NOTE — OP NOTE
OPERATIVE REPORT  PATIENT NAME: David Bhatt    :  1985  MRN: [id number]  Pt Location: [facility] L&D OR ROOM 01    SURGERY DATE: 3/22/2019    Surgeon(s) and Role:     * Hugo Serrano MD - Primary     * Sheba Rodriguez MD - Assisting    Preop Diagnosis:  Pregnancy, ter, 39 weeks  Prior CS x 1  Request Repeat CS  A2GDM    Postop Diagnosis  Same      Procedure(s) (LRB):   SECTION () REPEAT (N/A)    Specimen(s):  ID Type Source Tests Collected by Time Destination   A :  Cord Blood Cord BLOOD GAS, VENOUS, CORD Hugo Serrano MD 3/22/2019 0756    B :  Tissue (Placenta on Hold) OB Only Placenta PLACENTA IN STORAGE Hugo Serrano MD 3/22/2019 6494        Estimated Blood Loss:   700 mL    Drains:  Urethral Catheter Non-latex 16 Fr  (Active)   Number of days: 0       Anesthesia Type:   Spinal    Operative Indications:  Prior CS x 1    Operative Findings:  Viable male fetus  Vertex presentation, clear amniotic fluid, no nuchal cord  Normal uterus, tubes and ovaries  Omental adhesions to the fascia, anteriorly    Complications:   None    Procedure and Technique:    The patient was correctly identified and was taken to the OR where spinal was obtained without difficulty  A Medrano catheter was aseptically placed  She also had pneumatic compression boots placed  Her vagina was prepped with Betadine   Her abdomen was then prepped using Chloraprep and was draped in the normal sterile fashion in the dorsal supine position with a leftward tilt   Anesthesia was tested and was found to be adequate            A Pfannensteil skin incision was then made hrough the prior Pfannensteil scarwith the scapel and carried through to the underlying layer of fascia with the scalpel       The fascia was incised in the midline and the incision extended laterally with the Albarado scissors   The superior aspect of the fascial incision was grasped with a pair of Kochers, elevated, and the underlying rectus muscles dissected off bluntly   The rectus muscles were then  in the midline, and the peritoneum identified, tented up, and entered sharply with the Metzenbaum scissors   The peritoneal incision was extended superiorly and inferiorly with good visualization of the bladder   The bladder blade was then inserted and the vesicouterine peritoneum identified, grasped with the pick-ups and entered sharply with the Metzenbaum scissors   The incision was then extended laterally and the bladder flap created digitally  [place] retractor was inserted  The bladder blade was then reinserted and the lower uterine segment incised in a transverse fashion with the scalpel  The uterine incision was extended laterally in a curvilinear fashion by blunt dissection  Upon reaching the level of the membranes, the membranes were ruptured using a clamp  Clear fluid was noted  The vertex was presenting  The bladder blade was removed and the infant's head was grasped, stabilized and was delivered through the uterine incision using gentle fundal pressure   The rest of the body was delivered without difficulty  There was no shoulder dystocia encountered  The nose and mouth were bulb suctioned, and the cord was doubly clamped and cut  The infant had spontaneous cry,color and tone  The infant was handed to a waiting nurse resuscitator  The fetus was delivered atraumatically  Cord gases, both arterial & venous as well as cord blood was sent to pathology for analysis  The placenta was gently removed from the patients uterus using gentle traction  Oxytocin infusion was started at this point to control postpartum bleeding and uterine atony  The uterus was then manually exteriorized and interiorly it was cleared of all clots and debris and curetted using a sponge  The uterus was closed in a 2 layer fashion   The first layer was repaired with  0 Vicryl in a running, interlocking stitch   The second layer was closed using an imbricating stitch of the same suture was used to obtain excellent hemostasis       Several bleeding sites along the incision were controlled using interrupted figure-of-eight sutures of Vicryl 0  [place] pelvic lavage was performed at the posterior culdesac and this area was carefully suctioned and cleared of all clots  The uterus, bilateral tubes and ovaries appeared normal  The uterus was then gently replaced into the pelvis   The paracolic gutters were lavaged with warm saline, aspirated with a suction and cleared of all clots  Careful inspection of the uterine incision line revealed excellent hemostasis       A piece of Surgicel  was placed over uterine incision for additional hemostasis    The muscle and peritoneum was aproximated with several interrupted sutures of Vicryl 3 0    The fascia was reapproximated with 0 Vicryl in a running fashion       The subcutaneous layer was flushed with warm NSS  Hemostasis of bleeding small blood vessels was done using electrocautery  Subcutaneous layer was reappoximated with interrupted sutures using Plain 3 0  The skin was closed using running subcuticular sutures of Monocryl 4 0  Histoacryl was applied over the skin incision  Drapes were removed and patient was cleansed  Medrano catheter was draining clear urine  The patient tolerated the procedure well  Sponge,instrument,needle counts were correct times 2          I was present for the entire procedure, A qualified resident physician was not available and A co-surgeon was required because of skills and techniques relevant to speciality    Patient Disposition:  PACU  and hemodynamically stable    SIGNATURE: Kt Mendieta MD  DATE: March 22, 2019  TIME: 8:52 AM

## 2019-03-22 NOTE — ANESTHESIA PREPROCEDURE EVALUATION
Review of Systems/Medical History  Patient summary reviewed  Chart reviewed      Cardiovascular  Hypertension ,    Pulmonary  Negative pulmonary ROS        GI/Hepatic  Negative GI/hepatic ROS               Endo/Other  Diabetes gestational ,   Obesity    GYN  Currently pregnant ,          Hematology  Negative hematology ROS      Musculoskeletal  Negative musculoskeletal ROS        Neurology  Negative neurology ROS      Psychology   Negative psychology ROS              Physical Exam    Airway    Mallampati score: II  TM Distance: >3 FB  Neck ROM: full     Dental   No notable dental hx     Cardiovascular  Rhythm: regular, Rate: normal, Cardiovascular exam normal    Pulmonary  Pulmonary exam normal Breath sounds clear to auscultation, No rhonchi,     Other Findings        Anesthesia Plan  ASA Score- 2     Anesthesia Type- spinal with ASA Monitors  Additional Monitors:   Airway Plan:         Plan Factors-    Induction-     Postoperative Plan- Plan for postoperative opioid use  Informed Consent- Anesthetic plan and risks discussed with patient

## 2019-03-22 NOTE — DISCHARGE INSTRUCTIONS
WHAT YOU NEED TO KNOW:   A , or  section, is abdominal surgery to deliver your baby  DISCHARGE INSTRUCTIONS:   Call 911 for any of the following:   · You feel lightheaded, short of breath, and have chest pain  · You cough up blood  Seek care immediately if:   · Blood soaks through your bandage  · Your stitches come apart  · Your arm or leg feels warm, tender, and painful  It may look swollen and red  Contact your OB if:   · You have heavy vaginal bleeding that fills 1 or more sanitary pads in 1 hour  · You have a fever  · Your incision is swollen, red, or draining pus  · You have questions or concerns about yourself or your baby  Medicines: You may  need any of the following:  · Prescription pain medicine  may be given  Ask how to take this medicine safely  · Acetaminophen  decreases pain and fever  It is available without a doctor's order  Ask how much to take and how often to take it  Follow directions  Acetaminophen can cause liver damage if not taken correctly  · NSAIDs , such as ibuprofen, help decrease swelling, pain, and fever  NSAIDs can cause stomach bleeding or kidney problems in certain people  If you take blood thinner medicine, always ask your healthcare provider if NSAIDs are safe for you  Always read the medicine label and follow directions  · Take your medicine as directed  Contact your healthcare provider if you think your medicine is not helping or if you have side effects  Tell him or her if you are allergic to any medicine  Keep a list of the medicines, vitamins, and herbs you take  Include the amounts, and when and why you take them  Bring the list or the pill bottles to follow-up visits  Carry your medicine list with you in case of an emergency  Wound care:  Carefully wash your wound with soap and water every day  Keep your wound clean and dry  Wear loose, comfortable clothes that do not rub against your wound   Ask your OB about bathing and showering  Limit activity as directed:   · Ask when it is safe for you to drive, walk up stairs, lift heavy objects, and have sex  · Ask when it is okay to exercise, and what types of exercise to do  Start slowly and do more as you get stronger  Drink liquids as directed:  Liquids help keep you hydrated after your procedure and decrease your risk for a blood clot  Ask how much liquid to drink each day and which liquids are best for you  Follow up with your OB as directed: You may need to return to have your stitches or staples removed  Write down your questions so you remember to ask them during your visits  © 2017 [employer] Information is for End User's use only and may not be sold, redistributed or otherwise used for commercial purposes  All illustrations and images included in CareNotes® are the copyrighted property of [employer]  or [employer]  The above information is an  only  It is not intended as medical advice for individual conditions or treatments  Talk to your doctor, nurse or pharmacist before following any medical regimen to see if it is safe and effective for you

## 2019-03-22 NOTE — LACTATION NOTE
This note was copied from a baby's chart  Met with mother  Provided mother with Ready, Set, Baby booklet  Discussed Skin to Skin contact an benefits to mom and baby  Talked about the delay of the first bath until baby has adjusted  Spoke about the benefits of rooming in  Feeding on cue and what that means for recognizing infant's hunger  Avoidance of pacifiers for the first month discussed  Talked about exclusive breastfeeding for the first 6 months  Positioning and latch reviewed as well as showing images of other feeding positions  Discussed the properties of a good latch in any position  Reviewed hand/manual expression  Discussed s/s that baby is getting enough milk and some s/s that breastfeeding dyad may need further help  Gave information on common concerns, what to expect the first few weeks after delivery, preparing for other caregivers, and how partners can help  Resources for support also provided  Assisted with breastfeeding  Baby sleepy and mom not feeling well  Awakened baby and positioned in football hold  Baby latched well  He needed enc to stay awake but fed fairly well

## 2019-03-23 LAB
ERYTHROCYTE [DISTWIDTH] IN BLOOD BY AUTOMATED COUNT: 13.1 % (ref 11.6–15.1)
HCT VFR BLD AUTO: 36.6 % (ref 34.8–46.1)
HGB BLD-MCNC: 11.9 G/DL (ref 11.5–15.4)
MCH RBC QN AUTO: 29.7 PG (ref 26.8–34.3)
MCHC RBC AUTO-ENTMCNC: 32.5 G/DL (ref 31.4–37.4)
MCV RBC AUTO: 91 FL (ref 82–98)
PLATELET # BLD AUTO: 139 THOUSANDS/UL (ref 149–390)
PMV BLD AUTO: 10.8 FL (ref 8.9–12.7)
RBC # BLD AUTO: 4.01 MILLION/UL (ref 3.81–5.12)
WBC # BLD AUTO: 14.48 THOUSAND/UL (ref 4.31–10.16)

## 2019-03-23 PROCEDURE — 99024 POSTOP FOLLOW-UP VISIT: CPT | Performed by: OBSTETRICS & GYNECOLOGY

## 2019-03-23 PROCEDURE — 85027 COMPLETE CBC AUTOMATED: CPT | Performed by: OBSTETRICS & GYNECOLOGY

## 2019-03-23 RX ORDER — IBUPROFEN 600 MG/1
600 TABLET ORAL EVERY 6 HOURS PRN
Status: DISCONTINUED | OUTPATIENT
Start: 2019-03-23 | End: 2019-03-24 | Stop reason: HOSPADM

## 2019-03-23 RX ORDER — SIMETHICONE 80 MG
80 TABLET,CHEWABLE ORAL EVERY 6 HOURS PRN
Status: DISCONTINUED | OUTPATIENT
Start: 2019-03-23 | End: 2019-03-24 | Stop reason: HOSPADM

## 2019-03-23 RX ADMIN — OXYCODONE HYDROCHLORIDE AND ACETAMINOPHEN 2 TABLET: 5; 325 TABLET ORAL at 15:43

## 2019-03-23 RX ADMIN — KETOROLAC TROMETHAMINE 30 MG: 30 INJECTION, SOLUTION INTRAMUSCULAR; INTRAVENOUS at 01:36

## 2019-03-23 RX ADMIN — DOCUSATE SODIUM 100 MG: 100 CAPSULE, LIQUID FILLED ORAL at 17:20

## 2019-03-23 RX ADMIN — IBUPROFEN 600 MG: 600 TABLET ORAL at 23:41

## 2019-03-23 RX ADMIN — KETOROLAC TROMETHAMINE 30 MG: 30 INJECTION, SOLUTION INTRAMUSCULAR; INTRAVENOUS at 07:51

## 2019-03-23 RX ADMIN — Medication 1 TABLET: at 09:18

## 2019-03-23 RX ADMIN — OXYCODONE HYDROCHLORIDE AND ACETAMINOPHEN 2 TABLET: 5; 325 TABLET ORAL at 19:46

## 2019-03-23 RX ADMIN — DOCUSATE SODIUM 100 MG: 100 CAPSULE, LIQUID FILLED ORAL at 09:18

## 2019-03-23 RX ADMIN — IBUPROFEN 600 MG: 600 TABLET ORAL at 15:07

## 2019-03-23 RX ADMIN — SIMETHICONE CHEW TAB 80 MG 80 MG: 80 TABLET ORAL at 22:16

## 2019-03-23 RX ADMIN — OXYCODONE HYDROCHLORIDE AND ACETAMINOPHEN 2 TABLET: 5; 325 TABLET ORAL at 09:17

## 2019-03-23 NOTE — PLAN OF CARE
Problem: PAIN - ADULT  Goal: Verbalizes/displays adequate comfort level or baseline comfort level  Description  Interventions:  - Encourage patient to monitor pain and request assistance  - Assess pain using appropriate pain scale  - Administer analgesics based on type and severity of pain and evaluate response  - Implement non-pharmacological measures as appropriate and evaluate response  - Consider cultural and social influences on pain and pain management  - Notify physician/advanced practitioner if interventions unsuccessful or patient reports new pain  Outcome: Progressing     Problem: INFECTION - ADULT  Goal: Absence or prevention of progression during hospitalization  Description  INTERVENTIONS:  - Assess and monitor for signs and symptoms of infection  - Monitor lab/diagnostic results  - Monitor all insertion sites, i e  indwelling lines, tubes, and drains  - Monitor endotracheal (as able) and nasal secretions for changes in amount and color  - [place] appropriate cooling/warming therapies per order  - Administer medications as ordered  - Instruct and encourage patient and family to use good hand hygiene technique  - Identify and instruct in appropriate isolation precautions for identified infection/condition  Outcome: Progressing  Goal: Absence of fever/infection during neutropenic period  Description  INTERVENTIONS:  - Monitor WBC  - Implement neutropenic guidelines  Outcome: Progressing     Problem: SAFETY ADULT  Goal: Patient will remain free of falls  Description  INTERVENTIONS:  - Assess patient frequently for physical needs  -  Identify cognitive and physical deficits and behaviors that affect risk of falls    -  [place] fall precautions as indicated by assessment   - Educate patient/family on patient safety including physical limitations  - Instruct patient to call for assistance with activity based on assessment  - Modify environment to reduce risk of injury  - Consider OT/PT consult to assist with strengthening/mobility  Outcome: Progressing  Goal: Maintain or return to baseline ADL function  Description  INTERVENTIONS:  -  Assess patient's ability to carry out ADLs; assess patient's baseline for ADL function and identify physical deficits which impact ability to perform ADLs (bathing, care of mouth/teeth, toileting, grooming, dressing, etc )  - Assess/evaluate cause of self-care deficits   - Assess range of motion  - Assess patient's mobility; develop plan if impaired  - Assess patient's need for assistive devices and provide as appropriate  - Encourage maximum independence but intervene and supervise when necessary  ¯ Involve family in performance of ADLs  ¯ Assess for home care needs following discharge   ¯ Request OT consult to assist with ADL evaluation and planning for discharge  ¯ Provide patient education as appropriate  Outcome: Progressing  Goal: Maintain or return mobility status to optimal level  Description  INTERVENTIONS:  - Assess patient's baseline mobility status (ambulation, transfers, stairs, etc )    - Identify cognitive and physical deficits and behaviors that affect mobility  - Identify mobility aids required to assist with transfers and/or ambulation (gait belt, sit-to-stand, lift, walker, cane, etc )  - [place] fall precautions as indicated by assessment  - Record patient progress and toleration of activity level on Mobility SBAR; progress patient to next Phase/Stage  - Instruct patient to call for assistance with activity based on assessment  - Request Rehabilitation consult to assist with strengthening/weightbearing, etc   Outcome: Progressing     Problem: Knowledge Deficit  Goal: Patient/family/caregiver demonstrates understanding of disease process, treatment plan, medications, and discharge instructions  Description  Complete learning assessment and assess knowledge base    Interventions:  - Provide teaching at level of understanding  - Provide teaching via preferred learning methods  Outcome: Progressing     Problem: DISCHARGE PLANNING  Goal: Discharge to home or other facility with appropriate resources  Description  INTERVENTIONS:  - Identify barriers to discharge w/patient and caregiver  - Arrange for needed discharge resources and transportation as appropriate  - Identify discharge learning needs (meds, wound care, etc )  - Arrange for interpretive services to assist at discharge as needed  - Refer to Case Management Department for coordinating discharge planning if the patient needs post-hospital services based on physician/advanced practitioner order or complex needs related to functional status, cognitive ability, or social support system  Outcome: Progressing     Problem: POSTPARTUM  Goal: Experiences normal postpartum course  Description  INTERVENTIONS:  - Monitor maternal vital signs  - Assess uterine involution and lochia  Outcome: Progressing  Goal: Appropriate maternal -  bonding  Description  INTERVENTIONS:  - Identify family support  - Assess for appropriate maternal/infant bonding   -Encourage maternal/infant bonding opportunities  - Referral to  or  as needed  Outcome: Progressing  Goal: Establishment of infant feeding pattern  Description  INTERVENTIONS:  - Assess breast/bottle feeding  - Refer to lactation as needed  Outcome: Progressing  Goal: Incision(s), wounds(s) or drain site(s) healing without S/S of infection  Description  INTERVENTIONS  - Assess and document risk factors for skin impairment   - Assess and document dressing, incision, wound bed, drain sites and surrounding tissue  - Initiate Nutrition services consult and/or wound management as needed  Outcome: Progressing     Problem: Potential for Falls  Goal: Patient will remain free of falls  Description  INTERVENTIONS:  - Assess patient frequently for physical needs  -  Identify cognitive and physical deficits and behaviors that affect risk of falls    -  [place] fall precautions as indicated by assessment   - Educate patient/family on patient safety including physical limitations  - Instruct patient to call for assistance with activity based on assessment  - Modify environment to reduce risk of injury  - Consider OT/PT consult to assist with strengthening/mobility  Outcome: Progressing

## 2019-03-23 NOTE — PROGRESS NOTES
Progress Note - OB/GYN   Ben Cai 29 y o  female MRN: [id number]  Unit/Bed#: L&D 309-01 Encounter: [id number]    Assessment:  POD#1 s/p Repeat low transverse  section, stable    Plan:  1  Routine postop care  - Woods out, f/u void  - Encourage ambulation  2  Encourage breastfeeding  3  Thrombocytopenia: plt 159k-->139k  - Appropriate decline proportional to hgb  4  A2GDM  - 2hr glucose testing at 6wks pp      Subjective/Objective   Chief Complaint:     POD#1 s/p Repeat low transverse  section    Subjective:     Pain: yes, incisional  Well controlled  Tolerating PO: yes  Voiding: woods just out  Flatus: no  BM: no  Ambulating: no  Breastfeeding: Breastfeeding  Chest pain: no  Shortness of breath: no  Leg pain: no  Lochia: minimal    Objective:     Vitals:   Vitals:    19 1500 19 1920 19 2230 19 0226   BP: 126/64 112/73 111/59 108/72   BP Location: Right arm  Right arm Right arm   Pulse: 70 86 76 74   Resp: 18 18 18 18   Temp: 97 7 °F (36 5 °C) 97 9 °F (36 6 °C) 98 4 °F (36 9 °C) 97 9 °F (36 6 °C)   TempSrc: Oral Oral Oral Oral   SpO2: 95% 95% 96% 96%   Weight:       Height:           Physical Exam:     Physical Exam   Constitutional: She appears well-developed and well-nourished  No distress  HENT:   Head: Normocephalic and atraumatic  Eyes: No scleral icterus  Cardiovascular: Normal rate  Pulmonary/Chest: Effort normal  No respiratory distress  Abdominal: Soft  She exhibits no distension  There is no tenderness  There is no rebound and no guarding  Uterine fundus firm and non-tender, located at the level of the umbilicus  Incision clean, dry, and intact  No erythema or drainage  Musculoskeletal: She exhibits no edema or tenderness (LE non-tender to palpation bilaterally)  Skin: Skin is warm and dry  No rash noted  No erythema  No pallor  Psychiatric: She has a normal mood and affect   Her behavior is normal            Lab, Imaging and other studies: I have personally reviewed pertinent reports        Lab Results   Component Value Date    WBC 14 48 (H) 03/23/2019    HGB 11 9 03/23/2019    HCT 36 6 03/23/2019    MCV 91 03/23/2019     (L) 03/23/2019               Koby Nielsen MD  03/23/19

## 2019-03-24 VITALS
SYSTOLIC BLOOD PRESSURE: 116 MMHG | OXYGEN SATURATION: 97 % | BODY MASS INDEX: 39.51 KG/M2 | DIASTOLIC BLOOD PRESSURE: 72 MMHG | WEIGHT: 223 LBS | TEMPERATURE: 97.5 F | HEART RATE: 71 BPM | RESPIRATION RATE: 16 BRPM | HEIGHT: 63 IN

## 2019-03-24 PROBLEM — O24.414 INSULIN CONTROLLED GESTATIONAL DIABETES MELLITUS (GDM) IN THIRD TRIMESTER: Status: RESOLVED | Noted: 2019-01-30 | Resolved: 2019-03-24

## 2019-03-24 PROBLEM — Z87.59 HISTORY OF GESTATIONAL HYPERTENSION: Status: RESOLVED | Noted: 2018-10-16 | Resolved: 2019-03-24

## 2019-03-24 PROBLEM — O34.219 PREVIOUS CESAREAN DELIVERY AFFECTING PREGNANCY, ANTEPARTUM: Status: RESOLVED | Noted: 2018-08-15 | Resolved: 2019-03-24

## 2019-03-24 PROCEDURE — 99024 POSTOP FOLLOW-UP VISIT: CPT | Performed by: OBSTETRICS & GYNECOLOGY

## 2019-03-24 RX ORDER — IBUPROFEN 600 MG/1
600 TABLET ORAL EVERY 6 HOURS PRN
Qty: 40 TABLET | Refills: 1 | Status: SHIPPED | OUTPATIENT
Start: 2019-03-24 | End: 2020-02-15

## 2019-03-24 RX ORDER — OXYCODONE HYDROCHLORIDE AND ACETAMINOPHEN 5; 325 MG/1; MG/1
1 TABLET ORAL EVERY 4 HOURS PRN
Qty: 30 TABLET | Refills: 0 | Status: SHIPPED | OUTPATIENT
Start: 2019-03-24 | End: 2019-04-03

## 2019-03-24 RX ADMIN — IBUPROFEN 600 MG: 600 TABLET ORAL at 07:27

## 2019-03-24 RX ADMIN — DOCUSATE SODIUM 100 MG: 100 CAPSULE, LIQUID FILLED ORAL at 12:35

## 2019-03-24 RX ADMIN — OXYCODONE HYDROCHLORIDE AND ACETAMINOPHEN 2 TABLET: 5; 325 TABLET ORAL at 05:30

## 2019-03-24 RX ADMIN — OXYCODONE HYDROCHLORIDE AND ACETAMINOPHEN 2 TABLET: 5; 325 TABLET ORAL at 09:30

## 2019-03-24 RX ADMIN — OXYCODONE HYDROCHLORIDE AND ACETAMINOPHEN 2 TABLET: 5; 325 TABLET ORAL at 00:36

## 2019-03-24 RX ADMIN — DOCUSATE SODIUM 100 MG: 100 CAPSULE, LIQUID FILLED ORAL at 07:27

## 2019-03-24 RX ADMIN — SIMETHICONE CHEW TAB 80 MG 80 MG: 80 TABLET ORAL at 08:55

## 2019-03-24 NOTE — PLAN OF CARE
Problem: PAIN - ADULT  Goal: Verbalizes/displays adequate comfort level or baseline comfort level  Description  Interventions:  - Encourage patient to monitor pain and request assistance  - Assess pain using appropriate pain scale  - Administer analgesics based on type and severity of pain and evaluate response  - Implement non-pharmacological measures as appropriate and evaluate response  - Consider cultural and social influences on pain and pain management  - Notify physician/advanced practitioner if interventions unsuccessful or patient reports new pain  Outcome: Completed     Problem: INFECTION - ADULT  Goal: Absence or prevention of progression during hospitalization  Description  INTERVENTIONS:  - Assess and monitor for signs and symptoms of infection  - Monitor lab/diagnostic results  - Monitor all insertion sites, i e  indwelling lines, tubes, and drains  - Monitor endotracheal (as able) and nasal secretions for changes in amount and color  - [place] appropriate cooling/warming therapies per order  - Administer medications as ordered  - Instruct and encourage patient and family to use good hand hygiene technique  - Identify and instruct in appropriate isolation precautions for identified infection/condition  Outcome: Completed  Goal: Absence of fever/infection during neutropenic period  Description  INTERVENTIONS:  - Monitor WBC  - Implement neutropenic guidelines  Outcome: Completed     Problem: SAFETY ADULT  Goal: Patient will remain free of falls  Description  INTERVENTIONS:  - Assess patient frequently for physical needs  -  Identify cognitive and physical deficits and behaviors that affect risk of falls    -  [place] fall precautions as indicated by assessment   - Educate patient/family on patient safety including physical limitations  - Instruct patient to call for assistance with activity based on assessment  - Modify environment to reduce risk of injury  - Consider OT/PT consult to assist with strengthening/mobility  3/24/2019 0958 by Jyoti Allan RN  Outcome: Completed  3/24/2019 0957 by Jyoti Allan RN  Outcome: Adequate for Discharge  Goal: Maintain or return to baseline ADL function  Description  INTERVENTIONS:  -  Assess patient's ability to carry out ADLs; assess patient's baseline for ADL function and identify physical deficits which impact ability to perform ADLs (bathing, care of mouth/teeth, toileting, grooming, dressing, etc )  - Assess/evaluate cause of self-care deficits   - Assess range of motion  - Assess patient's mobility; develop plan if impaired  - Assess patient's need for assistive devices and provide as appropriate  - Encourage maximum independence but intervene and supervise when necessary  ¯ Involve family in performance of ADLs  ¯ Assess for home care needs following discharge   ¯ Request OT consult to assist with ADL evaluation and planning for discharge  ¯ Provide patient education as appropriate  Outcome: Completed  Goal: Maintain or return mobility status to optimal level  Description  INTERVENTIONS:  - Assess patient's baseline mobility status (ambulation, transfers, stairs, etc )    - Identify cognitive and physical deficits and behaviors that affect mobility  - Identify mobility aids required to assist with transfers and/or ambulation (gait belt, sit-to-stand, lift, walker, cane, etc )  - [place] fall precautions as indicated by assessment  - Record patient progress and toleration of activity level on Mobility SBAR; progress patient to next Phase/Stage  - Instruct patient to call for assistance with activity based on assessment  - Request Rehabilitation consult to assist with strengthening/weightbearing, etc   Outcome: Completed     Problem: Knowledge Deficit  Goal: Patient/family/caregiver demonstrates understanding of disease process, treatment plan, medications, and discharge instructions  Description  Complete learning assessment and assess knowledge base   Interventions:  - Provide teaching at level of understanding  - Provide teaching via preferred learning methods  Outcome: Completed     Problem: DISCHARGE PLANNING  Goal: Discharge to home or other facility with appropriate resources  Description  INTERVENTIONS:  - Identify barriers to discharge w/patient and caregiver  - Arrange for needed discharge resources and transportation as appropriate  - Identify discharge learning needs (meds, wound care, etc )  - Arrange for interpretive services to assist at discharge as needed  - Refer to Case Management Department for coordinating discharge planning if the patient needs post-hospital services based on physician/advanced practitioner order or complex needs related to functional status, cognitive ability, or social support system  Outcome: Completed     Problem: POSTPARTUM  Goal: Experiences normal postpartum course  Description  INTERVENTIONS:  - Monitor maternal vital signs  - Assess uterine involution and lochia  Outcome: Completed  Goal: Appropriate maternal -  bonding  Description  INTERVENTIONS:  - Identify family support  - Assess for appropriate maternal/infant bonding   -Encourage maternal/infant bonding opportunities  - Referral to  or  as needed  Outcome: Completed  Goal: Establishment of infant feeding pattern  Description  INTERVENTIONS:  - Assess breast/bottle feeding  - Refer to lactation as needed  Outcome: Completed  Goal: Incision(s), wounds(s) or drain site(s) healing without S/S of infection  Description  INTERVENTIONS  - Assess and document risk factors for skin impairment   - Assess and document dressing, incision, wound bed, drain sites and surrounding tissue  - Initiate Nutrition services consult and/or wound management as needed  Outcome: Completed     Problem: Potential for Falls  Goal: Patient will remain free of falls  Description  INTERVENTIONS:  - Assess patient frequently for physical needs  - Identify cognitive and physical deficits and behaviors that affect risk of falls    -  [place] fall precautions as indicated by assessment   - Educate patient/family on patient safety including physical limitations  - Instruct patient to call for assistance with activity based on assessment  - Modify environment to reduce risk of injury  - Consider OT/PT consult to assist with strengthening/mobility  3/24/2019 0958 by Bernice Cristina, RN  Outcome: Completed  3/24/2019 0957 by Bernice Cristina, RN  Outcome: Adequate for Discharge

## 2019-03-24 NOTE — PROGRESS NOTES
Progress Note - OB/GYN   Henry Suazo 29 y o  female MRN: [id number]  Unit/Bed#: L&D 309-01 Encounter: [id number]    Assessment:  POD#2 s/p Repeat low transverse  section, stable     Plan:  1  Routine postop care  2  Encourage ambulation and breastfeeding  3  Thrombocytopenia: plt 159k-->139k  - Appropriate decline proportional to hgb  4  A2GDM  - 2hr glucose testing at 6wks pp  5  Possible early discharge        Subjective/Objective   Chief Complaint:     POD#2 s/p Repeat low transverse  section    Subjective:     Pain: yes, mild  concerned about "pulling" sensation at incision  Tolerating PO: yes  Voiding: yes  Flatus: yes  BM: no  Ambulating: yes  Breastfeeding: Breastfeeding  Chest pain: no  Shortness of breath: no  Leg pain: no  Lochia: mild    Objective:     Vitals:   Vitals:    19 1700 19 1920 19 2225 19 0527   BP: 118/78 128/93 125/73 135/87   BP Location:  Right arm Right arm Left arm   Pulse: 90 74 72 83   Resp: 18 18 18 16   Temp: 98 °F (36 7 °C) 97 8 °F (36 6 °C) 97 8 °F (36 6 °C) 97 5 °F (36 4 °C)   TempSrc: Oral Oral Oral Oral   SpO2:  96%  96%   Weight:       Height:           Physical Exam:     Physical Exam   Constitutional: She appears well-developed and well-nourished  No distress  HENT:   Head: Normocephalic and atraumatic  Eyes: No scleral icterus  Cardiovascular: Normal rate  Pulmonary/Chest: Effort normal  No respiratory distress  Abdominal: Soft  She exhibits no distension  There is no tenderness  There is no rebound and no guarding  Uterine fundus firm and non-tender, located 1cm below umbilicus  Incision clean, dry, and intact  No erythema or drainage   Musculoskeletal: She exhibits no edema or tenderness (LE non-tender to palpation bilaterally)  Skin: Skin is warm and dry  No rash noted  No erythema  No pallor  Psychiatric: She has a normal mood and affect   Her behavior is normal            Lab, Imaging and other studies: no new labs    Lab Results   Component Value Date    WBC 14 48 (H) 03/23/2019    HGB 11 9 03/23/2019    HCT 36 6 03/23/2019    MCV 91 03/23/2019     (L) 03/23/2019               Rohan Henry MD  03/24/19

## 2019-04-05 ENCOUNTER — TELEPHONE (OUTPATIENT)
Dept: POSTPARTUM | Facility: CLINIC | Age: 34
End: 2019-04-05

## 2019-04-05 ENCOUNTER — OFFICE VISIT (OUTPATIENT)
Dept: OBGYN CLINIC | Facility: CLINIC | Age: 34
End: 2019-04-05

## 2019-04-05 VITALS
DIASTOLIC BLOOD PRESSURE: 76 MMHG | HEIGHT: 63 IN | WEIGHT: 211 LBS | SYSTOLIC BLOOD PRESSURE: 117 MMHG | BODY MASS INDEX: 37.39 KG/M2

## 2019-04-05 DIAGNOSIS — Z98.891 S/P CESAREAN SECTION: Primary | ICD-10-CM

## 2019-04-05 PROCEDURE — 99024 POSTOP FOLLOW-UP VISIT: CPT | Performed by: OBSTETRICS & GYNECOLOGY

## 2019-04-05 RX ORDER — CLINDAMYCIN HYDROCHLORIDE 300 MG/1
300 CAPSULE ORAL 2 TIMES DAILY
Qty: 14 CAPSULE | Refills: 0 | Status: SHIPPED | OUTPATIENT
Start: 2019-04-05 | End: 2019-04-05 | Stop reason: SDUPTHER

## 2019-04-05 RX ORDER — CLINDAMYCIN HYDROCHLORIDE 300 MG/1
300 CAPSULE ORAL 2 TIMES DAILY
Qty: 14 CAPSULE | Refills: 0 | Status: SHIPPED | OUTPATIENT
Start: 2019-04-05 | End: 2019-04-12

## 2019-04-12 ENCOUNTER — TELEPHONE (OUTPATIENT)
Dept: OBGYN CLINIC | Facility: CLINIC | Age: 34
End: 2019-04-12

## 2019-04-12 DIAGNOSIS — R39.9 UTI SYMPTOMS: Primary | ICD-10-CM

## 2019-04-12 RX ORDER — NITROFURANTOIN 25; 75 MG/1; MG/1
100 CAPSULE ORAL 2 TIMES DAILY
Qty: 14 CAPSULE | Refills: 0 | Status: SHIPPED | OUTPATIENT
Start: 2019-04-12 | End: 2020-01-31

## 2019-05-01 ENCOUNTER — POSTPARTUM VISIT (OUTPATIENT)
Dept: OBGYN CLINIC | Facility: CLINIC | Age: 34
End: 2019-05-01

## 2019-05-01 VITALS — SYSTOLIC BLOOD PRESSURE: 120 MMHG | DIASTOLIC BLOOD PRESSURE: 80 MMHG | WEIGHT: 206.4 LBS | BODY MASS INDEX: 36.56 KG/M2

## 2019-05-01 DIAGNOSIS — Z30.09 COUNSELING FOR BIRTH CONTROL, ORAL CONTRACEPTIVES: ICD-10-CM

## 2019-05-01 DIAGNOSIS — Z98.891 S/P CESAREAN SECTION: Primary | ICD-10-CM

## 2019-05-01 PROBLEM — R39.9 UTI SYMPTOMS: Status: RESOLVED | Noted: 2019-04-12 | Resolved: 2019-05-01

## 2019-05-01 PROCEDURE — 99024 POSTOP FOLLOW-UP VISIT: CPT | Performed by: OBSTETRICS & GYNECOLOGY

## 2019-05-01 RX ORDER — ACETAMINOPHEN AND CODEINE PHOSPHATE 120; 12 MG/5ML; MG/5ML
1 SOLUTION ORAL DAILY
Qty: 28 TABLET | Refills: 11 | Status: SHIPPED | OUTPATIENT
Start: 2019-05-01 | End: 2020-02-15

## 2019-08-20 DIAGNOSIS — Z30.09 COUNSELING FOR BIRTH CONTROL, ORAL CONTRACEPTIVES: Primary | ICD-10-CM

## 2019-08-20 RX ORDER — DROSPIRENONE AND ETHINYL ESTRADIOL 0.02-3(28)
1 KIT ORAL DAILY
Qty: 84 TABLET | Refills: 2 | Status: SHIPPED | OUTPATIENT
Start: 2019-08-20 | End: 2020-02-15

## 2019-08-20 NOTE — TELEPHONE ENCOUNTER
Pt requests combined OCPs  Will send to [facility] pharmacy  Rx: Clearance Mikaela, checked with pharmacist and pt was on this in May 2018

## 2019-09-06 DIAGNOSIS — G43.009 MIGRAINE WITHOUT AURA AND WITHOUT STATUS MIGRAINOSUS, NOT INTRACTABLE: Primary | ICD-10-CM

## 2019-09-06 RX ORDER — SUMATRIPTAN 50 MG/1
50 TABLET, FILM COATED ORAL ONCE AS NEEDED
Qty: 30 TABLET | Refills: 4 | Status: SHIPPED | OUTPATIENT
Start: 2019-09-06 | End: 2020-09-12

## 2019-09-28 ENCOUNTER — OFFICE VISIT (OUTPATIENT)
Dept: URGENT CARE | Facility: CLINIC | Age: 34
End: 2019-09-28
Payer: COMMERCIAL

## 2019-09-28 VITALS
HEIGHT: 63 IN | BODY MASS INDEX: 36.11 KG/M2 | HEART RATE: 84 BPM | TEMPERATURE: 98 F | RESPIRATION RATE: 20 BRPM | DIASTOLIC BLOOD PRESSURE: 77 MMHG | SYSTOLIC BLOOD PRESSURE: 122 MMHG | WEIGHT: 203.8 LBS

## 2019-09-28 DIAGNOSIS — H01.003 BLEPHARITIS OF BOTH EYES, UNSPECIFIED EYELID, UNSPECIFIED TYPE: Primary | ICD-10-CM

## 2019-09-28 DIAGNOSIS — H01.006 BLEPHARITIS OF BOTH EYES, UNSPECIFIED EYELID, UNSPECIFIED TYPE: Primary | ICD-10-CM

## 2019-09-28 PROCEDURE — S9088 SERVICES PROVIDED IN URGENT: HCPCS | Performed by: PHYSICIAN ASSISTANT

## 2019-09-28 PROCEDURE — 99203 OFFICE O/P NEW LOW 30 MIN: CPT | Performed by: PHYSICIAN ASSISTANT

## 2019-09-28 RX ORDER — TOBRAMYCIN 3 MG/ML
1 SOLUTION/ DROPS OPHTHALMIC
Qty: 2.5 ML | Refills: 0 | Status: SHIPPED | OUTPATIENT
Start: 2019-09-28 | End: 2019-10-08

## 2019-09-28 RX ORDER — KETOROLAC TROMETHAMINE 4 MG/ML
1 SOLUTION/ DROPS OPHTHALMIC 4 TIMES DAILY
Qty: 5 ML | Refills: 0 | Status: SHIPPED | OUTPATIENT
Start: 2019-09-28 | End: 2020-02-27

## 2019-09-28 NOTE — PROGRESS NOTES
[address] [facility]        NAME: Chepe Salvador is a 36 y o  female  : 1985    MRN: [id number]  DATE: 2019  TIME: 1:35 PM    Assessment and Plan   Blepharitis of both eyes, unspecified eyelid, unspecified type [H01 003, H01 006]  1  Blepharitis of both eyes, unspecified eyelid, unspecified type  tobramycin (TOBREX) 0 3 % SOLN    ketorolac (ACULAR) 0 4 % SOLN         Patient Instructions     -start tobrex and ketorolac eye drops as directed  Separate them each by 30-60 min  -continue with benadryl or OTC antihistamine  -cool compresses  Follow up with PCP in 3-5 days  Proceed to  ER if symptoms worsen  Chief Complaint     Chief Complaint   Patient presents with    Eye Pain     symptoms started on wednesday  pt states it is at its worse when she sleeps and wakes up in the morning  eyes are puffy  does state she had some congestion noted past 3 weeks  History of Present Illness       Patient presents with bilateral eye pain and swelling since Wednesday She states it is worse when she first wakes up  She  Has noticed itching  As well  She has been taking benadryl at night which does help  She complains of pain behind her eyes and a headache  She denies any fevers, chills, nausea, vomiting, congestion, sinus pain  She started a new diet and thought it was an allergy to food but she stopped th diet and it didn't resolve  Review of Systems   Review of Systems   Constitutional: Negative  HENT: Negative  Eyes: Positive for pain and itching  Negative for photophobia, discharge, redness and visual disturbance  Respiratory: Negative  Cardiovascular: Negative  Neurological: Positive for headaches  Psychiatric/Behavioral: Negative            Current Medications       Current Outpatient Medications:     drospirenone-ethinyl estradiol (BARON) 3-0 02 MG per tablet, Take 1 tablet by mouth daily, Disp: 84 tablet, Rfl: 2    ibuprofen (MOTRIN) 600 mg tablet, Take 1 tablet (600 mg total) by mouth every 6 (six) hours as needed for mild pain, Disp: 40 tablet, Rfl: 1    SUMAtriptan (IMITREX) 50 mg tablet, Take 1 tablet (50 mg total) by mouth once as needed for migraine, Disp: 30 tablet, Rfl: 4    B-D UF III MINI PEN NEEDLES 31G X 5 MM MISC, , Disp: , Rfl:     Blood Glucose Monitoring Suppl ([facility]) w/Device KIT, 1 Device by Does not apply route 4 (four) times a day, Disp: , Rfl:     Insulin Pen Needle (PEN NEEDLES) 31G X 6 MM MISC, Use new needle with every injection (Patient not taking: Reported on 9/28/2019), Disp: 100 each, Rfl: 3    ketorolac (ACULAR) 0 4 % SOLN, Administer 1 drop to both eyes 4 (four) times a day for 4 days, Disp: 5 mL, Rfl: 0    nitrofurantoin (MACROBID) 100 mg capsule, Take 1 capsule (100 mg total) by mouth 2 (two) times a day (Patient not taking: Reported on 9/28/2019), Disp: 14 capsule, Rfl: 0    norethindrone (MICRONOR) 0 35 MG tablet, Take 1 tablet (0 35 mg total) by mouth daily (Patient not taking: Reported on 9/28/2019), Disp: 28 tablet, Rfl: 11    ONETOUCH DELICA LANCETS FINE MISC, 1 Stick by Subdermal route 4 times day, Disp: , Rfl:     Prenat w/o A-FeCbGl-DSS-FA-DHA (CITRANATAL 90 DHA) 90-1 & 300 MG MISC, Take 1 kit by mouth daily (Patient not taking: Reported on 9/28/2019), Disp: 60 each, Rfl: 6    tobramycin (TOBREX) 0 3 % SOLN, Administer 1 drop to both eyes every 4 (four) hours while awake for 10 days, Disp: 2 5 mL, Rfl: 0    Current Allergies     Allergies as of 09/28/2019 - Reviewed 09/28/2019   Allergen Reaction Noted    Amoxicillin Rash 02/23/2016    Penicillins Rash 09/09/2017            The following portions of the patient's history were reviewed and updated as appropriate: allergies, current medications, past family history, past medical history, past social history, past surgical history and problem list      Past Medical History:   Diagnosis Date    Abnormal Pap smear of cervix     Arthritis     Diabetes mellitus ([address] )  Gestational hypertension affecting first pregnancy     Irritable bowel syndrome     Migraine     Varicella        Past Surgical History:   Procedure Laterality Date     SECTION  10/06/2017    COLPOSCOPY      [place]  DELIVERY ONLY Bilateral 10/6/2017    Procedure:  SECTION (); Surgeon: Allison Parson MD;  [place] ;  Service: Obstetrics    [place]  DELIVERY ONLY N/A 3/22/2019    Procedure: Tessy White () REPEAT;  Surgeon: Allison Parson MD;  [place] ;  Service: Obstetrics    [place] COLONOSCOPY FLX DX W/COLLJ SPEC WHEN PFRMD N/A 2016    Procedure: COLONOSCOPY;  Surgeon: Jeff Enrique MD;  [facility]; Service: Gastroenterology    WISDOM TOOTH EXTRACTION         Family History   Problem Relation Age of Onset    Arthritis Mother     Breast cancer Maternal Grandmother     Heart disease Maternal Grandmother     Arthritis Maternal Grandmother     Diabetes Maternal Grandfather     Breast cancer Paternal Grandmother     Heart disease Paternal Grandfather          Medications have been verified  Objective   /77 (BP Location: Left arm, Patient Position: Sitting, Cuff Size: Standard)   Pulse 84   Temp 98 °F (36 7 °C) (Tympanic)   Resp 20   Ht 5' 3" (1 6 m)   Wt 92 4 kg (203 lb 12 8 oz)   BMI 36 10 kg/m²        Physical Exam     Physical Exam   Constitutional: She is oriented to person, place, and time  She appears well-developed and well-nourished  HENT:   Head: Normocephalic and atraumatic  Right Ear: External ear normal    Left Ear: External ear normal    Nose: Nose normal    Mouth/Throat: Oropharynx is clear and moist  No oropharyngeal exudate  Eyes: Pupils are equal, round, and reactive to light  Conjunctivae and EOM are normal  Right eye exhibits no discharge  Left eye exhibits no discharge  Mild swelling of eyelids  TTP of eyelids  Cardiovascular: Normal rate and regular rhythm     Pulmonary/Chest: Effort normal and breath sounds normal    Neurological: She is alert and oriented to person, place, and time  Psychiatric: Her behavior is normal    Nursing note and vitals reviewed

## 2019-09-28 NOTE — PATIENT INSTRUCTIONS
-start tobrex and ketorolac eye drops as directed   Separate them each by 30-60 min  -continue with benadryl or OTC antihistamine  -follow up with PCP 3-5 days  -ER if symptoms worsen

## 2019-12-20 ENCOUNTER — APPOINTMENT (OUTPATIENT)
Dept: LAB | Facility: HOSPITAL | Age: 34
End: 2019-12-20
Payer: COMMERCIAL

## 2019-12-20 ENCOUNTER — TRANSCRIBE ORDERS (OUTPATIENT)
Dept: LAB | Facility: HOSPITAL | Age: 34
End: 2019-12-20

## 2019-12-20 DIAGNOSIS — E78.5 HYPERLIPIDEMIA, UNSPECIFIED HYPERLIPIDEMIA TYPE: ICD-10-CM

## 2019-12-20 DIAGNOSIS — O24.419 GESTATIONAL DIABETES MELLITUS (GDM), ANTEPARTUM, GESTATIONAL DIABETES METHOD OF CONTROL UNSPECIFIED: ICD-10-CM

## 2019-12-20 DIAGNOSIS — R53.83 FATIGUE, UNSPECIFIED TYPE: ICD-10-CM

## 2019-12-20 DIAGNOSIS — E53.8 VITAMIN B12 DEFICIENCY: ICD-10-CM

## 2019-12-20 DIAGNOSIS — M25.50 PAIN IN JOINT, MULTIPLE SITES: ICD-10-CM

## 2019-12-20 DIAGNOSIS — E53.8 BIOTIN-(PROPIONYL-COA-CARBOXYLASE) LIGASE DEFICIENCY: ICD-10-CM

## 2019-12-20 DIAGNOSIS — E78.5 HYPERLIPIDEMIA, UNSPECIFIED HYPERLIPIDEMIA TYPE: Primary | ICD-10-CM

## 2019-12-20 DIAGNOSIS — E55.9 VITAMIN D DEFICIENCY, UNSPECIFIED: ICD-10-CM

## 2019-12-20 LAB
25(OH)D3 SERPL-MCNC: 40.6 NG/ML (ref 30–100)
ALBUMIN SERPL BCP-MCNC: 3.8 G/DL (ref 3.5–5)
ALP SERPL-CCNC: 65 U/L (ref 46–116)
ALT SERPL W P-5'-P-CCNC: 21 U/L (ref 12–78)
ANION GAP SERPL CALCULATED.3IONS-SCNC: 7 MMOL/L (ref 4–13)
AST SERPL W P-5'-P-CCNC: 17 U/L (ref 5–45)
BASOPHILS # BLD AUTO: 0.02 THOUSANDS/ΜL (ref 0–0.1)
BASOPHILS NFR BLD AUTO: 0 % (ref 0–1)
BILIRUB SERPL-MCNC: 0.41 MG/DL (ref 0.2–1)
BILIRUB UR QL STRIP: NEGATIVE
BUN SERPL-MCNC: 11 MG/DL (ref 5–25)
CALCIUM SERPL-MCNC: 9.4 MG/DL (ref 8.3–10.1)
CHLORIDE SERPL-SCNC: 108 MMOL/L (ref 100–108)
CHOLEST SERPL-MCNC: 222 MG/DL (ref 50–200)
CLARITY UR: CLEAR
CO2 SERPL-SCNC: 25 MMOL/L (ref 21–32)
COLOR UR: YELLOW
CREAT SERPL-MCNC: 0.69 MG/DL (ref 0.6–1.3)
CRP SERPL QL: 7 MG/L
EOSINOPHIL # BLD AUTO: 0.06 THOUSAND/ΜL (ref 0–0.61)
EOSINOPHIL NFR BLD AUTO: 1 % (ref 0–6)
ERYTHROCYTE [DISTWIDTH] IN BLOOD BY AUTOMATED COUNT: 12.4 % (ref 11.6–15.1)
ERYTHROCYTE [SEDIMENTATION RATE] IN BLOOD: 57 MM/HOUR (ref 0–20)
EST. AVERAGE GLUCOSE BLD GHB EST-MCNC: 100 MG/DL
GFR SERPL CREATININE-BSD FRML MDRD: 115 ML/MIN/1.73SQ M
GLUCOSE P FAST SERPL-MCNC: 87 MG/DL (ref 65–99)
GLUCOSE UR STRIP-MCNC: NEGATIVE MG/DL
HBA1C MFR BLD: 5.1 % (ref 4.2–6.3)
HCT VFR BLD AUTO: 42.3 % (ref 34.8–46.1)
HDLC SERPL-MCNC: 49 MG/DL
HGB BLD-MCNC: 14.1 G/DL (ref 11.5–15.4)
HGB UR QL STRIP.AUTO: NEGATIVE
IMM GRANULOCYTES # BLD AUTO: 0.01 THOUSAND/UL (ref 0–0.2)
IMM GRANULOCYTES NFR BLD AUTO: 0 % (ref 0–2)
KETONES UR STRIP-MCNC: NEGATIVE MG/DL
LDLC SERPL CALC-MCNC: 129 MG/DL (ref 0–100)
LEUKOCYTE ESTERASE UR QL STRIP: NEGATIVE
LYMPHOCYTES # BLD AUTO: 2.39 THOUSANDS/ΜL (ref 0.6–4.47)
LYMPHOCYTES NFR BLD AUTO: 43 % (ref 14–44)
MCH RBC QN AUTO: 29.1 PG (ref 26.8–34.3)
MCHC RBC AUTO-ENTMCNC: 33.3 G/DL (ref 31.4–37.4)
MCV RBC AUTO: 87 FL (ref 82–98)
MONOCYTES # BLD AUTO: 0.27 THOUSAND/ΜL (ref 0.17–1.22)
MONOCYTES NFR BLD AUTO: 5 % (ref 4–12)
NEUTROPHILS # BLD AUTO: 2.8 THOUSANDS/ΜL (ref 1.85–7.62)
NEUTS SEG NFR BLD AUTO: 51 % (ref 43–75)
NITRITE UR QL STRIP: NEGATIVE
NONHDLC SERPL-MCNC: 173 MG/DL
NRBC BLD AUTO-RTO: 0 /100 WBCS
PH UR STRIP.AUTO: 7 [PH]
PLATELET # BLD AUTO: 216 THOUSANDS/UL (ref 149–390)
PMV BLD AUTO: 11.3 FL (ref 8.9–12.7)
POTASSIUM SERPL-SCNC: 4 MMOL/L (ref 3.5–5.3)
PROT SERPL-MCNC: 7.6 G/DL (ref 6.4–8.2)
PROT UR STRIP-MCNC: NEGATIVE MG/DL
RBC # BLD AUTO: 4.85 MILLION/UL (ref 3.81–5.12)
SODIUM SERPL-SCNC: 140 MMOL/L (ref 136–145)
SP GR UR STRIP.AUTO: 1.02 (ref 1–1.03)
TRIGL SERPL-MCNC: 222 MG/DL
TSH SERPL DL<=0.05 MIU/L-ACNC: 1.36 UIU/ML (ref 0.36–3.74)
UROBILINOGEN UR QL STRIP.AUTO: 0.2 E.U./DL
VIT B12 SERPL-MCNC: 324 PG/ML (ref 100–900)
WBC # BLD AUTO: 5.55 THOUSAND/UL (ref 4.31–10.16)

## 2019-12-20 PROCEDURE — 82607 VITAMIN B-12: CPT

## 2019-12-20 PROCEDURE — 86430 RHEUMATOID FACTOR TEST QUAL: CPT

## 2019-12-20 PROCEDURE — 82306 VITAMIN D 25 HYDROXY: CPT

## 2019-12-20 PROCEDURE — 36415 COLL VENOUS BLD VENIPUNCTURE: CPT

## 2019-12-20 PROCEDURE — 83036 HEMOGLOBIN GLYCOSYLATED A1C: CPT

## 2019-12-20 PROCEDURE — 86038 ANTINUCLEAR ANTIBODIES: CPT

## 2019-12-20 PROCEDURE — 85652 RBC SED RATE AUTOMATED: CPT

## 2019-12-20 PROCEDURE — 86235 NUCLEAR ANTIGEN ANTIBODY: CPT

## 2019-12-20 PROCEDURE — 81003 URINALYSIS AUTO W/O SCOPE: CPT

## 2019-12-20 PROCEDURE — 84443 ASSAY THYROID STIM HORMONE: CPT

## 2019-12-20 PROCEDURE — 86140 C-REACTIVE PROTEIN: CPT

## 2019-12-20 PROCEDURE — 80061 LIPID PANEL: CPT

## 2019-12-20 PROCEDURE — 85025 COMPLETE CBC W/AUTO DIFF WBC: CPT

## 2019-12-20 PROCEDURE — 80053 COMPREHEN METABOLIC PANEL: CPT

## 2019-12-21 LAB
ENA SS-A AB SER-ACNC: <0.2 AI (ref 0–0.9)
ENA SS-B AB SER-ACNC: <0.2 AI (ref 0–0.9)

## 2019-12-22 LAB — RHEUMATOID FACT SER QL LA: NEGATIVE

## 2019-12-23 LAB — RYE IGE QN: NEGATIVE

## 2020-01-14 ENCOUNTER — APPOINTMENT (OUTPATIENT)
Dept: LAB | Facility: HOSPITAL | Age: 35
End: 2020-01-14
Attending: INTERNAL MEDICINE
Payer: COMMERCIAL

## 2020-01-14 ENCOUNTER — TRANSCRIBE ORDERS (OUTPATIENT)
Dept: LAB | Facility: HOSPITAL | Age: 35
End: 2020-01-14

## 2020-01-14 DIAGNOSIS — M06.4 INFLAMMATORY POLYARTHROPATHY (HCC): ICD-10-CM

## 2020-01-14 DIAGNOSIS — M06.4 INFLAMMATORY POLYARTHROPATHY (HCC): Primary | ICD-10-CM

## 2020-01-14 LAB
C3 SERPL-MCNC: 147 MG/DL (ref 90–180)
C4 SERPL-MCNC: 32 MG/DL (ref 10–40)
CRP SERPL QL: 5.4 MG/L
ERYTHROCYTE [SEDIMENTATION RATE] IN BLOOD: 31 MM/HOUR (ref 0–20)
HAV IGM SER QL: NORMAL
HBV CORE IGM SER QL: NORMAL
HBV SURFACE AG SER QL: NORMAL
HCV AB SER QL: NORMAL

## 2020-01-14 PROCEDURE — 86140 C-REACTIVE PROTEIN: CPT

## 2020-01-14 PROCEDURE — 86255 FLUORESCENT ANTIBODY SCREEN: CPT

## 2020-01-14 PROCEDURE — 86225 DNA ANTIBODY NATIVE: CPT

## 2020-01-14 PROCEDURE — 36415 COLL VENOUS BLD VENIPUNCTURE: CPT

## 2020-01-14 PROCEDURE — 86038 ANTINUCLEAR ANTIBODIES: CPT

## 2020-01-14 PROCEDURE — 80074 ACUTE HEPATITIS PANEL: CPT

## 2020-01-14 PROCEDURE — 86160 COMPLEMENT ANTIGEN: CPT

## 2020-01-14 PROCEDURE — 86235 NUCLEAR ANTIGEN ANTIBODY: CPT

## 2020-01-14 PROCEDURE — 86430 RHEUMATOID FACTOR TEST QUAL: CPT

## 2020-01-14 PROCEDURE — 86200 CCP ANTIBODY: CPT

## 2020-01-14 PROCEDURE — 85652 RBC SED RATE AUTOMATED: CPT

## 2020-01-15 LAB
DSDNA AB SER-ACNC: 6 IU/ML (ref 0–9)
ENA RNP AB SER-ACNC: <0.2 AI (ref 0–0.9)
ENA SM AB SER-ACNC: <0.2 AI (ref 0–0.9)
ENA SS-A AB SER-ACNC: <0.2 AI (ref 0–0.9)
ENA SS-B AB SER-ACNC: <0.2 AI (ref 0–0.9)
RHEUMATOID FACT SER QL LA: NEGATIVE
RYE IGE QN: NEGATIVE

## 2020-01-16 LAB
C-ANCA TITR SER IF: NORMAL TITER
CCP IGA+IGG SERPL IA-ACNC: 9 UNITS (ref 0–19)
MYELOPEROXIDASE AB SER IA-ACNC: <9 U/ML (ref 0–9)
P-ANCA ATYPICAL TITR SER IF: NORMAL TITER
P-ANCA TITR SER IF: NORMAL TITER
PROTEINASE3 AB SER IA-ACNC: <3.5 U/ML (ref 0–3.5)

## 2020-01-22 ENCOUNTER — TELEPHONE (OUTPATIENT)
Dept: OBGYN CLINIC | Facility: CLINIC | Age: 35
End: 2020-01-22

## 2020-01-22 DIAGNOSIS — Z30.41 ENCOUNTER FOR BIRTH CONTROL PILLS MAINTENANCE: Primary | ICD-10-CM

## 2020-01-22 RX ORDER — DROSPIRENONE AND ETHINYL ESTRADIOL 0.02-3(28)
1 KIT ORAL DAILY
Qty: 84 TABLET | Refills: 3 | Status: SHIPPED | OUTPATIENT
Start: 2020-01-22 | End: 2020-02-27 | Stop reason: SDUPTHER

## 2020-01-22 NOTE — TELEPHONE ENCOUNTER
Patient would like to know if she could have refills of her BCP Muriel Delarosa) sent to the [facility] at [place]

## 2020-01-31 ENCOUNTER — TELEPHONE (OUTPATIENT)
Dept: OBGYN CLINIC | Facility: CLINIC | Age: 35
End: 2020-01-31

## 2020-01-31 DIAGNOSIS — R39.9 UTI SYMPTOMS: Primary | ICD-10-CM

## 2020-01-31 RX ORDER — NITROFURANTOIN 25; 75 MG/1; MG/1
100 CAPSULE ORAL 2 TIMES DAILY
Qty: 14 CAPSULE | Refills: 0 | Status: SHIPPED | OUTPATIENT
Start: 2020-01-31 | End: 2020-02-07

## 2020-01-31 NOTE — TELEPHONE ENCOUNTER
Pt called c/o UTI symptoms  Rx to be sent  Advised to call if no improvement in symptoms   She is unable to go to LAB to submit a urine sample

## 2020-02-15 ENCOUNTER — OFFICE VISIT (OUTPATIENT)
Dept: URGENT CARE | Facility: CLINIC | Age: 35
End: 2020-02-15
Payer: COMMERCIAL

## 2020-02-15 VITALS
TEMPERATURE: 98 F | SYSTOLIC BLOOD PRESSURE: 108 MMHG | RESPIRATION RATE: 16 BRPM | OXYGEN SATURATION: 98 % | HEART RATE: 105 BPM | WEIGHT: 156 LBS | DIASTOLIC BLOOD PRESSURE: 66 MMHG | BODY MASS INDEX: 27.64 KG/M2 | HEIGHT: 63 IN

## 2020-02-15 DIAGNOSIS — J11.1 INFLUENZA: Primary | ICD-10-CM

## 2020-02-15 DIAGNOSIS — J02.9 SORE THROAT: ICD-10-CM

## 2020-02-15 LAB — S PYO AG THROAT QL: NEGATIVE

## 2020-02-15 PROCEDURE — G0382 LEV 3 HOSP TYPE B ED VISIT: HCPCS | Performed by: FAMILY MEDICINE

## 2020-02-15 PROCEDURE — 87880 STREP A ASSAY W/OPTIC: CPT | Performed by: FAMILY MEDICINE

## 2020-02-15 RX ORDER — OSELTAMIVIR PHOSPHATE 75 MG/1
75 CAPSULE ORAL EVERY 12 HOURS SCHEDULED
Qty: 10 CAPSULE | Refills: 0 | Status: SHIPPED | OUTPATIENT
Start: 2020-02-15 | End: 2020-02-20

## 2020-02-15 NOTE — LETTER
February 15, 2020     Patient: Alen Nicole   YOB: 1985   Date of Visit: 2/15/2020       To Whom it May Concern:    Fer Mathur was seen in my clinic on 2/15/2020  She may return to work on 2/18/2020  If you have any questions or concerns, please don't hesitate to call           Sincerely,          Romana Mercer DO        CC: No Recipients

## 2020-02-15 NOTE — PROGRESS NOTES
[address] [facility]        NAME: Ok Ornelas is a 36 y o  female  : 1985    MRN: [id number]  DATE: February 15, 2020  TIME: 1:20 PM    Assessment and Plan   Influenza [J11 1]  1  Influenza  oseltamivir (TAMIFLU) 75 mg capsule         Patient Instructions     Ibuprofen every 6 hours for fever body aches  Plain Mucinex or plain guaifenesin congestion and mucus  May return to work when fever free for 24 hours  Follow up with PCP in 3-5 days  Proceed to  ER if symptoms worsen  Chief Complaint     Chief Complaint   Patient presents with    Cough     x yesterday    Fever     x this am    Nasal Congestion    Sore Throat         History of Present Illness       Cough (x yesterday)  Fever (x this am)  Nasal Congestion   Sore Throat     Both children and diagnosed with strep throat   was diagnosed with influenza this morning in the ER with a positive nasal swab  Cough   This is a new problem  The current episode started in the past 7 days  The problem has been gradually worsening  The problem occurs constantly  The cough is productive of sputum  Associated symptoms include chills, a fever and a sore throat  Fever   Associated symptoms include chills, coughing, a fever and a sore throat  Sore Throat    Associated symptoms include coughing  Review of Systems   Review of Systems   Constitutional: Positive for chills and fever  HENT: Positive for sore throat  Respiratory: Positive for cough  Cardiovascular: Negative            Current Medications       Current Outpatient Medications:     drospirenone-ethinyl estradiol (BARON) 3-0 02 MG per tablet, Take 1 tablet by mouth daily, Disp: 84 tablet, Rfl: 3    SUMAtriptan (IMITREX) 50 mg tablet, Take 1 tablet (50 mg total) by mouth once as needed for migraine, Disp: 30 tablet, Rfl: 4    ketorolac (ACULAR) 0 4 % SOLN, Administer 1 drop to both eyes 4 (four) times a day for 4 days, Disp: 5 mL, Rfl: 0    oseltamivir (TAMIFLU) 75 mg capsule, Take 1 capsule (75 mg total) by mouth every 12 (twelve) hours for 5 days, Disp: 10 capsule, Rfl: 0    Current Allergies     Allergies as of 02/15/2020 - Reviewed 02/15/2020   Allergen Reaction Noted    Amoxicillin Rash 2016    Penicillins Rash 2017            The following portions of the patient's history were reviewed and updated as appropriate: allergies, current medications, past family history, past medical history, past social history, past surgical history and problem list      Past Medical History:   Diagnosis Date    Abnormal Pap smear of cervix     Arthritis     Diabetes mellitus ([facility] [address] )     Gestational hypertension affecting first pregnancy     Irritable bowel syndrome     Migraine     Varicella        Past Surgical History:   Procedure Laterality Date     SECTION  10/06/2017    COLPOSCOPY      [place]  DELIVERY ONLY Bilateral 10/6/2017    Procedure:  SECTION (); Surgeon: Enedelia Arora MD;  [place] ;  Service: Obstetrics    [place]  DELIVERY ONLY N/A 3/22/2019    Procedure: Marion Anders () REPEAT;  Surgeon: Enedelia Arora MD;  [place] ;  Service: Obstetrics    [place] COLONOSCOPY FLX DX W/COLLJ SPEC WHEN PFRMD N/A 2016    Procedure: COLONOSCOPY;  Surgeon: Lebron Vázquez MD;  [facility]; Service: Gastroenterology    WISDOM TOOTH EXTRACTION         Family History   Problem Relation Age of Onset    Arthritis Mother     Breast cancer Maternal Grandmother     Heart disease Maternal Grandmother     Arthritis Maternal Grandmother     Diabetes Maternal Grandfather     Breast cancer Paternal Grandmother     Heart disease Paternal Grandfather          Medications have been verified          Objective   /66   Pulse 105   Temp 98 °F (36 7 °C)   Resp 16   Ht 5' 3" (1 6 m)   Wt 70 8 kg (156 lb)   SpO2 98%   BMI 27 63 kg/m²        Physical Exam     Physical Exam   Constitutional: She is oriented to person, place, and time  She appears well-developed and well-nourished  She appears ill  HENT:   Right Ear: External ear normal    Left Ear: External ear normal    Nose: Nose normal    Mouth/Throat: Oropharynx is clear and moist  No oropharyngeal exudate  Eyes: Conjunctivae are normal    Neck: Normal range of motion  Neck supple  Cardiovascular: Normal rate, regular rhythm and normal heart sounds  No murmur heard  Pulmonary/Chest: Effort normal and breath sounds normal  No respiratory distress  She has no wheezes  She has no rales  She exhibits no tenderness  Abdominal: Soft  Musculoskeletal: Normal range of motion  Lymphadenopathy:     She has no cervical adenopathy  Neurological: She is alert and oriented to person, place, and time  Skin: Skin is warm  No rash noted  No erythema

## 2020-02-15 NOTE — PATIENT INSTRUCTIONS
Ibuprofen every 6 hours for fever body aches  Plain Mucinex or plain guaifenesin congestion and mucus  May return to work when fever free for 24 hours  Follow up with PCP in 3-5 days  Proceed to  ER if symptoms worsen  Influenza   AMBULATORY CARE:   Influenza  (the flu) is an infection caused by the influenza virus  The flu is easily spread when an infected person coughs, sneezes, or has close contact with others  You may be able to spread the flu to others for 1 week or longer after signs or symptoms appear  Common signs and symptoms include the following:   · Fever and chills    · Headaches, body aches, and muscle or joint pain    · Cough, runny nose, and sore throat    · Loss of appetite, nausea, vomiting, or diarrhea    · Tiredness    · Trouble breathing  Call 911 for any of the following:   · You have trouble breathing, and your lips look purple or blue  · You have a seizure  Seek care immediately if:   · You are dizzy, or you are urinating less or not at all  · You have a headache with a stiff neck, and you feel tired or confused  · You have new pain or pressure in your chest     · Your symptoms, such as shortness of breath, vomiting, or diarrhea, get worse  · Your symptoms, such as fever and coughing, seem to get better, but then get worse  Contact your healthcare provider if:   · You have new muscle pain or weakness  · You have questions or concerns about your condition or care  Treatment for influenza  may include any of the following:  · Acetaminophen  decreases pain and fever  It is available without a doctor's order  Ask how much to take and how often to take it  Follow directions  Acetaminophen can cause liver damage if not taken correctly  · NSAIDs , such as ibuprofen, help decrease swelling, pain, and fever  This medicine is available with or without a doctor's order  NSAIDs can cause stomach bleeding or kidney problems in certain people   If you take blood thinner medicine, always ask your healthcare provider if NSAIDs are safe for you  Always read the medicine label and follow directions  · Antivirals  help fight a viral infection  Manage your symptoms:   · Rest  as much as you can to help you recover  · Drink liquids as directed  to help prevent dehydration  Ask how much liquid to drink each day and which liquids are best for you  Prevent the spread of the flu:   · Wash your hands often  Use soap and water  Wash your hands after you use the bathroom, change a child's diapers, or sneeze  Wash your hands before you prepare or eat food  Use gel hand cleanser when soap and water are not available  Do not touch your eyes, nose, or mouth unless you have washed your hands first            · Cover your mouth when you sneeze or cough  Cough into a tissue or the bend of your arm  · Clean shared items with a germ-killing   Clean table surfaces, doorknobs, and light switches  Do not share towels, silverware, and dishes with people who are sick  Wash bed sheets, towels, silverware, and dishes with soap and water  · Wear a mask  over your mouth and nose if you are sick or are near anyone who is sick  · Stay away from others  if you are sick  · Influenza vaccine  helps prevent influenza (flu)  Everyone older than 6 months should get a yearly influenza vaccine  Get the vaccine as soon as it is available, usually in September or October each year  Follow up with your healthcare provider as directed:  Write down your questions so you remember to ask them during your visits  © 2017 [facility] Information is for End User's use only and may not be sold, redistributed or otherwise used for commercial purposes  All illustrations and images included in CareNotes® are the copyrighted property of [employer]  or [employer]  The above information is an  only   It is not intended as medical advice for individual conditions or treatments  Talk to your doctor, nurse or pharmacist before following any medical regimen to see if it is safe and effective for you

## 2020-02-27 ENCOUNTER — ANNUAL EXAM (OUTPATIENT)
Dept: OBGYN CLINIC | Facility: CLINIC | Age: 35
End: 2020-02-27
Payer: COMMERCIAL

## 2020-02-27 VITALS
BODY MASS INDEX: 30.83 KG/M2 | WEIGHT: 174 LBS | HEIGHT: 63 IN | SYSTOLIC BLOOD PRESSURE: 116 MMHG | DIASTOLIC BLOOD PRESSURE: 66 MMHG

## 2020-02-27 DIAGNOSIS — Z12.4 CERVICAL CANCER SCREENING: ICD-10-CM

## 2020-02-27 DIAGNOSIS — Z01.419 ENCOUNTER FOR GYNECOLOGICAL EXAMINATION (GENERAL) (ROUTINE) WITHOUT ABNORMAL FINDINGS: Primary | ICD-10-CM

## 2020-02-27 DIAGNOSIS — R87.612 LGSIL ON PAP SMEAR OF CERVIX: ICD-10-CM

## 2020-02-27 DIAGNOSIS — Z30.41 ENCOUNTER FOR BIRTH CONTROL PILLS MAINTENANCE: ICD-10-CM

## 2020-02-27 DIAGNOSIS — Z80.3 FAMILY HISTORY OF BREAST CANCER: ICD-10-CM

## 2020-02-27 PROBLEM — Z30.09 COUNSELING FOR BIRTH CONTROL, ORAL CONTRACEPTIVES: Status: RESOLVED | Noted: 2019-05-01 | Resolved: 2020-02-27

## 2020-02-27 PROBLEM — R39.9 UTI SYMPTOMS: Status: RESOLVED | Noted: 2020-01-31 | Resolved: 2020-02-27

## 2020-02-27 PROCEDURE — 88175 CYTOPATH C/V AUTO FLUID REDO: CPT | Performed by: OBSTETRICS & GYNECOLOGY

## 2020-02-27 PROCEDURE — 87624 HPV HI-RISK TYP POOLED RSLT: CPT | Performed by: OBSTETRICS & GYNECOLOGY

## 2020-02-27 PROCEDURE — 99395 PREV VISIT EST AGE 18-39: CPT | Performed by: OBSTETRICS & GYNECOLOGY

## 2020-02-27 RX ORDER — MELOXICAM 15 MG/1
TABLET ORAL
COMMUNITY
Start: 2020-01-21 | End: 2021-01-22 | Stop reason: ALTCHOICE

## 2020-02-27 RX ORDER — DROSPIRENONE AND ETHINYL ESTRADIOL 0.02-3(28)
1 KIT ORAL DAILY
Qty: 84 TABLET | Refills: 3 | Status: SHIPPED | OUTPATIENT
Start: 2020-02-27 | End: 2021-04-19

## 2020-02-27 NOTE — PROGRESS NOTES
Assessment        Diagnoses and all orders for this visit:    Encounter for gynecological examination (general) (routine) without abnormal findings    Encounter for birth control pills maintenance  -     drospirenone-ethinyl estradiol (BARON) 3-0 02 MG per tablet; Take 1 tablet by mouth daily    LGSIL on Pap smear of cervix  -     Liquid-based pap, diagnostic    Cervical cancer screening  -     Liquid-based pap, diagnostic    Family history of breast cancer  -     Ambulatory referral to Surgical Oncology; Future    Other orders  -     meloxicam (MOBIC) 15 mg tablet                  Plan      All questions answered  Await pap smear results  Breast self exam technique reviewed and patient encouraged to perform self-exam monthly  Contraception: OCP (estrogen/progesterone)  Discussed healthy lifestyle modifications  Educational material distributed  Follow up in 1 year  Follow up as needed  Pap smear  Repeat PAP and HPV testing ordered due to h/o LGSIL    Patient referred to surgical oncology for cancer genetic scounseling and recommendations for screening on high risk patient    Subjective      Chancy Letters is a 36 y o  female who presents for annual exam       Chief Complaint   Patient presents with    Gynecologic Exam     patient reports no concerns            Last Pap:  18  Last mammogram: n/a      Current contraception: OCP (estrogen/progesterone)  History of abnormal Pap smear: yes - LGSIL  History of abnormal mammogram: no  Family history of uterine or ovarian cancer: no  Family history of breast cancer: yes - MGM (late [de-identified]) and PGM (45s)  Family history of colon cancer: no      Menstrual History:  OB History        2    Para   2    Term   2            AB        Living   2       SAB        TAB        Ectopic        Multiple   0    Live Births   2                  Patient's last menstrual period was 2020 (exact date)           Past Medical History:   Diagnosis Date    Abnormal Pap smear of cervix     Arthritis     Diabetes mellitus ([facility] [address]     Gestational hypertension affecting first pregnancy     Irritable bowel syndrome     Migraine     Varicella      Past Surgical History:   Procedure Laterality Date     SECTION  10/06/2017    COLPOSCOPY      [place]  DELIVERY ONLY Bilateral 10/6/2017    Procedure:  SECTION (); Surgeon: Lillian Rico MD;  [facility];  Service: Obstetrics    [place]  DELIVERY ONLY N/A 3/22/2019    Procedure: [place] () REPEAT;  Surgeon: Lillian Rico MD;  Location: [facility];  Service: Obstetrics    [place] COLONOSCOPY FLX DX W/COLLJ SPEC WHEN PFRMD N/A 2016    Procedure: COLONOSCOPY;  Surgeon: Max Jarrell MD;  [facility];   Service: Gastroenterology    WISDOM TOOTH EXTRACTION       Social History     Socioeconomic History    Marital status: /Civil Union     Spouse name: Not on file    Number of children: Not on file    Years of education: Not on file    Highest education level: Not on file   Occupational History    Not on file   Social Needs    Financial resource strain: Not on file    Food insecurity:     Worry: Not on file     Inability: Not on file    Transportation needs:     Medical: Not on file     Non-medical: Not on file   Tobacco Use    Smoking status: Never Smoker    Smokeless tobacco: Never Used   Substance and Sexual Activity    Alcohol use: Yes     Comment: socially prior to pregnancy    Drug use: No    Sexual activity: Yes     Partners: Male   Lifestyle    Physical activity:     Days per week: Not on file     Minutes per session: Not on file    Stress: Not on file   Relationships    Social connections:     Talks on phone: Not on file     Gets together: Not on file     Attends Church service: Not on file     Active member of club or organization: Not on file     Attends meetings of clubs or organizations: Not on file     Relationship status: Not on file    Intimate partner violence:     Fear of current or ex partner: Not on file     Emotionally abused: Not on file     Physically abused: Not on file     Forced sexual activity: Not on file   Other Topics Concern    Not on file   Social History Narrative    Not on file         Current Outpatient Medications:     drospirenone-ethinyl estradiol (BARON) 3-0 02 MG per tablet, Take 1 tablet by mouth daily, Disp: 84 tablet, Rfl: 3    meloxicam (MOBIC) 15 mg tablet, , Disp: , Rfl:     SUMAtriptan (IMITREX) 50 mg tablet, Take 1 tablet (50 mg total) by mouth once as needed for migraine, Disp: 30 tablet, Rfl: 4    Allergies   Allergen Reactions    Amoxicillin Rash    Penicillins Rash           Review of Systems   Constitutional: Negative  HENT: Negative  Eyes: Negative  Respiratory: Negative  Cardiovascular: Negative  Gastrointestinal: Negative  Endocrine: Negative  Genitourinary:        As noted in HPI   Musculoskeletal: Negative  Skin: Negative  Allergic/Immunologic: Negative  Neurological: Negative  Hematological: Negative  Psychiatric/Behavioral: Negative  /66 (BP Location: Right arm, Patient Position: Sitting, Cuff Size: Standard)   Ht 5' 3" (1 6 m)   Wt 78 9 kg (174 lb)   LMP 02/20/2020 (Exact Date)   BMI 30 82 kg/m²         Physical Exam   Constitutional: She is oriented to person, place, and time  She appears well-developed and well-nourished  Genitourinary: Rectum normal, vagina normal and uterus normal  Pelvic exam was performed with patient supine  There is no rash or lesion on the right labia  There is no rash or lesion on the left labia  Vagina exhibits rugosity  Vagina exhibits no lesion  No bleeding in the vagina  No vaginal discharge found  Right adnexum does not display mass, does not display tenderness and does not display fullness  Left adnexum does not display mass, does not display tenderness and does not display fullness   Cervix does not exhibit motion tenderness, lesion or polyp  Uterus is not enlarged or tender  Rectal exam shows no external hemorrhoid  Genitourinary Comments: Perineum and anus: Normal    Pelvic support  - Vaginal outlet: normal  - Anterior vaginal wall: normal  - Posterior vaginal wall: normal    Bladder: normal    Urethra: normal    Urethral support: normal       HENT:   Head: Normocephalic  Neck: No thyromegaly present  Cardiovascular: Normal rate, regular rhythm and normal heart sounds  No murmur heard  Pulmonary/Chest: Effort normal and breath sounds normal  No respiratory distress  She has no wheezes  She has no rales  Right breast exhibits no mass, no nipple discharge, no skin change and no tenderness  Left breast exhibits no mass, no nipple discharge, no skin change and no tenderness  Abdominal: Soft  She exhibits no distension and no mass  There is no tenderness  There is no rebound and no guarding  Musculoskeletal: She exhibits no edema or tenderness  Lymphadenopathy:        Right: No inguinal adenopathy present  Left: No inguinal adenopathy present  Neurological: She is alert and oriented to person, place, and time  Skin: Skin is warm and dry  Psychiatric: She has a normal mood and affect

## 2020-02-27 NOTE — PATIENT INSTRUCTIONS
Breast Self Exam for Women   WHAT YOU NEED TO KNOW:   A BSE is a way to check your breasts for lumps and other changes  Regular BSEs can help you know how your breasts normally look and feel  Most breast lumps or changes are not cancer, but you should always have them checked by a healthcare provider  Your healthcare provider can also watch you do a BSE and can tell you if you are doing your BSE correctly  DISCHARGE INSTRUCTIONS:   Contact your healthcare provider if:   · You find any lumps or changes in your breasts  · You have breast pain or fluid coming from your nipples  · You have questions or concerns about your condition or care  Why you should do a BSE:  Breast cancer is the most common type of cancer in women  Even if you have mammograms, you may still want to do a BSE regularly  If you know how your breasts normally feel and look, it may help you know when to contact your healthcare provider  Mammograms can miss some cancers  You may find a lump during a BSE that did not show up on your mammogram   When you should do a BSE:  Nabor Perks your calendar to help you remember to do BSE on a regular schedule  One easy way to remember to do a BSE is to do the exam on the same day of each month  If you have periods, you may want to do your BSE 1 week after your period ends  This is the time when your breasts may be the least swollen, lumpy, or tender  You can do regular BSEs even if you are breastfeeding or have breast implants  How to do a BSE:   · Look at your breasts in a mirror  Look at the size and shape of each breast and nipple  Check for swelling, lumps, dimpling, scaly skin, or other skin changes  Look for nipple changes, such as a nipple that is painful or beginning to pull inward  Gently squeeze both nipples and check to see if fluid (that is not breast milk) comes out of them  If you find any of these or other breast changes, contact your healthcare provider   Check your breasts while you sit or  the following 3 positions:    [facility] your arms down at your sides  ¨ Raise your hands and join them behind your head  ¨ Put firm pressure with your hands on your hips  Bend slightly forward while you look at your breasts in the mirror  · Lie down and feel your breasts  When you lie down, your breast tissue spreads out evenly over your chest  This makes it easier for you to feel for lumps and anything that may not be normal for your breasts  Do a BSE on one breast at a time  ¨ Place a small pillow or towel under your left shoulder  Put your left arm behind your head  ¨ Use the 3 middle fingers of your right hand  Use your fingertip pads, on the top of your fingers  Your fingertip pad is the most sensitive part of your finger  ¨ Use small circles to feel your breast tissue  Use your fingertip pads to make dime-sized, overlapping circles on your breast and armpits  Use light, medium, and firm pressure  First, press lightly  Second, press with medium pressure to feel a little deeper into the breast  Last, use firm pressure to feel deep within your breast     ¨ Examine your entire breast area  Examine the breast area from above the breast to below the breast where you feel only ribs  Make small circles with your fingertips, starting in the middle of your armpit  Make circles going up and down the breast area  Continue toward your breast and all the way across it  Examine the area from your armpit all the way over to the middle of your chest (breastbone)  Stop at the middle of your chest     ¨ Move the pillow or towel to your right shoulder, and put your right arm behind your head  Use the 3 fingertip pads of your left hand, and repeat the above steps to do a BSE on your right breast        What else you can do to check for breast problems or cancer:  Some experts suggest that women 36years of age or older should have a mammogram every year   Other experts suggest that women between the ages of 48and 76years old should have a mammogram every 2 years  Talk to your healthcare provider about when you should have a mammogram   Follow up with your healthcare provider as directed: Your healthcare provider can watch you and tell you if you are doing your BSE correctly  Write down your questions so you remember to ask them during your visits  © 2017 [employer] Information is for End User's use only and may not be sold, redistributed or otherwise used for commercial purposes  All illustrations and images included in CareNotes® are the copyrighted property of [employer] M [employer]  or [employer]  The above information is an  only  It is not intended as medical advice for individual conditions or treatments  Talk to your doctor, nurse or pharmacist before following any medical regimen to see if it is safe and effective for you  Wellness Visit for Adults   AMBULATORY CARE:   A wellness visit  is when you see your healthcare provider to get screened for health problems  You can also get advice on how to stay healthy  Write down your questions so you remember to ask them  Ask your healthcare provider how often you should have a wellness visit  What happens at a wellness visit:  Your healthcare provider will ask about your health, and your family history of health problems  This includes high blood pressure, heart disease, and cancer  He or she will ask if you have symptoms that concern you, if you smoke, and about your mood  You may also be asked about your intake of medicines, supplements, food, and alcohol  Any of the following may be done:  · Your weight  will be checked  Your height may also be checked so your body mass index (BMI) can be calculated  Your BMI shows if you are at a healthy weight  · Your blood pressure  and heart rate will be checked  Your temperature may also be checked  · Blood and urine tests  may be done   Blood tests may be done to check your cholesterol levels  Abnormal cholesterol levels increase your risk for heart disease and stroke  You may also need a blood or urine test to check for diabetes if you are at increased risk  Urine tests may be done to look for signs of an infection or kidney disease  · A physical exam  includes checking your heartbeat and lungs with a stethoscope  Your healthcare provider may also check your skin to look for sun damage  · Screening tests  may be recommended  A screening test is done to check for diseases that may not cause symptoms  The screening tests you may need depend on your age, gender, family history, and lifestyle habits  For example, colorectal screening may be recommended if you are 48years old or older  Screening tests you need if you are a woman:   · A Pap smear  is used to screen for cervical cancer  Pap smears are usually done every 3 to 5 years depending on your age  You may need them more often if you have had abnormal Pap smear test results in the past  Ask your healthcare provider how often you should have a Pap smear  · A mammogram  is an x-ray of your breasts to screen for breast cancer  Experts recommend mammograms every 2 years starting at age 48 years  You may need a mammogram at age 52 years or younger if you have an increased risk for breast cancer  Talk to your healthcare provider about when you should start having mammograms and how often you need them  Vaccines you may need:   · Get an influenza vaccine  every year  The influenza vaccine protects you from the flu  Several types of viruses cause the flu  The viruses change over time, so new vaccines are made each year  · Get a tetanus-diphtheria (Td) booster vaccine  every 10 years  This vaccine protects you against tetanus and diphtheria  Tetanus is a severe infection that may cause painful muscle spasms and lockjaw   Diphtheria is a severe bacterial infection that causes a thick covering in the back of your mouth and throat  · Get a human papillomavirus (HPV) vaccine  if you are female and aged 23 to 32 or male 23 to 24 and never received it  This vaccine protects you from HPV infection  HPV is the most common infection spread by sexual contact  HPV may also cause vaginal, penile, and anal cancers  · Get a pneumococcal vaccine  if you are aged 72 years or older  The pneumococcal vaccine is an injection given to protect you from pneumococcal disease  Pneumococcal disease is an infection caused by pneumococcal bacteria  The infection may cause pneumonia, meningitis, or an ear infection  · Get a shingles vaccine  if you are aged 61 or older, even if you have had shingles before  The shingles vaccine is an injection to protect you from the varicella-zoster virus  This is the same virus that causes chickenpox  Shingles is a painful rash that develops in people who had chickenpox or have been exposed to the virus  How to eat healthy:  My Plate is a model for planning healthy meals  It shows the types and amounts of foods that should go on your plate  Fruits and vegetables make up about half of your plate, and grains and protein make up the other half  A serving of dairy is included on the side of your plate  The amount of calories and serving sizes you need depends on your age, gender, weight, and height  Examples of healthy foods are listed below:  · Eat a variety of vegetables  such as dark green, red, and orange vegetables  You can also include canned vegetables low in sodium (salt) and frozen vegetables without added butter or sauces  · Eat a variety of fresh fruits , canned fruit in 100% juice, frozen fruit, and dried fruit  · Include whole grains  At least half of the grains you eat should be whole grains   Examples include whole-wheat bread, wheat pasta, brown rice, and whole-grain cereals such as oatmeal     · Eat a variety of protein foods such as seafood (fish and shellfish), lean meat, and poultry without skin (turkey and chicken)  Examples of lean meats include pork leg, shoulder, or tenderloin, and beef round, sirloin, tenderloin, and extra lean ground beef  Other protein foods include eggs and egg substitutes, beans, peas, soy products, nuts, and seeds  · Choose low-fat dairy products such as skim or 1% milk or low-fat yogurt, cheese, and cottage cheese  · Limit unhealthy fats  such as butter, hard margarine, and shortening  Exercise:  Exercise at least 30 minutes per day on most days of the week  Some examples of exercise include walking, biking, dancing, and swimming  You can also fit in more physical activity by taking the stairs instead of the elevator or parking farther away from stores  Include muscle strengthening activities 2 days each week  Regular exercise provides many health benefits  It helps you manage your weight, and decreases your risk for type 2 diabetes, heart disease, stroke, and high blood pressure  Exercise can also help improve your mood  Ask your healthcare provider about the best exercise plan for you  General health and safety guidelines:   · Do not smoke  Nicotine and other chemicals in cigarettes and cigars can cause lung damage  Ask your healthcare provider for information if you currently smoke and need help to quit  E-cigarettes or smokeless tobacco still contain nicotine  Talk to your healthcare provider before you use these products  · Limit alcohol  A drink of alcohol is 12 ounces of beer, 5 ounces of wine, or 1½ ounces of liquor  · Lose weight, if needed  Being overweight increases your risk of certain health conditions  These include heart disease, high blood pressure, type 2 diabetes, and certain types of cancer  · Protect your skin  Do not sunbathe or use tanning beds  Use sunscreen with a SPF 15 or higher  Apply sunscreen at least 15 minutes before you go outside  Reapply sunscreen every 2 hours   Wear protective clothing, hats, and sunglasses when you are outside  · Drive safely  Always wear your seatbelt  Make sure everyone in your car wears a seatbelt  A seatbelt can save your life if you are in an accident  Do not use your cell phone when you are driving  This could distract you and cause an accident  Pull over if you need to make a call or send a text message  · Practice safe sex  Use latex condoms if are sexually active and have more than one partner  Your healthcare provider may recommend screening tests for sexually transmitted infections (STIs)  · Wear helmets, lifejackets, and protective gear  Always wear a helmet when you ride a bike or motorcycle, go skiing, or play sports that could cause a head injury  Wear protective equipment when you play sports  Wear a lifejacket when you are on a boat or doing water sports  © 2017 [employer]  Information is for End User's use only and may not be sold, redistributed or otherwise used for commercial purposes  All illustrations and images included in CareNotes® are the copyrighted property of [employer] Inc  or [employer]  The above information is an  only  It is not intended as medical advice for individual conditions or treatments  Talk to your doctor, nurse or pharmacist before following any medical regimen to see if it is safe and effective for you

## 2020-02-28 LAB
HPV HR 12 DNA CVX QL NAA+PROBE: NEGATIVE
HPV16 DNA CVX QL NAA+PROBE: NEGATIVE
HPV18 DNA CVX QL NAA+PROBE: NEGATIVE

## 2020-03-03 LAB
LAB AP GYN PRIMARY INTERPRETATION: NORMAL
Lab: NORMAL

## 2020-07-31 ENCOUNTER — TELEPHONE (OUTPATIENT)
Dept: OBGYN CLINIC | Facility: CLINIC | Age: 35
End: 2020-07-31

## 2020-07-31 DIAGNOSIS — G43.829 MENSTRUAL MIGRAINE WITHOUT STATUS MIGRAINOSUS, NOT INTRACTABLE: Primary | ICD-10-CM

## 2020-07-31 RX ORDER — DROSPIRENONE AND ETHINYL ESTRADIOL 0.02-3(28)
1 KIT ORAL DAILY
Qty: 112 TABLET | Refills: 4 | Status: SHIPPED | OUTPATIENT
Start: 2020-07-31 | End: 2021-01-22 | Stop reason: ALTCHOICE

## 2020-07-31 NOTE — TELEPHONE ENCOUNTER
Patient with menstrual migraines  She states these occur on the last week of her pill pack  She does use Imitrex as needed to help  Offered to do continuous OCPs which she is agreeable to versus a change in oral contraception  She declines progestin only contraceptive    Other options to consider are low-dose estrogen Lo Loestrin  She was advised to call prn

## 2020-09-09 ENCOUNTER — TELEPHONE (OUTPATIENT)
Dept: HEMATOLOGY ONCOLOGY | Facility: CLINIC | Age: 35
End: 2020-09-09

## 2020-09-09 NOTE — TELEPHONE ENCOUNTER
Genetics New Patient Intake Form   Patient Details:     Vita Sat    1985    [id number]    Background Information:    "On Hold (Urgent Slot)" is set aside for Pancreatic, Ovarian, and Scheduled Surgery Patients  If it is not scheduled by the previous Friday, any patient can be scheduled in it  Who is calling to schedule? If not self, relationship to patient? self   Referring Provider Eduardo Dunbar   Is this a personal or family history?  family   If personal history, what age were you at diagnosis? What is the type of tumor? breast    Pancreatic and Ovarian needs to be scheduled in the "On Hold (Urgent Slot)"   Do you have a pending surgery for your cancer diagnosis? NA    If yes: needs to be scheduled in the "On Hold (Urgent Slot)"   Referring Provider Department GYN   Did the patient schedule an appointment? Yes   List Appointment Date and Provider Name  Christian Hicks 11/12/2020 TELEPHONE VISIT   Scheduling Information:   Preferred [place]:  [facility]   Are there any dates/time the patient cannot be seen?  thurs morning preferred   Miscellaneous:    After completing the above information, please route to Oncology Genetics

## 2020-09-12 DIAGNOSIS — G43.009 MIGRAINE WITHOUT AURA AND WITHOUT STATUS MIGRAINOSUS, NOT INTRACTABLE: ICD-10-CM

## 2020-09-12 RX ORDER — SUMATRIPTAN 50 MG/1
TABLET, FILM COATED ORAL
Qty: 16 TABLET | Refills: 0 | Status: SHIPPED | OUTPATIENT
Start: 2020-09-12 | End: 2020-09-18

## 2020-09-14 ENCOUNTER — APPOINTMENT (OUTPATIENT)
Dept: LAB | Facility: CLINIC | Age: 35
End: 2020-09-14
Payer: COMMERCIAL

## 2020-09-14 ENCOUNTER — TRANSCRIBE ORDERS (OUTPATIENT)
Dept: LAB | Facility: CLINIC | Age: 35
End: 2020-09-14

## 2020-09-14 DIAGNOSIS — M25.50 PAIN IN JOINT, MULTIPLE SITES: ICD-10-CM

## 2020-09-14 DIAGNOSIS — R19.7 DIARRHEA OF PRESUMED INFECTIOUS ORIGIN: ICD-10-CM

## 2020-09-14 DIAGNOSIS — E78.5 HYPERLIPIDEMIA, UNSPECIFIED HYPERLIPIDEMIA TYPE: Primary | ICD-10-CM

## 2020-09-14 DIAGNOSIS — E78.5 HYPERLIPIDEMIA, UNSPECIFIED HYPERLIPIDEMIA TYPE: ICD-10-CM

## 2020-09-14 LAB
ALBUMIN SERPL BCP-MCNC: 3.6 G/DL (ref 3.5–5)
ALP SERPL-CCNC: 52 U/L (ref 46–116)
ALT SERPL W P-5'-P-CCNC: 14 U/L (ref 12–78)
ANION GAP SERPL CALCULATED.3IONS-SCNC: 9 MMOL/L (ref 4–13)
AST SERPL W P-5'-P-CCNC: 15 U/L (ref 5–45)
BASOPHILS # BLD AUTO: 0.03 THOUSANDS/ΜL (ref 0–0.1)
BASOPHILS NFR BLD AUTO: 0 % (ref 0–1)
BILIRUB SERPL-MCNC: 0.41 MG/DL (ref 0.2–1)
BUN SERPL-MCNC: 8 MG/DL (ref 5–25)
CALCIUM SERPL-MCNC: 8.8 MG/DL (ref 8.3–10.1)
CHLORIDE SERPL-SCNC: 102 MMOL/L (ref 100–108)
CHOLEST SERPL-MCNC: 243 MG/DL (ref 50–200)
CO2 SERPL-SCNC: 24 MMOL/L (ref 21–32)
CREAT SERPL-MCNC: 0.64 MG/DL (ref 0.6–1.3)
EOSINOPHIL # BLD AUTO: 0.06 THOUSAND/ΜL (ref 0–0.61)
EOSINOPHIL NFR BLD AUTO: 1 % (ref 0–6)
ERYTHROCYTE [DISTWIDTH] IN BLOOD BY AUTOMATED COUNT: 12 % (ref 11.6–15.1)
GFR SERPL CREATININE-BSD FRML MDRD: 117 ML/MIN/1.73SQ M
GLUCOSE P FAST SERPL-MCNC: 98 MG/DL (ref 65–99)
HCT VFR BLD AUTO: 42.9 % (ref 34.8–46.1)
HDLC SERPL-MCNC: 53 MG/DL
HGB BLD-MCNC: 14.6 G/DL (ref 11.5–15.4)
IMM GRANULOCYTES # BLD AUTO: 0.02 THOUSAND/UL (ref 0–0.2)
IMM GRANULOCYTES NFR BLD AUTO: 0 % (ref 0–2)
LDLC SERPL CALC-MCNC: 153 MG/DL (ref 0–100)
LYMPHOCYTES # BLD AUTO: 2.96 THOUSANDS/ΜL (ref 0.6–4.47)
LYMPHOCYTES NFR BLD AUTO: 44 % (ref 14–44)
MCH RBC QN AUTO: 29.2 PG (ref 26.8–34.3)
MCHC RBC AUTO-ENTMCNC: 34 G/DL (ref 31.4–37.4)
MCV RBC AUTO: 86 FL (ref 82–98)
MONOCYTES # BLD AUTO: 0.31 THOUSAND/ΜL (ref 0.17–1.22)
MONOCYTES NFR BLD AUTO: 5 % (ref 4–12)
NEUTROPHILS # BLD AUTO: 3.39 THOUSANDS/ΜL (ref 1.85–7.62)
NEUTS SEG NFR BLD AUTO: 50 % (ref 43–75)
NONHDLC SERPL-MCNC: 190 MG/DL
NRBC BLD AUTO-RTO: 0 /100 WBCS
PLATELET # BLD AUTO: 237 THOUSANDS/UL (ref 149–390)
PMV BLD AUTO: 10.3 FL (ref 8.9–12.7)
POTASSIUM SERPL-SCNC: 4 MMOL/L (ref 3.5–5.3)
PROT SERPL-MCNC: 7.5 G/DL (ref 6.4–8.2)
RBC # BLD AUTO: 5 MILLION/UL (ref 3.81–5.12)
SODIUM SERPL-SCNC: 135 MMOL/L (ref 136–145)
TRIGL SERPL-MCNC: 187 MG/DL
TSH SERPL DL<=0.05 MIU/L-ACNC: 1.34 UIU/ML (ref 0.36–3.74)
WBC # BLD AUTO: 6.77 THOUSAND/UL (ref 4.31–10.16)

## 2020-09-14 PROCEDURE — 86255 FLUORESCENT ANTIBODY SCREEN: CPT

## 2020-09-14 PROCEDURE — 84443 ASSAY THYROID STIM HORMONE: CPT

## 2020-09-14 PROCEDURE — 85025 COMPLETE CBC W/AUTO DIFF WBC: CPT

## 2020-09-14 PROCEDURE — 80061 LIPID PANEL: CPT

## 2020-09-14 PROCEDURE — 82784 ASSAY IGA/IGD/IGG/IGM EACH: CPT

## 2020-09-14 PROCEDURE — 83516 IMMUNOASSAY NONANTIBODY: CPT

## 2020-09-14 PROCEDURE — 80053 COMPREHEN METABOLIC PANEL: CPT

## 2020-09-14 PROCEDURE — 83036 HEMOGLOBIN GLYCOSYLATED A1C: CPT | Performed by: INTERNAL MEDICINE

## 2020-09-14 PROCEDURE — 36415 COLL VENOUS BLD VENIPUNCTURE: CPT | Performed by: INTERNAL MEDICINE

## 2020-09-15 LAB
EST. AVERAGE GLUCOSE BLD GHB EST-MCNC: 111 MG/DL
HBA1C MFR BLD: 5.5 %

## 2020-09-16 LAB
ENDOMYSIUM IGA SER QL: NEGATIVE
GLIADIN PEPTIDE IGA SER-ACNC: 5 UNITS (ref 0–19)
GLIADIN PEPTIDE IGG SER-ACNC: 4 UNITS (ref 0–19)
IGA SERPL-MCNC: 215 MG/DL (ref 87–352)
TTG IGA SER-ACNC: <2 U/ML (ref 0–3)
TTG IGG SER-ACNC: <2 U/ML (ref 0–5)

## 2020-09-18 DIAGNOSIS — G43.009 MIGRAINE WITHOUT AURA AND WITHOUT STATUS MIGRAINOSUS, NOT INTRACTABLE: ICD-10-CM

## 2020-09-18 RX ORDER — SUMATRIPTAN 50 MG/1
TABLET, FILM COATED ORAL
Qty: 16 TABLET | Refills: 0 | Status: SHIPPED | OUTPATIENT
Start: 2020-09-18 | End: 2020-11-17

## 2020-11-12 ENCOUNTER — CONSULT (OUTPATIENT)
Dept: GENETICS | Facility: CLINIC | Age: 35
End: 2020-11-12

## 2020-11-12 ENCOUNTER — TELEPHONE (OUTPATIENT)
Dept: HEMATOLOGY ONCOLOGY | Facility: CLINIC | Age: 35
End: 2020-11-12

## 2020-11-12 DIAGNOSIS — Z80.3 FAMILY HISTORY OF BREAST CANCER: Primary | ICD-10-CM

## 2020-11-12 PROCEDURE — NC001 PR NO CHARGE: Performed by: GENETIC COUNSELOR, MS

## 2020-11-17 DIAGNOSIS — G43.009 MIGRAINE WITHOUT AURA AND WITHOUT STATUS MIGRAINOSUS, NOT INTRACTABLE: ICD-10-CM

## 2020-11-17 RX ORDER — SUMATRIPTAN 50 MG/1
TABLET, FILM COATED ORAL
Qty: 16 TABLET | Refills: 1 | Status: SHIPPED | OUTPATIENT
Start: 2020-11-17 | End: 2021-01-29 | Stop reason: SDUPTHER

## 2020-11-18 ENCOUNTER — NURSE TRIAGE (OUTPATIENT)
Dept: OTHER | Facility: OTHER | Age: 35
End: 2020-11-18

## 2020-11-18 ENCOUNTER — AMB VIDEO VISIT (OUTPATIENT)
Dept: OTHER | Facility: HOSPITAL | Age: 35
End: 2020-11-18
Payer: COMMERCIAL

## 2020-11-18 DIAGNOSIS — Z20.828 EXPOSURE TO SARS-ASSOCIATED CORONAVIRUS: Primary | ICD-10-CM

## 2020-11-18 PROCEDURE — 99201 PR OFFICE OUTPATIENT NEW 10 MINUTES: CPT | Performed by: PAIN MEDICINE

## 2020-11-19 DIAGNOSIS — Z20.828 EXPOSURE TO SARS-ASSOCIATED CORONAVIRUS: ICD-10-CM

## 2020-11-19 PROCEDURE — U0003 INFECTIOUS AGENT DETECTION BY NUCLEIC ACID (DNA OR RNA); SEVERE ACUTE RESPIRATORY SYNDROME CORONAVIRUS 2 (SARS-COV-2) (CORONAVIRUS DISEASE [COVID-19]), AMPLIFIED PROBE TECHNIQUE, MAKING USE OF HIGH THROUGHPUT TECHNOLOGIES AS DESCRIBED BY CMS-2020-01-R: HCPCS | Performed by: FAMILY MEDICINE

## 2020-11-20 LAB — SARS-COV-2 RNA SPEC QL NAA+PROBE: NOT DETECTED

## 2020-11-23 ENCOUNTER — TELEPHONE (OUTPATIENT)
Dept: GENETICS | Facility: CLINIC | Age: 35
End: 2020-11-23

## 2020-11-30 ENCOUNTER — TELEPHONE (OUTPATIENT)
Dept: GENETICS | Facility: CLINIC | Age: 35
End: 2020-11-30

## 2020-12-14 ENCOUNTER — TELEPHONE (OUTPATIENT)
Dept: GENETICS | Facility: CLINIC | Age: 35
End: 2020-12-14

## 2020-12-15 ENCOUNTER — DOCUMENTATION (OUTPATIENT)
Dept: OBGYN CLINIC | Facility: CLINIC | Age: 35
End: 2020-12-15

## 2020-12-15 DIAGNOSIS — Z12.31 ENCOUNTER FOR SCREENING MAMMOGRAM FOR BREAST CANCER: ICD-10-CM

## 2020-12-15 DIAGNOSIS — Z12.39 BREAST CANCER SCREENING, HIGH RISK PATIENT: Primary | ICD-10-CM

## 2021-01-29 DIAGNOSIS — G43.009 MIGRAINE WITHOUT AURA AND WITHOUT STATUS MIGRAINOSUS, NOT INTRACTABLE: ICD-10-CM

## 2021-01-29 RX ORDER — SUMATRIPTAN 50 MG/1
TABLET, FILM COATED ORAL
Qty: 16 TABLET | Refills: 0 | Status: SHIPPED | OUTPATIENT
Start: 2021-01-29 | End: 2021-03-12 | Stop reason: SDUPTHER

## 2021-03-07 ENCOUNTER — IMMUNIZATIONS (OUTPATIENT)
Dept: FAMILY MEDICINE CLINIC | Facility: HOSPITAL | Age: 36
End: 2021-03-07

## 2021-03-07 DIAGNOSIS — Z23 ENCOUNTER FOR IMMUNIZATION: Primary | ICD-10-CM

## 2021-03-07 PROCEDURE — 91300 SARS-COV-2 / COVID-19 MRNA VACCINE (PFIZER-BIONTECH) 30 MCG: CPT

## 2021-03-07 PROCEDURE — 0001A SARS-COV-2 / COVID-19 MRNA VACCINE (PFIZER-BIONTECH) 30 MCG: CPT

## 2021-03-12 DIAGNOSIS — G43.009 MIGRAINE WITHOUT AURA AND WITHOUT STATUS MIGRAINOSUS, NOT INTRACTABLE: ICD-10-CM

## 2021-03-12 RX ORDER — SUMATRIPTAN 50 MG/1
TABLET, FILM COATED ORAL
Qty: 16 TABLET | Refills: 0 | Status: SHIPPED | OUTPATIENT
Start: 2021-03-12 | End: 2021-04-23

## 2021-03-28 ENCOUNTER — IMMUNIZATIONS (OUTPATIENT)
Dept: FAMILY MEDICINE CLINIC | Facility: HOSPITAL | Age: 36
End: 2021-03-28

## 2021-03-28 DIAGNOSIS — Z23 ENCOUNTER FOR IMMUNIZATION: Primary | ICD-10-CM

## 2021-03-28 PROCEDURE — 0002A SARS-COV-2 / COVID-19 MRNA VACCINE (PFIZER-BIONTECH) 30 MCG: CPT

## 2021-03-28 PROCEDURE — 91300 SARS-COV-2 / COVID-19 MRNA VACCINE (PFIZER-BIONTECH) 30 MCG: CPT

## 2021-04-19 DIAGNOSIS — Z30.41 ENCOUNTER FOR BIRTH CONTROL PILLS MAINTENANCE: ICD-10-CM

## 2021-04-19 RX ORDER — DROSPIRENONE AND ETHINYL ESTRADIOL TABLETS 0.02-3(28)
KIT ORAL
Qty: 84 TABLET | Refills: 0 | Status: SHIPPED | OUTPATIENT
Start: 2021-04-19 | End: 2021-06-07

## 2021-04-23 DIAGNOSIS — G43.009 MIGRAINE WITHOUT AURA AND WITHOUT STATUS MIGRAINOSUS, NOT INTRACTABLE: ICD-10-CM

## 2021-04-23 RX ORDER — SUMATRIPTAN 50 MG/1
TABLET, FILM COATED ORAL
Qty: 16 TABLET | Refills: 2 | Status: SHIPPED | OUTPATIENT
Start: 2021-04-23 | End: 2021-07-19 | Stop reason: SDUPTHER

## 2021-04-28 ENCOUNTER — APPOINTMENT (OUTPATIENT)
Dept: LAB | Facility: CLINIC | Age: 36
End: 2021-04-28

## 2021-04-28 ENCOUNTER — TRANSCRIBE ORDERS (OUTPATIENT)
Dept: LAB | Facility: CLINIC | Age: 36
End: 2021-04-28

## 2021-04-28 DIAGNOSIS — Z00.8 HEALTH EXAMINATION IN POPULATION SURVEY: Primary | ICD-10-CM

## 2021-04-28 DIAGNOSIS — Z00.8 HEALTH EXAMINATION IN POPULATION SURVEY: ICD-10-CM

## 2021-04-28 LAB
CHOLEST SERPL-MCNC: 203 MG/DL (ref 50–200)
EST. AVERAGE GLUCOSE BLD GHB EST-MCNC: 108 MG/DL
HBA1C MFR BLD: 5.4 %
HDLC SERPL-MCNC: 52 MG/DL
LDLC SERPL CALC-MCNC: 122 MG/DL (ref 0–100)
NONHDLC SERPL-MCNC: 151 MG/DL
TRIGL SERPL-MCNC: 144 MG/DL

## 2021-04-28 PROCEDURE — 83036 HEMOGLOBIN GLYCOSYLATED A1C: CPT | Performed by: PREVENTIVE MEDICINE

## 2021-04-28 PROCEDURE — 80061 LIPID PANEL: CPT

## 2021-04-28 PROCEDURE — 36415 COLL VENOUS BLD VENIPUNCTURE: CPT | Performed by: PREVENTIVE MEDICINE

## 2021-06-07 DIAGNOSIS — Z30.41 ENCOUNTER FOR BIRTH CONTROL PILLS MAINTENANCE: ICD-10-CM

## 2021-06-07 RX ORDER — DROSPIRENONE AND ETHINYL ESTRADIOL TABLETS 0.02-3(28)
KIT ORAL
Qty: 84 TABLET | Refills: 0 | Status: SHIPPED | OUTPATIENT
Start: 2021-06-07 | End: 2021-07-18 | Stop reason: SDUPTHER

## 2021-07-06 ENCOUNTER — TELEPHONE (OUTPATIENT)
Dept: OBGYN CLINIC | Facility: CLINIC | Age: 36
End: 2021-07-06

## 2021-07-06 DIAGNOSIS — R39.9 UTI SYMPTOMS: Primary | ICD-10-CM

## 2021-07-06 RX ORDER — NITROFURANTOIN 25; 75 MG/1; MG/1
100 CAPSULE ORAL 2 TIMES DAILY
Qty: 14 CAPSULE | Refills: 0 | Status: SHIPPED | OUTPATIENT
Start: 2021-07-06 | End: 2021-07-13

## 2021-07-06 NOTE — TELEPHONE ENCOUNTER
Patient complaining of UTI symptoms  She is having pain in frequency since yesterday  Requested antibiotics to be sent home [place] [facility] in [place]    I did place for an order for urine culture in case patient wants to go to the lab and  Send urine sample

## 2021-07-18 NOTE — PATIENT INSTRUCTIONS

## 2021-07-18 NOTE — PROGRESS NOTES
Assessment        Diagnoses and all orders for this visit:    Encntr for gyn exam (general) (routine) w/o abn findings    Encounter for birth control pills maintenance  -     Discontinue: drospirenone-ethinyl estradiol (Loryna) 3-0 02 MG per tablet; Take 1 tablet by mouth daily Skip placebo pills  -     Ambulatory referral to Neurology; Future  -     norethindrone (MICRONOR) 0 35 MG tablet; Take 1 tablet (0 35 mg total) by mouth daily    Migraine without aura and without status migrainosus, not intractable  -     SUMAtriptan (IMITREX) 50 mg tablet; Take 1 tablet (50 mg total) by mouth once as needed for migraine for up to 1 dose    Hepatic hemangioma  -     MRI abdomen w wo contrast; Future    Encounter for screening mammogram for breast cancer    Breast cancer screening, high risk patient  -     Mammo screening bilateral w 3d & cad; Future                  Plan      All questions answered  Contraception: oral progesterone-only contraceptive  Diagnosis explained in detail, including differential   Educational material distributed  Follow up in 1 year  Follow up as needed  Mammogram   MRI liver       Advised patient switching to  Progestin only contraception due to history of migraines  Discussed with patient increased risk of stroke with estrogen use and migraines  Also concern due to family history of breast cancer  She also has a liver hemangioma and technically, estrogen use   May increase the size of hepatic hemangioma  Patient was advised MRI of the liver to follow-up this lesion  Patient was advised neurology referral due to history of migraines to discuss other preventative measures  PAP not indicated    Brannon Beard is a 29 y o  female who presents for annual exam       Chief Complaint   Patient presents with    Gynecologic Exam     Patient here for yv, states no problems  Last pap 2/27/20 D-       Patient was on progestin only pill  Shortly after delivery    Patient has been on continuous combined oral contraceptive pills specifically BARON  For birth control as well as menstrual migraines  Patient still has migraines without aura  Patient uses Imitrex as needed at least takes 6 tablets/month  she has a history of liver hemangioma noted via a right upper quadrant ultrasound noted during pregnancy  She has had no follow-up  Last Pap: 3/3/20      Current contraception: OCP (estrogen/progesterone)  History of abnormal Pap smear: yes   History of abnormal mammogram: no  Family history of uterine or ovarian cancer: no  Family history of breast cancer: yes - MGM (80) PGM (45s)  Family history of colon cancer: no        Menstrual History:  OB History        2    Para   2    Term   2            AB        Living   2       SAB        TAB        Ectopic        Multiple   0    Live Births   2                Menarche age: 16  No LMP recorded (lmp unknown)  (Menstrual status: Birth Control)  Patient taking oral contraceptive pills continuously with no withdrawal bleeding          Past Medical History:   Diagnosis Date    Abnormal Pap smear of cervix     Arthritis     Constipation, chronic     Diabetes mellitus ([address] )     Gestational diabetes     Gestational hypertension affecting first pregnancy     Irritable bowel syndrome     Migraine     Varicella     Weight loss      Past Surgical History:   Procedure Laterality Date     SECTION  10/06/2017    COLPOSCOPY      MA  DELIVERY ONLY Bilateral 10/6/2017    Procedure:  SECTION (); Surgeon: Allison Parson MD;  Location: [facility];  Service: Obstetrics    MA  DELIVERY ONLY N/A 3/22/2019    Procedure: [address] () REPEAT;  Surgeon: Allison Parson MD;  Location: [facility];  Service: Obstetrics    MA COLONOSCOPY FLX DX W/COLLJ SPEC WHEN PFRMD N/A 2016    Procedure: COLONOSCOPY;  Surgeon: Jeff Enrique MD;  [facility];   Service: Gastroenterology   Demetris Palomares WISDOM TOOTH EXTRACTION       Family History   Problem Relation Age of Onset    Arthritis Mother     Irritable bowel syndrome Mother     Breast cancer Maternal Grandmother     Heart disease Maternal Grandmother     Arthritis Maternal Grandmother     Diabetes Maternal Grandfather     Breast cancer Paternal Grandmother     Heart disease Paternal Grandfather     No Known Problems Sister     No Known Problems Father     No Known Problems Daughter     No Known Problems Son        Social History     Tobacco Use    Smoking status: Never Smoker    Smokeless tobacco: Never Used   Vaping Use    Vaping Use: Never used   Substance Use Topics    Alcohol use: Not Currently    Drug use: No          Current Outpatient Medications:     ibuprofen (MOTRIN) 600 mg tablet, Take 1 tablet by mouth 2 (two) times a day as needed, Disp: , Rfl:     SUMAtriptan (IMITREX) 50 mg tablet, Take 1 tablet (50 mg total) by mouth once as needed for migraine for up to 1 dose, Disp: 16 tablet, Rfl: 4    norethindrone (MICRONOR) 0 35 MG tablet, Take 1 tablet (0 35 mg total) by mouth daily, Disp: 84 tablet, Rfl: 4    Allergies   Allergen Reactions    Amoxicillin Rash    Penicillins Rash           Review of Systems   Constitutional: Negative  HENT: Negative  Eyes: Negative  Respiratory: Negative  Cardiovascular: Negative  Gastrointestinal: Negative  Endocrine: Negative  Genitourinary:        As noted in HPI   Musculoskeletal: Negative  Skin: Negative  Allergic/Immunologic: Negative  Neurological: Negative  Hematological: Negative  Psychiatric/Behavioral: Negative  /68 (BP Location: Right arm, Patient Position: Sitting, Cuff Size: Standard)   Pulse 98   Temp 97 7 °F (36 5 °C) (Temporal)   Ht 5' 3" (1 6 m)   Wt 75 4 kg (166 lb 3 2 oz)   LMP  (LMP Unknown)   BMI 29 44 kg/m²         Physical Exam  Constitutional:       Appearance: She is well-developed     Genitourinary:      Pelvic exam was performed with patient in the lithotomy position  Vulva, urethra, bladder, vagina, uterus and rectum normal       No vulval condylomata, lesion, tenderness, Bartholin's cyst or rash noted  No signs of labial injury  No posterior fourchette tenderness, injury, rash or lesion present  No inguinal adenopathy present in the right or left side  No lesions in the vagina  No vaginal discharge, tenderness, bleeding, atrophy or prolapse  No cervical motion tenderness, friability, lesion or polyp  Uterus is not enlarged or tender  No right or left adnexal mass present  Right adnexa not tender or full  Left adnexa not tender or full  Genitourinary Comments:      Rectum:      No external hemorrhoid  HENT:      Head: Normocephalic  Nose: Nose normal    Eyes:      Conjunctiva/sclera: Conjunctivae normal    Neck:      Thyroid: No thyromegaly  Cardiovascular:      Rate and Rhythm: Normal rate and regular rhythm  Heart sounds: Normal heart sounds  No murmur heard  Pulmonary:      Effort: Pulmonary effort is normal  No respiratory distress  Breath sounds: Normal breath sounds  No wheezing or rales  Chest:      Breasts:         Right: No mass, nipple discharge, skin change or tenderness  Left: No mass, nipple discharge, skin change or tenderness  Abdominal:      General: There is no distension  Palpations: Abdomen is soft  There is no mass  Tenderness: There is no abdominal tenderness  There is no guarding or rebound  Musculoskeletal:         General: No tenderness  Cervical back: Neck supple  No muscular tenderness  Lymphadenopathy:      Cervical: No cervical adenopathy  Lower Body: No right inguinal adenopathy  No left inguinal adenopathy  Neurological:      Mental Status: She is alert and oriented to person, place, and time  Skin:     General: Skin is warm and dry     Psychiatric:         Mood and Affect: Mood normal          Behavior: Behavior normal

## 2021-07-19 ENCOUNTER — ANNUAL EXAM (OUTPATIENT)
Dept: OBGYN CLINIC | Facility: CLINIC | Age: 36
End: 2021-07-19
Payer: COMMERCIAL

## 2021-07-19 VITALS
TEMPERATURE: 97.7 F | WEIGHT: 166.2 LBS | HEART RATE: 98 BPM | SYSTOLIC BLOOD PRESSURE: 112 MMHG | BODY MASS INDEX: 29.45 KG/M2 | DIASTOLIC BLOOD PRESSURE: 68 MMHG | HEIGHT: 63 IN

## 2021-07-19 DIAGNOSIS — D18.03 HEPATIC HEMANGIOMA: ICD-10-CM

## 2021-07-19 DIAGNOSIS — Z30.41 ENCOUNTER FOR BIRTH CONTROL PILLS MAINTENANCE: ICD-10-CM

## 2021-07-19 DIAGNOSIS — Z12.31 ENCOUNTER FOR SCREENING MAMMOGRAM FOR BREAST CANCER: ICD-10-CM

## 2021-07-19 DIAGNOSIS — Z01.419 ENCNTR FOR GYN EXAM (GENERAL) (ROUTINE) W/O ABN FINDINGS: Primary | ICD-10-CM

## 2021-07-19 DIAGNOSIS — Z12.39 BREAST CANCER SCREENING, HIGH RISK PATIENT: ICD-10-CM

## 2021-07-19 DIAGNOSIS — G43.009 MIGRAINE WITHOUT AURA AND WITHOUT STATUS MIGRAINOSUS, NOT INTRACTABLE: ICD-10-CM

## 2021-07-19 PROCEDURE — 99395 PREV VISIT EST AGE 18-39: CPT | Performed by: OBSTETRICS & GYNECOLOGY

## 2021-07-19 RX ORDER — ACETAMINOPHEN AND CODEINE PHOSPHATE 120; 12 MG/5ML; MG/5ML
1 SOLUTION ORAL DAILY
Qty: 84 TABLET | Refills: 4 | Status: SHIPPED | OUTPATIENT
Start: 2021-07-19 | End: 2022-01-13

## 2021-07-19 RX ORDER — SUMATRIPTAN 50 MG/1
50 TABLET, FILM COATED ORAL ONCE AS NEEDED
Qty: 16 TABLET | Refills: 4 | Status: SHIPPED | OUTPATIENT
Start: 2021-07-19 | End: 2021-12-21 | Stop reason: SDUPTHER

## 2021-07-19 RX ORDER — DROSPIRENONE AND ETHINYL ESTRADIOL 0.02-3(28)
1 KIT ORAL DAILY
Qty: 84 TABLET | Refills: 4 | Status: SHIPPED | OUTPATIENT
Start: 2021-07-19 | End: 2021-07-19

## 2021-07-21 ENCOUNTER — TELEPHONE (OUTPATIENT)
Dept: NEUROLOGY | Facility: CLINIC | Age: 36
End: 2021-07-21

## 2021-07-21 NOTE — TELEPHONE ENCOUNTER
Best contact number for [facility]:[phone]    Emergency Contact name and number:None    Referring provider and telephone number: Fernando Lucas    Primary Care Provider Name and if affiliated with [facility]: Lena Orantes     Reason for Appointment/Dx:Headache     Have you seen and followed up with a pediatric Neurologist for this disease in the past? No      Neurology Location patient would like to be seen:[facility] received? Yes                                                  Records Received? No    Have you ever seen another Neurologist?       No    [address] [facility]    ID/Policy #:    Secondary Insurance:    ID/Policy#: Workman's Comp/ Accident/ School  Information      Workman's Comp/Accident/School related?        None    If yes name of Insurance company:    Claim #:    Date of Injury:    Type of Injury:     Name and Telephone Number:    Notes:Appointment schedule with patient insurance confirmed                    Appointment date: 01-04-22 10:00am

## 2021-08-06 ENCOUNTER — TELEPHONE (OUTPATIENT)
Dept: OBGYN CLINIC | Facility: CLINIC | Age: 36
End: 2021-08-06

## 2021-09-12 ENCOUNTER — HOSPITAL ENCOUNTER (OUTPATIENT)
Dept: MRI IMAGING | Facility: HOSPITAL | Age: 36
Discharge: HOME | End: 2021-09-12
Attending: OBSTETRICS & GYNECOLOGY
Payer: COMMERCIAL

## 2021-09-12 DIAGNOSIS — D18.03 HEPATIC HEMANGIOMA: ICD-10-CM

## 2021-09-12 PROCEDURE — A9585 GADOBUTROL INJECTION: HCPCS | Performed by: OBSTETRICS & GYNECOLOGY

## 2021-09-12 PROCEDURE — G1004 CDSM NDSC: HCPCS

## 2021-09-12 PROCEDURE — 74183 MRI ABD W/O CNTR FLWD CNTR: CPT

## 2021-09-12 RX ADMIN — GADOBUTROL 7 ML: 604.72 INJECTION INTRAVENOUS at 14:09

## 2021-09-17 ENCOUNTER — DOCUMENTATION (OUTPATIENT)
Dept: OBGYN CLINIC | Facility: CLINIC | Age: 36
End: 2021-09-17

## 2021-09-17 DIAGNOSIS — Z30.011 ENCOUNTER FOR BCP (BIRTH CONTROL PILLS) INITIAL PRESCRIPTION: Primary | ICD-10-CM

## 2021-09-17 NOTE — PROGRESS NOTES
C/o headaches, not feeling well, bleeding x 3 weeks on minipill, awaiting MRI read, neuro appt in Jan,  Advised changed to new progestin only pill

## 2021-09-20 ENCOUNTER — TELEPHONE (OUTPATIENT)
Dept: LABOR AND DELIVERY | Facility: HOSPITAL | Age: 36
End: 2021-09-20

## 2021-09-20 DIAGNOSIS — D18.03 LIVER HEMANGIOMA: Primary | ICD-10-CM

## 2021-09-20 NOTE — TELEPHONE ENCOUNTER
Discussed MRI with pt, offered referral to surg onc, declines, but agrees to repeat MRI in 6 months to document stability   Advised NO estrogen containing pills

## 2021-09-23 ENCOUNTER — TELEMEDICINE (OUTPATIENT)
Dept: INTERNAL MEDICINE CLINIC | Facility: CLINIC | Age: 36
End: 2021-09-23
Payer: COMMERCIAL

## 2021-09-23 ENCOUNTER — NURSE TRIAGE (OUTPATIENT)
Dept: OTHER | Facility: OTHER | Age: 36
End: 2021-09-23

## 2021-09-23 DIAGNOSIS — J02.9 SORE THROAT: Primary | ICD-10-CM

## 2021-09-23 DIAGNOSIS — Z20.822 SUSPECTED SEVERE ACUTE RESPIRATORY SYNDROME CORONAVIRUS 2 (SARS-COV-2) INFECTION: Primary | ICD-10-CM

## 2021-09-23 DIAGNOSIS — G43.009 MIGRAINE WITHOUT AURA AND WITHOUT STATUS MIGRAINOSUS, NOT INTRACTABLE: ICD-10-CM

## 2021-09-23 DIAGNOSIS — R09.81 SINUS CONGESTION: ICD-10-CM

## 2021-09-23 DIAGNOSIS — R05.9 COUGH: ICD-10-CM

## 2021-09-23 PROCEDURE — 99213 OFFICE O/P EST LOW 20 MIN: CPT | Performed by: INTERNAL MEDICINE

## 2021-09-23 PROCEDURE — U0003 INFECTIOUS AGENT DETECTION BY NUCLEIC ACID (DNA OR RNA); SEVERE ACUTE RESPIRATORY SYNDROME CORONAVIRUS 2 (SARS-COV-2) (CORONAVIRUS DISEASE [COVID-19]), AMPLIFIED PROBE TECHNIQUE, MAKING USE OF HIGH THROUGHPUT TECHNOLOGIES AS DESCRIBED BY CMS-2020-01-R: HCPCS | Performed by: INTERNAL MEDICINE

## 2021-09-23 PROCEDURE — U0005 INFEC AGEN DETEC AMPLI PROBE: HCPCS | Performed by: INTERNAL MEDICINE

## 2021-09-23 NOTE — PROGRESS NOTES
Virtual Regular Visit    Verification of patient location:    Patient is located in the following state in which I hold an active license [place]      Assessment/Plan:    Problem List Items Addressed This Visit        Respiratory    Sinus congestion     Advised to take over-the-counter medication Allegra D  And may use fluticasone nasal spray  Advised to increase fluids  At present no need for antibiotic            Cardiovascular and Mediastinum    Migraine without aura and without status migrainosus, not intractable     She takes Imitrex p r n  Other    Sore throat - Primary     Rule out COVID-19 infection versus other infection patient had a COVID-19 test today result pending  She was advised to stay home until COVID-19 test result available  Increase fluids         Cough     Rule out COVID-19 infection versus allergies versus other etiology  She has above patient has  slight cough at present no need for other medication  She will try Allegra D for now  Reason for visit is No chief complaint on file  Encounter provider Karlene Wilburn MD    Provider located at [address] [place] [address] Road  [phone]      Recent Visits  No visits were found meeting these conditions  Showing recent visits within past 7 days and meeting all other requirements  Today's Visits  Date Type Provider Dept   09/23/21 Telemedicine Karlene Wilburn MD [facility]   Showing today's visits and meeting all other requirements  Future Appointments  No visits were found meeting these conditions  Showing future appointments within next 150 days and meeting all other requirements       The patient was identified by name and date of birth   Maurice Muro was informed that this is a telemedicine visit and that the visit is being conducted through [address] [place] [address] Now and patient was informed that this is a secure, HIPAA-compliant platform  She agrees to proceed     My office door was closed  No one else was in the room  She acknowledged consent and understanding of privacy and security of the video platform  The patient has agreed to participate and understands they can discontinue the visit at any time  Patient is aware this is a billable service  Subjective:  Sinus congestion, sore throat and slight cough  Martha Philip is a 29 y o  female    HPI   61-year-old white female patient developed sore throat, slight cough, sinus congestion since yesterday  Her children side also sick with similar symptoms  She denies any fever or chills denies any chest pain shortness of breath her children and had a COVID-19 test which was negative per patient she also went for COVID-19 test today result is pending  Denies any nausea, vomiting, diarrhea  Has a mostly clear discharge from nose  Past Medical History:   Diagnosis Date    Abnormal Pap smear of cervix     Arthritis     Constipation, chronic     Cough 2021    Diabetes mellitus ([address] )     Gestational diabetes     Gestational hypertension affecting first pregnancy     Irritable bowel syndrome     Migraine     Sinus congestion 2021    Sore throat 2021    Varicella     Weight loss        Past Surgical History:   Procedure Laterality Date     SECTION  10/06/2017    COLPOSCOPY      [place]  DELIVERY ONLY Bilateral 10/6/2017    Procedure:  SECTION (); Surgeon: Arian Fang MD;  [facility];  Service: Obstetrics    [place]  DELIVERY ONLY N/A 3/22/2019    Procedure: Efrem Swanson () REPEAT;  Surgeon: Arian Fang MD;  [facility];  Service: Obstetrics    [place] COLONOSCOPY FLX DX W/COLLJ SPEC WHEN PFRMD N/A 2016    Procedure: COLONOSCOPY;  Surgeon: Murray Centeno MD;  [facility];   Service: Gastroenterology    WISDOM TOOTH EXTRACTION         Current Outpatient Medications Medication Sig Dispense Refill    Drospirenone 4 MG TABS Take 1 tablet by mouth daily 84 tablet 3    ibuprofen (MOTRIN) 600 mg tablet Take 1 tablet by mouth 2 (two) times a day as needed      norethindrone (MICRONOR) 0 35 MG tablet Take 1 tablet (0 35 mg total) by mouth daily 84 tablet 4    SUMAtriptan (IMITREX) 50 mg tablet Take 1 tablet (50 mg total) by mouth once as needed for migraine for up to 1 dose 16 tablet 4     No current facility-administered medications for this visit  Allergies   Allergen Reactions    Amoxicillin Rash    Penicillins Rash       Review of Systems   Constitutional: Negative for chills and fever  HENT: Positive for congestion and sore throat  Negative for ear pain, hearing loss, nosebleeds, sinus pain and trouble swallowing  Eyes: Negative for discharge, redness and visual disturbance  Respiratory: Positive for cough  Negative for chest tightness and shortness of breath  Cardiovascular: Negative for chest pain and palpitations  Gastrointestinal: Negative for abdominal pain, blood in stool, constipation, diarrhea, nausea and vomiting  Genitourinary: Negative for dysuria, flank pain, frequency and hematuria  Musculoskeletal: Negative for arthralgias, myalgias and neck pain  Skin: Negative for color change and rash  Neurological: Negative for dizziness, speech difficulty, weakness and headaches  Hematological: Does not bruise/bleed easily  Psychiatric/Behavioral: Negative for agitation and behavioral problems  Video Exam      There were no vitals filed for this visit  Physical Exam  Constitutional:       General: She is not in acute distress  Appearance: Normal appearance  HENT:      Head: Normocephalic  Eyes:      General:         Right eye: No discharge  Left eye: No discharge  Pulmonary:      Effort: Pulmonary effort is normal  No respiratory distress  Musculoskeletal:         General: Normal range of motion  Cervical back: Normal range of motion  No muscular tenderness  Neurological:      General: No focal deficit present  Mental Status: She is alert and oriented to person, place, and time  Psychiatric:         Mood and Affect: Mood normal          Behavior: Behavior normal           I spent 20 minutes directly with the patient during this visit    [facility] verbally agrees to participate in [employer]  Pt is aware that [employer] could be limited without vital signs or the ability to perform a full hands-on physical Rella Stratford understands she or the provider may request at any time to terminate the video visit and request the patient to seek care or treatment in person

## 2021-09-23 NOTE — TELEPHONE ENCOUNTER
Regarding: COVID-19 - Symptomatic  ----- Message from Stacia Jackman sent at 9/23/2021  7:25 AM EDT -----  "I am congested and I have a sore throat "

## 2021-09-23 NOTE — TELEPHONE ENCOUNTER
1  Were you within 6 feet or less, for up to 15 minutes or more with a person that has a confirmed COVID-19 test?   Denied   2  What was the date of your exposure? N/A  3  Are you experiencing any symptoms attributed to the virus?  (Assess for SOB, cough, fever, difficulty breathing)   Started 9/22/21  Nasal congestion  Sore throat  4  HIGH RISK: Do you have any history heart or lung conditions, weakened immune system, diabetes, Asthma, CHF, HIV, COPD, Chemo, renal failure, sickle cell, etc?  Denied   5  PREGNANCY: Are you pregnant or did you recently give birth?   Denied

## 2021-09-23 NOTE — ASSESSMENT & PLAN NOTE
Rule out COVID-19 infection versus allergies versus other etiology  She has above patient has  slight cough at present no need for other medication  She will try Allegra D for now

## 2021-09-23 NOTE — ASSESSMENT & PLAN NOTE
Advised to take over-the-counter medication Allegra D  And may use fluticasone nasal spray  Advised to increase fluids    At present no need for antibiotic

## 2021-09-23 NOTE — ASSESSMENT & PLAN NOTE
Rule out COVID-19 infection versus other infection patient had a COVID-19 test today result pending  She was advised to stay home until COVID-19 test result available    Increase fluids

## 2021-09-23 NOTE — TELEPHONE ENCOUNTER
Clerical Staff: this patient is an [employer] employee and required to have a virtual visit with her PCP within 48 hours  I was not able to find an appointment for her  She was advised to call the schedule

## 2021-09-24 LAB — SARS-COV-2 RNA RESP QL NAA+PROBE: NEGATIVE

## 2021-09-27 ENCOUNTER — TELEPHONE (OUTPATIENT)
Dept: FAMILY MEDICINE CLINIC | Facility: CLINIC | Age: 36
End: 2021-09-27

## 2021-09-27 NOTE — TELEPHONE ENCOUNTER
Pt called and would like to return to work tomorrow (9/28/21)  She is requesting a note for work  She would like to be able to view on [employer]

## 2021-09-27 NOTE — TELEPHONE ENCOUNTER
Please call her that I have written letter it is in computer  If she would like to be fax, we can fax

## 2021-09-28 ENCOUNTER — TELEPHONE (OUTPATIENT)
Dept: INTERNAL MEDICINE CLINIC | Facility: CLINIC | Age: 36
End: 2021-09-28

## 2021-09-28 DIAGNOSIS — H10.32 ACUTE CONJUNCTIVITIS OF LEFT EYE, UNSPECIFIED ACUTE CONJUNCTIVITIS TYPE: Primary | ICD-10-CM

## 2021-09-28 RX ORDER — CIPROFLOXACIN HYDROCHLORIDE 3.5 MG/ML
2 SOLUTION/ DROPS TOPICAL 4 TIMES DAILY
Qty: 5 ML | Refills: 0 | Status: SHIPPED | OUTPATIENT
Start: 2021-09-28 | End: 2022-05-09 | Stop reason: SDUPTHER

## 2021-09-28 NOTE — TELEPHONE ENCOUNTER
Woke up this am and her Left eye is red and crusty - children have the same thing  Can you send drops to [facility]?

## 2021-10-22 ENCOUNTER — DOCUMENTATION (OUTPATIENT)
Dept: OBGYN CLINIC | Facility: CLINIC | Age: 36
End: 2021-10-22

## 2021-10-22 NOTE — PROGRESS NOTES
Patient discharged to home by doctor  Discharge instructions/precuations reviewed with patient by Dr Keri Castro  Patient verbalizes understanding & denies further questions at this time  Yes

## 2021-10-24 ENCOUNTER — NURSE TRIAGE (OUTPATIENT)
Dept: OTHER | Facility: OTHER | Age: 36
End: 2021-10-24

## 2021-10-24 DIAGNOSIS — Z11.59 ENCOUNTER FOR SCREENING FOR OTHER VIRAL DISEASES: Primary | ICD-10-CM

## 2021-10-24 PROCEDURE — U0005 INFEC AGEN DETEC AMPLI PROBE: HCPCS | Performed by: INTERNAL MEDICINE

## 2021-10-24 PROCEDURE — U0003 INFECTIOUS AGENT DETECTION BY NUCLEIC ACID (DNA OR RNA); SEVERE ACUTE RESPIRATORY SYNDROME CORONAVIRUS 2 (SARS-COV-2) (CORONAVIRUS DISEASE [COVID-19]), AMPLIFIED PROBE TECHNIQUE, MAKING USE OF HIGH THROUGHPUT TECHNOLOGIES AS DESCRIBED BY CMS-2020-01-R: HCPCS | Performed by: INTERNAL MEDICINE

## 2021-10-25 ENCOUNTER — TELEPHONE (OUTPATIENT)
Dept: INTERNAL MEDICINE CLINIC | Facility: CLINIC | Age: 36
End: 2021-10-25

## 2021-10-25 ENCOUNTER — TELEMEDICINE (OUTPATIENT)
Dept: INTERNAL MEDICINE CLINIC | Facility: CLINIC | Age: 36
End: 2021-10-25
Payer: COMMERCIAL

## 2021-10-25 DIAGNOSIS — R09.81 SINUS CONGESTION: ICD-10-CM

## 2021-10-25 DIAGNOSIS — J01.00 ACUTE NON-RECURRENT MAXILLARY SINUSITIS: Primary | ICD-10-CM

## 2021-10-25 DIAGNOSIS — J31.0 RHINITIS, UNSPECIFIED TYPE: ICD-10-CM

## 2021-10-25 PROBLEM — J01.90 ACUTE NON-RECURRENT SINUSITIS: Status: ACTIVE | Noted: 2021-10-25

## 2021-10-25 PROCEDURE — 99213 OFFICE O/P EST LOW 20 MIN: CPT | Performed by: INTERNAL MEDICINE

## 2021-10-25 RX ORDER — AZITHROMYCIN 250 MG/1
TABLET, FILM COATED ORAL
Qty: 6 TABLET | Refills: 0 | Status: SHIPPED | OUTPATIENT
Start: 2021-10-25 | End: 2021-10-30

## 2021-10-25 RX ORDER — FLUTICASONE PROPIONATE 50 MCG
2 SPRAY, SUSPENSION (ML) NASAL DAILY
Qty: 15.8 ML | Refills: 0 | Status: SHIPPED | OUTPATIENT
Start: 2021-10-25 | End: 2022-07-01

## 2021-11-01 ENCOUNTER — APPOINTMENT (OUTPATIENT)
Dept: PHYSICAL THERAPY | Age: 36
End: 2021-11-01

## 2021-11-03 ENCOUNTER — TELEPHONE (OUTPATIENT)
Dept: NEUROLOGY | Facility: CLINIC | Age: 36
End: 2021-11-03

## 2021-11-04 ENCOUNTER — HOSPITAL ENCOUNTER (OUTPATIENT)
Dept: RADIOLOGY | Facility: HOSPITAL | Age: 36
Discharge: HOME | End: 2021-11-04
Payer: COMMERCIAL

## 2021-11-04 VITALS — HEIGHT: 63 IN | BODY MASS INDEX: 29.41 KG/M2 | WEIGHT: 166 LBS

## 2021-11-04 DIAGNOSIS — Z12.39 BREAST CANCER SCREENING, HIGH RISK PATIENT: ICD-10-CM

## 2021-11-04 PROCEDURE — 77067 SCR MAMMO BI INCL CAD: CPT

## 2021-11-04 PROCEDURE — 77063 BREAST TOMOSYNTHESIS BI: CPT

## 2021-12-07 ENCOUNTER — TELEPHONE (OUTPATIENT)
Dept: INTERNAL MEDICINE CLINIC | Facility: CLINIC | Age: 36
End: 2021-12-07

## 2021-12-07 DIAGNOSIS — R05.9 COUGH: ICD-10-CM

## 2021-12-07 DIAGNOSIS — Z20.822 EXPOSURE TO CONFIRMED CASE OF COVID-19: Primary | ICD-10-CM

## 2021-12-07 PROCEDURE — U0003 INFECTIOUS AGENT DETECTION BY NUCLEIC ACID (DNA OR RNA); SEVERE ACUTE RESPIRATORY SYNDROME CORONAVIRUS 2 (SARS-COV-2) (CORONAVIRUS DISEASE [COVID-19]), AMPLIFIED PROBE TECHNIQUE, MAKING USE OF HIGH THROUGHPUT TECHNOLOGIES AS DESCRIBED BY CMS-2020-01-R: HCPCS | Performed by: INTERNAL MEDICINE

## 2021-12-07 PROCEDURE — U0005 INFEC AGEN DETEC AMPLI PROBE: HCPCS | Performed by: INTERNAL MEDICINE

## 2021-12-08 ENCOUNTER — TELEPHONE (OUTPATIENT)
Dept: INTERNAL MEDICINE CLINIC | Facility: CLINIC | Age: 36
End: 2021-12-08

## 2021-12-08 ENCOUNTER — TELEMEDICINE (OUTPATIENT)
Dept: INTERNAL MEDICINE CLINIC | Facility: CLINIC | Age: 36
End: 2021-12-08
Payer: COMMERCIAL

## 2021-12-08 DIAGNOSIS — U07.1 COVID-19 VIRUS INFECTION: Primary | ICD-10-CM

## 2021-12-08 DIAGNOSIS — R05.9 COUGH: ICD-10-CM

## 2021-12-08 DIAGNOSIS — R53.83 OTHER FATIGUE: ICD-10-CM

## 2021-12-08 DIAGNOSIS — R09.81 SINUS CONGESTION: ICD-10-CM

## 2021-12-08 PROCEDURE — 99213 OFFICE O/P EST LOW 20 MIN: CPT | Performed by: INTERNAL MEDICINE

## 2021-12-17 ENCOUNTER — TELEPHONE (OUTPATIENT)
Dept: INTERNAL MEDICINE CLINIC | Facility: CLINIC | Age: 36
End: 2021-12-17

## 2021-12-28 ENCOUNTER — TELEPHONE (OUTPATIENT)
Dept: HEMATOLOGY ONCOLOGY | Facility: CLINIC | Age: 36
End: 2021-12-28

## 2022-01-05 ENCOUNTER — TELEPHONE (OUTPATIENT)
Dept: NEUROLOGY | Facility: CLINIC | Age: 37
End: 2022-01-05

## 2022-01-05 NOTE — TELEPHONE ENCOUNTER
LMOM reminding the patient of her appt next week on 1/13/22 with Dr David Torres and offered to switch to a virtual visit   Provided a call back number if she is interested in switching

## 2022-01-07 DIAGNOSIS — Z30.011 ENCOUNTER FOR BCP (BIRTH CONTROL PILLS) INITIAL PRESCRIPTION: ICD-10-CM

## 2022-01-13 ENCOUNTER — TELEMEDICINE (OUTPATIENT)
Dept: NEUROLOGY | Facility: CLINIC | Age: 37
End: 2022-01-13
Payer: COMMERCIAL

## 2022-01-13 ENCOUNTER — TELEPHONE (OUTPATIENT)
Dept: NEUROLOGY | Facility: CLINIC | Age: 37
End: 2022-01-13

## 2022-01-13 DIAGNOSIS — G43.009 MIGRAINE WITHOUT AURA AND WITHOUT STATUS MIGRAINOSUS, NOT INTRACTABLE: Primary | ICD-10-CM

## 2022-01-13 DIAGNOSIS — Z30.41 ENCOUNTER FOR BIRTH CONTROL PILLS MAINTENANCE: ICD-10-CM

## 2022-01-13 PROCEDURE — 99204 OFFICE O/P NEW MOD 45 MIN: CPT | Performed by: PSYCHIATRY & NEUROLOGY

## 2022-01-13 RX ORDER — MELATONIN 10 MG
10000 TABLET, SUBLINGUAL SUBLINGUAL DAILY
COMMUNITY

## 2022-01-13 RX ORDER — DEXAMETHASONE 4 MG/1
TABLET ORAL
Qty: 20 TABLET | Refills: 1 | Status: SHIPPED | OUTPATIENT
Start: 2022-01-13 | End: 2022-05-09 | Stop reason: ALTCHOICE

## 2022-01-13 RX ORDER — TOPIRAMATE 25 MG/1
TABLET ORAL
Qty: 90 TABLET | Refills: 2 | Status: SHIPPED | OUTPATIENT
Start: 2022-01-13 | End: 2022-02-22 | Stop reason: SDUPTHER

## 2022-01-13 RX ORDER — SUMATRIPTAN 100 MG/1
TABLET, FILM COATED ORAL
Qty: 12 TABLET | Refills: 1 | Status: SHIPPED | OUTPATIENT
Start: 2022-01-13 | End: 2022-02-22 | Stop reason: SDUPTHER

## 2022-01-13 NOTE — PROGRESS NOTES
Virtual Regular Visit    Assessment/Plan:   Patient Instructions   Migraine: Jonel Ty   Presents for an initial consultation with regard to migraine headaches  At this point in time she is experiencing significant migraines many days out of month with additional headaches coming from the back of the neck  She has never been on any preventative medications, quite surprisingly, and notes that she is getting only modest benefit from Imitrex which is being given at a half dose at this time  Her neurologic exam is reassuring today  - for migraine prevention we will begin topiramate at a dose of 25 mg taken once daily in the morning  If she notices that it makes her tired she can absolutely taken at night  She should continue at that dose for 2 weeks and then increase by 1 tablet for the next 1-2 weeks  At that time if she notices no benefit and no side effects she can increase once again to 3 tablets per day  - 2 abort a migraine she can use Imitrex 100 mg  The medication can be repeated once in 2 hours and the maximum is 200 mg in any 24 hours  To that she could add either 600 mg of ibuprofen or 4 mg of dexamethasone  She should avoid using anyone medication that would be typical for migraine treatment more than 3 days out of a week  - it is important that she treat her migraines as early as possible as this will make the medication more effective and she will need few were repeat doses   - she should remain well hydrated drinking at least 48-64 oz of non caffeinated beverages per day in addition to any caffeine  Getting adequate rest is also very important  I would have a reasonably low threshold in the future to have her see the physical therapy group for treatment of neck pain    - I would like for her to keep track of her migraines using application on her phone     I will plan for her to follow up with me directly in 4 months time but would be happy to see her sooner if the need should arise         Problem List Items Addressed This Visit     None      Visit Diagnoses     Encounter for birth control pills maintenance                   Reason for visit is   Chief Complaint   Patient presents with    Headache    Virtual Regular Visit        Encounter provider Marlon Gaviria MD    Provider located at [address] [place] [address]  [phone]      Recent Visits  No visits were found meeting these conditions  Showing recent visits within past 7 days and meeting all other requirements  Today's Visits  Date Type Provider Dept   01/13/22 Telemedicine Marlon Gaviria MD [facility] today's visits and meeting all other requirements  Future Appointments  No visits were found meeting these conditions  Showing future appointments within next 150 days and meeting all other requirements       The patient was identified by name and date of birth  Humaira Moore was informed that this is a telemedicine visit and that the visit is being conducted through  [facility] and patient was informed this is a secure, HIPAA-complaint platform  She agrees to proceed     My office door was closed  No one else was in the room  She acknowledged consent and understanding of privacy and security of the video platform  The patient has agreed to participate and understands they can discontinue the visit at any time  Patient is aware this is a billable service  Subjective      Temporal Pattern of Headaches:   They have had migraines for as long as they can remember but started worsening in their early 20s    Additional Relevant History:  Do you have any family history of headache? migraine headaches in mother and sister  Family history of aneurysms? no    Is there any particular time of day that is worse: No specific time  Do they awaken from sleep?: Not overnight but awaken early in the am with it  'Clustering' of HA's over time? no  Have you found any triggers? stress, menses (previoulsy, now on OCP and does not get a period), on progesterone only version), changes in sleep, too much wine sometimes    Description of Headaches:  Do you have a specific aura? None    What is the typical location of pain: Usually starts occipital and can radiate forward  Switches sides but is typically unilateral   Pain is occipital, behind the eye, temporal/paretal, top of the head, base of the skull  What is the character of pain: pressure like    How severe is the pain for a typical headache? 7  What is the maximum severity of pain? 9    Accompanying symptoms:sonophobia, photophobia    Rapidity of onset: She will let it go for an hour or 2 before taking meds    Typical duration of individual headache: 2-3days    Frequency of headaches 10 days per month with additional headaches on other days that are somewhat different    Headache type 2: Cervical and tension headaches  Tension in the neck up into the head  Painful but not additional migraine symptoms  At times takes Motrin with variable effect  Recently this is happening somewhat often  Has seen a chiropractor who talked to her about posture  Approx freq nearly daily at this point  Therapeutics:  CURRENT Abortive meds? Imitrex (works if she takes motrin with it, may improve it so that she can function, but Imitrex used to work on its own, used 10x in Nov, Dec had Covid and lots of headache), Motrin (using a lot, at least every other day and at times more often),     CURRENT/prior Preventive meds?  None      Past Medical History:   Diagnosis Date    Abnormal Pap smear of cervix     Acute non-recurrent sinusitis 10/25/2021    Arthritis     BRCA1 negative     BRCA2 negative     Constipation, chronic     Cough 9/23/2021    COVID-19 virus infection 12/8/2021    Diabetes mellitus [address] [address]     Fatigue 12/8/2021    Gestational diabetes     Gestational hypertension affecting first pregnancy     Irritable bowel syndrome     Migraine     Rhinitis 10/25/2021    Sinus congestion 2021    Sore throat 2021    Varicella     Weight loss        Past Surgical History:   Procedure Laterality Date     SECTION  10/06/2017    COLPOSCOPY      [place]  DELIVERY ONLY Bilateral 10/6/2017    Procedure:  SECTION (); Surgeon: Danan Aldana MD;  [facility] ;  Service: Obstetrics    [place]  DELIVERY ONLY N/A 3/22/2019    Procedure: Lavera Self () REPEAT;  Surgeon: Danna Aldana MD;  [facility];  Service: Obstetrics    [place] COLONOSCOPY FLX DX W/COLLJ SPEC WHEN PFRMD N/A 2016    Procedure: COLONOSCOPY;  Surgeon: Violetta Mann MD;  [facility]; Service: Gastroenterology    WISDOM TOOTH EXTRACTION         Current Outpatient Medications   Medication Sig Dispense Refill    ciprofloxacin (CILOXAN) 0 3 % ophthalmic solution Administer 2 drops into the left eye 4 (four) times a day 5 mL 0    Drospirenone 4 MG TABS Take 1 tablet by mouth daily 84 tablet 0    fluticasone (FLONASE) 50 mcg/act nasal spray 2 sprays into each nostril daily 15 8 mL 0    ibuprofen (MOTRIN) 600 mg tablet Take 1 tablet by mouth 2 (two) times a day as needed      norethindrone (MICRONOR) 0 35 MG tablet Take 1 tablet (0 35 mg total) by mouth daily 84 tablet 4    SUMAtriptan (IMITREX) 50 mg tablet Take 1 tablet (50 mg total) by mouth once as needed for migraine for up to 16 doses 16 tablet 0     No current facility-administered medications for this visit  Allergies   Allergen Reactions    Amoxicillin Rash    Penicillins Rash       Review of Systems   Constitutional: Negative  HENT: Negative  Eyes: Negative  Respiratory: Negative  Cardiovascular: Negative  Gastrointestinal: Negative  Endocrine: Negative  Genitourinary: Negative  Musculoskeletal: Positive for neck pain  Skin: Negative  Allergic/Immunologic: Negative  Neurological: Positive for headaches  Hematological: Negative  Psychiatric/Behavioral: Negative  Reviewed ROS as entered by medical assistant  Video Exam      Physical Exam     At the time examination she awake and in distress  She is an excellent historian with no dysarthria or aphasia  Extraocular movements were nystagmus  Face was symmetric  Tongue was  Cervical range of motion including lateral, extension, flexion was quite good  There is no clear drift  Finger-to-nose revealed no significant ataxia and preserved proprioceptive function although she had little bit of difficulty with 2 step commands likely related to a little bit of distractibility  I have spent 50 minutes with Patient  today including counseling/coordination of care regarding Intructions for management and Patient and family education as well as on chart review, communication, and documentation  VIRTUAL VISIT DISCLAIMER      Alvaro Oliveros verbally agrees to participate in [employer]  Pt is aware that [employer] could be limited without vital signs or the ability to perform a full hands-on physical Harleen Duenas understands she or the provider may request at any time to terminate the video visit and request the patient to seek care or treatment in person

## 2022-01-13 NOTE — PATIENT INSTRUCTIONS
Migraine: Gilda Chinchilla for an initial consultation with regard to migraine headaches  At this point in time she is experiencing significant migraines many days out of month with additional headaches coming from the back of the neck  She has never been on any preventative medications, quite surprisingly, and notes that she is getting only modest benefit from Imitrex which is being given at a half dose at this time  Her neurologic exam is reassuring today  - for migraine prevention we will begin topiramate at a dose of 25 mg taken once daily in the morning  If she notices that it makes her tired she can absolutely taken at night  She should continue at that dose for 2 weeks and then increase by 1 tablet for the next 1-2 weeks  At that time if she notices no benefit and no side effects she can increase once again to 3 tablets per day  - 2 abort a migraine she can use Imitrex 100 mg  The medication can be repeated once in 2 hours and the maximum is 200 mg in any 24 hours  To that she could add either 600 mg of ibuprofen or 4 mg of dexamethasone  She should avoid using anyone medication that would be typical for migraine treatment more than 3 days out of a week  - it is important that she treat her migraines as early as possible as this will make the medication more effective and she will need few were repeat doses   - she should remain well hydrated drinking at least 48-64 oz of non caffeinated beverages per day in addition to any caffeine  Getting adequate rest is also very important  I would have a reasonably low threshold in the future to have her see the physical therapy group for treatment of neck pain  - I would like for her to keep track of her migraines using application on her phone     I will plan for her to follow up with me directly in 4 months time but would be happy to see her sooner if the need should arise

## 2022-01-13 NOTE — TELEPHONE ENCOUNTER
Called to schedule follow up w/ Amelia Dasilva in 4 months after virtual visit w/ Dr Chanel De La Torre on 1/13   [facility] for call back

## 2022-01-20 ENCOUNTER — TELEPHONE (OUTPATIENT)
Dept: OBGYN CLINIC | Facility: CLINIC | Age: 37
End: 2022-01-20

## 2022-01-20 DIAGNOSIS — Z30.011 ENCOUNTER FOR BCP (BIRTH CONTROL PILLS) INITIAL PRESCRIPTION: Primary | ICD-10-CM

## 2022-01-20 RX ORDER — DROSPIRENONE AND ETHINYL ESTRADIOL 0.02-3(28)
1 KIT ORAL DAILY
Qty: 84 TABLET | Refills: 4 | Status: SHIPPED | OUTPATIENT
Start: 2022-01-20 | End: 2022-04-06 | Stop reason: SDUPTHER

## 2022-01-20 NOTE — TELEPHONE ENCOUNTER
----- Message from Betty Chirinos sent at 1/20/2022 12:45 PM EST -----  Regarding: Recommendations from neurologist  Hi Dr Jose Alejandro Pantoja,   If this is okay, then I do think I would prefer to try to go back on Michelle  Overall I think I felt better on it  I have about a week left on this current pack of Slynd  Should I finish this pack out first before I switch?  Thank you so much! - [facility]

## 2022-02-09 DIAGNOSIS — G43.009 MIGRAINE WITHOUT AURA AND WITHOUT STATUS MIGRAINOSUS, NOT INTRACTABLE: ICD-10-CM

## 2022-02-09 RX ORDER — SUMATRIPTAN 100 MG/1
TABLET, FILM COATED ORAL
Qty: 12 TABLET | Refills: 0 | Status: CANCELLED | OUTPATIENT
Start: 2022-02-09

## 2022-02-09 RX ORDER — TOPIRAMATE 25 MG/1
TABLET ORAL
Qty: 90 TABLET | Refills: 0 | Status: CANCELLED | OUTPATIENT
Start: 2022-02-09

## 2022-02-22 DIAGNOSIS — G43.009 MIGRAINE WITHOUT AURA AND WITHOUT STATUS MIGRAINOSUS, NOT INTRACTABLE: ICD-10-CM

## 2022-02-22 NOTE — TELEPHONE ENCOUNTER
Patient called in for refills of topiramate - taking 25 mg 3 tabs daily and is satisfied with this dose  Headaches improving and no side effects to reports  She would like sumatriptan refilled as well  Preferred pharm [facility] [employer]  Scripts pended for review and signature

## 2022-02-23 RX ORDER — TOPIRAMATE 25 MG/1
75 TABLET ORAL DAILY
Qty: 270 TABLET | Refills: 3 | Status: SHIPPED | OUTPATIENT
Start: 2022-02-23 | End: 2022-05-13 | Stop reason: SDUPTHER

## 2022-02-23 RX ORDER — SUMATRIPTAN 100 MG/1
TABLET, FILM COATED ORAL
Qty: 12 TABLET | Refills: 1 | Status: SHIPPED | OUTPATIENT
Start: 2022-02-23 | End: 2022-05-10 | Stop reason: SDUPTHER

## 2022-02-23 NOTE — TELEPHONE ENCOUNTER
Patient called again; she was upset that scripts for topamax and imitrex weren't signed yet  She said [facility] wouldn't fill however scripts should still have refills  I called [facility] to check status and was told patient [facility] to [facility] [address] [place] [address] 1/17  I then confirmed this with patient; she is asking for scripts to be redirected back to [facility]  Please sign if in agreement and I will then call [facility] to cancel refills  Thanks for your help

## 2022-02-23 NOTE — TELEPHONE ENCOUNTER
I called [facility] and cancelled scripts for sumatriptan and topamax  I called patient to advise scripts sent to [facility]; reached , [facility] message [facility]

## 2022-03-25 ENCOUNTER — CONSULT (OUTPATIENT)
Dept: SURGICAL ONCOLOGY | Facility: CLINIC | Age: 37
End: 2022-03-25
Payer: COMMERCIAL

## 2022-03-25 ENCOUNTER — TELEPHONE (OUTPATIENT)
Dept: HEMATOLOGY ONCOLOGY | Facility: CLINIC | Age: 37
End: 2022-03-25

## 2022-03-25 ENCOUNTER — TELEPHONE (OUTPATIENT)
Dept: SURGICAL ONCOLOGY | Facility: CLINIC | Age: 37
End: 2022-03-25

## 2022-03-25 VITALS
BODY MASS INDEX: 29.62 KG/M2 | SYSTOLIC BLOOD PRESSURE: 116 MMHG | HEART RATE: 86 BPM | HEIGHT: 63 IN | RESPIRATION RATE: 18 BRPM | OXYGEN SATURATION: 98 % | WEIGHT: 167.2 LBS | TEMPERATURE: 98.3 F | DIASTOLIC BLOOD PRESSURE: 64 MMHG

## 2022-03-25 DIAGNOSIS — Z80.3 FAMILY HISTORY OF BREAST CANCER: Primary | ICD-10-CM

## 2022-03-25 DIAGNOSIS — Z91.89 INCREASED RISK OF BREAST CANCER: ICD-10-CM

## 2022-03-25 DIAGNOSIS — Z12.31 VISIT FOR SCREENING MAMMOGRAM: ICD-10-CM

## 2022-03-25 PROCEDURE — 99244 OFF/OP CNSLTJ NEW/EST MOD 40: CPT | Performed by: NURSE PRACTITIONER

## 2022-03-25 NOTE — TELEPHONE ENCOUNTER
Spoke to patient about her consult appt with [facility] Renee  I recommended she reach out to her referring Dr to ask whether this appt is necessary or not

## 2022-03-25 NOTE — PATIENT INSTRUCTIONS
Breast Self Exam for Women   AMBULATORY CARE:   A breast self-exam (BSE)  is a way to check your breasts for lumps and other changes  Regular BSEs can help you know how your breasts normally look and feel  Most breast lumps or changes are not cancer, but you should always have them checked by a healthcare provider  Why you should do a BSE:  Breast cancer is the most common type of cancer in women  Even if you have mammograms, you may still want to do a BSE regularly  If you know how your breasts normally feel and look, it may help you know when to contact your healthcare provider  Mammograms can miss some cancers  You may find a lump during a BSE that did not show up on a mammogram   When you should do a BSE:  If you have periods, you may want to do your BSE 1 week after your period ends  This is the time when your breasts may be the least swollen, lumpy, or tender  You can do regular BSEs even if you are breastfeeding or have breast implants  Call your doctor if:   · You find any lumps or changes in your breasts  · You have breast pain or fluid coming from your nipples  · You have questions or concerns about your condition or care  How to do a BSE:       · Look at your breasts in a mirror  Look at the size and shape of each breast and nipple  Check for swelling, lumps, dimpling, scaly skin, or other skin changes  Look for nipple changes, such as a nipple that is painful or beginning to pull inward  Gently squeeze both nipples and check to see if fluid (that is not breast milk) comes out of them  If you find any of these or other breast changes, contact your healthcare provider  Check your breasts while you sit or  the following 3 positions:    ? Hang your arms down at your sides  ? Raise your hands and join them behind your head  ? Put firm pressure with your hands on your hips  Bend slightly forward while you look at your breasts in the mirror  · Lie down and feel your breasts    When you lie down, your breast tissue spreads out evenly over your chest  This makes it easier for you to feel for lumps and anything that may not be normal for your breasts  Do a BSE on one breast at a time  ? Place a small pillow or towel under your left shoulder  Put your left arm behind your head  ? Use the 3 middle fingers of your right hand  Use your fingertip pads, on the top of your fingers  Your fingertip pad is the most sensitive part of your finger  ? Use small circles to feel your breast tissue  Use your fingertip pads to make dime-sized, overlapping circles on your breast and armpits  Use light, medium, and firm pressure  First, press lightly  Second, press with medium pressure to feel a little deeper into the breast  Last, use firm pressure to feel deep within your breast     ? Examine your entire breast area  Examine the breast area from above the breast to below the breast where you feel only ribs  Make small circles with your fingertips, starting in the middle of your armpit  Make circles going up and down the breast area  Continue toward your breast and all the way across it  Examine the area from your armpit all the way over to the middle of your chest (breastbone)  Stop at the middle of your chest     ? Move the pillow or towel to your right shoulder, and put your right arm behind your head  Use the 3 fingertip pads of your left hand, and repeat the above steps to do a BSE on your right breast     What else you can do to check for breast problems or cancer:  Talk to your healthcare provider about mammograms  A mammogram is an x-ray of your breasts to screen for breast cancer or other problems  Your provider can tell you the benefits and risks of mammograms  The first mammogram is usually at age 39 or 48  Your provider may recommend you start at 36 or younger if your risk for breast cancer is high  Mammograms usually continue every 1 to 2 years until age 76         Follow up with your doctor as directed:  Write down your questions so you remember to ask them during your visits  © Copyright [employer] 2022 Information is for End User's use only and may not be sold, redistributed or otherwise used for commercial purposes  All illustrations and images included in CareNotes® are the copyrighted property of A D A M [employer] Inc  or Denisse Glasgow  The above information is an  only  It is not intended as medical advice for individual conditions or treatments  Talk to your doctor, nurse or pharmacist before following any medical regimen to see if it is safe and effective for you

## 2022-03-25 NOTE — PROGRESS NOTES
Surgical Oncology Follow Up       [facility]  [facility] [id number]    Ludy Carlson  1985  [id number]      Chief Complaint   Patient presents with    Advice Only       Assessment/Plan:  1  Family history of breast cancer    2  Increased risk of breast cancer  - Mammo screening bilateral w 3d & cad; Future    3  Visit for screening mammogram  - Mammo screening bilateral w 3d & cad; Future      Discussion/Summary: Patient is a 71-year-old female with a family history of breast cancer in a paternal grandmother diagnosed in her 45s and a maternal grandmother diagnosed in her [de-identified]  The patient underwent genetic testing which was negative  She notices no changes on her self breast exam and there are no concerns on exam today  She had a normal mammogram in November of 2021  Lifetime TC risk was estimated to be 25 88%  I reviewed risk reducing measures with her today  I recommended annual 3D mammography and clinical breast exams every 6 months  I would hold off on further adjuvant imaging at this time  She sees her gynecologist in the summer so I will plan to see her back in approximately 10 months and then annually thereafter so her visits with me are staggered with her gynecologist   I encouraged her to be self-breast aware and contact me with any changes on self-exam     History of Present Illness:     -Interval History:  Patient is a 71-year-old female who presents today for a consultation regarding a family history of breast cancer  Her paternal grandmother was diagnosed with breast cancer in her 45s and her maternal grandmother was diagnosed with breast cancer in her [de-identified]  Patient underwent genetic testing which was negative, VUS of MSH6  She notices no changes on her self breast exam with the exception of breast tenderness which began after switching her birth control pills    She had a bilateral mammogram in November of  which was BI-RADS 1, category 2 density  Her lifetime TC risk was estimated to be 25 88%  Menarche age 16, 2 pregnancies, 2 live births  She was 32 at the time her 1st child was born  She works as a nurse in maternal fetal medicine  Genetic testing results:  Test(s): [employer] Common Hereditary Cancer Panel      Result: 2 Variants of uncertain significance     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); heterozygous; uncertain significance      Variant 2  c 1574G>A (p [id number]); heterozygous; uncertain significance          Review of Systems:  Review of Systems   Constitutional: Negative for activity change, appetite change, chills, fatigue, fever and unexpected weight change  Respiratory: Negative for cough and shortness of breath  Cardiovascular: Negative for chest pain  Gastrointestinal: Negative for abdominal pain, constipation, diarrhea, nausea and vomiting  Musculoskeletal: Positive for joint swelling and neck pain  Negative for arthralgias, back pain, gait problem and myalgias  Skin: Negative for color change and rash  Neurological: Negative for dizziness and headaches  Hematological: Negative for adenopathy  Psychiatric/Behavioral: Negative for agitation and confusion  All other systems reviewed and are negative        Patient Active Problem List   Diagnosis    S/P  section    Hepatic hemangioma    Migraine without aura and without status migrainosus, not intractable    Family history of breast cancer    Cervical cancer screening    UTI symptoms    Sinus congestion    Sore throat    Cough    Acute non-recurrent sinusitis    Rhinitis    COVID-19 virus infection    Fatigue    Increased risk of breast cancer     Past Medical History:   Diagnosis Date    Abnormal Pap smear of cervix     Acute non-recurrent sinusitis 10/25/2021    Arthritis     BRCA1 negative     BRCA2 negative     Constipation, chronic     Cough 2021    COVID-19 virus infection 2021    Diabetes mellitus ([facility] [address] )     Fatigue 2021    Gestational diabetes     Gestational hypertension affecting first pregnancy     Irritable bowel syndrome     Migraine     Rhinitis 10/25/2021    Sinus congestion 2021    Sore throat 2021    Varicella     Weight loss      Past Surgical History:   Procedure Laterality Date     SECTION  10/06/2017    COLPOSCOPY      NV  DELIVERY ONLY Bilateral 10/6/2017    Procedure:  SECTION (); Surgeon: Kathy Reza MD;  Location: [facility];  Service: Obstetrics    NV  DELIVERY ONLY N/A 3/22/2019    Procedure: Ryann Gal () REPEAT;  Surgeon: Kathy Reza MD;  Location: [facility];  Service: Obstetrics    NV COLONOSCOPY FLX DX W/COLLJ SPEC WHEN PFRMD N/A 2016    Procedure: COLONOSCOPY;  Surgeon: Shawn Bryant MD;  [facility]; Service: Gastroenterology    WISDOM TOOTH EXTRACTION       Family History   Problem Relation Age of Onset    Arthritis Mother     Irritable bowel syndrome Mother     Breast cancer Maternal Grandmother [de-identified]    Heart disease Maternal Grandmother     Arthritis Maternal Grandmother     Diabetes Maternal Grandfather     Breast cancer Paternal Grandmother 36    Heart disease Paternal Grandfather     No Known Problems Sister     No Known Problems Father     No Known Problems Daughter     No Known Problems Son     No Known Problems Sister     No Known Problems Maternal Aunt     No Known Problems Paternal Uncle      Social History     Socioeconomic History    Marital status: /Civil Union     Spouse name: Not on file    Number of children: Not on file    Years of education: Not on file    Highest education level: Not on file   Occupational History    Not on file   Tobacco Use    Smoking status: Never Smoker    Smokeless tobacco: Never Used   Vaping Use    Vaping Use: Never used   Substance and Sexual Activity    Alcohol use: Not Currently    Drug use:  No  Sexual activity: Yes     Partners: Male     Birth control/protection: OCP   Other Topics Concern    Not on file   Social History Narrative    No alcohol use    Pap smear by her gyn     - [facility] per [facility]     Social Determinants of Health     Financial Resource Strain: Not on file   Food Insecurity: Not on file   Transportation Needs: Not on file   Physical Activity: Not on file   Stress: Not on file   Social Connections: Not on file   Intimate Partner Violence: Not on file   Housing Stability: Not on file       Current Outpatient Medications:     Cholecalciferol (Vitamin D3) 250 MCG (43450 UT) TABS, Take 10,000 Units by mouth in the morning, Disp: , Rfl:     dexamethasone (DECADRON) 4 mg tablet, 1 tab as needed for migraine, do NOT take with NSAID, Disp: 20 tablet, Rfl: 1    Drospirenone 4 MG TABS, Take 1 tablet by mouth daily, Disp: 84 tablet, Rfl: 0    drospirenone-ethinyl estradiol (BARON) 3-0 02 MG per tablet, Take 1 tablet by mouth daily, Disp: 84 tablet, Rfl: 4    ibuprofen (MOTRIN) 600 mg tablet, Take 1 tablet by mouth 2 (two) times a day as needed, Disp: , Rfl:     SUMAtriptan (IMITREX) 100 mg tablet, 1 tab as needed for migraine, may repeat in 2 hours, max 200mg per 24 hours, Disp: 12 tablet, Rfl: 1    topiramate (TOPAMAX) 25 mg tablet, Take 3 tablets (75 mg total) by mouth daily, Disp: 270 tablet, Rfl: 3    ciprofloxacin (CILOXAN) 0 3 % ophthalmic solution, Administer 2 drops into the left eye 4 (four) times a day (Patient not taking: Reported on 1/13/2022 ), Disp: 5 mL, Rfl: 0    fluticasone (FLONASE) 50 mcg/act nasal spray, 2 sprays into each nostril daily (Patient not taking: Reported on 1/13/2022 ), Disp: 15 8 mL, Rfl: 0  Allergies   Allergen Reactions    Amoxicillin Rash    Penicillins Rash     Vitals:    03/25/22 1511   BP: 116/64   Pulse: 86   Resp: 18   Temp: 98 3 °F (36 8 °C)   SpO2: 98%       Physical Exam  Vitals reviewed  Constitutional:       Appearance: Normal appearance  HENT:      Head: Normocephalic and atraumatic  Pulmonary:      Effort: Pulmonary effort is normal    Chest:   Breasts:      Right: No swelling, bleeding, inverted nipple, mass, nipple discharge, skin change, tenderness, axillary adenopathy or supraclavicular adenopathy  Left: No swelling, bleeding, inverted nipple, mass, nipple discharge, skin change, tenderness, axillary adenopathy or supraclavicular adenopathy  Lymphadenopathy:      Upper Body:      Right upper body: No supraclavicular or axillary adenopathy  Left upper body: No supraclavicular or axillary adenopathy  Neurological:      General: No focal deficit present  Mental Status: She is alert and oriented to person, place, and time  Psychiatric:         Mood and Affect: Mood normal            Results:    Imaging  11/4/2021-bilateral 3D screening mammogram- BI-RADS 1, category 2 density  Lifetime TC risk 25 88%      Advance Care Planning/Advance Directives:  Discussed disease status and treatment goals with the patient

## 2022-03-25 NOTE — TELEPHONE ENCOUNTER
CALL RETURN FORM   Reason for patient call? Patient is unsure if she needs appointment,  Wants to talk to someone on how necessary appointment is    Patient's primary oncologist?  Riki Salas   Name of person the patient was calling for? Riki Salas   Any additional information to add, if applicable? Informed patient that the message will be forwarded to the team and someone will get back to them as soon as possible    Did you relay this information to the patient?  Yes

## 2022-03-25 NOTE — TELEPHONE ENCOUNTER
Patient stated they will call and set up mammo and f/u appt   MAMMO (AROUND 11/5/22)   10 month f/u (around 1/24/23) with [facility] at [facility] or with Addison Andersen at [facility]

## 2022-04-06 DIAGNOSIS — Z30.011 ENCOUNTER FOR BCP (BIRTH CONTROL PILLS) INITIAL PRESCRIPTION: ICD-10-CM

## 2022-04-07 DIAGNOSIS — Z30.011 ENCOUNTER FOR BCP (BIRTH CONTROL PILLS) INITIAL PRESCRIPTION: ICD-10-CM

## 2022-04-07 RX ORDER — DROSPIRENONE AND ETHINYL ESTRADIOL 0.02-3(28)
1 KIT ORAL DAILY
Qty: 84 TABLET | Refills: 0 | Status: SHIPPED | OUTPATIENT
Start: 2022-04-07 | End: 2022-04-07 | Stop reason: SDUPTHER

## 2022-04-08 RX ORDER — DROSPIRENONE AND ETHINYL ESTRADIOL 0.02-3(28)
1 KIT ORAL DAILY
Qty: 84 TABLET | Refills: 0 | Status: SHIPPED | OUTPATIENT
Start: 2022-04-08 | End: 2022-05-09 | Stop reason: ALTCHOICE

## 2022-04-29 ENCOUNTER — TELEPHONE (OUTPATIENT)
Dept: NEUROLOGY | Facility: CLINIC | Age: 37
End: 2022-04-29

## 2022-04-29 NOTE — TELEPHONE ENCOUNTER
Called and left a voicemail for patient - Please call back to confirm upcoming appointment with Wilfredo Davis  Provided patient with apt date, time and location  Informed patient that check in is at least 15 minutes prior to apt time

## 2022-05-09 ENCOUNTER — OFFICE VISIT (OUTPATIENT)
Dept: INTERNAL MEDICINE CLINIC | Facility: CLINIC | Age: 37
End: 2022-05-09
Payer: COMMERCIAL

## 2022-05-09 VITALS
OXYGEN SATURATION: 99 % | HEART RATE: 74 BPM | WEIGHT: 169 LBS | RESPIRATION RATE: 12 BRPM | BODY MASS INDEX: 27.16 KG/M2 | HEIGHT: 66 IN | SYSTOLIC BLOOD PRESSURE: 122 MMHG | TEMPERATURE: 98.7 F | DIASTOLIC BLOOD PRESSURE: 78 MMHG

## 2022-05-09 DIAGNOSIS — M25.50 ARTHRALGIA, UNSPECIFIED JOINT: ICD-10-CM

## 2022-05-09 DIAGNOSIS — M79.10 MYALGIA: Primary | ICD-10-CM

## 2022-05-09 DIAGNOSIS — E78.2 MIXED HYPERLIPIDEMIA: ICD-10-CM

## 2022-05-09 DIAGNOSIS — F32.89 OTHER DEPRESSION: ICD-10-CM

## 2022-05-09 DIAGNOSIS — M54.2 NECK PAIN: ICD-10-CM

## 2022-05-09 DIAGNOSIS — G43.009 MIGRAINE WITHOUT AURA AND WITHOUT STATUS MIGRAINOSUS, NOT INTRACTABLE: ICD-10-CM

## 2022-05-09 DIAGNOSIS — R53.83 OTHER FATIGUE: ICD-10-CM

## 2022-05-09 PROBLEM — F32.A DEPRESSION: Status: ACTIVE | Noted: 2022-05-09

## 2022-05-09 PROCEDURE — 99214 OFFICE O/P EST MOD 30 MIN: CPT | Performed by: INTERNAL MEDICINE

## 2022-05-09 RX ORDER — DULOXETIN HYDROCHLORIDE 30 MG/1
30 CAPSULE, DELAYED RELEASE ORAL DAILY
Qty: 90 CAPSULE | Refills: 0 | Status: SHIPPED | OUTPATIENT
Start: 2022-05-09 | End: 2022-07-01

## 2022-05-09 RX ORDER — IBUPROFEN 800 MG/1
TABLET ORAL 2 TIMES DAILY
COMMUNITY

## 2022-05-09 NOTE — PROGRESS NOTES
Assessment/Plan:    1  Myalgia  Assessment & Plan:  She gets generalized body ache for which she has been taking ibuprofen almost 800 mg twice a day  Rule out any rheumatological disorder versus fiber myalgia  Will order rheumatological workup and metabolic workup as well  Will refer to rheumatologist   She had a an evaluation by rheumatologist in the past   Discussed with the patient to start also medication duloxetine which might help for a fibromyalgia and possible depression    Orders:  -     CBC and differential; Future  -     CK; Future  -     Comprehensive metabolic panel; Future  -     TSH, 3rd generation; Future  -     UA (URINE) with reflex to Scope  -     Sedimentation rate, automated; Future  -     RF Screen w/ Reflex to Titer; Future  -     Antinuclear Antibodies, IFA; Future  -     C-reactive protein; Future  -     Ambulatory Referral to Sports Medicine; Future  -     Ambulatory Referral to Rheumatology; Future    2  Other depression  Assessment & Plan:  Discussed with the patient to start medication  She agreed so started on her duloxetine 30 mg daily  Orders:  -     CBC and differential; Future  -     Comprehensive metabolic panel; Future  -     TSH, 3rd generation; Future  -     UA (URINE) with reflex to Scope  -     DULoxetine (Cymbalta) 30 mg delayed release capsule; Take 1 capsule (30 mg total) by mouth daily    3  Migraine without aura and without status migrainosus, not intractable  Assessment & Plan:  She has been followed by neurologist   She is on Topamax as well as Imitrex  She gets migraine about 5 times per month  She has a follow-up appoint with neurologist this Friday  Orders:  -     CBC and differential; Future    4  Neck pain  Assessment & Plan:  Possible muscular contraction, muscle spasm causing neck pain  Will order x-ray to rule out any underlying arthritis versus other etiology  Patient does not want any muscle relaxant    Will refer to Sports medication will need a physical therapy  Continue ibuprofen for now     Orders:  -     XR spine cervical complete 4 or 5 vw non injury; Future  -     CK; Future  -     Ambulatory Referral to Sports Medicine; Future    5  BMI 27 0-27 9,adult  Assessment & Plan:  Patient  was advised to decrease portion size  Advised to decrease carb, sugar, cholesterol intake  Advised to exercise 3-5 times per week  Advised to lose weight  6  Other fatigue  Assessment & Plan:  Rule out any metabolic problem versus rule out secondary to depression  Will order metabolic workup as well as start medication for depression as above  Orders:  -     CBC and differential; Future  -     CK; Future  -     Comprehensive metabolic panel; Future  -     TSH, 3rd generation; Future  -     UA (URINE) with reflex to Scope    7  Mixed hyperlipidemia  Assessment & Plan:  Her cholesterol was 308 in June 2018 then came down to 203 last year  Similarly LDL was 230 in June 2018 and then came down to 122 last year on diet  Triglyceride 144  She has been watching diet for cholesterol saturated fat  Will follow lipid panel  Orders:  -     Lipid panel; Future    8  Arthralgia, unspecified joint  Assessment & Plan:  She gets pain in both hands and neck  Rule out any rheumatological disorder  She had an evaluation by rheumatologist in the past   She has been taking ibuprofen 800 mg b i d     Will refer to rheumatologist for reevaluation after blood test     Orders:  -     CBC and differential; Future  -     Comprehensive metabolic panel; Future  -     Sedimentation rate, automated; Future  -     RF Screen w/ Reflex to Titer; Future  -     Antinuclear Antibodies, IFA; Future  -     C-reactive protein; Future  -     Ambulatory Referral to Rheumatology; Future          Subjective:  Patient presents with multiple complaints      Patient ID: Rio Beal is a 40 y o  female  HPI   73-year-old white female patient presents with complaint of not feeling well  Complain of neck pain and pain in the hands  Denies any fall or injury  Complain of generalized muscle aches and pain  She has been taking ibuprofen almost H 10 mg twice a day for pain she has also complain of feeling very tired  Complain of feeling somewhat depression like lack of motivation and interest   Gets irritated emotional easily  She has been followed by gyn and has been trying different hormone pills by pain for which she has been followed by neurologist   On Topamax 75 mg daily and Imitrex 100 mg as needed  Still see gets migraine about 5 times per month per patient  The following portions of the patient's history were reviewed and updated as appropriate:     Past Medical History:  She has a past medical history of Abnormal Pap smear of cervix, Acute non-recurrent sinusitis (10/25/2021), Arthritis, BMI 27 0-27 9,adult (2022), BRCA1 negative, BRCA2 negative, Constipation, chronic, Cough (2021), COVID-19 virus infection (2021), Depression (2022), Diabetes mellitus ([facility] [address] ), Fatigue (2021), Gestational diabetes, Gestational hypertension affecting first pregnancy, Irritable bowel syndrome, Migraine, Myalgia (2022), Neck pain (2022), Rhinitis (10/25/2021), Sinus congestion (2021), Sore throat (2021), Varicella, and Weight loss  ,  _______________________________________________________________________  Past Surgical History:   has a past surgical history that includes [place] tooth extraction; pr colonoscopy flx dx w/collj spec when pfrmd (N/A, 2016); Colposcopy; pr  delivery only (Bilateral, 10/6/2017);  section (10/06/2017); and pr  delivery only (N/A, 3/22/2019)  ,  _______________________________________________________________________  Family History:  family history includes Arthritis in her maternal grandmother and mother; Breast cancer (age of onset: 36) in her paternal grandmother; Breast cancer (age of onset: [de-identified]) in her maternal grandmother; Diabetes in her maternal grandfather; Heart disease in her maternal grandmother and paternal grandfather; Irritable bowel syndrome in her mother; No Known Problems in her daughter, father, maternal aunt, paternal uncle, sister, sister, and son ,  _______________________________________________________________________  Social History:   reports that she has never smoked  She has never used smokeless tobacco  She reports previous alcohol use  She reports that she does not use drugs  ,  _______________________________________________________________________  Allergies:  is allergic to amoxicillin and penicillins     _______________________________________________________________________  Current Outpatient Medications   Medication Sig Dispense Refill    Cholecalciferol (Vitamin D3) 250 MCG (66111 UT) TABS Take 10,000 Units by mouth in the morning      ibuprofen (MOTRIN) 800 mg tablet Take by mouth 2 (two) times a day      SUMAtriptan (IMITREX) 100 mg tablet 1 tab as needed for migraine, may repeat in 2 hours, max 200mg per 24 hours 12 tablet 1    topiramate (TOPAMAX) 25 mg tablet Take 3 tablets (75 mg total) by mouth daily 270 tablet 3    DULoxetine (Cymbalta) 30 mg delayed release capsule Take 1 capsule (30 mg total) by mouth daily 90 capsule 0    fluticasone (FLONASE) 50 mcg/act nasal spray 2 sprays into each nostril daily (Patient not taking: Reported on 1/13/2022 ) 15 8 mL 0     No current facility-administered medications for this visit      _______________________________________________________________________  Review of Systems   Constitutional: Positive for fatigue  Negative for chills and fever  HENT: Negative for congestion, ear pain, hearing loss, nosebleeds, sinus pain, sore throat and trouble swallowing  Eyes: Negative for discharge, redness and visual disturbance  Respiratory: Negative for cough, chest tightness and shortness of breath      Cardiovascular: Negative for chest pain and palpitations  Gastrointestinal: Negative for abdominal pain, blood in stool, constipation, diarrhea, nausea and vomiting  Genitourinary: Negative for dysuria, flank pain, frequency and hematuria  Musculoskeletal: Positive for arthralgias, myalgias and neck pain  Skin: Negative for color change and rash  Neurological: Negative for dizziness, speech difficulty, weakness and headaches  Hematological: Does not bruise/bleed easily  Psychiatric/Behavioral: Negative for agitation, behavioral problems, self-injury and suicidal ideas  Complain of feeling run down, lack of interest, gets angry irritated easily and upset easily feels like has some depression         Objective:  Vitals:    05/09/22 1540   BP: 122/78   BP Location: Left arm   Patient Position: Sitting   Cuff Size: Adult   Pulse: 74   Resp: 12   Temp: 98 7 °F (37 1 °C)   TempSrc: Tympanic   SpO2: 99%   Weight: 76 7 kg (169 lb)   Height: 5' 6" (1 676 m)     Body mass index is 27 28 kg/m²  Physical Exam  Vitals and nursing note reviewed  Constitutional:       General: She is not in acute distress  Appearance: Normal appearance  HENT:      Head: Normocephalic and atraumatic  Right Ear: Ear canal and external ear normal       Left Ear: Ear canal and external ear normal       Nose: Nose normal       Mouth/Throat:      Mouth: Mucous membranes are moist    Eyes:      General: No scleral icterus  Right eye: No discharge  Left eye: No discharge  Extraocular Movements: Extraocular movements intact  Conjunctiva/sclera: Conjunctivae normal    Cardiovascular:      Rate and Rhythm: Normal rate and regular rhythm  Pulses: Normal pulses  Heart sounds: No murmur heard  Pulmonary:      Effort: Pulmonary effort is normal  No respiratory distress  Breath sounds: Normal breath sounds  Abdominal:      General: Bowel sounds are normal       Palpations: Abdomen is soft  Tenderness:  There is no abdominal tenderness  Musculoskeletal:         General: Normal range of motion  Cervical back: Normal range of motion and neck supple  Tenderness (Tender on bilateral cervical area as well as bilateral trapezius muscle area ) present  No rigidity  No muscular tenderness  Right lower leg: No edema  Left lower leg: No edema  Skin:     General: Skin is warm  Findings: No rash  Neurological:      General: No focal deficit present  Mental Status: She is alert and oriented to person, place, and time  Motor: No weakness  Coordination: Coordination normal    Psychiatric:         Mood and Affect: Mood normal          Behavior: Behavior normal          I spent 30 minutes with the patient today    More than 50% time spent for reviewing of external notes, reviewing of the results of diagnostics test, management of care, patient education and ordering of test

## 2022-05-09 NOTE — PATIENT INSTRUCTIONS
Patient was advised to continue present medications  discussed with the patient medications and laboratory data in detail  Follow-up with me  as advised  If any blood test was ordered please do 1 week prior to next appointment unless advise to get earlier    If you have any questions please call the office [phone] For Specimen Handling/Conveyance To Laboratory?: no

## 2022-05-10 ENCOUNTER — APPOINTMENT (OUTPATIENT)
Dept: LAB | Facility: CLINIC | Age: 37
End: 2022-05-10
Payer: COMMERCIAL

## 2022-05-10 ENCOUNTER — TELEPHONE (OUTPATIENT)
Dept: OBGYN CLINIC | Facility: HOSPITAL | Age: 37
End: 2022-05-10

## 2022-05-10 DIAGNOSIS — M79.10 MYALGIA: ICD-10-CM

## 2022-05-10 DIAGNOSIS — E78.2 MIXED HYPERLIPIDEMIA: ICD-10-CM

## 2022-05-10 DIAGNOSIS — M25.50 ARTHRALGIA, UNSPECIFIED JOINT: ICD-10-CM

## 2022-05-10 DIAGNOSIS — M54.2 NECK PAIN: ICD-10-CM

## 2022-05-10 DIAGNOSIS — G43.009 MIGRAINE WITHOUT AURA AND WITHOUT STATUS MIGRAINOSUS, NOT INTRACTABLE: ICD-10-CM

## 2022-05-10 DIAGNOSIS — F32.89 OTHER DEPRESSION: ICD-10-CM

## 2022-05-10 DIAGNOSIS — R53.83 OTHER FATIGUE: ICD-10-CM

## 2022-05-10 DIAGNOSIS — R39.9 UTI SYMPTOMS: ICD-10-CM

## 2022-05-10 LAB
ALBUMIN SERPL BCP-MCNC: 4.1 G/DL (ref 3.5–5)
ALP SERPL-CCNC: 57 U/L (ref 34–104)
ALT SERPL W P-5'-P-CCNC: 7 U/L (ref 7–52)
ANION GAP SERPL CALCULATED.3IONS-SCNC: 7 MMOL/L (ref 4–13)
AST SERPL W P-5'-P-CCNC: 15 U/L (ref 13–39)
BASOPHILS # BLD AUTO: 0.03 THOUSANDS/ΜL (ref 0–0.1)
BASOPHILS NFR BLD AUTO: 0 % (ref 0–1)
BILIRUB SERPL-MCNC: 0.51 MG/DL (ref 0.2–1)
BILIRUB UR QL STRIP: NEGATIVE
BUN SERPL-MCNC: 12 MG/DL (ref 5–25)
CALCIUM SERPL-MCNC: 9.1 MG/DL (ref 8.4–10.2)
CHLORIDE SERPL-SCNC: 105 MMOL/L (ref 96–108)
CHOLEST SERPL-MCNC: 234 MG/DL
CK SERPL-CCNC: 72 U/L (ref 26–192)
CLARITY UR: CLEAR
CO2 SERPL-SCNC: 25 MMOL/L (ref 21–32)
COLOR UR: YELLOW
CREAT SERPL-MCNC: 0.69 MG/DL (ref 0.6–1.3)
CRP SERPL QL: 2 MG/L
EOSINOPHIL # BLD AUTO: 0.14 THOUSAND/ΜL (ref 0–0.61)
EOSINOPHIL NFR BLD AUTO: 2 % (ref 0–6)
ERYTHROCYTE [DISTWIDTH] IN BLOOD BY AUTOMATED COUNT: 12 % (ref 11.6–15.1)
ERYTHROCYTE [SEDIMENTATION RATE] IN BLOOD: 9 MM/HOUR (ref 0–19)
GFR SERPL CREATININE-BSD FRML MDRD: 112 ML/MIN/1.73SQ M
GLUCOSE P FAST SERPL-MCNC: 90 MG/DL (ref 65–99)
GLUCOSE UR STRIP-MCNC: NEGATIVE MG/DL
HCT VFR BLD AUTO: 43.7 % (ref 34.8–46.1)
HDLC SERPL-MCNC: 67 MG/DL
HGB BLD-MCNC: 15 G/DL (ref 11.5–15.4)
HGB UR QL STRIP.AUTO: NEGATIVE
IMM GRANULOCYTES # BLD AUTO: 0.02 THOUSAND/UL (ref 0–0.2)
IMM GRANULOCYTES NFR BLD AUTO: 0 % (ref 0–2)
KETONES UR STRIP-MCNC: NEGATIVE MG/DL
LDLC SERPL CALC-MCNC: 145 MG/DL (ref 0–100)
LEUKOCYTE ESTERASE UR QL STRIP: NEGATIVE
LYMPHOCYTES # BLD AUTO: 3.22 THOUSANDS/ΜL (ref 0.6–4.47)
LYMPHOCYTES NFR BLD AUTO: 42 % (ref 14–44)
MCH RBC QN AUTO: 29.4 PG (ref 26.8–34.3)
MCHC RBC AUTO-ENTMCNC: 34.3 G/DL (ref 31.4–37.4)
MCV RBC AUTO: 86 FL (ref 82–98)
MONOCYTES # BLD AUTO: 0.35 THOUSAND/ΜL (ref 0.17–1.22)
MONOCYTES NFR BLD AUTO: 5 % (ref 4–12)
NEUTROPHILS # BLD AUTO: 3.97 THOUSANDS/ΜL (ref 1.85–7.62)
NEUTS SEG NFR BLD AUTO: 51 % (ref 43–75)
NITRITE UR QL STRIP: NEGATIVE
NONHDLC SERPL-MCNC: 167 MG/DL
NRBC BLD AUTO-RTO: 0 /100 WBCS
PH UR STRIP.AUTO: 7.5 [PH]
PLATELET # BLD AUTO: 225 THOUSANDS/UL (ref 149–390)
PMV BLD AUTO: 10.4 FL (ref 8.9–12.7)
POTASSIUM SERPL-SCNC: 3.6 MMOL/L (ref 3.5–5.3)
PROT SERPL-MCNC: 7.1 G/DL (ref 6.4–8.4)
PROT UR STRIP-MCNC: NEGATIVE MG/DL
RBC # BLD AUTO: 5.11 MILLION/UL (ref 3.81–5.12)
SODIUM SERPL-SCNC: 137 MMOL/L (ref 135–147)
SP GR UR STRIP.AUTO: 1.01 (ref 1–1.03)
TRIGL SERPL-MCNC: 108 MG/DL
TSH SERPL DL<=0.05 MIU/L-ACNC: 1.46 UIU/ML (ref 0.45–4.5)
UROBILINOGEN UR QL STRIP.AUTO: 0.2 E.U./DL
WBC # BLD AUTO: 7.73 THOUSAND/UL (ref 4.31–10.16)

## 2022-05-10 PROCEDURE — 85652 RBC SED RATE AUTOMATED: CPT

## 2022-05-10 PROCEDURE — 81003 URINALYSIS AUTO W/O SCOPE: CPT | Performed by: INTERNAL MEDICINE

## 2022-05-10 PROCEDURE — 86140 C-REACTIVE PROTEIN: CPT

## 2022-05-10 PROCEDURE — 36415 COLL VENOUS BLD VENIPUNCTURE: CPT

## 2022-05-10 PROCEDURE — 84443 ASSAY THYROID STIM HORMONE: CPT

## 2022-05-10 PROCEDURE — 86038 ANTINUCLEAR ANTIBODIES: CPT

## 2022-05-10 PROCEDURE — 86430 RHEUMATOID FACTOR TEST QUAL: CPT

## 2022-05-10 PROCEDURE — 87086 URINE CULTURE/COLONY COUNT: CPT

## 2022-05-10 PROCEDURE — 80061 LIPID PANEL: CPT

## 2022-05-10 PROCEDURE — 80053 COMPREHEN METABOLIC PANEL: CPT

## 2022-05-10 PROCEDURE — 82550 ASSAY OF CK (CPK): CPT

## 2022-05-10 PROCEDURE — 85025 COMPLETE CBC W/AUTO DIFF WBC: CPT

## 2022-05-10 RX ORDER — TOPIRAMATE 25 MG/1
75 TABLET ORAL DAILY
Qty: 270 TABLET | Refills: 0 | Status: CANCELLED | OUTPATIENT
Start: 2022-05-10

## 2022-05-10 RX ORDER — SUMATRIPTAN 100 MG/1
TABLET, FILM COATED ORAL
Qty: 12 TABLET | Refills: 5 | Status: SHIPPED | OUTPATIENT
Start: 2022-05-10

## 2022-05-10 NOTE — ASSESSMENT & PLAN NOTE
Discussed with the patient to start medication  She agreed so started on her duloxetine 30 mg daily

## 2022-05-10 NOTE — ASSESSMENT & PLAN NOTE
She gets pain in both hands and neck  Rule out any rheumatological disorder  She had an evaluation by rheumatologist in the past   She has been taking ibuprofen 800 mg b i d     Will refer to rheumatologist for reevaluation after blood test

## 2022-05-10 NOTE — ASSESSMENT & PLAN NOTE
She gets generalized body ache for which she has been taking ibuprofen almost 800 mg twice a day  Rule out any rheumatological disorder versus fiber myalgia  Will order rheumatological workup and metabolic workup as well    Will refer to rheumatologist   She had a an evaluation by rheumatologist in the past   Discussed with the patient to start also medication duloxetine which might help for a fibromyalgia and possible depression

## 2022-05-10 NOTE — ASSESSMENT & PLAN NOTE
Rule out any metabolic problem versus rule out secondary to depression  Will order metabolic workup as well as start medication for depression as above

## 2022-05-10 NOTE — TELEPHONE ENCOUNTER
Patient of Dr Marzena Woods, last visit 1/13; office note documents:  - "for migraine prevention we will begin topiramate at a dose of 25 mg taken once daily in the morning; - 2 abort a migraine she can use Imitrex 100 mg "    topamax (has refills; I discontinued script, left patient vm and sent f/u my chart message)  sumatriptan (due for refill) Please sign script if in agreement, thank you

## 2022-05-10 NOTE — ASSESSMENT & PLAN NOTE
She has been followed by neurologist   She is on Topamax as well as Imitrex  She gets migraine about 5 times per month  She has a follow-up appoint with neurologist this Friday

## 2022-05-10 NOTE — ASSESSMENT & PLAN NOTE
Possible muscular contraction, muscle spasm causing neck pain  Will order x-ray to rule out any underlying arthritis versus other etiology  Patient does not want any muscle relaxant  Will refer to Sports medication will need a physical therapy    Continue ibuprofen for now

## 2022-05-10 NOTE — ASSESSMENT & PLAN NOTE
Her cholesterol was 308 in June 2018 then came down to 203 last year  Similarly LDL was 230 in June 2018 and then came down to 122 last year on diet  Triglyceride 144  She has been watching diet for cholesterol saturated fat  Will follow lipid panel

## 2022-05-11 LAB
BACTERIA UR CULT: NORMAL
RHEUMATOID FACT SER QL LA: NEGATIVE

## 2022-05-12 LAB — ANA TITR SER IF: NEGATIVE {TITER}

## 2022-05-13 ENCOUNTER — OFFICE VISIT (OUTPATIENT)
Dept: NEUROLOGY | Facility: CLINIC | Age: 37
End: 2022-05-13
Payer: COMMERCIAL

## 2022-05-13 VITALS
WEIGHT: 166.4 LBS | SYSTOLIC BLOOD PRESSURE: 104 MMHG | DIASTOLIC BLOOD PRESSURE: 64 MMHG | RESPIRATION RATE: 18 BRPM | OXYGEN SATURATION: 98 % | HEART RATE: 96 BPM | HEIGHT: 63 IN | TEMPERATURE: 97.6 F | BODY MASS INDEX: 29.48 KG/M2

## 2022-05-13 DIAGNOSIS — G43.009 MIGRAINE WITHOUT AURA AND WITHOUT STATUS MIGRAINOSUS, NOT INTRACTABLE: ICD-10-CM

## 2022-05-13 PROCEDURE — 99213 OFFICE O/P EST LOW 20 MIN: CPT | Performed by: NURSE PRACTITIONER

## 2022-05-13 RX ORDER — CYCLOBENZAPRINE HCL 5 MG
5 TABLET ORAL
Qty: 15 TABLET | Refills: 1 | Status: SHIPPED | OUTPATIENT
Start: 2022-05-13

## 2022-05-13 RX ORDER — TOPIRAMATE 100 MG/1
100 TABLET, FILM COATED ORAL DAILY
Qty: 90 TABLET | Refills: 1 | Status: SHIPPED | OUTPATIENT
Start: 2022-05-13

## 2022-05-13 NOTE — PROGRESS NOTES
Patient ID: Alen Nicole is a 40 y o  female  Assessment/Plan:  Patient Instructions: Add magnesium oxide 400mg daily  Increase topamax to 100mg   Consider using the cymbalta low dose- this could be helpful for your symptoms- get the upcoming xray/see ortho/rheumatology  Keep logging migraines  Hydrate with 60-80oz water/day  Look up nerivio  Follow up 3 months    Discussed using flexeril at this time on as needed nightly basis for muscle spasm/headache  She will reconsider use of cymbalta for pain, with the idea that it would be good to reduce chronic motrin use  Discussed nerivio as another acute nonpharmacologic migraine medication  Diagnoses and all orders for this visit:    Migraine without aura and without status migrainosus, not intractable  -     topiramate (TOPAMAX) 100 mg tablet; Take 1 tablet (100 mg total) by mouth in the morning   -     cyclobenzaprine (FLEXERIL) 5 mg tablet; Take 1 tablet (5 mg total) by mouth daily at bedtime as needed for muscle spasms         Subjective:    JANEY  Fer Mathur is a 40year old female who presents for follow up of migraines/neck pain  She works as a nurse for [employer]  She states a decrease in headache duration while on topamax- states 5x/month migraine; 10x/month other type of headaches- ranging 4-10/10 pain  She states imitrex helpful  No recent ED visitsShe has reduced ibuprofen slightly but still overusing  She is currently getting repeat rheumatological evaluation (labs/xray) and may see rheumatologist in future  She states continued neck pain L>R  She has no problem with sleep; is able to hydrate well; stays busy with two children  States she may not be able to commit to formal PT-willing to try home neck exercises  States has used cbd cream and heat which helps some with muscle pains  She mentions a feeling of right eyelid twitching; not noticeable to others; no visual complaints  Temporal Pattern of Headaches:   They have had migraines for as long as they can remember but started worsening in their early 25s     Additional Relevant History:  Do you have any family history of headache? migraine headaches in mother and sister  Family history of aneurysms? no     Is there any particular time of day that is worse: No specific time  Do they awaken from sleep?: Not overnight but awaken early in the am with it  'Clustering' of HA's over time? no  Have you found any triggers? stress, menses (previoulsy, now on OCP and does not get a period), on progesterone only version), changes in sleep, too much wine sometimes     Description of Headaches:  Do you have a specific aura? None     What is the typical location of pain: Usually starts occipital and can radiate forward  Switches sides but is typically unilateral   Pain is occipital, behind the eye, temporal/paretal, top of the head, base of the skull      What is the character of pain: pressure like     How severe is the pain for a typical headache? 7  What is the maximum severity of pain? 9     Accompanying symptoms:sonophobia, photophobia     Rapidity of onset: She will let it go for an hour or 2 before taking meds     Typical duration of individual headache: 2-3days     Frequency of headaches 10 days per month with additional headaches on other days that are somewhat different     Headache type 2: Cervical and tension headaches  Tension in the neck up into the head  Painful but not additional migraine symptoms  At times takes Motrin with variable effect  Recently this is happening somewhat often  Has seen a chiropractor who talked to her about posture  Approx freq nearly daily at this point      Therapeutics:  CURRENT Abortive meds?  Imitrex (works if she takes motrin with it, may improve it so that she can function, but Imitrex used to work on its own, used 10x in Nov, Dec had Covid and lots of headache), Motrin (using a lot, at least every other day and at times more often),    CURRENT/prior Preventive meds? topiramate    The following portions of the patient's history were reviewed and updated as appropriate: allergies, current medications, past family history, past medical history, past social history, past surgical history and problem list          Objective:    Blood pressure 104/64, pulse 96, temperature 97 6 °F (36 4 °C), temperature source Tympanic, resp  rate 18, height 5' 3" (1 6 m), weight 75 5 kg (166 lb 6 4 oz), SpO2 98 %, currently breastfeeding  Physical Exam  Vitals reviewed  Constitutional:       General: She is not in acute distress  Appearance: She is not ill-appearing, toxic-appearing or diaphoretic  HENT:      Head: Normocephalic and atraumatic  Right Ear: External ear normal       Left Ear: External ear normal       Nose: Nose normal       Mouth/Throat:      Mouth: Mucous membranes are moist       Pharynx: Oropharynx is clear  Eyes:      General: Lids are normal       Extraocular Movements: Extraocular movements intact  Conjunctiva/sclera: Conjunctivae normal       Pupils: Pupils are equal, round, and reactive to light  Cardiovascular:      Rate and Rhythm: Normal rate  Pulses: Normal pulses  Heart sounds: Normal heart sounds  Pulmonary:      Effort: Pulmonary effort is normal       Breath sounds: Normal breath sounds  Abdominal:      General: Abdomen is flat  There is no distension  Musculoskeletal:         General: Normal range of motion  Cervical back: Tenderness present  Skin:     Capillary Refill: Capillary refill takes less than 2 seconds  Neurological:      General: No focal deficit present  Mental Status: She is alert  Deep Tendon Reflexes: Strength normal and reflexes are normal and symmetric  Psychiatric:         Mood and Affect: Mood normal          Speech: Speech normal          Behavior: Behavior normal          Neurological Exam  Mental Status  Alert   Speech is normal  Language is fluent with no aphasia  Attention and concentration are normal  Fund of knowledge is appropriate for level of education  Cranial Nerves  CN II: Visual acuity is normal  Visual fields full to confrontation  CN III, IV, VI: Extraocular movements intact bilaterally  Normal lids and orbits bilaterally  Pupils equal round and reactive to light bilaterally  CN V: Facial sensation is normal   CN VII: Full and symmetric facial movement  CN VIII: Hearing is normal   CN IX, X: Palate elevates symmetrically  Normal gag reflex  CN XI: Shoulder shrug strength is normal   CN XII: Tongue midline without atrophy or fasciculations  Motor  Normal muscle bulk throughout  Normal muscle tone  No abnormal involuntary movements  Strength is 5/5 throughout all four extremities  Sensory  Light touch is normal in upper and lower extremities  Reflexes  Deep tendon reflexes are 2+ and symmetric in all four extremities  Coordination  Right: Finger-to-nose normal Left: Finger-to-nose normal     Gait  Normal casual, toe, heel and tandem gait  ROS:    Review of Systems   Constitutional: Negative  Negative for appetite change and fever  HENT: Negative  Negative for hearing loss, tinnitus, trouble swallowing and voice change  Eyes: Negative  Negative for photophobia and pain  Right eye twitching for the past month and a half   Respiratory: Negative  Negative for shortness of breath  Cardiovascular: Negative  Negative for palpitations  Gastrointestinal: Negative  Negative for nausea and vomiting  Endocrine: Negative  Negative for cold intolerance  Genitourinary: Negative  Negative for dysuria, frequency and urgency  Musculoskeletal: Positive for back pain (low back pain), neck pain and neck stiffness  Negative for myalgias  Skin: Negative  Negative for rash  Allergic/Immunologic: Negative  Neurological: Positive for headaches   Negative for dizziness, tremors, seizures, syncope, facial asymmetry, speech difficulty, weakness, light-headedness and numbness  Hematological: Negative  Does not bruise/bleed easily  Psychiatric/Behavioral: Negative  Negative for confusion, hallucinations and sleep disturbance     ROS was reviewed and updated as appropriate

## 2022-05-13 NOTE — PATIENT INSTRUCTIONS
Add magnesium oxide 400mg daily  Increase topamax to 100mg   Consider using the cymbalta low dose- this could be helpful for your symptoms- get the upcoming xray/see ortho/rheumatology  Keep logging migraines  Hydrate with 60-80oz water/day  Look up nerivio  Follow up 3 months    Migraine Headache   AMBULATORY CARE:   A migraine headache  is a severe headache  The pain can be so severe that it interferes with your daily activities  A migraine can last a few hours up to several days  The exact cause of migraines is not known  A family history of migraines increases your risk  Your risk is also higher if you are a woman or take medicines such as estrogen or a vasodilator  Common warning signs include the following:  Warning signs usually start 15 to 60 minutes before the headache:  Visual changes (auras), such as blurred vision, temporary blind or bright spots, lines, or hallucinations    Unusual tiredness or frequent yawning    Tingling in an arm or leg    Signs and symptoms of a migraine headache:  A migraine headache usually begins as a dull ache around the eye or temple  The pain may get worse with movement  You may also have the following:  Pain in your head that may increase to the point that you cannot do everyday activities    Pain on one or both sides of your head    Throbbing, pulsing, or pounding pain in your head    Nausea and vomiting    Sensitivity to light, noise, or smells    Call your local emergency number (911 in the [address] [place] [address],[address]) or have someone call if:   You feel like you are going to faint, you become confused, or you have a seizure  Seek care immediately if:   You have a headache that seems different or much worse than your usual migraine headache  You have a severe headache with a fever or a stiff neck  You have new problems with speech, vision, balance, or movement  Call your doctor or neurologist if:   You have a fever  Your migraines interfere with your daily activities      Your medicines or treatments stop working  You have questions about your condition or care  Treatment:  Migraines cannot be cured  The goal of treatment is to reduce your symptoms  Take medicine as soon as you feel a migraine begin, or as directed  The following may be used to manage migraines:  Medicines  may be given to prevent or treat migraines  Take medicine to prevent migraines as soon as you feel a migraine begin, or as directed  Your healthcare provider may recommend any of the following:    Migraine medicines  are used to help prevent or stop a migraine  NSAIDs  help decrease swelling and pain or fever  This medicine is available with or without a doctor's order  NSAIDs can cause stomach bleeding or kidney problems in certain people  If you take blood thinner medicine, always ask your healthcare provider if NSAIDs are safe for you  Always read the medicine label and follow directions  Acetaminophen  decreases pain and fever  It is available without a doctor's order  Ask how much to take and how often to take it  Follow directions  Read the labels of all other medicines you are using to see if they also contain acetaminophen, or ask your doctor or pharmacist  Acetaminophen can cause liver damage if not taken correctly  Do not use more than 4 grams (4,000 milligrams) total of acetaminophen in one day  Prescription pain medicine  may be given  Ask your healthcare provider how to take this medicine safely  Some prescription pain medicines contain acetaminophen  Do not take other medicines that contain acetaminophen without talking to your healthcare provider  Too much acetaminophen may cause liver damage  Prescription pain medicine may cause constipation  Ask your healthcare provider how to prevent or treat constipation  Antinausea medicine  may be given to calm your stomach and to help prevent vomiting  This medicine can also help relieve pain      Cognitive behavior therapy (CBT)  can help you learn ways to manage and prevent migraines  A therapist can teach you to relax and to reduce stress  You may learn ways to create healthy nutrition, activity, and sleep habits to prevent migraines  The therapist can also help you manage conditions that can affect migraines, such as anxiety or depression  Common triggers for a migraine include the following:   Stress, eye strain, oversleeping, or not getting enough sleep    Hormone changes in women from birth control pills, pregnancy, menopause, or during a monthly period    Skipping meals, going too long without eating, or not drinking enough liquids    Certain foods or drinks such as chocolate, hard cheese, alcohol, or drinks that contain caffeine    Foods that contain gluten, nitrates, MSG, or artificial sweeteners    Sunlight, bright or flashing lights, loud noises, smoke, or strong smells    Heat, humidity, or changes in the weather    Manage your symptoms:   Rest in a dark, quiet room  This will help decrease your pain  Sleep may also help relieve the pain  Apply ice to decrease pain  Use an ice pack, or put crushed ice in a plastic bag  Cover the ice pack with a towel and place it on your head where it hurts for 15 to 20 minutes every hour  Apply heat to decrease pain and muscle spasms  Use a small towel dampened with warm water or a heating pad, or sit in a warm bath  Apply heat on the area for 20 to 30 minutes every 2 hours  You may alternate heat and ice  Keep a migraine record  Write down when your migraines start and stop  Include your symptoms and what you were doing when a migraine began  Record what you ate or drank for 24 hours before the migraine started  Keep track of what you did to treat your migraine and if it worked  Prevent another migraine headache:   Prevent a medicine overuse headache  Take pain medicines only as long as directed  A medicine may be limited to a certain amount each month   Your healthcare provider can help you create a plan so you get a safe amount each month  Do not smoke  Nicotine and other chemicals in cigarettes and cigars can trigger a migraine and also cause lung damage  Ask your healthcare provider for information if you currently smoke and need help to quit  E-cigarettes or smokeless tobacco still contain nicotine  Talk to your healthcare provider before you use these products  Do not drink alcohol  Alcohol can trigger a migraine  It can also interfere with the medicines used to treat your migraine  Be physically active  Physical activity, such as exercise, may help prevent migraines  Talk to your healthcare provider about the best activity plan for you  Try to get at least 30 minutes of physical activity on most days  Manage stress  Stress may trigger a migraine  Learn new ways to relax, such as deep breathing  Follow a sleep schedule  Go to bed and get up at the same time each day  Eat a variety of healthy foods  Healthy foods include fruit, vegetables, whole-grain breads, low-fat dairy products, beans, lean meats, and fish  Do not have foods or drinks that trigger your migraines  Drink more liquids to prevent dehydration  Your healthcare provider can tell you how much liquid to drink each day and which liquids are best for you  Follow up with your doctor or neurologist as directed:  Bring your migraine record with you  Write down your questions so you remember to ask them during your visits  © Copyright [employer] 2022 Information is for End User's use only and may not be sold, redistributed or otherwise used for commercial purposes  All illustrations and images included in CareNotes® are the copyrighted property of A D A M [employer] Inc  or Denisse Glasgow  The above information is an  only  It is not intended as medical advice for individual conditions or treatments   Talk to your doctor, nurse or pharmacist before following any medical regimen to see if it is safe and effective for you  Neck Exercises   AMBULATORY CARE:   Neck exercises  help reduce neck pain, and improve neck movement and strength  Neck exercises also help prevent long-term neck problems  What you need to know about neck exercises:   Do the exercises every day,  or as often as directed by your healthcare provider  Move slowly, gently, and smoothly  Avoid fast or jerky motions  Stand and sit the way your healthcare provider shows you  Good posture may reduce your neck pain  Check your posture often, even when you are not doing your neck exercises  How to perform neck exercises safely:   Exercise position:  You may sit or stand while you do neck exercises  Face forward  Your shoulders should be straight and relaxed, with a good posture  Head tilts, forward and back:  Gently bow your head and try to touch your chin to your chest  Your healthcare provider may tell you to push on the back of your neck to help bow your head  Raise your chin back to the starting position  Tilt your head back as far as possible so you are looking up at the ceiling  Your healthcare provider may tell you to lift your chin to help tilt your head back  Return your head to the starting position  Head tilts, side to side:  Tilt your head, bringing your ear toward your shoulder  Then tilt your head toward the other shoulder  Head turns:  Turn your head to look over your shoulder  Tilt your chin down and try to touch it to your shoulder  Do not raise your shoulder to your chin  Face forward again  Do the same on the other side  Head rolls:  Slowly bring your chin toward your chest  Next, roll your head to the right  Your ear should be positioned over your shoulder  Hold this position for 5 seconds  Roll your head back toward your chest and to the left into the same position  Hold for 5 seconds  Gently roll your head back and around in a clockwise [place] 3 times   Next, move your head in the reverse direction (counterclockwise) in a [place] 3 times  Do not shrug your shoulders upwards while you do this exercise  Contact your healthcare provider if:   Your pain does not get better, or gets worse  You have questions or concerns about your condition, care, or exercise program     © Copyright [employer] 2022 Information is for End User's use only and may not be sold, redistributed or otherwise used for commercial purposes  All illustrations and images included in CareNotes® are the copyrighted property of [employer]  or [facility] Delmis Alfaro   The above information is an  only  It is not intended as medical advice for individual conditions or treatments  Talk to your doctor, nurse or pharmacist before following any medical regimen to see if it is safe and effective for you

## 2022-05-14 ENCOUNTER — HOSPITAL ENCOUNTER (OUTPATIENT)
Dept: RADIOLOGY | Facility: HOSPITAL | Age: 37
Discharge: HOME | End: 2022-05-14
Attending: INTERNAL MEDICINE
Payer: COMMERCIAL

## 2022-05-14 DIAGNOSIS — M54.2 NECK PAIN: ICD-10-CM

## 2022-05-14 PROCEDURE — 72050 X-RAY EXAM NECK SPINE 4/5VWS: CPT

## 2022-05-20 ENCOUNTER — TELEPHONE (OUTPATIENT)
Dept: INTERNAL MEDICINE CLINIC | Facility: CLINIC | Age: 37
End: 2022-05-20

## 2022-05-20 NOTE — TELEPHONE ENCOUNTER
Please call [address] [place] [address] to get a x-ray result of her neck ,done on 05/16/2022

## 2022-06-06 ENCOUNTER — ANNUAL EXAM (OUTPATIENT)
Dept: OBGYN CLINIC | Facility: CLINIC | Age: 37
End: 2022-06-06
Payer: COMMERCIAL

## 2022-06-06 VITALS
DIASTOLIC BLOOD PRESSURE: 60 MMHG | HEIGHT: 63 IN | HEART RATE: 76 BPM | SYSTOLIC BLOOD PRESSURE: 98 MMHG | BODY MASS INDEX: 29.09 KG/M2 | WEIGHT: 164.2 LBS

## 2022-06-06 DIAGNOSIS — Z30.09 BIRTH CONTROL COUNSELING: ICD-10-CM

## 2022-06-06 DIAGNOSIS — Z01.419 ENCNTR FOR GYN EXAM (GENERAL) (ROUTINE) W/O ABN FINDINGS: Primary | ICD-10-CM

## 2022-06-06 DIAGNOSIS — Z91.89 INCREASED RISK OF BREAST CANCER: ICD-10-CM

## 2022-06-06 DIAGNOSIS — Z80.3 FAMILY HISTORY OF BREAST CANCER: ICD-10-CM

## 2022-06-06 DIAGNOSIS — Z12.4 CERVICAL CANCER SCREENING: ICD-10-CM

## 2022-06-06 PROCEDURE — S0612 ANNUAL GYNECOLOGICAL EXAMINA: HCPCS | Performed by: OBSTETRICS & GYNECOLOGY

## 2022-06-06 RX ORDER — TOPIRAMATE 25 MG/1
TABLET ORAL
COMMUNITY
Start: 2022-05-13 | End: 2022-07-01

## 2022-06-06 NOTE — PATIENT INSTRUCTIONS
Wellness Visit for Adults   AMBULATORY CARE:   A wellness visit  is when you see your healthcare provider to get screened for health problems  Your healthcare provider will also give you advice on how to stay healthy  Write down your questions so you remember to ask them  Ask your healthcare provider how often you should have a wellness visit  What happens at a wellness visit:  Your healthcare provider will ask about your health, and your family history of health problems  This includes high blood pressure, heart disease, and cancer  He or she will ask if you have symptoms that concern you, if you smoke, and about your mood  You may also be asked about your intake of medicines, supplements, food, and alcohol  Any of the following may be done: Your weight  will be checked  Your height may also be checked so your body mass index (BMI) can be calculated  Your BMI shows if you are at a healthy weight  Your blood pressure  and heart rate will be checked  Your temperature may also be checked  Blood and urine tests  may be done  Blood tests may be done to check your cholesterol levels  Abnormal cholesterol levels increase your risk for heart disease and stroke  You may also need a blood or urine test to check for diabetes if you are at increased risk  Urine tests may be done to look for signs of an infection or kidney disease  A physical exam  includes checking your heartbeat and lungs with a stethoscope  Your healthcare provider may also check your skin to look for sun damage  Screening tests  may be recommended  A screening test is done to check for diseases that may not cause symptoms  The screening tests you may need depend on your age, gender, family history, and lifestyle habits  For example, colorectal screening may be recommended if you are 48years old or older  Screening tests you need if you are a woman:   A Pap smear  is used to screen for cervical cancer   Pap smears are usually done every 3 to 5 years depending on your age  You may need them more often if you have had abnormal Pap smear test results in the past  Ask your healthcare provider how often you should have a Pap smear  A mammogram  is an x-ray of your breasts to screen for breast cancer  Experts recommend mammograms every 2 years starting at age 48 years  You may need a mammogram at age 52 years or younger if you have an increased risk for breast cancer  Talk to your healthcare provider about when you should start having mammograms and how often you need them  Vaccines you may need:   Get an influenza vaccine  every year  The influenza vaccine protects you from the flu  Several types of viruses cause the flu  The viruses change over time, so new vaccines are made each year  Get a tetanus-diphtheria (Td) booster vaccine  every 10 years  This vaccine protects you against tetanus and diphtheria  Tetanus is a severe infection that may cause painful muscle spasms and lockjaw  Diphtheria is a severe bacterial infection that causes a thick covering in the back of your mouth and throat  Get a human papillomavirus (HPV) vaccine  if you are female and aged 23 to 32 or male 23 to 24 and never received it  This vaccine protects you from HPV infection  HPV is the most common infection spread by sexual contact  HPV may also cause vaginal, penile, and anal cancers  Get a pneumococcal vaccine  if you are aged 72 years or older  The pneumococcal vaccine is an injection given to protect you from pneumococcal disease  Pneumococcal disease is an infection caused by pneumococcal bacteria  The infection may cause pneumonia, meningitis, or an ear infection  Get a shingles vaccine  if you are 60 or older, even if you have had shingles before  The shingles vaccine is an injection to protect you from the varicella-zoster virus  This is the same virus that causes chickenpox   Shingles is a painful rash that develops in people who had chickenpox or have been exposed to the virus  How to eat healthy:  My Plate is a model for planning healthy meals  It shows the types and amounts of foods that should go on your plate  Fruits and vegetables make up about half of your plate, and grains and protein make up the other half  A serving of dairy is included on the side of your plate  The amount of calories and serving sizes you need depends on your age, gender, weight, and height  Examples of healthy foods are listed below:  Eat a variety of vegetables  such as dark green, red, and orange vegetables  You can also include canned vegetables low in sodium (salt) and frozen vegetables without added butter or sauces  Eat a variety of fresh fruits , canned fruit in 100% juice, frozen fruit, and dried fruit  Include whole grains  At least half of the grains you eat should be whole grains  Examples include whole-wheat bread, wheat pasta, brown rice, and whole-grain cereals such as oatmeal     Eat a variety of protein foods such as seafood (fish and shellfish), lean meat, and poultry without skin (turkey and chicken)  Examples of lean meats include pork leg, shoulder, or tenderloin, and beef round, sirloin, tenderloin, and extra lean ground beef  Other protein foods include eggs and egg substitutes, beans, peas, soy products, nuts, and seeds  Choose low-fat dairy products such as skim or 1% milk or low-fat yogurt, cheese, and cottage cheese  Limit unhealthy fats  such as butter, hard margarine, and shortening  Exercise:  Exercise at least 30 minutes per day on most days of the week  Some examples of exercise include walking, biking, dancing, and swimming  You can also fit in more physical activity by taking the stairs instead of the elevator or parking farther away from stores  Include muscle strengthening activities 2 days each week  Regular exercise provides many health benefits   It helps you manage your weight, and decreases your risk for type 2 diabetes, heart disease, stroke, and high blood pressure  Exercise can also help improve your mood  Ask your healthcare provider about the best exercise plan for you  General health and safety guidelines:   Do not smoke  Nicotine and other chemicals in cigarettes and cigars can cause lung damage  Ask your healthcare provider for information if you currently smoke and need help to quit  E-cigarettes or smokeless tobacco still contain nicotine  Talk to your healthcare provider before you use these products  Limit alcohol  A drink of alcohol is 12 ounces of beer, 5 ounces of wine, or 1½ ounces of liquor  Lose weight, if needed  Being overweight increases your risk of certain health conditions  These include heart disease, high blood pressure, type 2 diabetes, and certain types of cancer  Protect your skin  Do not sunbathe or use tanning beds  Use sunscreen with a SPF 15 or higher  Apply sunscreen at least 15 minutes before you go outside  Reapply sunscreen every 2 hours  Wear protective clothing, hats, and sunglasses when you are outside  Drive safely  Always wear your seatbelt  Make sure everyone in your car wears a seatbelt  A seatbelt can save your life if you are in an accident  Do not use your cell phone when you are driving  This could distract you and cause an accident  Pull over if you need to make a call or send a text message  Practice safe sex  Use latex condoms if are sexually active and have more than one partner  Your healthcare provider may recommend screening tests for sexually transmitted infections (STIs)  Wear helmets, lifejackets, and protective gear  Always wear a helmet when you ride a bike or motorcycle, go skiing, or play sports that could cause a head injury  Wear protective equipment when you play sports  Wear a lifejacket when you are on a boat or doing water sports      © Copyright [employer] 2022 Information is for End User's use only and may not be sold, redistributed or otherwise used for commercial purposes  All illustrations and images included in CareNotes® are the copyrighted property of A D A M , Inc  or Denisse Glasgow  The above information is an  only  It is not intended as medical advice for individual conditions or treatments  Talk to your doctor, nurse or pharmacist before following any medical regimen to see if it is safe and effective for you  Intrauterine Device   DISCHARGE INSTRUCTIONS:   Seek care immediately if:   You have severe pain or bleeding during your period  You have a fever and severe abdominal pain  Call your doctor or gynecologist if:   You think you are pregnant  The IUD has come out  You have bleeding from your vagina after you have sex, and it is not your period  You have pain during sex  You cannot feel the IUD string, the string feels longer, or you feel the plastic of the IUD itself  You have vaginal discharge that is green, yellow, or has a foul odor  You have questions or concerns about your condition or care  Medicines:   NSAIDs  help decrease swelling and pain or fever  This medicine is available with or without a doctor's order  NSAIDs can cause stomach bleeding or kidney problems in certain people  If you take blood thinner medicine, always ask your healthcare provider if NSAIDs are safe for you  Always read the medicine label and follow directions  Take your medicine as directed  Contact your healthcare provider if you think your medicine is not helping or if you have side effects  Tell him or her if you are allergic to any medicine  Keep a list of the medicines, vitamins, and herbs you take  Include the amounts, and when and why you take them  Bring the list or the pill bottles to follow-up visits  Carry your medicine list with you in case of an emergency  Self-care:   Apply heat to relieve pain and cramping  Use a heating pad set on low   Apply heat to your lower abdomen for 20 minutes every hour, or as directed  Return to activities as directed  Your healthcare provider will tell you when it is okay to return to work, school, or other activities  Do not use a tampon or have sex  until your provider says it is okay  Make sure your IUD is in place: An IUD has a string that is made of plastic thread  One to 2 inches of this string hangs into your vagina  You cannot see this string, and it should not cause problems when you have sex  Check your IUD string every 3 days for the first 3 months that you have your IUD  After that, check the string after each monthly period  Do the following to check the placement of your IUD:  Wash your hands with soap and warm water  Dry them with a clean towel  Bend your knees and squat low to the ground  Gently put your index finger inside your vagina  The cervix is at the top of the vagina and feels like the tip of your nose  Feel for the IUD string  Do not pull on the string  You should not be able to feel the firm plastic of the IUD itself  Wash your hands after you check your IUD string  For more information:   [employer] [facility] [address] [place]  Phone: 7- 381 - 116-9679  Web Address: https://[employer]/  org    Follow up with your doctor as directed:  Write down your questions so you remember to ask them during your visits  © Copyright "[employer] 2022 Information is for End User's use only and may not be sold, redistributed or otherwise used for commercial purposes  All illustrations and images included in CareNotes® are the copyrighted property of [employer]  or [facility] Delmis Alfaro   The above information is an  only  It is not intended as medical advice for individual conditions or treatments  Talk to your doctor, nurse or pharmacist before following any medical regimen to see if it is safe and effective for you

## 2022-06-06 NOTE — PROGRESS NOTES
Assessment        Diagnoses and all orders for this visit:    Encntr for gyn exam (general) (routine) w/o abn findings    Cervical cancer screening  -     Liquid-based pap, screening    Encounter for birth control pills maintenance    Family history of breast cancer    Increased risk of breast cancer    Birth control counseling    Other orders  -     topiramate (TOPAMAX) 25 mg tablet             Plan      All questions answered  Await pap smear results  Contraception: condoms  Educational material distributed  Follow up in 1 year  Follow up as needed  Mammogram      Discussed with patient nonhormonal contraception including condoms, copper Intrauterine device versus vaginal spermicide Phexxi    She is unsure about another pregnancy but also declines permanent sterilization    She will use condoms for now, information was given on Intrauterine device  She was counseled on copper Intrauterine device, risks benefits and side effects including heavier menses  Also discussed serious side effects including uterine perforation and will position  Intrauterine device brochure was given to patient  She will call if she would like to have this started  Continue follow-up with Surgical Oncology regarding breast screening    Brannon Whalen is a 40 y o  female who presents for annual exam       Chief Complaint   Patient presents with    Gynecologic Exam     Yearly, no problems or concerns      She stopped OCPS due to mood changes  She feels better            Last Pap: 2 years   Last mammogram: 22 managed by surg onc    Current contraception: none  History of abnormal Pap smear: yes   History of abnormal mammogram: no  Family history of uterine or ovarian cancer: no  Family history of breast cancer: yes - MGM (80) PGM (45s)  Family history of colon cancer: no        Menstrual History:  OB History        2    Para   2    Term   2            AB        Living   2       SAB IAB        Ectopic        Multiple   0    Live Births   2                Menarche age: 16  Patient's last menstrual period was 2022  Past Medical History:   Diagnosis Date    Abnormal Pap smear of cervix     Acute non-recurrent sinusitis 10/25/2021    Arthritis     BMI 27 0-27 9,adult 2022    BRCA1 negative     BRCA2 negative     Constipation, chronic     Cough 2021    COVID-19 virus infection 2021    Depression 2022    Diabetes mellitus ([address] )     Fatigue 2021    Gestational diabetes     Gestational hypertension affecting first pregnancy     Irritable bowel syndrome     Migraine     Myalgia 2022    Neck pain 2022    Rhinitis 10/25/2021    Sinus congestion 2021    Sore throat 2021    Varicella     Weight loss      Past Surgical History:   Procedure Laterality Date     SECTION  10/06/2017    COLPOSCOPY      [place]  DELIVERY ONLY Bilateral 10/6/2017    Procedure:  SECTION (); Surgeon: Mele Gil MD;  [facility];  Service: Obstetrics    [place]  DELIVERY ONLY N/A 3/22/2019    Procedure: Stacey Torres () REPEAT;  Surgeon: Mele Gil MD;  [facility];  Service: Obstetrics    [place] COLONOSCOPY FLX DX W/COLLJ SPEC WHEN PFRMD N/A 2016    Procedure: COLONOSCOPY;  Surgeon: Radha Méndez MD;  [facility];   Service: Gastroenterology    WISDOM TOOTH EXTRACTION       Family History   Problem Relation Age of Onset    Arthritis Mother     Irritable bowel syndrome Mother     Breast cancer Maternal Grandmother [de-identified]    Heart disease Maternal Grandmother     Arthritis Maternal Grandmother     Diabetes Maternal Grandfather     Breast cancer Paternal Grandmother 36    Heart disease Paternal Grandfather     No Known Problems Sister     No Known Problems Father     No Known Problems Daughter     No Known Problems Son     No Known Problems Sister     No Known Problems Maternal Aunt     No Known Problems Paternal Uncle        Social History     Tobacco Use    Smoking status: Never Smoker    Smokeless tobacco: Never Used   Vaping Use    Vaping Use: Never used   Substance Use Topics    Alcohol use: Not Currently    Drug use: No          Current Outpatient Medications:     ibuprofen (MOTRIN) 800 mg tablet, Take by mouth 2 (two) times a day, Disp: , Rfl:     SUMAtriptan (IMITREX) 100 mg tablet, 1 tab as needed for migraine, may repeat in 2 hours, max 200mg per 24 hours, Disp: 12 tablet, Rfl: 5    topiramate (TOPAMAX) 100 mg tablet, Take 1 tablet (100 mg total) by mouth in the morning , Disp: 90 tablet, Rfl: 1    Cholecalciferol (Vitamin D3) 250 MCG (95819 UT) TABS, Take 10,000 Units by mouth in the morning (Patient not taking: No sig reported), Disp: , Rfl:     cyclobenzaprine (FLEXERIL) 5 mg tablet, Take 1 tablet (5 mg total) by mouth daily at bedtime as needed for muscle spasms (Patient not taking: Reported on 6/6/2022), Disp: 15 tablet, Rfl: 1    DULoxetine (Cymbalta) 30 mg delayed release capsule, Take 1 capsule (30 mg total) by mouth daily (Patient not taking: No sig reported), Disp: 90 capsule, Rfl: 0    fluticasone (FLONASE) 50 mcg/act nasal spray, 2 sprays into each nostril daily (Patient not taking: No sig reported), Disp: 15 8 mL, Rfl: 0    topiramate (TOPAMAX) 25 mg tablet, , Disp: , Rfl:     Allergies   Allergen Reactions    Amoxicillin Rash    Penicillins Rash           Review of Systems   Constitutional: Negative  HENT: Negative  Eyes: Negative  Respiratory: Negative  Cardiovascular: Negative  Gastrointestinal: Negative  Endocrine: Negative  Genitourinary:        As noted in HPI   Musculoskeletal: Negative  Skin: Negative  Allergic/Immunologic: Negative  Neurological: Negative  Hematological: Negative  Psychiatric/Behavioral: Negative          BP 98/60 (BP Location: Right arm, Patient Position: Sitting, Cuff Size: Standard)   Pulse 76   Ht 5' 3" (1 6 m)   Wt 74 5 kg (164 lb 3 2 oz)   LMP 06/02/2022   BMI 29 09 kg/m²         Physical Exam  Constitutional:       Appearance: She is well-developed  Genitourinary:      Vulva, bladder and rectum normal       No lesions in the vagina  Genitourinary Comments:         Right Labia: No rash, tenderness, lesions, skin changes or Bartholin's cyst      Left Labia: No tenderness, lesions, skin changes, Bartholin's cyst or rash  No inguinal adenopathy present in the right or left side  No vaginal discharge, tenderness or bleeding  No vaginal prolapse present  No vaginal atrophy present  Right Adnexa: not tender, not full and no mass present  Left Adnexa: not tender, not full and no mass present  No cervical motion tenderness, friability, lesion or polyp  Uterus is not enlarged or tender  Pelvic exam was performed with patient in the lithotomy position  Rectum:      No external hemorrhoid  Breasts:      Right: No mass, nipple discharge, skin change or tenderness  Left: No mass, nipple discharge, skin change or tenderness  HENT:      Head: Normocephalic  Nose: Nose normal    Eyes:      Conjunctiva/sclera: Conjunctivae normal    Neck:      Thyroid: No thyromegaly  Cardiovascular:      Rate and Rhythm: Normal rate and regular rhythm  Heart sounds: Normal heart sounds  No murmur heard  Pulmonary:      Effort: Pulmonary effort is normal  No respiratory distress  Breath sounds: Normal breath sounds  No wheezing or rales  Abdominal:      General: There is no distension  Palpations: Abdomen is soft  There is no mass  Tenderness: There is no abdominal tenderness  There is no guarding or rebound  Musculoskeletal:         General: No tenderness  Cervical back: Neck supple  No muscular tenderness  Lymphadenopathy:      Cervical: No cervical adenopathy  Lower Body: No right inguinal adenopathy  No left inguinal adenopathy  Neurological:      Mental Status: She is alert and oriented to person, place, and time  Skin:     General: Skin is warm and dry     Psychiatric:         Mood and Affect: Mood normal          Behavior: Behavior normal

## 2022-06-07 PROCEDURE — G0145 SCR C/V CYTO,THINLAYER,RESCR: HCPCS | Performed by: OBSTETRICS & GYNECOLOGY

## 2022-06-07 PROCEDURE — G0476 HPV COMBO ASSAY CA SCREEN: HCPCS | Performed by: OBSTETRICS & GYNECOLOGY

## 2022-06-13 LAB
LAB AP GYN PRIMARY INTERPRETATION: NORMAL
Lab: NORMAL

## 2022-06-14 ENCOUNTER — TELEPHONE (OUTPATIENT)
Dept: OBGYN CLINIC | Facility: CLINIC | Age: 37
End: 2022-06-14

## 2022-06-21 NOTE — TELEPHONE ENCOUNTER
Called patients insurance and spoke with Dolly Justice  Confirmed that for a PARAGARD iud, insertion, and removal all codes are covered at 100% with no patient liability and no precert required      Reference # is Sarina Sen 6/21/22 10:59 am EST

## 2022-06-27 ENCOUNTER — OFFICE VISIT (OUTPATIENT)
Dept: OBGYN CLINIC | Facility: CLINIC | Age: 37
End: 2022-06-27
Payer: COMMERCIAL

## 2022-06-27 VITALS
HEIGHT: 63 IN | SYSTOLIC BLOOD PRESSURE: 113 MMHG | WEIGHT: 159 LBS | BODY MASS INDEX: 28.17 KG/M2 | HEART RATE: 82 BPM | DIASTOLIC BLOOD PRESSURE: 70 MMHG

## 2022-06-27 DIAGNOSIS — M79.10 MYALGIA: ICD-10-CM

## 2022-06-27 DIAGNOSIS — M54.2 NECK PAIN: Primary | ICD-10-CM

## 2022-06-27 PROCEDURE — 99244 OFF/OP CNSLTJ NEW/EST MOD 40: CPT | Performed by: PHYSICAL MEDICINE & REHABILITATION

## 2022-06-27 RX ORDER — MELOXICAM 15 MG/1
15 TABLET ORAL DAILY PRN
Qty: 90 TABLET | Refills: 0 | Status: SHIPPED | OUTPATIENT
Start: 2022-06-27

## 2022-06-27 NOTE — PROGRESS NOTES
1  Neck pain  Ambulatory Referral to Sports Medicine    Ambulatory referral to Physical Therapy    meloxicam (MOBIC) 15 mg tablet   2  Myalgia  Ambulatory Referral to Sports Medicine    Ambulatory referral to Physical Therapy    meloxicam (MOBIC) 15 mg tablet     Orders Placed This Encounter   Procedures    Ambulatory referral to Physical Therapy      Impression:  Cervical pain that is multifactorial and predominantly due to myofascial spasticity, spondylosis and dysfunctional muscle firing/muscular imbalance due to postural/core instability  There are no concerning neurological deficits  We discussed different treatment options and decided to proceed with meloxicam daily as needed, OTC topical diclofenac and OTC menthol or lidocaine patches  She can also try cyclobenzaprine if this is helpful for her  We also briefly discussed gabapentin but decided to hold off on this  The patient should start physical therapy as soon as possible  I will see them back after 5-6 weeks of physical therapy or earlier if symptoms worsen/change  We had a brief discussion about trigger point injections and can consider this in the future as well  Return in about 5 weeks (around 8/1/2022)  Patient is in agreement with the above plan  Imaging Studies (I personally reviewed images in PACS and report if it was available):  Cervical spine x-rays that are most recent to this encounter were reviewed  These images show preserved disc spaces but spondylotic changes mostly along the anterior lower cervical vertebrae  No acute osseous abnormalities  These findings are consistent with mild spondylosis  HPI:  Mackenzie Roy is a 40 y o  female  who presents for evaluation of   Chief Complaint   Patient presents with    Neck - Pain       Onset/Mechanism: Chronic pain for over a year without an injury  Location: Across the neck  Radiation: Causes headaches  Quality: Painful  Provocative: The way she sleeps  She sleeps on her stomach  Severity: Not improving  Associated Symptoms: Headaches  Treatment so far: Different pillows, chiropractic care  No red flag symptoms including fever/chills, unintentional weight change, bowel/bladder incontinence, scissoring gait, personal/family history of cancer, pain worse at night, intravenous drug abuse or trauma  Following history reviewed and updated:  Past Medical History:   Diagnosis Date    Abnormal Pap smear of cervix     Acute non-recurrent sinusitis 10/25/2021    Arthritis     BMI 27 0-27 9,adult 2022    BRCA1 negative     BRCA2 negative     Constipation, chronic     Cough 2021    COVID-19 virus infection 2021    Depression 2022    Diabetes mellitus ([address] )     Fatigue 2021    Gestational diabetes     Gestational hypertension affecting first pregnancy     Irritable bowel syndrome     Migraine     Myalgia 2022    Neck pain 2022    Rhinitis 10/25/2021    Sinus congestion 2021    Sore throat 2021    Varicella     Weight loss      Past Surgical History:   Procedure Laterality Date     SECTION  10/06/2017    COLPOSCOPY      [place]  DELIVERY ONLY Bilateral 10/6/2017    Procedure:  SECTION (); Surgeon: Royal Garcia MD;  [facility];  Service: Obstetrics    [place]  DELIVERY ONLY N/A 3/22/2019    Procedure: [address] () REPEAT;  Surgeon: Royal Garcia MD;  [facility];  Service: Obstetrics    [place] COLONOSCOPY FLX DX W/COLLJ SPEC WHEN PFRMD N/A 2016    Procedure: COLONOSCOPY;  Surgeon: Wan Ernandez MD;  [facility];   Service: Gastroenterology    WISDOM TOOTH EXTRACTION       Social History   Social History     Substance and Sexual Activity   Alcohol Use Not Currently     Social History     Substance and Sexual Activity   Drug Use No     Social History     Tobacco Use   Smoking Status Never Smoker   Smokeless Tobacco Never Used     Family History   Problem Relation Age of Onset    Arthritis Mother     Irritable bowel syndrome Mother     Breast cancer Maternal Grandmother [de-identified]    Heart disease Maternal Grandmother     Arthritis Maternal Grandmother     Diabetes Maternal Grandfather     Breast cancer Paternal Grandmother 36    Heart disease Paternal Grandfather     No Known Problems Sister     No Known Problems Father     No Known Problems Daughter     No Known Problems Son     No Known Problems Sister     No Known Problems Maternal Aunt     No Known Problems Paternal Uncle      Allergies   Allergen Reactions    Amoxicillin Rash    Penicillins Rash        Constitutional:  /70   Pulse 82   Ht 5' 3" (1 6 m)   Wt 72 1 kg (159 lb)   LMP 06/02/2022   BMI 28 17 kg/m²    General: NAD  Eyes: Anicteric sclerae  Neck: Supple  Lungs: Unlabored breathing  Cardiovascular: No lower extremity edema  Skin: Intact without erythema  Neurologic: Sensation intact to light touch  Psychiatric: Mood and affect are appropriate  Orthopedic Examination   Inspection: No obvious deformities, lesions or rashes   ROM: Within normal limits   Palpation:  Muscular hypertonicity along the right upper trapezius and cervical paraspinals  There are no step offs   Neuro: Bilateral extremity strength is normal and symmetric  No muscle atrophy or abnormal tone  Bilateral extremity muscle stretch reflexes are physiologic and symmetric   No myelopathic signs  Sensation to light touch is intact throughout    Gait is normal     Procedures

## 2022-06-27 NOTE — PATIENT INSTRUCTIONS
You can obtain diclofenac cream/gel/ointment over the counter  Many brands make this medication and they include Voltaren, Aspercreme and Aleve  You can use this up to 3 times per day  It is best to wash the area with water and then pat dry  The cream absorbs better after this  Be careful not to let any pets or children get in contact with the medication as it can be harmful to them  If you develop a skin reaction, stop using the cream      Room temperature/warm soaks with epsom salt can help with muscle tightness/cramping  Epsom salt releases magnesium which can be helpful  Over-the-counter salonpas patches can be applied to the area of discomfort to help with pain  There are two types of patches; one contains lidocaine 4% and the other contains menthol (and sometimes camphor and methyl salicylate)  You can try one or the other and see which one works best for you  You will be starting physical therapy  It is important to do home exercises as given by your physical therapist as you go through PT to speed up your recovery  Physical therapy addresses the problems that are causing your pain, instead of covering them up, as some other treatment options do

## 2022-06-28 NOTE — PROGRESS NOTES
Assessment/Plan:  Problem List Items Addressed This Visit    None     Visit Diagnoses     Encounter for insertion of copper intrauterine contraceptive device (IUD)    -  Primary    Relevant Medications    intrauterine copper (PARAGARD) IUD    Other Relevant Orders    POCT urine HCG (Completed)    Counseling for birth control regarding intrauterine device (IUD)        Relevant Medications    intrauterine copper (PARAGARD) IUD    Other Relevant Orders    POCT urine HCG (Completed)          Copper Intrauterine device was discussed with patient  Discussed with patient failure rate of less than 1%  Discussed with patient increased risk of ectopic pregnancy should a pregnancy occur  Discussed duration of uses up to 10 years  Discussed the copper Intrauterine device is highly effective, long-acting and rapidly reversible with few side effects  This should not interfere with sexual activity and does not have any hormone related side effects  Discussed copper Intrauterine device can cause longer and heavier menses or more painful menses  These symptoms generally improve after 6 months of use  Discussed use of NSAIDs to decrease menstrual blood loss and pain related to menses  Discuss other potential complications such as expulsion of about 3 to 10%  Discussed other potential complications such as mechanical breakdown, uterine perforation, malposition,  pelvic or genital infection  ,   Nonvisualized strings, difficulty removal       After insertion, patient should return if she has any fever, worsening pelvic pain, unusually heavy bleeding, suspected expulsion, foul-smelling vaginal discharge  Routine self Intrauterine device string checks are safe but not required  Follow-up is required after several weeks, usually after the 1st  menstrual cycle evaluate her satisfaction with the IUD and address any problems concerns or side effects          Subjective   Patient ID: Antionette Downey is a 40 y o  female  Patient is here for a problem visit  Chief Complaint   Patient presents with    Procedure     Paragard IUD insertion     She is here for IUD insertion  She has been off OCP since May   LMP 22    Menstrual History:  OB History        2    Para   2    Term   2            AB        Living   2       SAB        IAB        Ectopic        Multiple   0    Live Births   2                  Patient's last menstrual period was 2022  Past Medical History:   Diagnosis Date    Abnormal Pap smear of cervix     Acute non-recurrent sinusitis 10/25/2021    Arthritis     BMI 27 0-27 9,adult 2022    BRCA1 negative     BRCA2 negative     Constipation, chronic     Cough 2021    COVID-19 virus infection 2021    Depression 2022    Diabetes mellitus ([facility] [address]     Fatigue 2021    Gestational diabetes     Gestational hypertension affecting first pregnancy     Irritable bowel syndrome     Migraine     Myalgia 2022    Neck pain 2022    Rhinitis 10/25/2021    Sinus congestion 2021    Sore throat 2021    Varicella     Weight loss        Past Surgical History:   Procedure Laterality Date     SECTION  10/06/2017    COLPOSCOPY      [place]  DELIVERY ONLY Bilateral 10/6/2017    Procedure:  SECTION (); Surgeon: Kane Oviedo MD;  [facility];  Service: Obstetrics    [place]  DELIVERY ONLY N/A 3/22/2019    Procedure: Sybil Daigle () REPEAT;  Surgeon: Kane Oviedo MD;  [facility];  Service: Obstetrics    [place] COLONOSCOPY FLX DX W/COLLJ SPEC WHEN PFRMD N/A 2016    Procedure: COLONOSCOPY;  Surgeon: Indigo Perales MD;  [facility];   Service: Gastroenterology    WISDOM TOOTH EXTRACTION         Social History     Tobacco Use    Smoking status: Never Smoker    Smokeless tobacco: Never Used   Vaping Use    Vaping Use: Never used   Substance Use Topics    Alcohol use: Not Currently    Drug use: No        Allergies   Allergen Reactions    Amoxicillin Rash    Penicillins Rash         Current Outpatient Medications:     ibuprofen (MOTRIN) 800 mg tablet, Take by mouth 2 (two) times a day, Disp: , Rfl:     meloxicam (MOBIC) 15 mg tablet, Take 1 tablet (15 mg total) by mouth daily as needed for moderate pain, Disp: 90 tablet, Rfl: 0    SUMAtriptan (IMITREX) 100 mg tablet, 1 tab as needed for migraine, may repeat in 2 hours, max 200mg per 24 hours, Disp: 12 tablet, Rfl: 5    topiramate (TOPAMAX) 100 mg tablet, Take 1 tablet (100 mg total) by mouth in the morning , Disp: 90 tablet, Rfl: 1    Cholecalciferol (Vitamin D3) 250 MCG (37762 UT) TABS, Take 10,000 Units by mouth in the morning (Patient not taking: No sig reported), Disp: , Rfl:     cyclobenzaprine (FLEXERIL) 5 mg tablet, Take 1 tablet (5 mg total) by mouth daily at bedtime as needed for muscle spasms (Patient not taking: Reported on 6/6/2022), Disp: 15 tablet, Rfl: 1    Current Facility-Administered Medications:     intrauterine copper (PARAGARD) IUD, 1 each, Intrauterine, Once, Sue Dhillon MD      Review of Systems   Constitutional: Negative  HENT: Negative  Eyes: Negative  Respiratory: Negative  Cardiovascular: Negative  Gastrointestinal: Negative  Endocrine: Negative  Genitourinary:        As noted in HPI   Musculoskeletal: Negative  Skin: Negative  Allergic/Immunologic: Negative  Neurological: Negative  Hematological: Negative  Psychiatric/Behavioral: Negative  /72 (BP Location: Left arm, Patient Position: Sitting, Cuff Size: Adult)   Pulse 63   Temp (!) 96 8 °F (36 °C) (Tympanic)   Ht 5' 3" (1 6 m)   Wt 78 kg (172 lb)   LMP 06/02/2022   BMI 30 47 kg/m²       Physical Exam  Constitutional:       General: She is not in acute distress  Appearance: She is well-developed  Genitourinary:      Bladder and urethral meatus normal       No lesions in the vagina  Right Labia: No rash, tenderness, lesions, skin changes or Bartholin's cyst      Left Labia: No tenderness, lesions, skin changes, Bartholin's cyst or rash  No vaginal discharge, erythema, tenderness or bleeding  No vaginal prolapse present  No vaginal atrophy present  Right Adnexa: not tender, not full and no mass present  Left Adnexa: not tender, not full and no mass present  No cervical polyp  Uterus is not enlarged or tender  No uterine mass detected  Pelvic exam was performed with patient in the lithotomy position  Rectum:      No tenderness  Cardiovascular:      Rate and Rhythm: Normal rate  Pulmonary:      Effort: Pulmonary effort is normal    Abdominal:      General: There is no distension  Palpations: Abdomen is soft  Tenderness: There is no abdominal tenderness  There is no guarding  Neurological:      Mental Status: She is alert and oriented to person, place, and time  Skin:     General: Skin is warm and dry  Psychiatric:         Behavior: Behavior normal                I have spent 30 minutes on day of encounter, time spent involved pre-charting, review of previous records, face to face encounter, counseling and post visit documentation

## 2022-07-01 ENCOUNTER — PROCEDURE VISIT (OUTPATIENT)
Dept: OBGYN CLINIC | Facility: CLINIC | Age: 37
End: 2022-07-01
Payer: COMMERCIAL

## 2022-07-01 VITALS
WEIGHT: 172 LBS | SYSTOLIC BLOOD PRESSURE: 110 MMHG | DIASTOLIC BLOOD PRESSURE: 72 MMHG | BODY MASS INDEX: 30.48 KG/M2 | TEMPERATURE: 96.8 F | HEART RATE: 63 BPM | HEIGHT: 63 IN

## 2022-07-01 DIAGNOSIS — Z30.430 ENCOUNTER FOR INSERTION OF COPPER INTRAUTERINE CONTRACEPTIVE DEVICE (IUD): Primary | ICD-10-CM

## 2022-07-01 DIAGNOSIS — Z30.09 COUNSELING FOR BIRTH CONTROL REGARDING INTRAUTERINE DEVICE (IUD): ICD-10-CM

## 2022-07-01 PROBLEM — R39.9 UTI SYMPTOMS: Status: RESOLVED | Noted: 2021-07-06 | Resolved: 2022-07-01

## 2022-07-01 LAB — SL AMB POCT URINE HCG: NEGATIVE

## 2022-07-01 PROCEDURE — 99214 OFFICE O/P EST MOD 30 MIN: CPT | Performed by: OBSTETRICS & GYNECOLOGY

## 2022-07-01 PROCEDURE — 58300 INSERT INTRAUTERINE DEVICE: CPT | Performed by: OBSTETRICS & GYNECOLOGY

## 2022-07-01 PROCEDURE — 81025 URINE PREGNANCY TEST: CPT | Performed by: OBSTETRICS & GYNECOLOGY

## 2022-07-01 RX ORDER — COPPER 313.4 MG/1
1 INTRAUTERINE DEVICE INTRAUTERINE ONCE
Status: COMPLETED | OUTPATIENT
Start: 2022-07-01 | End: 2022-07-01

## 2022-07-01 RX ADMIN — COPPER 1 INTRA UTERINE DEVICE: 313.4 INTRAUTERINE DEVICE INTRAUTERINE at 12:31

## 2022-07-01 NOTE — PATIENT INSTRUCTIONS
Intrauterine Device   DISCHARGE INSTRUCTIONS:   Seek care immediately if:   You have severe pain or bleeding during your period  You have a fever and severe abdominal pain  Call your doctor or gynecologist if:   You think you are pregnant  The IUD has come out  You have bleeding from your vagina after you have sex, and it is not your period  You have pain during sex  You cannot feel the IUD string, the string feels longer, or you feel the plastic of the IUD itself  You have vaginal discharge that is green, yellow, or has a foul odor  You have questions or concerns about your condition or care  Medicines:   NSAIDs  help decrease swelling and pain or fever  This medicine is available with or without a doctor's order  NSAIDs can cause stomach bleeding or kidney problems in certain people  If you take blood thinner medicine, always ask your healthcare provider if NSAIDs are safe for you  Always read the medicine label and follow directions  Take your medicine as directed  Contact your healthcare provider if you think your medicine is not helping or if you have side effects  Tell him or her if you are allergic to any medicine  Keep a list of the medicines, vitamins, and herbs you take  Include the amounts, and when and why you take them  Bring the list or the pill bottles to follow-up visits  Carry your medicine list with you in case of an emergency  Self-care:   Apply heat to relieve pain and cramping  Use a heating pad set on low  Apply heat to your lower abdomen for 20 minutes every hour, or as directed  Return to activities as directed  Your healthcare provider will tell you when it is okay to return to work, school, or other activities  Do not use a tampon or have sex  until your provider says it is okay  Make sure your IUD is in place: An IUD has a string that is made of plastic thread  One to 2 inches of this string hangs into your vagina   You cannot see this string, and it should not cause problems when you have sex  Check your IUD string every 3 days for the first 3 months that you have your IUD  After that, check the string after each monthly period  Do the following to check the placement of your IUD:  Wash your hands with soap and warm water  Dry them with a clean towel  Bend your knees and squat low to the ground  Gently put your index finger inside your vagina  The cervix is at the top of the vagina and feels like the tip of your nose  Feel for the IUD string  Do not pull on the string  You should not be able to feel the firm plastic of the IUD itself  Wash your hands after you check your IUD string  For more information:   [facility] [address] [place]  Phone: [phone] [phone]  Web Address: https://[employer]  org    Follow up with your doctor as directed:  Write down your questions so you remember to ask them during your visits  © Copyright [employer] 2022 Information is for End User's use only and may not be sold, redistributed or otherwise used for commercial purposes  All illustrations and images included in CareNotes® are the copyrighted property of A D A M , Inc  or [facility] Delmis Alfaro   The above information is an  only  It is not intended as medical advice for individual conditions or treatments  Talk to your doctor, nurse or pharmacist before following any medical regimen to see if it is safe and effective for you

## 2022-07-01 NOTE — PROGRESS NOTES
Iud insertions    Date/Time: 7/1/2022 12:31 PM  Performed by: Corry Liz MD  Authorized by: Corry Liz MD   Universal Protocol:  Patient understanding: patient states understanding of the procedure being performed  Patient consent: the patient's understanding of the procedure matches consent given        Procedure:     Pelvic exam performed: yes      Negative urine pregnancy test: yes      Cervix cleaned and prepped: yes      Speculum placed in vagina: yes      Tenaculum applied to cervix: Allis        Uterus sounded: yes      Uterus sound depth (cm):  8    IUD inserted with no complications: yes      IUD type:  ParaGard    Strings trimmed: yes    Post-procedure:     Patient tolerated procedure well: yes      Patient will follow up after next period: yes    Comments:      Hurricane spray used  Lidocaine 4% topical applied on cervix  5ml lidocaine injected paracervicall x 2

## 2022-07-01 NOTE — LETTER
July 1, 2022     Patient: Asim Torres  YOB: 1985  Date of Visit: 7/1/2022      To Whom it May Concern:    Adalberto Durant is under my professional care  Asim Rock was seen in my office on 7/1/2022 for an in-office procedure  Please excuse her for her time missed at work  If you have any questions or concerns, please don't hesitate to call           Sincerely,          Kathy Reza MD        CC: No Recipients

## 2022-07-08 ENCOUNTER — TELEPHONE (OUTPATIENT)
Dept: OBGYN CLINIC | Facility: CLINIC | Age: 37
End: 2022-07-08

## 2022-07-08 NOTE — TELEPHONE ENCOUNTER
Pt called - she had Paragard inserted 7/1, and is still having some bleeding - brownish, not much, needs to wear panty liner  She is asking if this is normal to still be having this or should she be concerned? Please advise  Thank you

## 2022-07-11 DIAGNOSIS — G43.009 MIGRAINE WITHOUT AURA AND WITHOUT STATUS MIGRAINOSUS, NOT INTRACTABLE: ICD-10-CM

## 2022-07-11 RX ORDER — SUMATRIPTAN 100 MG/1
TABLET, FILM COATED ORAL
Qty: 12 TABLET | Refills: 0 | Status: CANCELLED | OUTPATIENT
Start: 2022-07-11

## 2022-07-14 ENCOUNTER — APPOINTMENT (OUTPATIENT)
Dept: LAB | Facility: CLINIC | Age: 37
End: 2022-07-14

## 2022-07-14 DIAGNOSIS — Z00.8 HEALTH EXAMINATION IN POPULATION SURVEY: ICD-10-CM

## 2022-07-14 LAB
CHOLEST SERPL-MCNC: 216 MG/DL
EST. AVERAGE GLUCOSE BLD GHB EST-MCNC: 103 MG/DL
HBA1C MFR BLD: 5.2 %
HDLC SERPL-MCNC: 48 MG/DL
LDLC SERPL CALC-MCNC: 150 MG/DL (ref 0–100)
NONHDLC SERPL-MCNC: 168 MG/DL
TRIGL SERPL-MCNC: 89 MG/DL

## 2022-07-14 PROCEDURE — 83036 HEMOGLOBIN GLYCOSYLATED A1C: CPT

## 2022-07-14 PROCEDURE — 80061 LIPID PANEL: CPT

## 2022-07-14 PROCEDURE — 36415 COLL VENOUS BLD VENIPUNCTURE: CPT

## 2022-07-27 NOTE — TELEPHONE ENCOUNTER
Sumatriptan 100 mg sent to [facility] in [facility] on 5/10/22 with 5 refills   No refills needed at this time    Sent patient mychart message

## 2022-08-15 PROBLEM — M25.50 ARTHRALGIA: Status: RESOLVED | Noted: 2022-05-09 | Resolved: 2022-08-15

## 2022-08-15 PROBLEM — U07.1 COVID-19 VIRUS INFECTION: Status: RESOLVED | Noted: 2021-12-08 | Resolved: 2022-08-15

## 2022-08-15 PROBLEM — J01.90 ACUTE NON-RECURRENT SINUSITIS: Status: RESOLVED | Noted: 2021-10-25 | Resolved: 2022-08-15

## 2022-08-15 PROBLEM — F32.A DEPRESSION: Status: RESOLVED | Noted: 2022-05-09 | Resolved: 2022-08-15

## 2022-08-15 PROBLEM — M79.10 MYALGIA: Status: RESOLVED | Noted: 2022-05-09 | Resolved: 2022-08-15

## 2022-08-15 PROBLEM — J02.9 SORE THROAT: Status: RESOLVED | Noted: 2021-09-23 | Resolved: 2022-08-15

## 2022-08-15 PROBLEM — M54.2 NECK PAIN: Status: RESOLVED | Noted: 2022-05-09 | Resolved: 2022-08-15

## 2022-08-15 PROBLEM — R53.83 FATIGUE: Status: RESOLVED | Noted: 2021-12-08 | Resolved: 2022-08-15

## 2022-08-15 PROBLEM — J31.0 RHINITIS: Status: RESOLVED | Noted: 2021-10-25 | Resolved: 2022-08-15

## 2022-08-15 PROBLEM — R09.81 SINUS CONGESTION: Status: RESOLVED | Noted: 2021-09-23 | Resolved: 2022-08-15

## 2022-08-15 PROBLEM — R05.9 COUGH: Status: RESOLVED | Noted: 2021-09-23 | Resolved: 2022-08-15

## 2022-08-15 PROBLEM — Z12.4 CERVICAL CANCER SCREENING: Status: RESOLVED | Noted: 2020-02-27 | Resolved: 2022-08-15

## 2022-08-16 ENCOUNTER — OFFICE VISIT (OUTPATIENT)
Dept: OBGYN CLINIC | Facility: CLINIC | Age: 37
End: 2022-08-16
Payer: COMMERCIAL

## 2022-08-16 VITALS
TEMPERATURE: 97.5 F | SYSTOLIC BLOOD PRESSURE: 104 MMHG | DIASTOLIC BLOOD PRESSURE: 62 MMHG | HEART RATE: 70 BPM | WEIGHT: 155 LBS | BODY MASS INDEX: 27.46 KG/M2 | HEIGHT: 63 IN

## 2022-08-16 DIAGNOSIS — Z30.431 IUD CHECK UP: Primary | ICD-10-CM

## 2022-08-16 DIAGNOSIS — Z97.5 IUD CONTRACEPTION: ICD-10-CM

## 2022-08-16 PROCEDURE — 99213 OFFICE O/P EST LOW 20 MIN: CPT | Performed by: OBSTETRICS & GYNECOLOGY

## 2022-08-16 NOTE — PROGRESS NOTES
Assessment/Plan:    Problem List Items Addressed This Visit    None     Visit Diagnoses     IUD check up    -  Primary    IUD contraception               Patient doing well on Intrauterine device contraception although with slightly heavier menses  She is not having breakthrough bleeding with Intrauterine device  Advised use of NSAIDs specifically ibuprofen 600 mg every 6 hours for 5 days with her next cycle  Advised patient to call if with any untoward side effects from Intrauterine device  Discussed with patient low threshold for ordering pelvic ultrasound if with abnormal uterine bleeding to check Intrauterine device position and rule out malposition or low-lying Intrauterine device  Follow-up in 2023 for annual gyn exam       Subjective   Patient ID: Tevin Borjas is a 40 y o  female  Patient is here for a follow-up  Chief Complaint   Patient presents with    Follow-up     Iud string check  No concerns  She had bled for about 10 days after insertion  She had a period 23 days after her last period  She states period was 7 days  She states she is soaking through super plus tampons in 3-4 tampons  She states 2nd period is 7 days long  She states periods are now 23-25 days apart  She has no pain  She has no pain with intercourse  She feels better off hormones        Menstrual History:  OB History        2    Para   2    Term   2            AB        Living   2       SAB        IAB        Ectopic        Multiple   0    Live Births   2                    Past Medical History:   Diagnosis Date    Abnormal Pap smear of cervix     Acute non-recurrent sinusitis 10/25/2021    Arthritis     BMI 27 0-27 9,adult 2022    BRCA1 negative     BRCA2 negative     Constipation, chronic     Cough 2021    COVID-19 virus infection 2021    Depression 2022    Diabetes mellitus ([address] )     Fatigue 2021    Gestational diabetes     Gestational hypertension affecting first pregnancy     Irritable bowel syndrome     Migraine     Myalgia 2022    Neck pain 2022    Rhinitis 10/25/2021    Sinus congestion 2021    Sore throat 2021    Varicella     Weight loss        Past Surgical History:   Procedure Laterality Date     SECTION  10/06/2017    COLPOSCOPY      [place]  DELIVERY ONLY Bilateral 10/6/2017    Procedure:  SECTION (); Surgeon: Tasha Prajapati MD;  [facility];  Service: Obstetrics    [place]  DELIVERY ONLY N/A 3/22/2019    Procedure: Marinette Grooms () REPEAT;  Surgeon: Tasha Prajapati MD;  Location: [facility];  Service: Obstetrics    [place] COLONOSCOPY FLX DX W/COLLJ SPEC WHEN PFRMD N/A 2016    Procedure: COLONOSCOPY;  Surgeon: Yamileth Olsen MD;  [facility];   Service: Gastroenterology    WISDOM TOOTH EXTRACTION         Social History     Tobacco Use    Smoking status: Never Smoker    Smokeless tobacco: Never Used   Vaping Use    Vaping Use: Never used   Substance Use Topics    Alcohol use: Not Currently    Drug use: No       Allergies   Allergen Reactions    Amoxicillin Rash    Penicillins Rash         Current Outpatient Medications:     ibuprofen (MOTRIN) 800 mg tablet, Take by mouth 2 (two) times a day, Disp: , Rfl:     meloxicam (MOBIC) 15 mg tablet, Take 1 tablet (15 mg total) by mouth daily as needed for moderate pain, Disp: 90 tablet, Rfl: 0    SUMAtriptan (IMITREX) 100 mg tablet, 1 tab as needed for migraine, may repeat in 2 hours, max 200mg per 24 hours, Disp: 12 tablet, Rfl: 5    topiramate (TOPAMAX) 100 mg tablet, Take 1 tablet (100 mg total) by mouth in the morning , Disp: 90 tablet, Rfl: 1    Cholecalciferol (Vitamin D3) 250 MCG (31815 UT) TABS, Take 10,000 Units by mouth in the morning (Patient not taking: No sig reported), Disp: , Rfl:     cyclobenzaprine (FLEXERIL) 5 mg tablet, Take 1 tablet (5 mg total) by mouth daily at bedtime as needed for muscle spasms (Patient not taking: Reported on 8/16/2022), Disp: 15 tablet, Rfl: 1      Review of Systems   Constitutional: Negative  HENT: Negative  Eyes: Negative  Respiratory: Negative  Cardiovascular: Negative  Gastrointestinal: Negative  Endocrine: Negative  Genitourinary:        As noted in HPI   Musculoskeletal: Negative  Skin: Negative  Allergic/Immunologic: Negative  Neurological: Negative  Hematological: Negative  Psychiatric/Behavioral: Negative  /62 (BP Location: Right arm, Patient Position: Sitting, Cuff Size: Adult)   Pulse 70   Temp 97 5 °F (36 4 °C)   Ht 5' 3" (1 6 m)   Wt 70 3 kg (155 lb)   BMI 27 46 kg/m²       Physical Exam  Constitutional:       General: She is not in acute distress  Appearance: She is well-developed  Genitourinary:      Bladder and urethral meatus normal       No lesions in the vagina  Right Labia: No rash, tenderness, lesions, skin changes or Bartholin's cyst      Left Labia: No tenderness, lesions, skin changes, Bartholin's cyst or rash  No vaginal discharge, erythema, tenderness or bleeding  No vaginal prolapse present  No vaginal atrophy present  Right Adnexa: not tender, not full and no mass present  Left Adnexa: not tender, not full and no mass present  No cervical polyp  IUD strings visualized  Uterus is not enlarged or tender  No uterine mass detected  Pelvic exam was performed with patient in the lithotomy position  Rectum:      No tenderness  Cardiovascular:      Rate and Rhythm: Normal rate  Pulmonary:      Effort: Pulmonary effort is normal    Abdominal:      General: There is no distension  Palpations: Abdomen is soft  Tenderness: There is no abdominal tenderness  Neurological:      Mental Status: She is alert and oriented to person, place, and time  Skin:     General: Skin is warm and dry     Psychiatric:         Behavior: Behavior normal  I have spent 20 minutes on day of encounter, time spent involved pre-charting, review of previous records, face to face encounter, counseling and post visit documentation

## 2022-08-23 DIAGNOSIS — G43.009 MIGRAINE WITHOUT AURA AND WITHOUT STATUS MIGRAINOSUS, NOT INTRACTABLE: ICD-10-CM

## 2022-08-24 ENCOUNTER — TELEPHONE (OUTPATIENT)
Dept: NEUROLOGY | Facility: CLINIC | Age: 37
End: 2022-08-24

## 2022-08-24 RX ORDER — SUMATRIPTAN 100 MG/1
TABLET, FILM COATED ORAL
Qty: 12 TABLET | Refills: 4 | Status: SHIPPED | OUTPATIENT
Start: 2022-08-24

## 2022-08-24 NOTE — TELEPHONE ENCOUNTER
Stveen Cortes has called back and scheduled with Jonathan Farley and asked if medication now be filled      Thank you,     Lang Pizano

## 2022-08-24 NOTE — TELEPHONE ENCOUNTER
Sending a Dermira message to inform pt of the follow up appt made for her since she was at work unable to wait to find the next available appointment  Pt asked to schedule for the next available after the dates given her and to schedule and she would call back if needed to reschedule the appt      Thank you,     Caprice Dennis

## 2022-10-13 DIAGNOSIS — G43.009 MIGRAINE WITHOUT AURA AND WITHOUT STATUS MIGRAINOSUS, NOT INTRACTABLE: ICD-10-CM

## 2022-10-13 RX ORDER — SUMATRIPTAN 100 MG/1
TABLET, FILM COATED ORAL
Qty: 12 TABLET | Refills: 0 | Status: CANCELLED | OUTPATIENT
Start: 2022-10-13

## 2022-10-14 RX ORDER — TOPIRAMATE 100 MG/1
100 TABLET, FILM COATED ORAL DAILY
Qty: 90 TABLET | Refills: 0 | Status: SHIPPED | OUTPATIENT
Start: 2022-10-14

## 2022-10-14 NOTE — TELEPHONE ENCOUNTER
Sumatriptan 100 mg last sent to [facility] in [place] on 8/24/22 with 4 refills       Sent patient Stack[employer] message

## 2022-11-02 ENCOUNTER — TELEPHONE (OUTPATIENT)
Dept: INTERNAL MEDICINE CLINIC | Facility: CLINIC | Age: 37
End: 2022-11-02

## 2022-11-02 NOTE — TELEPHONE ENCOUNTER
Please call patient that I cannot order x-ray without seeing her    Please advise her to go to urgent care for an evaluation and they can do x-ray as well if necessary

## 2022-11-02 NOTE — TELEPHONE ENCOUNTER
Patient has had lower left back pain for a few weeks now and would like you to put in Imaging for her

## 2022-11-23 ENCOUNTER — AMB VIDEO VISIT (OUTPATIENT)
Dept: OTHER | Facility: HOSPITAL | Age: 37
End: 2022-11-23

## 2022-11-23 DIAGNOSIS — J01.00 ACUTE NON-RECURRENT MAXILLARY SINUSITIS: Primary | ICD-10-CM

## 2022-11-23 RX ORDER — FLUTICASONE PROPIONATE 50 MCG
1 SPRAY, SUSPENSION (ML) NASAL DAILY
Qty: 9.9 ML | Refills: 0 | Status: SHIPPED | OUTPATIENT
Start: 2022-11-23

## 2022-11-23 RX ORDER — DOXYCYCLINE 100 MG/1
100 TABLET ORAL 2 TIMES DAILY
Qty: 20 TABLET | Refills: 0 | Status: SHIPPED | OUTPATIENT
Start: 2022-11-23 | End: 2022-12-03

## 2022-11-23 NOTE — PATIENT INSTRUCTIONS
Sinusitis   WHAT YOU NEED TO KNOW:   Sinusitis is inflammation or infection of your sinuses  Sinusitis is most often caused by a virus  Acute sinusitis may last up to 12 weeks  Chronic sinusitis lasts longer than 12 weeks  Recurrent sinusitis means you have 4 or more infections in 1 year  DISCHARGE INSTRUCTIONS:   Return to the emergency department if:   You have trouble breathing or wheezing that is getting worse  You have a stiff neck, a fever, or a bad headache  You cannot open your eye  Your eyeball bulges out or you cannot move your eye  You are more sleepy than normal, or you notice changes in your ability to think, move, or talk  You have swelling of your forehead or scalp  Call your doctor if:   You have vision changes, such as double vision  Your eye and eyelid are red, swollen, and painful  Your symptoms do not improve or go away after 10 days  You have nausea and are vomiting  Your nose is bleeding  You have questions or concerns about your condition or care  Medicines: Your symptoms may go away on their own  Your healthcare provider may recommend watchful waiting for up to 10 days before starting antibiotics  You may need any of the following:  Acetaminophen  decreases pain and fever  It is available without a doctor's order  Ask how much to take and how often to take it  Follow directions  Read the labels of all other medicines you are using to see if they also contain acetaminophen, or ask your doctor or pharmacist  Acetaminophen can cause liver damage if not taken correctly  Do not use more than 4 grams (4,000 milligrams) total of acetaminophen in one day  NSAIDs , such as ibuprofen, help decrease swelling, pain, and fever  This medicine is available with or without a doctor's order  NSAIDs can cause stomach bleeding or kidney problems in certain people   If you take blood thinner medicine, always ask your healthcare provider if NSAIDs are safe for you  Always read the medicine label and follow directions  Nasal steroid sprays  may help decrease inflammation in your nose and sinuses  Decongestants  help reduce swelling and drain mucus in the nose and sinuses  They may help you breathe easier  Antihistamines  help dry mucus in the nose and relieve sneezing  Antibiotics  help treat or prevent a bacterial infection  Take your medicine as directed  Contact your healthcare provider if you think your medicine is not helping or if you have side effects  Tell him or her if you are allergic to any medicine  Keep a list of the medicines, vitamins, and herbs you take  Include the amounts, and when and why you take them  Bring the list or the pill bottles to follow-up visits  Carry your medicine list with you in case of an emergency  Self-care:   Rinse your sinuses as directed  Use a sinus rinse device to rinse your nasal passages with a saline (salt water) solution or distilled water  Do not use tap water  This will help thin the mucus in your nose and rinse away pollen and dirt  It will also help reduce swelling so you can breathe normally  Use a humidifier  to increase air moisture in your home  This may make it easier for you to breathe and help decrease your cough  Sleep with your head elevated  Place an extra pillow under your head before you go to sleep to help your sinuses drain  Drink liquids as directed  Ask your healthcare provider how much liquid to drink each day and which liquids are best for you  Liquids will thin the mucus in your nose and help it drain  Avoid drinks that contain alcohol or caffeine  Do not smoke, and avoid secondhand smoke  Nicotine and other chemicals in cigarettes and cigars can make your symptoms worse  Ask your healthcare provider for information if you currently smoke and need help to quit  E-cigarettes or smokeless tobacco still contain nicotine   Talk to your healthcare provider before you use these products  Prevent the spread of germs:   Wash your hands often with soap and water  Wash your hands after you use the bathroom, change a child's diaper, or sneeze  Wash your hands before you prepare or eat food  Stay away from people who are sick  Some germs spread easily and quickly through contact  Follow up with your doctor as directed: You may be referred to an ear, nose, and throat specialist  Write down your questions so you remember to ask them during your visits  © Copyright [employer] 2022 Information is for End User's use only and may not be sold, redistributed or otherwise used for commercial purposes  All illustrations and images included in CareNotes® are the copyrighted property of [employer] M [employer] Inc  or [facility] Delmis Alfaro   The above information is an  only  It is not intended as medical advice for individual conditions or treatments  Talk to your doctor, nurse or pharmacist before following any medical regimen to see if it is safe and effective for you

## 2022-11-23 NOTE — CARE ANYWHERE EVISITS
Visit Summary for Pravin Guido - Gender: Female - Date of Birth: [id number]  Date: [id number] - Duration: 3 minutes  Patient: Pravin Guido  Provider: Gabriela Freedman PA-C    Patient Contact Information  Address  [address] [place] [address]      Visit Topics  Cold [Added ByTobi Hung - [id number]]    Triage Questions   What is your current physical address in the event of a medical emergency? Answer []  Are you allergic to any medications? Answer []  Are you now or could you be pregnant? Answer []  Do you have any immune system compromise or chronic lung   disease? Answer []  Do you have any vulnerable family members in the home (infant, pregnant, cancer, elderly)? Answer []     Conversation Transcripts  [0A][0A] [Notification] You are connected with Gabriela Freedman PA-C, Urgent Care Specialist [0A][Notification] Pravin Guido is located in [place]  [0A][Notification] Pravin Guido has shared health history  Jeanine Flores  [0A]    Diagnosis  Acute maxillary sinusitis, unspecified    Procedures  Value: [id number] Code: CPT-4 UNLISTED E&M SERVICE    Medications Prescribed    No prescriptions ordered    Electronically signed by: Christine Zelaya(NPI [id number])

## 2022-11-23 NOTE — PROGRESS NOTES
Video Visit - Toña Tinoco 40 y o  female MRN: [id number]    REQUIRED DOCUMENTATION:         1  This service was provided via [employer]  2  Provider located at [address] [place] [address] [id number] [phone]  3  [facility] provider: Nichole Martínez PA-C   4  Identify all parties in room with patient during [facility] visit:  No one else  5  After connecting through [employer], patient was identified by name and date of birth  Patient was then informed that this was a Telemedicine visit and that the exam was being conducted confidentially over secure lines  My office door was closed  No one else was in the room  Patient acknowledged consent and understanding of privacy and security of the Telemedicine visit  I informed the patient that I have reviewed their record in Epic and presented the opportunity for them to ask any questions regarding the visit today  The patient agreed to participate  HPI  Patient states she has had congestion for the last 1 5-2 weeks  She is having sinus pain and pressure, sinus headache for the past 3 days  She is taking OTc meds without relife  She denies any fevers, chills, nausea, vomiting, SOB, CP  She did not do a covid test   She is vaccinated  Physical Exam  Constitutional:       General: She is not in acute distress  Appearance: Normal appearance  She is not ill-appearing, toxic-appearing or diaphoretic  HENT:      Head: Normocephalic and atraumatic  Nose: Congestion present  Comments: TTP over sinuses  Pulmonary:      Effort: Pulmonary effort is normal  No respiratory distress  Lymphadenopathy:      Cervical: No cervical adenopathy  Skin:     General: Skin is dry  Neurological:      General: No focal deficit present  Mental Status: She is alert and oriented to person, place, and time     Psychiatric:         Mood and Affect: Mood normal          Behavior: Behavior normal          Diagnoses and all orders for this visit:    Acute non-recurrent maxillary sinusitis  -     fluticasone (FLONASE) 50 mcg/act nasal spray; 1 spray into each nostril daily  -     doxycycline (ADOXA) 100 MG tablet; Take 1 tablet (100 mg total) by mouth 2 (two) times a day for 10 days      Patient Instructions     Sinusitis   WHAT YOU NEED TO KNOW:   Sinusitis is inflammation or infection of your sinuses  Sinusitis is most often caused by a virus  Acute sinusitis may last up to 12 weeks  Chronic sinusitis lasts longer than 12 weeks  Recurrent sinusitis means you have 4 or more infections in 1 year  DISCHARGE INSTRUCTIONS:   Return to the emergency department if:   · You have trouble breathing or wheezing that is getting worse  · You have a stiff neck, a fever, or a bad headache  · You cannot open your eye  · Your eyeball bulges out or you cannot move your eye  · You are more sleepy than normal, or you notice changes in your ability to think, move, or talk  · You have swelling of your forehead or scalp  Call your doctor if:   · You have vision changes, such as double vision  · Your eye and eyelid are red, swollen, and painful  · Your symptoms do not improve or go away after 10 days  · You have nausea and are vomiting  · Your nose is bleeding  · You have questions or concerns about your condition or care  Medicines: Your symptoms may go away on their own  Your healthcare provider may recommend watchful waiting for up to 10 days before starting antibiotics  You may need any of the following:  · Acetaminophen  decreases pain and fever  It is available without a doctor's order  Ask how much to take and how often to take it  Follow directions  Read the labels of all other medicines you are using to see if they also contain acetaminophen, or ask your doctor or pharmacist  Acetaminophen can cause liver damage if not taken correctly   Do not use more than 4 grams (4,000 milligrams) total of acetaminophen in one day      · NSAIDs , such as ibuprofen, help decrease swelling, pain, and fever  This medicine is available with or without a doctor's order  NSAIDs can cause stomach bleeding or kidney problems in certain people  If you take blood thinner medicine, always ask your healthcare provider if NSAIDs are safe for you  Always read the medicine label and follow directions  · Nasal steroid sprays  may help decrease inflammation in your nose and sinuses  · Decongestants  help reduce swelling and drain mucus in the nose and sinuses  They may help you breathe easier  · Antihistamines  help dry mucus in the nose and relieve sneezing  · Antibiotics  help treat or prevent a bacterial infection  · Take your medicine as directed  Contact your healthcare provider if you think your medicine is not helping or if you have side effects  Tell him or her if you are allergic to any medicine  Keep a list of the medicines, vitamins, and herbs you take  Include the amounts, and when and why you take them  Bring the list or the pill bottles to follow-up visits  Carry your medicine list with you in case of an emergency  Self-care:   · Rinse your sinuses as directed  Use a sinus rinse device to rinse your nasal passages with a saline (salt water) solution or distilled water  Do not use tap water  This will help thin the mucus in your nose and rinse away pollen and dirt  It will also help reduce swelling so you can breathe normally  · Use a humidifier  to increase air moisture in your home  This may make it easier for you to breathe and help decrease your cough  · Sleep with your head elevated  Place an extra pillow under your head before you go to sleep to help your sinuses drain  · Drink liquids as directed  Ask your healthcare provider how much liquid to drink each day and which liquids are best for you  Liquids will thin the mucus in your nose and help it drain  Avoid drinks that contain alcohol or caffeine  · Do not smoke, and avoid secondhand smoke  Nicotine and other chemicals in cigarettes and cigars can make your symptoms worse  Ask your healthcare provider for information if you currently smoke and need help to quit  E-cigarettes or smokeless tobacco still contain nicotine  Talk to your healthcare provider before you use these products  Prevent the spread of germs:   · Wash your hands often with soap and water  Wash your hands after you use the bathroom, change a child's diaper, or sneeze  Wash your hands before you prepare or eat food  · Stay away from people who are sick  Some germs spread easily and quickly through contact  Follow up with your doctor as directed: You may be referred to an ear, nose, and throat specialist  Write down your questions so you remember to ask them during your visits  © Copyright "[employer] [employer]" 2022 Information is for End User's use only and may not be sold, redistributed or otherwise used for commercial purposes  All illustrations and images included in CareNotes® are the copyrighted property of A D A M , Inc  or [facility] Delmis Alfaro   The above information is an  only  It is not intended as medical advice for individual conditions or treatments  Talk to your doctor, nurse or pharmacist before following any medical regimen to see if it is safe and effective for you

## 2023-01-05 ENCOUNTER — HOSPITAL ENCOUNTER (OUTPATIENT)
Dept: RADIOLOGY | Facility: HOSPITAL | Age: 38
Discharge: HOME | End: 2023-01-05

## 2023-01-05 VITALS — BODY MASS INDEX: 27.46 KG/M2 | HEIGHT: 63 IN | WEIGHT: 155 LBS

## 2023-01-05 DIAGNOSIS — Z12.31 VISIT FOR SCREENING MAMMOGRAM: ICD-10-CM

## 2023-01-05 DIAGNOSIS — Z91.89 INCREASED RISK OF BREAST CANCER: ICD-10-CM

## 2023-01-09 ENCOUNTER — TELEPHONE (OUTPATIENT)
Dept: SURGICAL ONCOLOGY | Facility: HOSPITAL | Age: 38
End: 2023-01-09

## 2023-01-09 NOTE — TELEPHONE ENCOUNTER
I spoke the patient because she needed an appointment with Radha Ellison  The patient is going to call back the [facility] to schedule an appointment  I provided the [facility] phone number to the patient

## 2023-01-19 ENCOUNTER — TELEPHONE (OUTPATIENT)
Dept: NEUROLOGY | Facility: CLINIC | Age: 38
End: 2023-01-19

## 2023-01-19 NOTE — TELEPHONE ENCOUNTER
Called and spoke to patient - confirmed upcoming appointment with Trisha Rueda on 01/31/23 8:00 am at the [facility] office  Provided patient with apt date, time and location  Informed patient that check in is at least 15 minutes prior to apt time  The patient is not  having any issues or concerns at this time

## 2023-01-31 ENCOUNTER — OFFICE VISIT (OUTPATIENT)
Dept: NEUROLOGY | Facility: CLINIC | Age: 38
End: 2023-01-31

## 2023-01-31 VITALS
TEMPERATURE: 97.2 F | HEIGHT: 64 IN | SYSTOLIC BLOOD PRESSURE: 114 MMHG | BODY MASS INDEX: 27.14 KG/M2 | WEIGHT: 159 LBS | HEART RATE: 77 BPM | DIASTOLIC BLOOD PRESSURE: 55 MMHG | RESPIRATION RATE: 16 BRPM

## 2023-01-31 DIAGNOSIS — G43.009 MIGRAINE WITHOUT AURA AND WITHOUT STATUS MIGRAINOSUS, NOT INTRACTABLE: ICD-10-CM

## 2023-01-31 RX ORDER — TOPIRAMATE 100 MG/1
100 TABLET, FILM COATED ORAL DAILY
Qty: 90 TABLET | Refills: 1 | Status: SHIPPED | OUTPATIENT
Start: 2023-01-31

## 2023-01-31 RX ORDER — SUMATRIPTAN 100 MG/1
TABLET, FILM COATED ORAL
Qty: 12 TABLET | Refills: 5 | Status: SHIPPED | OUTPATIENT
Start: 2023-01-31

## 2023-01-31 NOTE — PROGRESS NOTES
Patient ID: Hallie Wolf is a 40 y o  female  Assessment/Plan:  Patient Instructions:  Continue topamax 100mg nightly  Restart the magnesium oxide 400mg once daily  Could use vitamin e 400iu for 5 days during menstrual cycle if migraine worse during this time  Continue with imitrex/motrin as needed for migraine, no more than 3x/week  Let me know of any change in your headache or back pain  Follow up in 6 month         Diagnoses and all orders for this visit:    Migraine without aura and without status migrainosus, not intractable  -     SUMAtriptan (IMITREX) 100 mg tablet; 1 tab as needed for migraine, may repeat in 2 hours, max 200mg per 24 hours  -     topiramate (TOPAMAX) 100 mg tablet; Take 1 tablet (100 mg total) by mouth daily  -     Comprehensive metabolic panel; Future         Subjective:  Christopher Soto is a 40year old female who presents for migraine follow up; last office visit 5/13/2022  She currently uses 100mg qhs of topiramate for prevention and prn imitrex/motrin for acute management  She has a copper IUD for pregnancy prevention  She states she never tried previously ordered flexeril for neck pain and states she is hesitant to trial cymbalta/gabapentin that were previously discussed  She did not see rheumatology; her labs for JUDY/RF/ESR were normal  She states overall she has been sleeping ok, but this month she has had more stress since a family member has been sick  She does feel her migraines are triggered most by her menstrual cycle  Other recent labs reviewed- , A1c 5 2, TSh 1 4  HPI    Since your last visit are your headaches Improved  If better or worse, please explain: Less often less severe  Are you taking your current medications as prescribed? yes  Do you have any side effects? no  How often do you use abortive medications to treat a headache? 5 times per month  How effective are they? effective  From 0-10, how severe is the pain for a typical headache for you? "Varies, but up to a 7  What does your typical headache pain feel like? pressure  Where is your typical headache? apical and nuchal  What is your current headache frequency: 5 times per month  How long does your typical headache last? 1-2 day(s)  In addition to the head pain, what other symptoms do you have before or during your headaches? None  Do you have anything you need to discuss with the doctor during your visit? No         The following portions of the patient's history were reviewed and updated as appropriate: allergies, current medications, past family history, past medical history, past social history, past surgical history and problem list          Objective:    Blood pressure 114/55, pulse 77, temperature (!) 97 2 °F (36 2 °C), temperature source Temporal, resp  rate 16, height 5' 4\" (1 626 m), weight 72 1 kg (159 lb), currently breastfeeding  Physical Exam  Vitals reviewed  Constitutional:       General: She is not in acute distress  HENT:      Head: Normocephalic and atraumatic  Right Ear: External ear normal       Left Ear: External ear normal       Nose: Nose normal       Mouth/Throat:      Mouth: Mucous membranes are moist       Pharynx: Oropharynx is clear  Eyes:      General: Lids are normal       Extraocular Movements: Extraocular movements intact  Pupils: Pupils are equal, round, and reactive to light  Cardiovascular:      Rate and Rhythm: Normal rate and regular rhythm  Pulses: Normal pulses  Heart sounds: Normal heart sounds  Pulmonary:      Effort: Pulmonary effort is normal       Breath sounds: Normal breath sounds  Abdominal:      General: There is no distension  Musculoskeletal:      Cervical back: Normal range of motion  No tenderness  Right lower leg: No edema  Left lower leg: No edema  Skin:     General: Skin is warm  Capillary Refill: Capillary refill takes less than 2 seconds  Neurological:      General: No focal deficit present   " Mental Status: She is alert  Mental status is at baseline  Motor: Motor strength is normal       Deep Tendon Reflexes: Reflexes are normal and symmetric  Psychiatric:         Mood and Affect: Mood normal          Speech: Speech normal          Behavior: Behavior normal          Neurological Exam  Mental Status  Alert  Speech is normal  Language is fluent with no aphasia  Attention and concentration are normal  Fund of knowledge is appropriate for level of education  Cranial Nerves  CN II: Visual acuity is normal  Visual fields full to confrontation  CN III, IV, VI: Extraocular movements intact bilaterally  Normal lids and orbits bilaterally  Pupils equal round and reactive to light bilaterally  CN V: Facial sensation is normal   CN VII: Full and symmetric facial movement  CN VIII: Hearing is normal   CN IX, X: Palate elevates symmetrically  Normal gag reflex  CN XI: Shoulder shrug strength is normal   CN XII: Tongue midline without atrophy or fasciculations  Motor  Normal muscle bulk throughout  Normal muscle tone  No abnormal involuntary movements  Strength is 5/5 throughout all four extremities  Sensory  Light touch is normal in upper and lower extremities  Reflexes  Deep tendon reflexes are 2+ and symmetric in all four extremities  Coordination  Right: Finger-to-nose normal Left: Finger-to-nose normal     Gait  Normal casual, toe, heel and tandem gait  ROS:    Review of Systems   Constitutional: Negative  Negative for appetite change and fever  HENT: Negative  Negative for hearing loss, tinnitus, trouble swallowing and voice change  Eyes: Negative  Negative for photophobia, pain and visual disturbance  Respiratory: Negative  Negative for shortness of breath  Cardiovascular: Negative  Negative for palpitations  Gastrointestinal: Negative  Negative for nausea and vomiting  Endocrine: Negative  Negative for cold intolerance  Genitourinary: Negative  Negative for dysuria, frequency and urgency  Musculoskeletal: Negative  Negative for gait problem, myalgias and neck pain  Skin: Negative  Negative for rash  Allergic/Immunologic: Negative  Neurological: Negative  Negative for dizziness, tremors, seizures, syncope, facial asymmetry, speech difficulty, weakness, light-headedness, numbness and headaches  Hematological: Negative  Does not bruise/bleed easily  Psychiatric/Behavioral: Negative  Negative for confusion, hallucinations and sleep disturbance     ROS was reviewed and updated as appropriate

## 2023-01-31 NOTE — PATIENT INSTRUCTIONS
Continue topamax 100mg nightly  Restart the magnesium oxide 400mg once daily  Could use vitamin e 400iu for 5 days during menstrual cycle if migraine worse during this time  Continue with imitrex/motrin as needed for migraine, no more than 3x/week  Let me know of any change in your headache or back pain  Follow up in 6 month    Lower Back Exercises   WHAT YOU NEED TO KNOW:   Lower back exercises help heal and strengthen your back muscles to prevent another injury  Ask your healthcare provider if you need to see a physical therapist for more advanced exercises  DISCHARGE INSTRUCTIONS:   Return to the emergency department if:   You have severe pain that prevents you from moving  Contact your healthcare provider if:   Your pain becomes worse  You have new pain  You have questions or concerns about your condition or care  Do lower back exercises safely:   Do the exercises on a mat or firm surface  (not on a bed) to support your spine and prevent low back pain  Move slowly and smoothly  Avoid fast or jerky motions  Breathe normally  Do not hold your breath  Stop if you feel pain  It is normal to feel some discomfort at first  Regular exercise will help decrease your discomfort over time  Lower back exercises: Your healthcare provider may recommend that you do back exercises 10 to 30 minutes each day  He may also recommend that you do exercises 1 to 3 times each day  Ask your healthcare provider which exercises are best for you and how often to do them  Ankle pumps:  Lie on your back  Move your foot up (with your toes pointing toward your head)  Then, move your foot down (with your toes pointing away from you)  Repeat this exercise 10 times on each side  Heel slides:  Lie on your back  Slowly bend one leg and then straighten it  Next, bend the other leg and then straighten it  Repeat 10 times on each side           Pelvic tilt:  Lie on your back with your knees bent and feet flat on the floor  Place your arms in a relaxed position beside your body  Tighten the muscles of your abdomen and flatten your back against the floor  Hold for 5 seconds  Repeat 5 times  Back stretch:  Lie on your back with your hands behind your head  Bend your knees and turn the lower half of your body to one side  Hold this position for 10 seconds  Repeat 3 times on each side  Straight leg raises:  Lie on your back with one leg straight  Bend the other knee  Tighten your abdomen and then slowly lift the straight leg up about 6 to 12 inches off the floor  Hold for 1 to 5 seconds  Lower your leg slowly  Repeat 10 times on each leg  Knee-to-chest:  Lie on your back with your knees bent and feet flat on the floor  Pull one of your knees toward your chest and hold it there for 5 seconds  Return your leg to the starting position  Lift the other knee toward your chest and hold for 5 seconds  Do this 5 times on each side  Cat and camel:  Place your hands and knees on the floor  Arch your back upward toward the ceiling and lower your head  Round out your spine as much as you can  Hold for 5 seconds  Lift your head upward and push your chest downward toward the floor  Hold for 5 seconds  Do 3 sets or as directed  Wall squats:  Stand with your back against a wall  Tighten the muscles of your abdomen  Slowly lower your body until your knees are bent at a 45 degree angle  Hold this position for 5 seconds  Slowly move back up to a standing position  Repeat 10 times  Curl up:  Lie on your back with your knees bent and feet flat on the floor  Place your hands, palms down, underneath the curve in your lower back  Next, with your elbows on the floor, lift your shoulders and chest 2 to 3 inches  Keep your head in line with your shoulders  Hold this position for 5 seconds  When you can do this exercise without pain for 10 to 15 seconds, you may add a rotation   While your shoulders and chest are lifted off the ground, turn slightly to the left and hold  Repeat on the other side  Bird dog:  Place your hands and knees on the floor  Keep your wrists directly below your shoulders and your knees directly below your hips  Pull your belly button in toward your spine  Do not flatten or arch your back  Tighten your abdominal muscles  Raise one arm straight out so that it is aligned with your head  Next, raise the leg opposite your arm  Hold this position for 15 seconds  Lower your arm and leg slowly and change sides  Do 5 sets  © Copyright [employer] 2022 Information is for End User's use only and may not be sold, redistributed or otherwise used for commercial purposes  All illustrations and images included in CareNotes® are the copyrighted property of [employer]  or Denisse Glasgow  The above information is an  only  It is not intended as medical advice for individual conditions or treatments  Talk to your doctor, nurse or pharmacist before following any medical regimen to see if it is safe and effective for you

## 2023-03-20 ENCOUNTER — TELEPHONE (OUTPATIENT)
Dept: SURGICAL ONCOLOGY | Facility: CLINIC | Age: 38
End: 2023-03-20

## 2023-03-20 ENCOUNTER — TELEPHONE (OUTPATIENT)
Dept: HEMATOLOGY ONCOLOGY | Facility: CLINIC | Age: 38
End: 2023-03-20

## 2023-03-20 NOTE — TELEPHONE ENCOUNTER
Appointment Request Review     Who is calling to schedule? Patient   If someone other than patient is calling, are they listed on the communication consent form? N/A     Who is the patients Primary Oncologist? CAS Steven     Which doctor would they like to schedule with? CAS Steven     Which location are you requesting? [place]     What is the reason they are requesting appointment? Follow Up   I will send this information to  ______’s team to have them review  Someone from his/her team will return a call to you within 24 hours to discuss next steps with you and if you are in need of an appointment, they will schedule this for you at that time  Did you relay this information to the patient?  Yes

## 2023-03-20 NOTE — TELEPHONE ENCOUNTER
Called and spoke to patient to reschedule f/u appointment with Lina Pool  Patient preferred to be seen with someone else due to the [facility] being far  Scheduled f/u appointment with Practitioner Nurse Kian Salvador  Patient was agreeable

## 2023-04-04 ENCOUNTER — APPOINTMENT (OUTPATIENT)
Dept: LAB | Facility: AMBULARY SURGERY CENTER | Age: 38
End: 2023-04-04

## 2023-04-04 DIAGNOSIS — G43.009 MIGRAINE WITHOUT AURA AND WITHOUT STATUS MIGRAINOSUS, NOT INTRACTABLE: ICD-10-CM

## 2023-04-04 LAB
ALBUMIN SERPL BCP-MCNC: 3.8 G/DL (ref 3.5–5)
ALP SERPL-CCNC: 91 U/L (ref 46–116)
ALT SERPL W P-5'-P-CCNC: 26 U/L (ref 12–78)
ANION GAP SERPL CALCULATED.3IONS-SCNC: 6 MMOL/L (ref 4–13)
AST SERPL W P-5'-P-CCNC: 23 U/L (ref 5–45)
BILIRUB SERPL-MCNC: 0.2 MG/DL (ref 0.2–1)
BUN SERPL-MCNC: 20 MG/DL (ref 5–25)
CALCIUM SERPL-MCNC: 8.6 MG/DL (ref 8.3–10.1)
CHLORIDE SERPL-SCNC: 112 MMOL/L (ref 96–108)
CO2 SERPL-SCNC: 21 MMOL/L (ref 21–32)
CREAT SERPL-MCNC: 0.73 MG/DL (ref 0.6–1.3)
GFR SERPL CREATININE-BSD FRML MDRD: 106 ML/MIN/1.73SQ M
GLUCOSE P FAST SERPL-MCNC: 106 MG/DL (ref 65–99)
POTASSIUM SERPL-SCNC: 4.1 MMOL/L (ref 3.5–5.3)
PROT SERPL-MCNC: 7.1 G/DL (ref 6.4–8.4)
SODIUM SERPL-SCNC: 139 MMOL/L (ref 135–147)

## 2023-06-27 ENCOUNTER — TELEPHONE (OUTPATIENT)
Dept: HEMATOLOGY ONCOLOGY | Facility: CLINIC | Age: 38
End: 2023-06-27

## 2023-06-27 ENCOUNTER — OFFICE VISIT (OUTPATIENT)
Dept: SURGICAL ONCOLOGY | Facility: CLINIC | Age: 38
End: 2023-06-27
Payer: COMMERCIAL

## 2023-06-27 VITALS
RESPIRATION RATE: 16 BRPM | TEMPERATURE: 98 F | WEIGHT: 159 LBS | HEART RATE: 78 BPM | DIASTOLIC BLOOD PRESSURE: 70 MMHG | HEIGHT: 64 IN | SYSTOLIC BLOOD PRESSURE: 106 MMHG | OXYGEN SATURATION: 98 % | BODY MASS INDEX: 27.14 KG/M2

## 2023-06-27 DIAGNOSIS — Z12.31 VISIT FOR SCREENING MAMMOGRAM: ICD-10-CM

## 2023-06-27 DIAGNOSIS — Z80.3 FAMILY HISTORY OF BREAST CANCER: ICD-10-CM

## 2023-06-27 DIAGNOSIS — Z91.89 INCREASED RISK OF BREAST CANCER: Primary | ICD-10-CM

## 2023-06-27 PROCEDURE — 99213 OFFICE O/P EST LOW 20 MIN: CPT

## 2023-06-27 NOTE — PROGRESS NOTES
Surgical Oncology Follow Up       [address] [facility] SURGICAL ONCOLOGY [place]  [address] [id number]    Elizabeth Mcclellan  1985  [id number]  [address] [facility] SURGICAL ONCOLOGY [place]  [address] [address]    Chief Complaint   Patient presents with   • Follow-up       Assessment/Plan:  1  Increased risk of breast cancer  - 1 year follow up  - obtains breast exam with gyn annually    2  Family history of breast cancer  - negative genetic testing    3  Visit for screening mammogram  - Mammo screening bilateral w 3d & cad; Future        Discussion/Summary: Patient is a 61-year-old female presenting for a 1 year follow up for increased risk of breast cancer secondary to a family history of breast cancer  Her paternal grandmother diagnosed in her 45s and a maternal grandmother was diagnosed in her [de-identified]  The patient underwent genetic testing which was negative  She had a bilateral screening mammogram on 1/5/2023 which was BI-RADS 1 category 2 density  Given that her breasts are not dense, I believe she is fine to solely have mammogram 1x a year and a breast exam 2x a year with us and gyn  There were no concerns on her cbe  I will see the patient back in 1 year or sooner should the need arise  She was instructed to call with any questions or concerns prior to this time  All questions were answered today  History of Present Illness:     Oncology History    No history exists         -Interval History: Patient is a 61-year-old female presenting for a 1 year follow up for increased risk of breast cancer secondary to a family history of breast cancer  She had a bilateral screening mammogram on 1/5/2023 which was BI-RADS 1 category 2 density  She denies breast changes and changes with her cbe         Review of Systems:  Review of Systems   Constitutional: Negative for activity change, appetite change, fatigue and unexpected weight change  Respiratory: Negative for cough and shortness of breath  Cardiovascular: Negative for chest pain  Gastrointestinal: Negative for abdominal pain, diarrhea, nausea and vomiting  Endocrine: Negative for heat intolerance  Musculoskeletal: Negative for arthralgias, back pain and myalgias  Skin: Negative for rash  Neurological: Negative for weakness and headaches  Hematological: Negative for adenopathy  Patient Active Problem List   Diagnosis   • S/P  section   • Hepatic hemangioma   • Migraine without aura and without status migrainosus, not intractable   • Family history of breast cancer   • Increased risk of breast cancer   • BMI 27 0-27 9,adult   • Mixed hyperlipidemia     Past Medical History:   Diagnosis Date   • Abnormal Pap smear of cervix    • Acute non-recurrent sinusitis 10/25/2021   • Arthritis    • BMI 27 0-27 9,adult 2022   • BRCA1 negative    • BRCA2 negative    • Constipation, chronic    • Cough 2021   • COVID-19 virus infection 2021   • Depression 2022   • Diabetes mellitus ([facility] [address] )    • Fatigue 2021   • Gestational diabetes    • Gestational hypertension affecting first pregnancy    • Irritable bowel syndrome    • Migraine    • Myalgia 2022   • Neck pain 2022   • Rhinitis 10/25/2021   • Sinus congestion 2021   • Sore throat 2021   • Varicella    • Weight loss      Past Surgical History:   Procedure Laterality Date   •  SECTION  10/06/2017   • COLPOSCOPY     • MN  DELIVERY ONLY Bilateral 10/6/2017    Procedure:  SECTION ();   Surgeon: Katlyn Cain MD;  Location: [facility];  Service: Obstetrics   • MN  DELIVERY ONLY N/A 3/22/2019    Procedure: Domenic Abbott () REPEAT;  Surgeon: Katlyn Cain MD;  Location: [facility];  Service: Obstetrics   • MN COLONOSCOPY FLX DX W/COLLJ SPEC WHEN PFRMD N/A 2016    Procedure: COLONOSCOPY;  Surgeon: Lebron Shah MD;  Location:  GI LAB;   Service: Gastroenterology   • WISDOM TOOTH EXTRACTION       Family History   Problem Relation Age of Onset   • Arthritis Mother    • Irritable bowel syndrome Mother    • Breast cancer Maternal Grandmother [de-identified]   • Heart disease Maternal Grandmother    • Arthritis Maternal Grandmother    • Diabetes Maternal Grandfather    • Breast cancer Paternal Grandmother 36   • Heart disease Paternal Grandfather    • No Known Problems Sister    • No Known Problems Father    • No Known Problems Daughter    • No Known Problems Son    • No Known Problems Sister    • No Known Problems Maternal Aunt    • No Known Problems Paternal Uncle      Social History     Socioeconomic History   • Marital status: /Civil Union     Spouse name: Not on file   • Number of children: Not on file   • Years of education: Not on file   • Highest education level: Not on file   Occupational History   • Not on file   Tobacco Use   • Smoking status: Never   • Smokeless tobacco: Never   Vaping Use   • Vaping Use: Never used   Substance and Sexual Activity   • Alcohol use: Not Currently   • Drug use: No   • Sexual activity: Yes     Partners: Male     Birth control/protection: OCP   Other Topics Concern   • Not on file   Social History Narrative    No alcohol use    Pap smear by her gyn     - [facility] per [facility]     Social Determinants of Health     Financial Resource Strain: Not on file   Food Insecurity: Not on file   Transportation Needs: Not on file   Physical Activity: Not on file   Stress: Not on file   Social Connections: Not on file   Intimate Partner Violence: Not on file   Housing Stability: Not on file       Current Outpatient Medications:   •  Cholecalciferol (Vitamin D3) 250 MCG (15218 UT) TABS, Take 10,000 Units by mouth in the morning, Disp: , Rfl:   •  ibuprofen (MOTRIN) 800 mg tablet, Take by mouth 2 (two) times a day, Disp: , Rfl:   •  meloxicam (MOBIC) 15 mg tablet, Take 1 tablet (15 mg total) by mouth daily as needed for moderate pain, Disp: 90 tablet, Rfl: 0  •  SUMAtriptan (IMITREX) 100 mg tablet, 1 tab as needed for migraine, may repeat in 2 hours, max 200mg per 24 hours, Disp: 12 tablet, Rfl: 5  •  topiramate (TOPAMAX) 100 mg tablet, Take 1 tablet (100 mg total) by mouth daily, Disp: 90 tablet, Rfl: 1  •  fluticasone (FLONASE) 50 mcg/act nasal spray, 1 spray into each nostril daily, Disp: 9 9 mL, Rfl: 0  Allergies   Allergen Reactions   • Amoxicillin Rash   • Penicillins Rash     Vitals:    06/27/23 0831   BP: 106/70   Pulse: 78   Resp: 16   Temp: 98 °F (36 7 °C)   SpO2: 98%       Physical Exam  Constitutional:       General: She is not in acute distress  Appearance: Normal appearance  Cardiovascular:      Rate and Rhythm: Normal rate and regular rhythm  Pulses: Normal pulses  Heart sounds: Normal heart sounds  Pulmonary:      Effort: Pulmonary effort is normal       Breath sounds: Normal breath sounds  Chest:      Chest wall: No mass  Breasts:     Right: No swelling, bleeding, inverted nipple, mass, nipple discharge, skin change or tenderness  Left: No swelling, bleeding, inverted nipple, mass, nipple discharge, skin change or tenderness  Abdominal:      General: Abdomen is flat  Palpations: Abdomen is soft  Lymphadenopathy:      Upper Body:      Right upper body: No supraclavicular, axillary or pectoral adenopathy  Left upper body: No supraclavicular, axillary or pectoral adenopathy  Skin:     General: Skin is warm  Neurological:      General: No focal deficit present  Mental Status: She is alert and oriented to person, place, and time  Psychiatric:         Mood and Affect: Mood normal          Behavior: Behavior normal            Results:    Imaging  No results found  I reviewed the above imaging data  Advance Care Planning/Advance Directives:  Discussed disease status, cancer treatment plans and/or cancer treatment goals with the patient

## 2023-07-28 ENCOUNTER — TELEPHONE (OUTPATIENT)
Dept: NEUROLOGY | Facility: CLINIC | Age: 38
End: 2023-07-28

## 2023-07-28 NOTE — TELEPHONE ENCOUNTER
Called and spoke to patient - confirmed upcoming appointment with Lizbeth Alba on 08/01/23 8:00 am at the [facility]. Provided patient with apt date, time and location. Informed patient that check in is at least 15 minutes prior to apt time. The patient is not  having any issues or concerns at this time.

## 2023-08-01 ENCOUNTER — OFFICE VISIT (OUTPATIENT)
Dept: NEUROLOGY | Facility: CLINIC | Age: 38
End: 2023-08-01

## 2023-08-01 VITALS
DIASTOLIC BLOOD PRESSURE: 71 MMHG | WEIGHT: 159 LBS | SYSTOLIC BLOOD PRESSURE: 121 MMHG | TEMPERATURE: 97.4 F | HEART RATE: 71 BPM | BODY MASS INDEX: 27.29 KG/M2 | RESPIRATION RATE: 16 BRPM

## 2023-08-01 DIAGNOSIS — G43.009 MIGRAINE WITHOUT AURA AND WITHOUT STATUS MIGRAINOSUS, NOT INTRACTABLE: ICD-10-CM

## 2023-08-01 RX ORDER — SUMATRIPTAN 100 MG/1
TABLET, FILM COATED ORAL
Qty: 12 TABLET | Refills: 5 | Status: SHIPPED | OUTPATIENT
Start: 2023-08-01

## 2023-08-01 RX ORDER — MAGNESIUM L-LACTATE 84 MG
84 TABLET, EXTENDED RELEASE ORAL DAILY
COMMUNITY

## 2023-08-01 RX ORDER — TOPIRAMATE 100 MG/1
100 TABLET, FILM COATED ORAL DAILY
Qty: 90 TABLET | Refills: 1 | Status: SHIPPED | OUTPATIENT
Start: 2023-08-01

## 2023-08-01 NOTE — PROGRESS NOTES
Patient ID: Gadiel Harris is a 41 y.o. female. Assessment/Plan:  Patient Instructions:  Let me know if any increase in headache frequency or if chiropractic care causes increased pain as I would consider repeat imaging or increase in topamax depending  Follow up in 6 months    Exam unchanged/nonfocal  Headaches are controlled at this time, on average getting 5 mild headaches/month and finding imitrex helpful for acute management. No red flag symptoms today. She will update the office if any changes. Because the recent concern of increased headache after the chiropractor, we discussed the rare possibility that cervical manipulation can cause dissection and we discussed the symptoms to look out for regarding this. Diagnoses and all orders for this visit:    Migraine without aura and without status migrainosus, not intractable  -     SUMAtriptan (IMITREX) 100 mg tablet; 1 tab as needed for migraine, may repeat in 2 hours, max 200mg per 24 hours  -     topiramate (TOPAMAX) 100 mg tablet; Take 1 tablet (100 mg total) by mouth daily    Other orders  -     magnesium (MAGTAB) 84 MG (7MEQ) TBCR; Take 84 mg by mouth daily         Subjective:  Stas Alexis presents for headache follow up. Since last visit she has continued topamax at 100mg daily and has started magnesium supplement. She states unsure if magnesium has been helpful for headaches but has helped mood and therefore will continue this. She shows me her headache calendar today and headaches seem to be 1 day in length, generally mild, and sporadic as far as to when they come. In July she did have a 5 day long headache that was more moderate; she states she started with a headache and then went to the chiropractor (has gone here for 6 months-1 year with benefit) and for some reason it seemed to make her symptoms worse this time. She denied dizziness, vision change (admits to not having recent eye exam), hearing change, focal weakness or numbness.  She denies change in plans for pregnancy, she has an iud. She is not wanting to increase dose of migraine preventative medication at this time. She will discuss elevated fasting glucose with primary care as she has plans to do A1c. She states no recent issues with eyelid twitching. Lab Results   Component Value Date    SODIUM 139 04/04/2023    K 4.1 04/04/2023     (H) 04/04/2023    CO2 21 04/04/2023    AGAP 6 04/04/2023    BUN 20 04/04/2023    CREATININE 0.73 04/04/2023    GLUC 71 10/05/2017    GLUF 106 (H) 04/04/2023    CALCIUM 8.6 04/04/2023    AST 23 04/04/2023    ALT 26 04/04/2023    ALKPHOS 91 04/04/2023    TP 7.1 04/04/2023    TBILI 0.20 04/04/2023    EGFR 106 04/04/2023         Since your last visit are your headaches Remained the Same  Are you taking your current medications as prescribed? yes  Do you have any side effects? no  How often do you use abortive medications to treat a headache? 5-6 times per month  How effective are they? effective  From 0-10, how severe is the pain for a typical headache for you? 7  What does your typical headache pain feel like? dull and tightness  Where is your typical headache? right-sided unilateral, bilateral, occipital, frontal and nuchal  What is your current headache frequency: 5-6 times per month  How long does your typical headache last? 2-3 day(s)  In addition to the head pain, what other symptoms do you have before or during your headaches? None  Do you have anything you need to discuss with the doctor during your visit? no      Previous History:  Temporal Pattern of Headaches: They have had migraines for as long as they can remember but started worsening in their early 20s  Additional Relevant History:  Do you have any family history of headache? migraine headaches in mother and sister  Family history of aneurysms? no  Description of Headaches:  Do you have a specific aura? None  What is the typical location of pain: Usually starts occipital and can radiate forward.  Switches sides but is typically unilateral.  Pain is occipital, behind the eye, temporal/paretal, top of the head, base of the skull. What is the character of pain: pressure like  Headache type 2: Cervical and tension headaches.  Tension in the neck up into the head.  Painful but not additional migraine symptoms.  At times takes Motrin with variable effect.  Recently this is happening somewhat often.  Has seen a chiropractor who talked to her about posture. Therapeutics:  CURRENT Abortive meds? Imitrex (works if she takes motrin with it,   CURRENT/prior Preventive meds? topiramate  She states she never tried previously ordered flexeril for neck pain and states she is hesitant to trial cymbalta/gabapentin that were previously discussed. She did not see rheumatology; her labs for JUDY/RF/ESR were normal. She states overall she has been sleeping ok  She states continued neck pain L>R. She has no problem with sleep; is able to hydrate well; stays busy with two children. States she may not be able to commit to formal PT-willing to try home neck exercises. States has used cbd cream and heat which helps some with muscle pains. She mentions a feeling of right eyelid twitching; not noticeable to others; no visual complaints. 2010-normal MRI brain (reviewed in PACS)    The following portions of the patient's history were reviewed and updated as appropriate: allergies, current medications, past family history, past medical history, past social history, past surgical history and problem list.         Objective:    Blood pressure 121/71, pulse 71, temperature (!) 97.4 °F (36.3 °C), temperature source Temporal, resp. rate 16, weight 72.1 kg (159 lb), currently breastfeeding. Physical Exam  Vitals reviewed. Constitutional:       General: She is not in acute distress. Appearance: She is not ill-appearing, toxic-appearing or diaphoretic. HENT:      Head: Normocephalic and atraumatic.       Right Ear: External ear normal.      Left Ear: External ear normal.      Nose: Nose normal.      Mouth/Throat:      Mouth: Mucous membranes are moist.      Pharynx: Oropharynx is clear. Eyes:      General: Lids are normal.         Right eye: No discharge. Left eye: No discharge. Extraocular Movements: Extraocular movements intact. Conjunctiva/sclera: Conjunctivae normal.      Pupils: Pupils are equal, round, and reactive to light. Cardiovascular:      Rate and Rhythm: Normal rate and regular rhythm. Pulses: Normal pulses. Heart sounds: Normal heart sounds. Pulmonary:      Effort: Pulmonary effort is normal.      Breath sounds: Normal breath sounds. Abdominal:      General: There is no distension. Musculoskeletal:      Cervical back: Normal range of motion. No tenderness. Right lower leg: No edema. Left lower leg: No edema. Skin:     General: Skin is warm and dry. Capillary Refill: Capillary refill takes less than 2 seconds. Findings: No rash. Neurological:      General: No focal deficit present. Mental Status: She is alert. Mental status is at baseline. Motor: Motor strength is normal.     Coordination: Romberg sign negative. Deep Tendon Reflexes:      Reflex Scores:       Brachioradialis reflexes are 2+ on the right side and 2+ on the left side. Patellar reflexes are 2+ on the right side and 2+ on the left side. Psychiatric:         Mood and Affect: Mood normal.         Speech: Speech normal.         Behavior: Behavior normal.         Neurological Exam  Mental Status  Alert. Oriented to person, place and time. Speech is normal. Language is fluent with no aphasia. Cranial Nerves  CN II: Visual acuity is normal. Visual fields full to confrontation. CN III, IV, VI: Extraocular movements intact bilaterally. Normal lids and orbits bilaterally. Pupils equal round and reactive to light bilaterally.   CN V: Facial sensation is normal.  CN VII: Full and symmetric facial movement. CN VIII: Hearing is normal.  CN IX, X: Palate elevates symmetrically. Normal gag reflex. CN XI: Shoulder shrug strength is normal.  CN XII: Tongue midline without atrophy or fasciculations. Motor  Normal muscle bulk throughout. Strength is 5/5 throughout all four extremities. Sensory  Light touch is normal in upper and lower extremities. Reflexes                                            Right                      Left  Brachioradialis                    2+                         2+  Patellar                                2+                         2+    Coordination  Right: Rapid alternating movement normal.Left: Rapid alternating movement normal.    Gait  Casual gait is normal including stance, stride, and arm swing. Romberg is absent. Able to rise from chair without using arms. ROS:    Review of Systems   Constitutional: Negative for appetite change, fatigue and fever. HENT: Negative. Negative for hearing loss, tinnitus, trouble swallowing and voice change. Eyes: Negative. Negative for photophobia, pain and visual disturbance. Respiratory: Negative. Negative for shortness of breath. Cardiovascular: Negative. Negative for palpitations. Gastrointestinal: Negative. Negative for nausea and vomiting. Endocrine: Negative. Negative for cold intolerance. Genitourinary: Negative. Negative for dysuria, frequency and urgency. Musculoskeletal: Negative for back pain, gait problem, myalgias and neck pain. Skin: Negative. Negative for rash. Allergic/Immunologic: Negative. Neurological: Negative. Negative for dizziness, tremors, seizures, syncope, facial asymmetry, speech difficulty, weakness, light-headedness, numbness and headaches. Hematological: Negative. Does not bruise/bleed easily. Psychiatric/Behavioral: Negative. Negative for confusion, hallucinations and sleep disturbance.    ROS was reviewed and updated as appropriate

## 2023-08-01 NOTE — PATIENT INSTRUCTIONS
Let me know if any increase in headache frequency or if chiropractic care causes increased pain as I would consider repeat imaging or increase in topamax depending  Follow up in 6 months

## 2023-08-14 DIAGNOSIS — G43.009 MIGRAINE WITHOUT AURA AND WITHOUT STATUS MIGRAINOSUS, NOT INTRACTABLE: Primary | ICD-10-CM

## 2023-08-14 RX ORDER — TOPIRAMATE 25 MG/1
TABLET ORAL
Qty: 60 TABLET | Refills: 5 | Status: SHIPPED | OUTPATIENT
Start: 2023-08-14

## 2023-08-15 ENCOUNTER — APPOINTMENT (OUTPATIENT)
Dept: LAB | Facility: AMBULARY SURGERY CENTER | Age: 38
End: 2023-08-15
Payer: COMMERCIAL

## 2023-08-15 ENCOUNTER — TELEPHONE (OUTPATIENT)
Dept: INTERNAL MEDICINE CLINIC | Facility: CLINIC | Age: 38
End: 2023-08-15

## 2023-08-15 DIAGNOSIS — Z00.8 HEALTH EXAMINATION IN POPULATION SURVEY: ICD-10-CM

## 2023-08-15 LAB
CHOLEST SERPL-MCNC: 218 MG/DL
EST. AVERAGE GLUCOSE BLD GHB EST-MCNC: 114 MG/DL
HBA1C MFR BLD: 5.6 %
HDLC SERPL-MCNC: 53 MG/DL
LDLC SERPL CALC-MCNC: 152 MG/DL (ref 0–100)
NONHDLC SERPL-MCNC: 165 MG/DL
TRIGL SERPL-MCNC: 63 MG/DL

## 2023-08-15 PROCEDURE — 83036 HEMOGLOBIN GLYCOSYLATED A1C: CPT

## 2023-08-15 PROCEDURE — 80061 LIPID PANEL: CPT

## 2023-08-15 PROCEDURE — 36415 COLL VENOUS BLD VENIPUNCTURE: CPT

## 2023-08-15 NOTE — TELEPHONE ENCOUNTER
Spoke  with patient regarding results. Patient states that she would like to think about it and speak with her . She feels she needs to try changing her diet first. Advised patient that her cholesterol has been elevated for at least the last 5 years. She will call back to schedule appointment if she would like to further discuss medication.

## 2023-08-15 NOTE — TELEPHONE ENCOUNTER
----- Message from Kris Anaya MD sent at 8/15/2023  2:23 PM EDT -----  Please call patient that her cholesterol is persistently elevated. If she is willing to start medication please have her make an appointment to see me. Advised for low-cholesterol diet.

## 2023-09-18 ENCOUNTER — TELEPHONE (OUTPATIENT)
Dept: OBGYN CLINIC | Facility: CLINIC | Age: 38
End: 2023-09-18

## 2023-09-18 NOTE — TELEPHONE ENCOUNTER
The patient called and stated that she had spotting 2 weeks after period and wanted to know if that is normal since she has never had that happen. Please advise.

## 2023-09-18 NOTE — TELEPHONE ENCOUNTER
Patient was made aware. Does not want US at this time since it was only one day of spotting day of expected ovulation, does have an annual scheduled on 10/3/23.

## 2023-10-02 DIAGNOSIS — O24.410 DIET CONTROLLED GESTATIONAL DIABETES MELLITUS (GDM), ANTEPARTUM: Primary | ICD-10-CM

## 2023-10-19 PROBLEM — O24.410 DIET CONTROLLED GESTATIONAL DIABETES MELLITUS (GDM), ANTEPARTUM: Status: RESOLVED | Noted: 2023-10-02 | Resolved: 2023-10-19

## 2023-10-24 ENCOUNTER — ANNUAL EXAM (OUTPATIENT)
Dept: OBGYN CLINIC | Facility: CLINIC | Age: 38
End: 2023-10-24

## 2023-10-24 ENCOUNTER — APPOINTMENT (OUTPATIENT)
Dept: LAB | Facility: CLINIC | Age: 38
End: 2023-10-24
Payer: COMMERCIAL

## 2023-10-24 VITALS
BODY MASS INDEX: 27.14 KG/M2 | OXYGEN SATURATION: 99 % | SYSTOLIC BLOOD PRESSURE: 118 MMHG | HEART RATE: 83 BPM | WEIGHT: 159 LBS | DIASTOLIC BLOOD PRESSURE: 72 MMHG | HEIGHT: 64 IN

## 2023-10-24 DIAGNOSIS — Z97.5 IUD CONTRACEPTION: ICD-10-CM

## 2023-10-24 DIAGNOSIS — N92.0 MENORRHAGIA WITH REGULAR CYCLE: ICD-10-CM

## 2023-10-24 DIAGNOSIS — Z01.419 ENCNTR FOR GYN EXAM (GENERAL) (ROUTINE) W/O ABN FINDINGS: Primary | ICD-10-CM

## 2023-10-24 LAB
B-HCG SERPL-ACNC: <1 MIU/ML (ref 0–5)
ERYTHROCYTE [DISTWIDTH] IN BLOOD BY AUTOMATED COUNT: 12.4 % (ref 11.6–15.1)
HCT VFR BLD AUTO: 43.8 % (ref 34.8–46.1)
HGB BLD-MCNC: 14.9 G/DL (ref 11.5–15.4)
MCH RBC QN AUTO: 29.5 PG (ref 26.8–34.3)
MCHC RBC AUTO-ENTMCNC: 34 G/DL (ref 31.4–37.4)
MCV RBC AUTO: 87 FL (ref 82–98)
PLATELET # BLD AUTO: 227 THOUSANDS/UL (ref 149–390)
PMV BLD AUTO: 10.9 FL (ref 8.9–12.7)
RBC # BLD AUTO: 5.05 MILLION/UL (ref 3.81–5.12)
TSH SERPL DL<=0.05 MIU/L-ACNC: 1.11 UIU/ML (ref 0.45–4.5)
WBC # BLD AUTO: 6.42 THOUSAND/UL (ref 4.31–10.16)

## 2023-10-24 PROCEDURE — 36415 COLL VENOUS BLD VENIPUNCTURE: CPT

## 2023-10-24 PROCEDURE — 84702 CHORIONIC GONADOTROPIN TEST: CPT

## 2023-10-24 PROCEDURE — 84443 ASSAY THYROID STIM HORMONE: CPT

## 2023-10-24 PROCEDURE — 85027 COMPLETE CBC AUTOMATED: CPT

## 2023-10-24 NOTE — PROGRESS NOTES
Assessment        Diagnoses and all orders for this visit:    Encntr for gyn exam (general) (routine) w/o abn findings    IUD contraception  -     US pelvis complete w transvaginal; Future    Menorrhagia with regular cycle  -     US pelvis complete w transvaginal; Future  -     hCG, quantitative; Future  -     TSH, 3rd generation with Free T4 reflex; Future  -     CBC; Future             Plan      All questions answered. Blood tests: CBC with diff, Serum hCG, and TSH. Contraception: IUD. Diagnosis explained in detail, including differential.  Follow up in 1 year. Follow up as needed. Mammogram.  Pelvic ultrasound.    Pap not indicated  Pelvic US to r/o IUD malposition, pother causes of AUB such as polyps or fibroids -s he wishes to retain IUD for now, continue menstrual tracking,  Offered use of Ibuprofen 600 mg QID x 5 days   Breast screening per surgical oncology      Subjective      Myrna Franz is a 41 y.o. female who presents for annual exam.      Chief Complaint   Patient presents with    Gynecologic Exam        Imitrex and Topamax for migraines  Negative genetic testing     Q6 months CBE  Mammogram yearly    On copper IUD, slightly heavier periods    Last month, she had a very heavy period, lasting for 2 days  Changing a pad every 2 hours, period was 4 days late      Last Pap: 22 NILM neg HPV     Current contraception: IUD PARAGARD 22  History of abnormal Pap smear: yes   History of abnormal mammogram: no  Family history of uterine or ovarian cancer: no  Family history of breast cancer: yes - MGM (80) PGM (45s)  Family history of colon cancer: no       OB History    Para Term  AB Living   2 2 2 0 0 2   SAB IAB Ectopic Multiple Live Births   0 0 0 0 2      # Outcome Date GA Lbr Vish/2nd Weight Sex Delivery Anes PTL Lv   2 Term 19 39w0d  3050 g (6 lb 11.6 oz) M CS-LTranv Spinal N IGNACIO      Name: Lobo Laumeek  ([employer])      Apgar1: 9  Apgar5: 9   1 Term 10/06/17 39w0d  3232 g (7 lb 2 oz) F CS-LTranv EPI N IGNACIO      Complications: Fetal Intolerance, Failure to Progress in First Stage      Name: Kaci Dotson: Lydia  Apgar5: 9       Menstrual History:  OB History          2    Para   2    Term   2            AB        Living   2         SAB        IAB        Ectopic        Multiple   0    Live Births   2                Menarche age: 16  Patient's last menstrual period was 10/24/2023. Feels like she might be getting period        Past Medical History:   Diagnosis Date    Abnormal Pap smear of cervix     Acute non-recurrent sinusitis 10/25/2021    Arthritis     BMI 27.0-27.9,adult 2022    BRCA1 negative     BRCA2 negative     Constipation, chronic     Cough 2021    COVID-19 virus infection 2021    Depression 2022    Diabetes mellitus ([address])     Fatigue 2021    Gestational diabetes     Gestational hypertension affecting first pregnancy     Irritable bowel syndrome     Migraine     Myalgia 2022    Neck pain 2022    Rhinitis 10/25/2021    Sinus congestion 2021    Sore throat 2021    Varicella     Weight loss      Past Surgical History:   Procedure Laterality Date     SECTION  10/06/2017    COLPOSCOPY      [place]  DELIVERY ONLY Bilateral 10/6/2017    Procedure:  SECTION (); Surgeon: Dane Germain MD;  [facility];  Service: Obstetrics    [place]  DELIVERY ONLY N/A 3/22/2019    Procedure: Musa Santizo () REPEAT;  Surgeon: Dane Germain MD;  [facility];  Service: Obstetrics    [place] COLONOSCOPY FLX DX W/COLLJ SPEC WHEN PFRMD N/A 2016    Procedure: COLONOSCOPY;  Surgeon: Shai Cheatham MD;  [facility];   Service: Gastroenterology    WISDOM TOOTH EXTRACTION       Family History   Problem Relation Age of Onset    Arthritis Mother     Irritable bowel syndrome Mother     Breast cancer Maternal Grandmother 80    Heart disease Maternal Grandmother Arthritis Maternal Grandmother     Diabetes Maternal Grandfather     Breast cancer Paternal Grandmother 36    Heart disease Paternal Grandfather     No Known Problems Sister     No Known Problems Father     No Known Problems Daughter     No Known Problems Son     No Known Problems Sister     No Known Problems Maternal Aunt     No Known Problems Paternal Uncle        Social History     Tobacco Use    Smoking status: Never    Smokeless tobacco: Never   Vaping Use    Vaping Use: Never used   Substance Use Topics    Alcohol use: Not Currently    Drug use: No          Current Outpatient Medications:     Cholecalciferol (Vitamin D3) 250 MCG (65270 UT) TABS, Take 10,000 Units by mouth in the morning, Disp: , Rfl:     ibuprofen (MOTRIN) 800 mg tablet, Take by mouth 2 (two) times a day, Disp: , Rfl:     magnesium (MAGTAB) 84 MG (7MEQ) TBCR, Take 84 mg by mouth daily, Disp: , Rfl:     SUMAtriptan (IMITREX) 100 mg tablet, 1 tab as needed for migraine, may repeat in 2 hours, max 200mg per 24 hours, Disp: 12 tablet, Rfl: 5    topiramate (TOPAMAX) 100 mg tablet, Take 1 tablet (100 mg total) by mouth daily, Disp: 90 tablet, Rfl: 1    topiramate (Topamax) 25 mg tablet, Take one tablet at bedtime for 1 month. If needed increase to 2 tablets at bedtime. In addition to 100mg tablet., Disp: 60 tablet, Rfl: 5    Allergies   Allergen Reactions    Amoxicillin Rash    Penicillins Rash           Review of Systems   Constitutional: Negative. HENT: Negative. Eyes: Negative. Respiratory: Negative. Cardiovascular: Negative. Gastrointestinal: Negative. Endocrine: Negative. Genitourinary:         As noted in HPI   Musculoskeletal: Negative. Skin: Negative. Allergic/Immunologic: Negative. Neurological: Negative. Hematological: Negative. Psychiatric/Behavioral: Negative.          /72   Pulse 83   Ht 5' 4" (1.626 m)   Wt 72.1 kg (159 lb)   LMP 10/24/2023   SpO2 99%   BMI 27.29 kg/m² Physical Exam  Constitutional:       Appearance: She is well-developed. Genitourinary:      Vulva, bladder and rectum normal.      No lesions in the vagina. Genitourinary Comments:         Right Labia: No rash, tenderness, lesions, skin changes or Bartholin's cyst.     Left Labia: No tenderness, lesions, skin changes, Bartholin's cyst or rash. No inguinal adenopathy present in the right or left side. Vaginal bleeding present. No vaginal discharge or tenderness. No vaginal prolapse present. No vaginal atrophy present. Right Adnexa: not tender, not full and no mass present. Left Adnexa: not tender, not full and no mass present. No cervical motion tenderness, friability, lesion or polyp. IUD strings visualized. Uterus is not enlarged or tender. Pelvic exam was performed with patient in the lithotomy position. Rectum:      No external hemorrhoid. Breasts:     Right: No mass, nipple discharge, skin change or tenderness. Left: No mass, nipple discharge, skin change or tenderness. HENT:      Head: Normocephalic. Nose: Nose normal.   Eyes:      Conjunctiva/sclera: Conjunctivae normal.   Neck:      Thyroid: No thyromegaly. Cardiovascular:      Rate and Rhythm: Normal rate and regular rhythm. Heart sounds: Normal heart sounds. No murmur heard. Pulmonary:      Effort: Pulmonary effort is normal. No respiratory distress. Breath sounds: Normal breath sounds. No wheezing or rales. Abdominal:      General: There is no distension. Palpations: Abdomen is soft. There is no mass. Tenderness: There is no abdominal tenderness. There is no guarding or rebound. Musculoskeletal:         General: No tenderness. Cervical back: Neck supple. No muscular tenderness. Lymphadenopathy:      Cervical: No cervical adenopathy. Lower Body: No right inguinal adenopathy. No left inguinal adenopathy.    Neurological:      Mental Status: She is alert and oriented to person, place, and time. Skin:     General: Skin is warm and dry.    Psychiatric:         Mood and Affect: Mood normal.         Behavior: Behavior normal.             Future Appointments   Date Time Provider 4600  46[facility]   7/2/2024  8:00 AM CAS Cortez [facility]

## 2023-10-24 NOTE — PATIENT INSTRUCTIONS
IBUPROFEN 600 mg every 6 hours x 5 day      Wellness Visit for Adults   AMBULATORY CARE:   A wellness visit  is when you see your healthcare provider to get screened for health problems. Your healthcare provider will also give you advice on how to stay healthy. Write down your questions so you remember to ask them. Ask your healthcare provider how often you should have a wellness visit. What happens at a wellness visit:  Your healthcare provider will ask about your health, and your family history of health problems. This includes high blood pressure, heart disease, and cancer. He or she will ask if you have symptoms that concern you, if you smoke, and about your mood. You may also be asked about your intake of medicines, supplements, food, and alcohol. Any of the following may be done: Your weight  will be checked. Your height may also be checked so your body mass index (BMI) can be calculated. Your BMI shows if you are at a healthy weight. Your blood pressure  and heart rate will be checked. Your temperature may also be checked. Blood and urine tests  may be done. Blood tests may be done to check your cholesterol levels. Abnormal cholesterol levels increase your risk for heart disease and stroke. You may also need a blood or urine test to check for diabetes if you are at increased risk. Urine tests may be done to look for signs of an infection or kidney disease. A physical exam  includes checking your heartbeat and lungs with a stethoscope. Your healthcare provider may also check your skin to look for sun damage. Screening tests  may be recommended. A screening test is done to check for diseases that may not cause symptoms. The screening tests you may need depend on your age, gender, family history, and lifestyle habits. For example, colorectal screening may be recommended if you are 48years old or older.     Screening tests you need if you are a woman:   A Pap smear  is used to screen for cervical cancer. Pap smears are usually done every 3 to 5 years depending on your age. You may need them more often if you have had abnormal Pap smear test results in the past. Ask your healthcare provider how often you should have a Pap smear. A mammogram  is an x-ray of your breasts to screen for breast cancer. Experts recommend mammograms every 2 years starting at age 48 years. You may need a mammogram at age 52 years or younger if you have an increased risk for breast cancer. Talk to your healthcare provider about when you should start having mammograms and how often you need them. Vaccines you may need:   Get an influenza vaccine  every year. The influenza vaccine protects you from the flu. Several types of viruses cause the flu. The viruses change over time, so new vaccines are made each year. Get a tetanus-diphtheria (Td) booster vaccine  every 10 years. This vaccine protects you against tetanus and diphtheria. Tetanus is a severe infection that may cause painful muscle spasms and lockjaw. Diphtheria is a severe bacterial infection that causes a thick covering in the back of your mouth and throat. Get a human papillomavirus (HPV) vaccine  if you are female and aged 23 to 32 or male 23 to 24 and never received it. This vaccine protects you from HPV infection. HPV is the most common infection spread by sexual contact. HPV may also cause vaginal, penile, and anal cancers. Get a pneumococcal vaccine  if you are aged 72 years or older. The pneumococcal vaccine is an injection given to protect you from pneumococcal disease. Pneumococcal disease is an infection caused by pneumococcal bacteria. The infection may cause pneumonia, meningitis, or an ear infection. Get a shingles vaccine  if you are 60 or older, even if you have had shingles before. The shingles vaccine is an injection to protect you from the varicella-zoster virus. This is the same virus that causes chickenpox.  Shingles is a painful rash that develops in people who had chickenpox or have been exposed to the virus. How to eat healthy:  My Plate is a model for planning healthy meals. It shows the types and amounts of foods that should go on your plate. Fruits and vegetables make up about half of your plate, and grains and protein make up the other half. A serving of dairy is included on the side of your plate. The amount of calories and serving sizes you need depends on your age, gender, weight, and height. Examples of healthy foods are listed below:  Eat a variety of vegetables  such as dark green, red, and orange vegetables. You can also include canned vegetables low in sodium (salt) and frozen vegetables without added butter or sauces. Eat a variety of fresh fruits , canned fruit in 100% juice, frozen fruit, and dried fruit. Include whole grains. At least half of the grains you eat should be whole grains. Examples include whole-wheat bread, wheat pasta, brown rice, and whole-grain cereals such as oatmeal.    Eat a variety of protein foods such as seafood (fish and shellfish), lean meat, and poultry without skin (turkey and chicken). Examples of lean meats include pork leg, shoulder, or tenderloin, and beef round, sirloin, tenderloin, and extra lean ground beef. Other protein foods include eggs and egg substitutes, beans, peas, soy products, nuts, and seeds. Choose low-fat dairy products such as skim or 1% milk or low-fat yogurt, cheese, and cottage cheese. Limit unhealthy fats  such as butter, hard margarine, and shortening. Exercise:  Exercise at least 30 minutes per day on most days of the week. Some examples of exercise include walking, biking, dancing, and swimming. You can also fit in more physical activity by taking the stairs instead of the elevator or parking farther away from stores. Include muscle strengthening activities 2 days each week. Regular exercise provides many health benefits.  It helps you manage your weight, and decreases your risk for type 2 diabetes, heart disease, stroke, and high blood pressure. Exercise can also help improve your mood. Ask your healthcare provider about the best exercise plan for you. General health and safety guidelines:   Do not smoke. Nicotine and other chemicals in cigarettes and cigars can cause lung damage. Ask your healthcare provider for information if you currently smoke and need help to quit. E-cigarettes or smokeless tobacco still contain nicotine. Talk to your healthcare provider before you use these products. Limit alcohol. A drink of alcohol is 12 ounces of beer, 5 ounces of wine, or 1½ ounces of liquor. Lose weight, if needed. Being overweight increases your risk of certain health conditions. These include heart disease, high blood pressure, type 2 diabetes, and certain types of cancer. Protect your skin. Do not sunbathe or use tanning beds. Use sunscreen with a SPF 15 or higher. Apply sunscreen at least 15 minutes before you go outside. Reapply sunscreen every 2 hours. Wear protective clothing, hats, and sunglasses when you are outside. Drive safely. Always wear your seatbelt. Make sure everyone in your car wears a seatbelt. A seatbelt can save your life if you are in an accident. Do not use your cell phone when you are driving. This could distract you and cause an accident. Pull over if you need to make a call or send a text message. Practice safe sex. Use latex condoms if are sexually active and have more than one partner. Your healthcare provider may recommend screening tests for sexually transmitted infections (STIs). Wear helmets, lifejackets, and protective gear. Always wear a helmet when you ride a bike or motorcycle, go skiing, or play sports that could cause a head injury. Wear protective equipment when you play sports. Wear a lifejacket when you are on a boat or doing water sports.     © Copyright [employer] 2023 Information is for End User's use only and may not be sold, redistributed or otherwise used for commercial purposes. The above information is an  only. It is not intended as medical advice for individual conditions or treatments. Talk to your doctor, nurse or pharmacist before following any medical regimen to see if it is safe and effective for you.

## 2023-11-07 ENCOUNTER — HOSPITAL ENCOUNTER (OUTPATIENT)
Dept: ULTRASOUND IMAGING | Facility: HOSPITAL | Age: 38
Discharge: HOME | End: 2023-11-07
Attending: OBSTETRICS & GYNECOLOGY
Payer: COMMERCIAL

## 2023-11-07 DIAGNOSIS — N92.0 MENORRHAGIA WITH REGULAR CYCLE: ICD-10-CM

## 2023-11-07 DIAGNOSIS — Z97.5 IUD CONTRACEPTION: ICD-10-CM

## 2023-11-07 PROCEDURE — 76830 TRANSVAGINAL US NON-OB: CPT

## 2023-11-07 PROCEDURE — 76856 US EXAM PELVIC COMPLETE: CPT

## 2023-12-23 ENCOUNTER — AMB VIDEO VISIT (OUTPATIENT)
Dept: OTHER | Facility: HOSPITAL | Age: 38
End: 2023-12-23
Payer: COMMERCIAL

## 2023-12-23 DIAGNOSIS — J01.90 ACUTE SINUSITIS, RECURRENCE NOT SPECIFIED, UNSPECIFIED LOCATION: Primary | ICD-10-CM

## 2023-12-23 PROCEDURE — 99212 OFFICE O/P EST SF 10 MIN: CPT | Performed by: NURSE PRACTITIONER

## 2023-12-23 RX ORDER — AZITHROMYCIN 250 MG/1
TABLET, FILM COATED ORAL
Qty: 6 TABLET | Refills: 0 | Status: SHIPPED | OUTPATIENT
Start: 2023-12-23 | End: 2023-12-27

## 2023-12-23 NOTE — PROGRESS NOTES
Required Documentation:  Encounter provider CAS Jaimes    Provider located at [facility]  VIRTUAL CARE   [address] [address]    Identify all parties in room with patient during virtual visit:  No one else    The patient was identified by name and date of birth. Layne Mcclellan was informed that this is a telemedicine visit and that the visit is being conducted through the [facility] [employer] platform. She agrees to proceed..  My office door was closed. No one else was in the room.  She acknowledged consent and understanding of privacy and security of the video platform. The patient has agreed to participate and understands they can discontinue the visit at any time.    Verification of patient location:    Patient is located at home in the following state in which I hold an active license [place]    Patient is aware this is a billable service.     Reason for visit is No chief complaint on file.       Subjective  This is a 38 year old female here today for video visit.  She states she has had cold symptoms for about 3-4 weeks with sinus congestion.  She states over the last week the sinus congestion has worsened.  She states this AM the symptoms are significantly worse.    She denies any fever.  She states she is having some yellow nasal discharge.  NO chest pain or sob.  She is eating an drinking okay.  She did not test for covid.            Past Medical History:   Diagnosis Date    Abnormal Pap smear of cervix     Acute non-recurrent sinusitis 10/25/2021    Arthritis     BMI 27.0-27.9,adult 5/9/2022    BRCA1 negative     BRCA2 negative     Constipation, chronic     Cough 9/23/2021    COVID-19 virus infection 12/8/2021    Depression 5/9/2022    Diabetes mellitus (HCC)     Fatigue 12/8/2021    Gestational diabetes     Gestational hypertension affecting first pregnancy     Irritable bowel syndrome     Migraine     Myalgia 5/9/2022    Neck pain  2022    Rhinitis 10/25/2021    Sinus congestion 2021    Sore throat 2021    Varicella     Weight loss        Past Surgical History:   Procedure Laterality Date     SECTION  10/06/2017    COLPOSCOPY      [place]  DELIVERY ONLY Bilateral 10/6/2017    Procedure:  SECTION ();  Surgeon: Patricia Rodriguez MD;  [facility]: [facility];  Service: Obstetrics    FL  DELIVERY ONLY N/A 3/22/2019    Procedure:  SECTION () REPEAT;  Surgeon: Patricia Rodriguez MD;  Location: [facility] ;  Service: Obstetrics    [place] COLONOSCOPY FLX DX W/COLLJ SPEC WHEN PFRMD N/A 2016    Procedure: COLONOSCOPY;  Surgeon: July Barton MD;  [facility];  Service: Gastroenterology    WISDOM TOOTH EXTRACTION          Allergies   Allergen Reactions    Amoxicillin Rash    Penicillins Rash       Review of Systems   Constitutional:  Negative for activity change, chills, fatigue and fever.   HENT:  Positive for congestion, rhinorrhea, sinus pressure and sinus pain.    Respiratory:  Positive for cough.    Cardiovascular: Negative.    Hematological: Negative.    Psychiatric/Behavioral: Negative.         Video Exam    There were no vitals filed for this visit.    Physical Exam  Constitutional:       General: She is not in acute distress.     Appearance: Normal appearance. She is not ill-appearing or toxic-appearing.   HENT:      Head: Normocephalic and atraumatic.      Nose: Congestion and rhinorrhea present.      Comments: Sounds very congested.   Eyes:      Conjunctiva/sclera: Conjunctivae normal.   Pulmonary:      Effort: Pulmonary effort is normal. No respiratory distress.      Comments: No cough or audible wheezing  Skin:     Comments: No rash on head or neck.    Neurological:      Mental Status: She is alert and oriented to person, place, and time.   Psychiatric:         Mood and Affect: Mood normal.         Behavior: Behavior normal.         Thought Content: Thought content normal.          Judgment: Judgment normal.         Visit Time  Total Visit Duration: 7 minutes    Assessment/Plan:    Diagnoses and all orders for this visit:    Acute sinusitis, recurrence not specified, unspecified location  -     azithromycin (ZITHROMAX) 250 mg tablet; Take 2 tablets today then 1 tablet daily x 4 days        Patient Instructions   Will treat for sinus infection.  As we discussed, you may want to do a covid test to rule out based on symptoms.  Rest and drink extra fluids.  Start antibiotic.  Take probiotic. OTC cough and cold as needed.  Nasal saline flushes and félix pot.  Follow up with PCP if no improvement.  Go to ER with any worsening symptoms.

## 2023-12-23 NOTE — CARE ANYWHERE EVISITS
Visit Summary for SIRIA ONTIVEROS - Gender: Female - Date of Birth: 1985  Date: [id number] - Duration: 6 minutes  Patient: SIRIA ONTIVEROS  Provider: Chari CARDOZO    Patient Contact Information  Address  [address] [address]      Visit Topics  Cold [Added By: Self - 2023-12-23]    Triage Questions   What is your current physical address in the event of a medical emergency? Answer []  Are you allergic to any medications? Answer []  Are you now or could you be pregnant? Answer []  Do you have any immune system compromise or chronic lung   disease? Answer []  Do you have any vulnerable family members in the home (infant, pregnant, cancer, elderly)? Answer []     Conversation Transcripts  [0A][0A] [Notification] You are connected with Chari CARDOZO, Urgent Care Specialist.[0A][Notification] SIRIA ONTIVEROS is located in [place].[0A][Notification] SIRIA ONTIVEROS has shared health history...[0A]    Diagnosis  Acute sinusitis, unspecified    Procedures  Value: [id number] Code: CPT-4 UNLISTED E&M SERVICE    Medications Prescribed    No prescriptions ordered    Electronically signed by: Chari Simmons(NPI [id number])

## 2023-12-23 NOTE — PATIENT INSTRUCTIONS
Will treat for sinus infection.  As we discussed, you may want to do a covid test to rule out based on symptoms.  Rest and drink extra fluids.  Start antibiotic.  Take probiotic. OTC cough and cold as needed.  Nasal saline flushes and félix pot.  Follow up with PCP if no improvement.  Go to ER with any worsening symptoms.

## 2024-02-03 ENCOUNTER — HOSPITAL ENCOUNTER (OUTPATIENT)
Dept: MAMMOGRAPHY | Facility: HOSPITAL | Age: 39
Discharge: HOME | End: 2024-02-03
Payer: COMMERCIAL

## 2024-02-03 VITALS — HEIGHT: 64 IN | WEIGHT: 159 LBS | BODY MASS INDEX: 27.14 KG/M2

## 2024-02-03 DIAGNOSIS — Z12.31 VISIT FOR SCREENING MAMMOGRAM: ICD-10-CM

## 2024-02-03 PROCEDURE — 77067 SCR MAMMO BI INCL CAD: CPT

## 2024-02-03 PROCEDURE — 77063 BREAST TOMOSYNTHESIS BI: CPT

## 2024-04-15 ENCOUNTER — OFFICE VISIT (OUTPATIENT)
Dept: URGENT CARE | Facility: CLINIC | Age: 39
End: 2024-04-15
Payer: COMMERCIAL

## 2024-04-15 VITALS
HEIGHT: 64 IN | SYSTOLIC BLOOD PRESSURE: 117 MMHG | HEART RATE: 91 BPM | DIASTOLIC BLOOD PRESSURE: 66 MMHG | BODY MASS INDEX: 27.83 KG/M2 | RESPIRATION RATE: 20 BRPM | TEMPERATURE: 98.1 F | OXYGEN SATURATION: 97 % | WEIGHT: 163 LBS

## 2024-04-15 DIAGNOSIS — H66.001 ACUTE SUPPURATIVE OTITIS MEDIA OF RIGHT EAR WITHOUT SPONTANEOUS RUPTURE OF TYMPANIC MEMBRANE, RECURRENCE NOT SPECIFIED: Primary | ICD-10-CM

## 2024-04-15 DIAGNOSIS — H60.391 OTHER INFECTIVE ACUTE OTITIS EXTERNA OF RIGHT EAR: ICD-10-CM

## 2024-04-15 PROCEDURE — 99214 OFFICE O/P EST MOD 30 MIN: CPT | Performed by: NURSE PRACTITIONER

## 2024-04-15 RX ORDER — CEFDINIR 300 MG/1
300 CAPSULE ORAL EVERY 12 HOURS SCHEDULED
Qty: 14 CAPSULE | Refills: 0 | Status: SHIPPED | OUTPATIENT
Start: 2024-04-15 | End: 2024-04-22

## 2024-04-15 RX ORDER — CIPROFLOXACIN AND DEXAMETHASONE 3; 1 MG/ML; MG/ML
4 SUSPENSION/ DROPS AURICULAR (OTIC) 2 TIMES DAILY
Qty: 7.5 ML | Refills: 0 | Status: SHIPPED | OUTPATIENT
Start: 2024-04-15 | End: 2024-04-23 | Stop reason: ALTCHOICE

## 2024-04-15 NOTE — PATIENT INSTRUCTIONS
--Take Rx meds as prescribed  --Keep water out of ear. No Q-tip use.    --Motrin as needed  --OTC decongestant (Sudafed) + saline nasal spray + nasal steroid (Flonase, Nasocort)--2 sprays/nostril just before lying down at night  --Seek re-evaluation if no improvement/worsening over the next 48-72 hours.

## 2024-04-15 NOTE — PROGRESS NOTES
[facility]        NAME: Layne Mcclellan is a 38 y.o. female  : 1985    MRN: [id number]  DATE: April 15, 2024  TIME: 10:11 AM    Assessment and Plan   Acute suppurative otitis media of right ear without spontaneous rupture of tympanic membrane, recurrence not specified [H66.001]  1. Acute suppurative otitis media of right ear without spontaneous rupture of tympanic membrane, recurrence not specified  cefdinir (OMNICEF) 300 mg capsule      2. Other infective acute otitis externa of right ear  ciprofloxacin-dexamethasone (CIPRODEX) otic suspension        --Deneis prior anaphylactic reaction with PCN (rash only).  Denies prior AE's from cephalosporins.  Denies pregnancy.      Patient Instructions     --Take Rx meds as prescribed  --Keep water out of ear. No Q-tip use.    --Motrin as needed  --OTC decongestant (Sudafed) + saline nasal spray + nasal steroid (Flonase, Nasocort)--2 sprays/nostril just before lying down at night  --Seek re-evaluation if no improvement/worsening over the next 48-72 hours.     If tests have been performed at [facility], our office will contact you with results if changes need to be made to the care plan discussed with you at the visit.  You can review your full results on [facility].    Chief Complaint     Chief Complaint   Patient presents with    Earache     Reports recent cold about 1 weeks ago. Now has severe pain in right ear, right side of face and right jaw area. Also reports difficulty hearing and muffled sounds that began yesterday and blood in her ear this morning. Used Motrin 800 mg this morning. Last dose at 0730 which was somewhat effective.          History of Present Illness       Here with complaints of right ear pain since yesterday, URI symptoms x 1 week with ongoing nasal congestion, sinus pressure.    Some headaches.  No fever.   Tried using Q-tip in ear yesterday. Saw small amount of blood afterwards.  No otorrhea otherwise.  Decreased hearing  out of ear.  Requesting work note because she states that her job requires use of headphone over right ear.    Taking Motrin.           Review of Systems   Review of Systems   Constitutional:  Negative for fever.   HENT:  Positive for ear discharge, ear pain and rhinorrhea. Negative for sinus pressure.    Respiratory:  Positive for cough. Negative for shortness of breath.    Neurological:  Positive for headaches.         Current Medications       Current Outpatient Medications:     cefdinir (OMNICEF) 300 mg capsule, Take 1 capsule (300 mg total) by mouth every 12 (twelve) hours for 7 days, Disp: 14 capsule, Rfl: 0    Cholecalciferol (Vitamin D3) 250 MCG (34951 UT) TABS, Take 10,000 Units by mouth in the morning, Disp: , Rfl:     ciprofloxacin-dexamethasone (CIPRODEX) otic suspension, Administer 4 drops to the right ear 2 (two) times a day, Disp: 7.5 mL, Rfl: 0    ibuprofen (MOTRIN) 800 mg tablet, Take by mouth 2 (two) times a day, Disp: , Rfl:     magnesium (MAGTAB) 84 MG (7MEQ) TBCR, Take 84 mg by mouth daily, Disp: , Rfl:     SUMAtriptan (IMITREX) 100 mg tablet, 1 tab as needed for migraine, may repeat in 2 hours, max 200mg per 24 hours, Disp: 12 tablet, Rfl: 5    topiramate (TOPAMAX) 100 mg tablet, Take 1 tablet (100 mg total) by mouth daily, Disp: 90 tablet, Rfl: 1    topiramate (Topamax) 25 mg tablet, Take one tablet at bedtime for 1 month. If needed increase to 2 tablets at bedtime. In addition to 100mg tablet., Disp: 60 tablet, Rfl: 5    Current Allergies     Allergies as of 04/15/2024 - Reviewed 04/15/2024   Allergen Reaction Noted    Amoxicillin Rash 02/23/2016    Penicillins Rash 09/09/2017            The following portions of the patient's history were reviewed and updated as appropriate: allergies, current medications, past family history, past medical history, past social history, past surgical history and problem list.     Past Medical History:   Diagnosis Date    Abnormal Pap smear of cervix     Acute  "non-recurrent sinusitis 10/25/2021    Arthritis     BMI 27.0-27.9,adult 2022    BRCA1 negative     BRCA2 negative     Constipation, chronic     Cough 2021    COVID-19 virus infection 2021    Depression 2022    Diabetes mellitus (HCC)     Fatigue 2021    Gestational diabetes     Gestational hypertension affecting first pregnancy     Irritable bowel syndrome     Migraine     Myalgia 2022    Neck pain 2022    Rhinitis 10/25/2021    Sinus congestion 2021    Sore throat 2021    Varicella     Weight loss        Past Surgical History:   Procedure Laterality Date     SECTION  10/06/2017    COLPOSCOPY      [place]  DELIVERY ONLY Bilateral 10/6/2017    Procedure:  SECTION ();  Surgeon: Patricia Rodriguez MD;  Location: [facility];  Service: Obstetrics    [place]  DELIVERY ONLY N/A 3/22/2019    Procedure:  SECTION () REPEAT;  Surgeon: Patricia Rodriguez MD;  Location: [facility];  Service: Obstetrics    [place] COLONOSCOPY FLX DX W/COLLJ SPEC WHEN PFRMD N/A 2016    Procedure: COLONOSCOPY;  Surgeon: July Barton MD;  [facility];  Service: Gastroenterology    WISDOM TOOTH EXTRACTION         Family History   Problem Relation Age of Onset    Arthritis Mother     Irritable bowel syndrome Mother     Breast cancer Maternal Grandmother 80    Heart disease Maternal Grandmother     Arthritis Maternal Grandmother     Diabetes Maternal Grandfather     Breast cancer Paternal Grandmother 40    Heart disease Paternal Grandfather     No Known Problems Sister     No Known Problems Father     No Known Problems Daughter     No Known Problems Son     No Known Problems Sister     No Known Problems Maternal Aunt     No Known Problems Paternal Uncle          Medications have been verified.        Objective   /66   Pulse 91   Temp 98.1 °F (36.7 °C) (Temporal)   Resp 20   Ht 5' 4\" (1.626 m)   Wt 73.9 kg (163 lb)   LMP 2024 (Exact Date)   SpO2 97%   BMI " 27.98 kg/m²   Patient's last menstrual period was 04/09/2024 (exact date).       Physical Exam     Physical Exam  Constitutional:       General: She is not in acute distress.     Appearance: She is not diaphoretic.   HENT:      Head: Normocephalic.      Right Ear: There is no impacted cerumen.      Left Ear: Tympanic membrane, ear canal and external ear normal. There is no impacted cerumen.      Ears:      Comments: Right TM intact, erythematous bulging.  Canal erythematous, mildly swollen, without otorrhea or signs of bleeding.    No tragus, mastoid, pinna tenderness.       Nose: Congestion present. No rhinorrhea.      Comments: No sinus tenderness.       Mouth/Throat:      Pharynx: No oropharyngeal exudate or posterior oropharyngeal erythema.   Cardiovascular:      Rate and Rhythm: Normal rate and regular rhythm.   Pulmonary:      Effort: Pulmonary effort is normal.      Breath sounds: Normal breath sounds.

## 2024-04-15 NOTE — LETTER
April 15, 2024     Patient: Layne Mcclellan   YOB: 1985   Date of Visit: 4/15/2024       To Whom it May Concern:    Layne Mcclellan was seen in my clinic on 4/15/2024. Please excuse from work the week of 4/15 due to medical condition (acute right hearing loss). May return sooner if feeling better.       If you have any questions or concerns, please don't hesitate to call.         Sincerely,          CAS Armas        CC: No Recipients

## 2024-04-23 ENCOUNTER — OFFICE VISIT (OUTPATIENT)
Dept: INTERNAL MEDICINE CLINIC | Facility: CLINIC | Age: 39
End: 2024-04-23
Payer: COMMERCIAL

## 2024-04-23 VITALS
BODY MASS INDEX: 27.49 KG/M2 | OXYGEN SATURATION: 99 % | DIASTOLIC BLOOD PRESSURE: 84 MMHG | TEMPERATURE: 97.9 F | HEIGHT: 64 IN | SYSTOLIC BLOOD PRESSURE: 120 MMHG | WEIGHT: 161 LBS | RESPIRATION RATE: 18 BRPM | HEART RATE: 74 BPM

## 2024-04-23 DIAGNOSIS — E78.2 MIXED HYPERLIPIDEMIA: ICD-10-CM

## 2024-04-23 DIAGNOSIS — H93.11 TINNITUS OF RIGHT EAR: Primary | ICD-10-CM

## 2024-04-23 DIAGNOSIS — G43.009 MIGRAINE WITHOUT AURA AND WITHOUT STATUS MIGRAINOSUS, NOT INTRACTABLE: ICD-10-CM

## 2024-04-23 DIAGNOSIS — K76.9 LIVER LESION: ICD-10-CM

## 2024-04-23 DIAGNOSIS — R73.9 HYPERGLYCEMIA: ICD-10-CM

## 2024-04-23 DIAGNOSIS — H91.91 HEARING LOSS OF RIGHT EAR, UNSPECIFIED HEARING LOSS TYPE: ICD-10-CM

## 2024-04-23 PROCEDURE — 99213 OFFICE O/P EST LOW 20 MIN: CPT | Performed by: INTERNAL MEDICINE

## 2024-04-23 NOTE — PATIENT INSTRUCTIONS
Patient was advised to continue present medications. discussed with the patient medications and laboratory data in detail.  Follow-up with me  as advised.  If any blood test was ordered please do 1 week prior to next appointment unless advise to get earlier.  If you have any questions please call the office [phone]

## 2024-04-23 NOTE — PROGRESS NOTES
Assessment/Plan:    1. Tinnitus of right ear    2. Hearing loss of right ear, unspecified hearing loss type    3. Mixed hyperlipidemia  -     Lipid panel; Future    4. Liver lesion  -     Comprehensive metabolic panel; Future  -     MRI abdomen w wo contrast; Future    5. BMI 27.0-27.9,adult  Assessment & Plan:  Patient  was advised to decrease portion size.  Advised to decrease carb, sugar, cholesterol intake.  Advised to exercise 3-5 times per week.  Advised to lose weight.      6. Migraine without aura and without status migrainosus, not intractable  -     Comprehensive metabolic panel; Future    7. Hyperglycemia  -     Comprehensive metabolic panel; Future  -     Hemoglobin A1C; Future      Discussion/summary/plan:    Patient states that she initially started with a sore throat and sinus congestion then developed pain in the right ear and hearing loss in the right ear went to see urgent care was prescribed p.o. antibiotic and eardrops.  Finished p.o. antibiotic. She  stopped taking the eardrops yesterday.  Complain of still has a hearing loss and ringing in the right ear.  On exam her tympanic membrane clear and intact.  No wax no discharge.  Minimal erythema of the ear canal noted.  So advised to continue eardrops for now.  There is a possibility of labyrinthitis versus other etiology causing her symptoms.  Discussed with the patient observe for neck 7 to 10 days if symptoms persist patient will need to see ENT specialist.  Her last cholesterol of 218, triglyceride 63, HDL 53,  patient has been watching her diet for cholesterol and carbs intake.  Will follow lipid panel.  Last time her blood sugar was 106 but hemoglobin A1c was 5.6.  She has been watching diet for sugar carbs intake.  Will follow blood sugar hemoglobin A1c.  She has been seen and followed by her GYN specialist as well as neurologist.  She had MRI September 2021 revealed hepatic hemangioma and liver lesion was advised follow-up in  6-month.  Patient stated she had a follow-up MRI which was ordered by her GYN specialist per patient.  She will check with her GYN specialist office about follow-up MRI if it was done or not.  I have ordered the MRI and patient will schedule if it was not done for follow-up.  Patient is not pregnant as she has a IUD.        Subjective: Patient presents with the complaint of ringing in the right ear and hearing loss of the right ear and follow-up her medical problems.      Patient ID: Layne Mcclellan is a 38 y.o. female.    HPI  38-year-old female patient presents for follow-up of her medical problems.  Patient states he started with sore throat and sinus congestion then she developed pain in the right ear and decreased hearing in the right ear for which she went to urgent care was prescribed p.o. antibiotic as well as eardrops.  She finished the p.o. antibiotic.  Also stopped eardrops since yesterday.  Still has a ringing in the ears and right ear hearing loss.  Although pain is better.  Denies any sore throat, cough, fever.  Denies any sinus drip or sinus congestion.  Denies any chest pain or shortness of breath or pain abdomen.  Denies nausea vomiting diarrhea.  She has been seen and followed by neurology for her migraine as well as GYN specialist for GYN examination.    The following portions of the patient's history were reviewed and updated as appropriate:     Past Medical History:  She has a past medical history of Abnormal Pap smear of cervix, Acute non-recurrent sinusitis (10/25/2021), Arthritis, BMI 27.0-27.9,adult (05/09/2022), BRCA1 negative, BRCA2 negative, Constipation, chronic, Cough (09/23/2021), COVID-19 virus infection (12/08/2021), Depression (05/09/2022), Diabetes mellitus (HCC), Fatigue (12/08/2021), Gestational diabetes, Gestational hypertension affecting first pregnancy, Hearing loss of right ear (04/23/2024), Hyperglycemia (04/23/2024), Irritable bowel syndrome, Liver lesion (04/23/2024),  Migraine, Myalgia (2022), Neck pain (2022), Rhinitis (10/25/2021), Sinus congestion (2021), Sore throat (2021), Tinnitus of right ear (2024), Varicella, and Weight loss.,  _______________________________________________________________________  Past Surgical History:   has a past surgical history that includes [place] tooth extraction; pr colonoscopy flx dx w/collj spec when pfrmd (N/A, 2016); Colposcopy; pr  delivery only (Bilateral, 10/6/2017);  section (10/06/2017); and pr  delivery only (N/A, 3/22/2019).,  _______________________________________________________________________  Family History:  family history includes Arthritis in her maternal grandmother and mother; Breast cancer (age of onset: 40) in her paternal grandmother; Breast cancer (age of onset: 80) in her maternal grandmother; Diabetes in her maternal grandfather; Heart disease in her maternal grandmother and paternal grandfather; Irritable bowel syndrome in her mother; No Known Problems in her daughter, father, maternal aunt, paternal uncle, sister, sister, and son.,  _______________________________________________________________________  Social History:   reports that she has never smoked. She has never used smokeless tobacco. She reports that she does not currently use alcohol. She reports that she does not use drugs.,  _______________________________________________________________________  Allergies:  is allergic to amoxicillin and penicillins..  _______________________________________________________________________  Current Outpatient Medications   Medication Sig Dispense Refill    Cholecalciferol (Vitamin D3) 250 MCG (25959 UT) TABS Take 10,000 Units by mouth in the morning      ibuprofen (MOTRIN) 800 mg tablet Take by mouth 2 (two) times a day      magnesium (MAGTAB) 84 MG (7MEQ) TBCR Take 84 mg by mouth daily      SUMAtriptan (IMITREX) 100 mg tablet 1 tab as needed for migraine, may  "repeat in 2 hours, max 200mg per 24 hours 12 tablet 5    topiramate (TOPAMAX) 100 mg tablet Take 1 tablet (100 mg total) by mouth daily 90 tablet 1    topiramate (Topamax) 25 mg tablet Take one tablet at bedtime for 1 month. If needed increase to 2 tablets at bedtime. In addition to 100mg tablet. 60 tablet 5     No current facility-administered medications for this visit.     _______________________________________________________________________  Review of Systems   Constitutional:  Negative for chills and fever.   HENT:  Positive for hearing loss (Right ear) and tinnitus (Right ear). Negative for congestion, ear pain, nosebleeds, sinus pain, sore throat and trouble swallowing.    Eyes:  Negative for discharge, redness and visual disturbance.   Respiratory:  Negative for cough, chest tightness and shortness of breath.    Cardiovascular:  Negative for chest pain and palpitations.   Gastrointestinal:  Negative for abdominal pain, blood in stool, constipation, diarrhea, nausea and vomiting.   Genitourinary:  Negative for dysuria, flank pain and hematuria.   Musculoskeletal:  Negative for arthralgias, myalgias and neck pain.   Skin:  Negative for color change and rash.   Neurological:  Negative for dizziness, speech difficulty, weakness and headaches.   Hematological:  Does not bruise/bleed easily.   Psychiatric/Behavioral:  Negative for agitation and behavioral problems.            Objective:  Vitals:    04/23/24 1141   BP: 120/84   BP Location: Left arm   Patient Position: Sitting   Cuff Size: Standard   Pulse: 74   Resp: 18   Temp: 97.9 °F (36.6 °C)   TempSrc: Temporal   SpO2: 99%   Weight: 73 kg (161 lb)   Height: 5' 4\" (1.626 m)     Body mass index is 27.64 kg/m².     Physical Exam  Vitals and nursing note reviewed.   Constitutional:       General: She is not in acute distress.     Appearance: Normal appearance.   HENT:      Head: Normocephalic and atraumatic.      Right Ear: Tympanic membrane normal. There is no " impacted cerumen.      Left Ear: Tympanic membrane, ear canal and external ear normal. There is no impacted cerumen.      Ears:      Comments: Right ear canal minimal erythema without any discharge or wax.  Tympanic membrane clear and intact.     Nose: Nose normal.      Mouth/Throat:      Mouth: Mucous membranes are moist.      Pharynx: Oropharynx is clear. No oropharyngeal exudate or posterior oropharyngeal erythema.   Eyes:      General: No scleral icterus.        Right eye: No discharge.         Left eye: No discharge.      Extraocular Movements: Extraocular movements intact.      Conjunctiva/sclera: Conjunctivae normal.   Cardiovascular:      Rate and Rhythm: Normal rate and regular rhythm.      Pulses: Normal pulses.      Heart sounds: Normal heart sounds. No murmur heard.  Pulmonary:      Effort: Pulmonary effort is normal. No respiratory distress.      Breath sounds: Normal breath sounds. No wheezing, rhonchi or rales.   Abdominal:      General: Bowel sounds are normal.      Palpations: Abdomen is soft.      Tenderness: There is no abdominal tenderness.   Musculoskeletal:         General: Normal range of motion.      Cervical back: Normal range of motion and neck supple. No muscular tenderness.      Right lower leg: No edema.      Left lower leg: No edema.   Skin:     General: Skin is warm.      Findings: No rash.   Neurological:      General: No focal deficit present.      Mental Status: She is alert and oriented to person, place, and time.      Motor: No weakness.      Coordination: Coordination normal.   Psychiatric:         Mood and Affect: Mood normal.         Behavior: Behavior normal.

## 2024-05-04 ENCOUNTER — PATIENT MESSAGE (OUTPATIENT)
Dept: NEUROLOGY | Facility: CLINIC | Age: 39
End: 2024-05-04

## 2024-05-04 DIAGNOSIS — G43.009 MIGRAINE WITHOUT AURA AND WITHOUT STATUS MIGRAINOSUS, NOT INTRACTABLE: ICD-10-CM

## 2024-05-06 RX ORDER — TOPIRAMATE 100 MG/1
100 TABLET, FILM COATED ORAL DAILY
Qty: 90 TABLET | Refills: 0 | Status: SHIPPED | OUTPATIENT
Start: 2024-05-06 | End: 2024-05-09 | Stop reason: SDUPTHER

## 2024-05-09 DIAGNOSIS — G43.009 MIGRAINE WITHOUT AURA AND WITHOUT STATUS MIGRAINOSUS, NOT INTRACTABLE: ICD-10-CM

## 2024-05-10 RX ORDER — TOPIRAMATE 100 MG/1
100 TABLET, FILM COATED ORAL DAILY
Qty: 90 TABLET | Refills: 1 | Status: SHIPPED | OUTPATIENT
Start: 2024-05-10

## 2024-05-16 ENCOUNTER — TELEPHONE (OUTPATIENT)
Dept: NEUROLOGY | Facility: CLINIC | Age: 39
End: 2024-05-16

## 2024-05-23 ENCOUNTER — TELEPHONE (OUTPATIENT)
Dept: NEUROLOGY | Facility: CLINIC | Age: 39
End: 2024-05-23

## 2024-05-23 NOTE — TELEPHONE ENCOUNTER
Called patient to confirm upcoming appointment on 5/30/24 with Peng Gonzalez in the [place] office.  Pt confirmed Virtual appt

## 2024-05-30 ENCOUNTER — TELEMEDICINE (OUTPATIENT)
Dept: NEUROLOGY | Facility: CLINIC | Age: 39
End: 2024-05-30
Payer: COMMERCIAL

## 2024-05-30 VITALS — BODY MASS INDEX: 27.31 KG/M2 | WEIGHT: 160 LBS | HEIGHT: 64 IN

## 2024-05-30 DIAGNOSIS — M54.2 NECK PAIN: Primary | ICD-10-CM

## 2024-05-30 DIAGNOSIS — G43.009 MIGRAINE WITHOUT AURA AND WITHOUT STATUS MIGRAINOSUS, NOT INTRACTABLE: ICD-10-CM

## 2024-05-30 PROCEDURE — 99213 OFFICE O/P EST LOW 20 MIN: CPT | Performed by: NURSE PRACTITIONER

## 2024-05-30 RX ORDER — CYCLOBENZAPRINE HCL 5 MG
5 TABLET ORAL
Qty: 15 TABLET | Refills: 1 | Status: SHIPPED | OUTPATIENT
Start: 2024-05-30

## 2024-05-30 RX ORDER — SUMATRIPTAN 100 MG/1
TABLET, FILM COATED ORAL
Qty: 12 TABLET | Refills: 5 | Status: SHIPPED | OUTPATIENT
Start: 2024-05-30

## 2024-05-30 RX ORDER — TOPIRAMATE 25 MG/1
TABLET ORAL
Qty: 60 TABLET | Refills: 5 | Status: SHIPPED | OUTPATIENT
Start: 2024-05-30

## 2024-05-30 NOTE — PATIENT INSTRUCTIONS
Continue 125mg topamax daily at this time and daily magnesium for migraine prevention  Please get eye exam and let me know the results (or the provider and I will get the results)  Continue imitrex/motrin for acute management-let me know if frequency increases past the current 5x/month  If there are any new concerning symptoms with the neck pain let me know as I would repeat imaging; if possible get back into care with chiropractor and do topical medications/stretches and I have added as needed evening low dose flexeril if needed  Follow up in 6 months time

## 2024-05-30 NOTE — PROGRESS NOTES
Virtual Regular Visit    Verification of patient location:    Patient is located at Home in the following state in which I hold an active license [place]      Assessment/Plan:  Patient Instructions:  Continue 125mg topamax daily at this time and daily magnesium for migraine prevention  Please get eye exam and let me know the results (or the provider and I will get the results)  Continue imitrex/motrin for acute management-let me know if frequency increases past the current 5x/month  If there are any new concerning symptoms with the neck pain let me know as I would repeat imaging; if possible get back into care with chiropractor and do topical medications/stretches and I have added as needed evening low dose flexeril if needed  Follow up in 6 months time    Problem List Items Addressed This Visit       Migraine without aura and without status migrainosus, not intractable    Relevant Medications    topiramate (Topamax) 25 mg tablet    cyclobenzaprine (FLEXERIL) 5 mg tablet    SUMAtriptan (IMITREX) 100 mg tablet     Other Visit Diagnoses       Neck pain    -  Primary    Relevant Medications    cyclobenzaprine (FLEXERIL) 5 mg tablet            Reason for visit is Migraine follow up, last office visit  8/1/2023    Encounter provider CAS Bello      Recent Visits  Date Type Provider Dept   05/23/24 Telephone [facility]   Showing recent visits within past 7 days and meeting all other requirements  Future Appointments  No visits were found meeting these conditions.  Showing future appointments within next 150 days and meeting all other requirements       The patient was identified by name and date of birth. Layne Mcclellan was informed that this is a telemedicine visit and that the visit is being conducted through the Epic Embedded platform. She agrees to proceed..  My office door was closed. No one else was in the room.  She acknowledged consent and understanding of privacy and  security of the video platform. The patient has agreed to participate and understands they can discontinue the visit at any time.    Layne was on video epic platform but camera was not functioning and therefore this was an audio only visit.    Patient is aware this is a billable service.     Subjective  Layne Mcclellan is a 39 y.o. female who presents for follow up.  She states after increasing topiramate to 125mg/day (see 8/14/2023 patient message) she was doing well with migraine frequency until the last few months when the migraine frequency increased because of life changes/stress-she states she started a new job and her  changed career paths. There has been limited time to visit the chiropractor and she thinks this is why her neck pain has worsened and with worse neck pain usually she has increased headaches. She denies change to headache characteristics/duration and states imitrex is still very effective in a short time without side effect. She does state mild vision difficulty and states she was given glasses in her 20's which she no longer wears and has not been to an eye doctor since that time. She continues with use of paraguard iud. No other new neurological symptoms- no balance concerns, no upper/lower extremity paresthesias, no speech change, no bowel/bladder change.     Since your last visit are your headaches Worsened  Are you taking your current medications as prescribed? yes  Do you have any side effects? no  How often do you use abortive medications to treat a headache? 4 times per month  How effective are they? effective  From 0-10, how severe is the pain for a typical headache for you? 6  What does your typical headache pain feel like? dull and tightness  Where is your typical headache? occipital and anterior neck  What is your current headache frequency: 4 times per month  How long does your typical headache last? 2 day(s)  In addition to the head pain, what other symptoms do you  have before or during your headaches? light sensitivity  Last headache was  and Monday. Lasting 2 days. Pain scale an 8    HPI     Past Medical History:   Diagnosis Date    Abnormal Pap smear of cervix     Acute non-recurrent sinusitis 10/25/2021    Arthritis     BMI 27.0-27.9,adult 2022    BRCA1 negative     BRCA2 negative     Constipation, chronic     Cough 2021    COVID-19 virus infection 2021    Depression 2022    Diabetes mellitus (HCC)     Fatigue 2021    Gestational diabetes     Gestational hypertension affecting first pregnancy     Hearing loss of right ear 2024    Hyperglycemia 2024    Irritable bowel syndrome     Liver lesion 2024    Migraine     Myalgia 2022    Neck pain 2022    Rhinitis 10/25/2021    Sinus congestion 2021    Sore throat 2021    Tinnitus of right ear 2024    Varicella     Weight loss        Past Surgical History:   Procedure Laterality Date     SECTION  10/06/2017    COLPOSCOPY      DE  DELIVERY ONLY Bilateral 10/6/2017    Procedure:  SECTION ();  Surgeon: Patricia Rodriguez MD;  Location: [facility];  Service: Obstetrics    DE  DELIVERY ONLY N/A 3/22/2019    Procedure:  SECTION () REPEAT;  Surgeon: Patricia Rodriguez MD;  Location: [facility];  Service: Obstetrics    DE COLONOSCOPY FLX DX W/COLLJ SPEC WHEN PFRMD N/A 2016    Procedure: COLONOSCOPY;  Surgeon: July Barton MD;  [facility];  Service: Gastroenterology    WISDOM TOOTH EXTRACTION         Current Outpatient Medications   Medication Sig Dispense Refill    Cholecalciferol (Vitamin D3) 250 MCG (79561 UT) TABS Take 10,000 Units by mouth in the morning      ibuprofen (MOTRIN) 800 mg tablet Take by mouth 2 (two) times a day      magnesium (MAGTAB) 84 MG (7MEQ) TBCR Take 84 mg by mouth daily      SUMAtriptan (IMITREX) 100 mg tablet 1 tab as needed for migraine, may repeat in 2 hours, max 200mg per 24  hours 12 tablet 5    topiramate (TOPAMAX) 100 mg tablet Take 1 tablet (100 mg total) by mouth daily 90 tablet 1    topiramate (Topamax) 25 mg tablet Take one tablet at bedtime for 1 month. If needed increase to 2 tablets at bedtime. In addition to 100mg tablet. 60 tablet 5     No current facility-administered medications for this visit.        Allergies   Allergen Reactions    Amoxicillin Rash    Penicillins Rash       Review of Systems   Constitutional: Negative.    HENT: Negative.     Eyes: Negative.    Respiratory: Negative.     Cardiovascular: Negative.    Gastrointestinal: Negative.    Genitourinary: Negative.    Musculoskeletal:  Positive for neck pain.   Neurological:  Positive for headaches. Negative for dizziness, tremors, seizures, syncope, speech difficulty, weakness, light-headedness and numbness.   Psychiatric/Behavioral: Negative.          Increased stress       Video Exam    There were no vitals filed for this visit.    Physical Exam  Constitutional:       General: She is not in acute distress.  Pulmonary:      Effort: Pulmonary effort is normal.   Neurological:      Mental Status: She is alert.      Cranial Nerves: No dysarthria.   Psychiatric:         Mood and Affect: Mood normal.          Visit Time  Total Visit Duration: 22 minutes

## 2024-06-04 ENCOUNTER — TELEPHONE (OUTPATIENT)
Dept: HEMATOLOGY ONCOLOGY | Facility: CLINIC | Age: 39
End: 2024-06-04

## 2024-06-04 NOTE — TELEPHONE ENCOUNTER
Left message for patient that her appt. On 7/2/24 with Leah Milian will need to be reschedule due to the provider being out of the office that day. Left number for [facility] to call back.

## 2024-06-05 ENCOUNTER — TELEPHONE (OUTPATIENT)
Dept: HEMATOLOGY ONCOLOGY | Facility: CLINIC | Age: 39
End: 2024-06-05

## 2024-06-05 NOTE — LETTER
Layne,     I am reaching out regarding your appointment with CAS Young on 7/2/24 at 8:00am.    There has been a change to the providers schedule, and your appointment has been cancelled.     Your new appointment is scheduled for 8/21/24 at 2:30pm in the [facility].    Please call the [facility] at [phone] and we would be happy to assist you with rescheduling.     Thank you,   [facility]  Oncology [facility]  [phone])

## 2024-06-05 NOTE — TELEPHONE ENCOUNTER
I called Layne regarding an appointment that they have scheduled with CAS Young scheduled on  7/2/24 at 8:00am        Appointment Change  Cancel, Reschedule, Change to Virtual      Who are you speaking with? Left message on pt. voicemail   If it is not the patient, is the caller listed on the communication consent form? N/A   Which provider is the appointment scheduled with? CAS Young   When was the original appointment scheduled?    Please list date and time 7/2/24 at 8am   At which location is the appointment scheduled to take place? [place]   Was the appointment rescheduled?     Was the appointment changed from an in person visit to a virtual visit?    If so, please list the details of the change. New appointment is scheduled for 8/21/24 at 2:30pm at [place]. Pt was advised to call back if new appointment doesn't work.   What is the reason for the appointment change? Provider ooo       Was STAR transport scheduled? No   Does STAR transport need to be scheduled for the new visit (if applicable) No   Does the patient need an infusion appointment rescheduled? No   Does the patient have an upcoming infusion appointment scheduled? If so, when? No   Is the patient undergoing chemotherapy? No   For appointments cancelled with less than 24 hours:  Was the no-show policy reviewed? No             Layne,     I am reaching out regarding your appointment with CAS Young on  7/2/24 at 8:00am .    There has been a change to the providers schedule, and your appointment has been cancelled.     Your new appointment is scheduled for 8/21/24 at 2:30pm in the [facility].    Please call the [facility] at [phone] and we would be happy to assist you with rescheduling.     Thank you,   [facility]  Oncology [facility]  [phone]

## 2024-06-11 ENCOUNTER — NURSE TRIAGE (OUTPATIENT)
Age: 39
End: 2024-06-11

## 2024-06-11 NOTE — TELEPHONE ENCOUNTER
"Patient is having epigastric pain for 3 days , intermittent . Worse with food. Please assist with scheduling. She has off Tuesday and Thursday . Patient mentioned she is having an abdominal MRI at the end of the month,  follow up for liver lesions . Reason for Disposition   MILD TO MODERATE pain that comes and goes (cramps) lasts > 24 hours    Answer Assessment - Initial Assessment Questions  1. LOCATION: \"Where does it hurt?\"       Upper abdominal pain   2. RADIATION: \"Does the pain shoot anywhere else?\" (e.g., chest, back)      No   3. ONSET: \"When did the pain begin?\" (e.g., minutes, hours or days ago)       2 days  4. SUDDEN: \"Gradual or sudden onset?\"      Gradual   5. PATTERN \"Does the pain come and go, or is it constant?\"     - If constant: \"Is it getting better, staying the same, or worsening?\"       (Note: Constant means the pain never goes away completely; most serious pain is constant and it progresses)      - If intermittent: \"How long does it last?\" \"Do you have pain now?\"      (Note: Intermittent means the pain goes away completely between bouts)      Intermittent   6. SEVERITY: \"How bad is the pain?\"  (e.g., Scale 1-10; mild, moderate, or severe)     - MILD (1-3): doesn't interfere with normal activities, abdomen soft and not tender to touch      - MODERATE (4-7): interferes with normal activities or awakens from sleep, tender to touch      - SEVERE (8-10): excruciating pain, doubled over, unable to do any normal activities        Moderate . Sharp pain   7. RECURRENT SYMPTOM: \"Have you ever had this type of stomach pain before?\" If Yes, ask: \"When was the last time?\" and \"What happened that time?\"       One month ago   8. AGGRAVATING FACTORS: \"Does anything seem to cause this pain?\" (e.g., foods, stress, alcohol)      Eating   9. CARDIAC SYMPTOMS: \"Do you have any of the following symptoms: chest pain, difficulty breathing, sweating, nausea?\"      Nausea   10. OTHER SYMPTOMS: \"Do you have any other " "symptoms?\" (e.g., fever, vomiting, diarrhea)        Nausea   11. PREGNANCY: \"Is there any chance you are pregnant?\" \"When was your last menstrual period?\"        Denies    Protocols used: Abdominal Pain - Upper-ADULT-[place]    "

## 2024-06-11 NOTE — TELEPHONE ENCOUNTER
Patient scheduled to be seen 6/13/24. Advised that if symptoms worsen patient to go to ED. Patient is agreeable.

## 2024-06-13 ENCOUNTER — OFFICE VISIT (OUTPATIENT)
Dept: INTERNAL MEDICINE CLINIC | Facility: CLINIC | Age: 39
End: 2024-06-13
Payer: COMMERCIAL

## 2024-06-13 VITALS
SYSTOLIC BLOOD PRESSURE: 110 MMHG | HEIGHT: 64 IN | BODY MASS INDEX: 26.98 KG/M2 | WEIGHT: 158 LBS | DIASTOLIC BLOOD PRESSURE: 70 MMHG | OXYGEN SATURATION: 99 % | HEART RATE: 97 BPM | TEMPERATURE: 98 F | RESPIRATION RATE: 18 BRPM

## 2024-06-13 DIAGNOSIS — K76.9 LIVER LESION: ICD-10-CM

## 2024-06-13 DIAGNOSIS — G43.009 MIGRAINE WITHOUT AURA AND WITHOUT STATUS MIGRAINOSUS, NOT INTRACTABLE: ICD-10-CM

## 2024-06-13 DIAGNOSIS — D18.03 HEPATIC HEMANGIOMA: ICD-10-CM

## 2024-06-13 DIAGNOSIS — R73.9 HYPERGLYCEMIA: ICD-10-CM

## 2024-06-13 DIAGNOSIS — R10.13 EPIGASTRIC PAIN: Primary | ICD-10-CM

## 2024-06-13 DIAGNOSIS — E78.2 MIXED HYPERLIPIDEMIA: ICD-10-CM

## 2024-06-13 PROCEDURE — 99214 OFFICE O/P EST MOD 30 MIN: CPT | Performed by: INTERNAL MEDICINE

## 2024-06-13 RX ORDER — PANTOPRAZOLE SODIUM 40 MG/1
40 TABLET, DELAYED RELEASE ORAL DAILY
Qty: 90 TABLET | Refills: 0 | Status: SHIPPED | OUTPATIENT
Start: 2024-06-13

## 2024-06-13 NOTE — PATIENT INSTRUCTIONS
Patient was advised to continue present medications. discussed with the patient medications and laboratory data in detail.  Follow-up with me in 6 weeks  or as advised.  If any blood test was ordered please do 1 week prior to next appointment unless advise to get earlier.  If you have any questions please call the office [phone]

## 2024-06-13 NOTE — PROGRESS NOTES
Assessment/Plan:    1. Epigastric pain  -     Ambulatory Referral to Gastroenterology; Future  -     Comprehensive metabolic panel; Future  -     CBC and differential; Future  -     Amylase; Future  -     Lipase; Future  -     pantoprazole (PROTONIX) 40 mg tablet; Take 1 tablet (40 mg total) by mouth daily  2. Hepatic hemangioma  -     Ambulatory Referral to Gastroenterology; Future  -     Comprehensive metabolic panel; Future  3. Liver lesion  -     Ambulatory Referral to Gastroenterology; Future  -     Comprehensive metabolic panel; Future  4. Migraine without aura and without status migrainosus, not intractable  -     CBC and differential; Future  5. BMI 27.0-27.9,adult  6. Hyperglycemia  -     Comprehensive metabolic panel; Future  -     Hemoglobin A1C; Future  -     UA (URINE) with reflex to Scope; Future  7. Mixed hyperlipidemia  -     Lipid panel; Future     Discussion/summary/plan:    She developed epigastric pain for last 4 days.  Has some nausea.  Had 3 loose BM yesterday.  Denies fever or chills.  Denies sore throat or cough or URI symptoms.  She has been using ibuprofen 800 mg tablet little more than usual due to her headache.  Rule out acid peptic disorder versus biliary tree disorder versus viral infection.  Will order metabolic workup.  Will start pantoprazole.  Discussed with the patient to avoid ibuprofen if possible for now and try acetaminophen if it works for her.  Also advised to avoid caffeinated beverages alcoholic beverages, spicy food, citrus foods and fluids.  She also has a liver lesion and hepatic hemangioma she is scheduled for MRI of the abdomen June 29, 2024.  Will refer to GI for further evaluation and recommendation.  She has been seen and followed by neurologist for migraine.  Hyperglycemia resolved on diet last hemoglobin A1c normal.  Will follow blood sugar hemoglobin A1c.  Last cholesterol 218, triglyceride 63, HDL 53, .  Advised for low-cholesterol low carbs diet.  Will  follow lipid.  Advise accordingly.  Advised to exercise and lose weight.    Subjective: Patient presents with complaint epigastric pain and follow-up her medical problems.      Patient ID: Layne Mcclellan is a 39 y.o. female.    Abdominal Pain  Associated symptoms include headaches. Pertinent negatives include no arthralgias, constipation, diarrhea, dysuria, fever, hematuria, myalgias, nausea or vomiting.     39-year-old white female patient developed epigastric pain about 4 days ago.  She has some nausea without vomiting.  She had 3 loose BMs yesterday.  Denies any fever or chills.  Denies any sore throat cough.  Denies chest pain or shortness of breath.  Has some stress and also has been using more ibuprofen lately than usual for her migraine headache.    The following portions of the patient's history were reviewed and updated as appropriate:     Past Medical History:  She has a past medical history of Abnormal Pap smear of cervix, Acute non-recurrent sinusitis (10/25/2021), Arthritis, BMI 27.0-27.9,adult (2022), BRCA1 negative, BRCA2 negative, Constipation, chronic, Cough (2021), COVID-19 virus infection (2021), Depression (2022), Diabetes mellitus (HCC), Epigastric pain (2024), Fatigue (2021), Gestational diabetes, Gestational hypertension affecting first pregnancy, Hearing loss of right ear (2024), Hyperglycemia (2024), Irritable bowel syndrome, Liver lesion (2024), Migraine, Myalgia (2022), Neck pain (2022), Rhinitis (10/25/2021), Sinus congestion (2021), Sore throat (2021), Tinnitus of right ear (2024), Varicella, and Weight loss.,  _______________________________________________________________________  Past Surgical History:   has a past surgical history that includes [place] tooth extraction; pr colonoscopy flx dx w/collj spec when pfrmd (N/A, 2016); Colposcopy; pr  delivery only (Bilateral, 10/6/2017);   section (10/06/2017); and pr  delivery only (N/A, 3/22/2019).,  _______________________________________________________________________  Family History:  family history includes Arthritis in her maternal grandmother and mother; Breast cancer (age of onset: 40) in her paternal grandmother; Breast cancer (age of onset: 80) in her maternal grandmother; Diabetes in her maternal grandfather; Heart disease in her maternal grandmother and paternal grandfather; Irritable bowel syndrome in her mother; No Known Problems in her daughter, father, maternal aunt, paternal uncle, sister, sister, and son.,  _______________________________________________________________________  Social History:   reports that she has never smoked. She has never used smokeless tobacco. She reports that she does not currently use alcohol. She reports that she does not use drugs.,  _______________________________________________________________________  Allergies:  is allergic to amoxicillin and penicillins..  _______________________________________________________________________  Current Outpatient Medications   Medication Sig Dispense Refill   • Cholecalciferol (Vitamin D3) 250 MCG (07601 UT) TABS Take 10,000 Units by mouth in the morning     • cyclobenzaprine (FLEXERIL) 5 mg tablet Take 1 tablet (5 mg total) by mouth daily at bedtime as needed for muscle spasms 15 tablet 1   • ibuprofen (MOTRIN) 800 mg tablet Take by mouth 2 (two) times a day As needed     • pantoprazole (PROTONIX) 40 mg tablet Take 1 tablet (40 mg total) by mouth daily 90 tablet 0   • SUMAtriptan (IMITREX) 100 mg tablet 1 tab as needed for migraine, may repeat in 2 hours, max 200mg per 24 hours 12 tablet 5   • topiramate (TOPAMAX) 100 mg tablet Take 1 tablet (100 mg total) by mouth daily 90 tablet 1   • topiramate (Topamax) 25 mg tablet Take one tablet at bedtime for 1 month. If needed increase to 2 tablets at bedtime. In addition to 100mg tablet. 60 tablet 5   •  "magnesium (MAGTAB) 84 MG (7MEQ) TBCR Take 84 mg by mouth daily       No current facility-administered medications for this visit.     _______________________________________________________________________  Review of Systems   Constitutional:  Negative for chills and fever.   HENT:  Negative for congestion, ear pain, hearing loss, nosebleeds, sinus pain, sore throat and trouble swallowing.    Eyes:  Negative for discharge, redness and visual disturbance.   Respiratory:  Negative for cough, chest tightness and shortness of breath.    Cardiovascular:  Negative for chest pain and palpitations.   Gastrointestinal:  Positive for abdominal pain (Epigastric pain). Negative for blood in stool, constipation, diarrhea, nausea and vomiting.   Genitourinary:  Negative for dysuria, flank pain and hematuria.   Musculoskeletal:  Negative for arthralgias, myalgias and neck pain.   Skin:  Negative for color change and rash.   Neurological:  Positive for headaches. Negative for dizziness, speech difficulty and weakness.   Hematological:  Does not bruise/bleed easily.   Psychiatric/Behavioral:  Negative for agitation and behavioral problems.          Objective:  Vitals:    06/13/24 1341   BP: 110/70   BP Location: Left arm   Patient Position: Sitting   Cuff Size: Standard   Pulse: 97   Resp: 18   Temp: 98 °F (36.7 °C)   TempSrc: Temporal   SpO2: 99%   Weight: 71.7 kg (158 lb)   Height: 5' 3.62\" (1.616 m)     Body mass index is 27.44 kg/m².     Physical Exam  Vitals and nursing note reviewed.   Constitutional:       General: She is not in acute distress.     Appearance: Normal appearance.   HENT:      Head: Normocephalic and atraumatic.      Right Ear: Ear canal and external ear normal.      Left Ear: Ear canal and external ear normal.      Nose: Nose normal.      Mouth/Throat:      Mouth: Mucous membranes are moist.   Eyes:      General: No scleral icterus.        Right eye: No discharge.         Left eye: No discharge.      " Extraocular Movements: Extraocular movements intact.      Conjunctiva/sclera: Conjunctivae normal.   Cardiovascular:      Rate and Rhythm: Normal rate and regular rhythm.      Pulses: Normal pulses.      Heart sounds: Normal heart sounds. No murmur heard.  Pulmonary:      Effort: Pulmonary effort is normal. No respiratory distress.      Breath sounds: Normal breath sounds. No wheezing, rhonchi or rales.   Abdominal:      General: Bowel sounds are normal. There is no distension.      Palpations: Abdomen is soft.      Tenderness: There is abdominal tenderness (Has upper abdominal tenderness more in epigastric area without any rebound tenderness or guarding).   Musculoskeletal:         General: Normal range of motion.      Cervical back: Normal range of motion and neck supple. No muscular tenderness.      Right lower leg: No edema.      Left lower leg: No edema.   Skin:     General: Skin is warm.      Findings: No rash.   Neurological:      General: No focal deficit present.      Mental Status: She is alert and oriented to person, place, and time.      Motor: No weakness.      Coordination: Coordination normal.   Psychiatric:         Mood and Affect: Mood normal.         Behavior: Behavior normal.

## 2024-06-15 ENCOUNTER — APPOINTMENT (OUTPATIENT)
Dept: LAB | Facility: CLINIC | Age: 39
End: 2024-06-15
Payer: COMMERCIAL

## 2024-06-15 DIAGNOSIS — R73.9 HYPERGLYCEMIA: ICD-10-CM

## 2024-06-15 DIAGNOSIS — D18.03 HEPATIC HEMANGIOMA: ICD-10-CM

## 2024-06-15 DIAGNOSIS — E78.2 MIXED HYPERLIPIDEMIA: ICD-10-CM

## 2024-06-15 DIAGNOSIS — K76.9 LIVER LESION: ICD-10-CM

## 2024-06-15 DIAGNOSIS — G43.009 MIGRAINE WITHOUT AURA AND WITHOUT STATUS MIGRAINOSUS, NOT INTRACTABLE: ICD-10-CM

## 2024-06-15 DIAGNOSIS — R10.13 EPIGASTRIC PAIN: ICD-10-CM

## 2024-06-15 LAB
ALBUMIN SERPL BCP-MCNC: 4.3 G/DL (ref 3.5–5)
ALP SERPL-CCNC: 61 U/L (ref 34–104)
ALT SERPL W P-5'-P-CCNC: 19 U/L (ref 7–52)
AMYLASE SERPL-CCNC: 33 IU/L (ref 29–103)
ANION GAP SERPL CALCULATED.3IONS-SCNC: 7 MMOL/L (ref 4–13)
AST SERPL W P-5'-P-CCNC: 21 U/L (ref 13–39)
BASOPHILS # BLD AUTO: 0.04 THOUSANDS/ÂΜL (ref 0–0.1)
BASOPHILS NFR BLD AUTO: 1 % (ref 0–1)
BILIRUB SERPL-MCNC: 0.45 MG/DL (ref 0.2–1)
BILIRUB UR QL STRIP: NEGATIVE
BUN SERPL-MCNC: 14 MG/DL (ref 5–25)
CALCIUM SERPL-MCNC: 9.3 MG/DL (ref 8.4–10.2)
CHLORIDE SERPL-SCNC: 107 MMOL/L (ref 96–108)
CHOLEST SERPL-MCNC: 232 MG/DL
CLARITY UR: CLEAR
CO2 SERPL-SCNC: 22 MMOL/L (ref 21–32)
COLOR UR: NORMAL
CREAT SERPL-MCNC: 0.79 MG/DL (ref 0.6–1.3)
EOSINOPHIL # BLD AUTO: 0.11 THOUSAND/ÂΜL (ref 0–0.61)
EOSINOPHIL NFR BLD AUTO: 2 % (ref 0–6)
ERYTHROCYTE [DISTWIDTH] IN BLOOD BY AUTOMATED COUNT: 12.3 % (ref 11.6–15.1)
EST. AVERAGE GLUCOSE BLD GHB EST-MCNC: 111 MG/DL
GFR SERPL CREATININE-BSD FRML MDRD: 95 ML/MIN/1.73SQ M
GLUCOSE P FAST SERPL-MCNC: 99 MG/DL (ref 65–99)
GLUCOSE UR STRIP-MCNC: NEGATIVE MG/DL
HBA1C MFR BLD: 5.5 %
HCT VFR BLD AUTO: 44.4 % (ref 34.8–46.1)
HDLC SERPL-MCNC: 53 MG/DL
HGB BLD-MCNC: 15.1 G/DL (ref 11.5–15.4)
HGB UR QL STRIP.AUTO: NEGATIVE
IMM GRANULOCYTES # BLD AUTO: 0.02 THOUSAND/UL (ref 0–0.2)
IMM GRANULOCYTES NFR BLD AUTO: 0 % (ref 0–2)
KETONES UR STRIP-MCNC: NEGATIVE MG/DL
LDLC SERPL CALC-MCNC: 155 MG/DL (ref 0–100)
LEUKOCYTE ESTERASE UR QL STRIP: NEGATIVE
LIPASE SERPL-CCNC: 7 U/L (ref 11–82)
LYMPHOCYTES # BLD AUTO: 2.7 THOUSANDS/ÂΜL (ref 0.6–4.47)
LYMPHOCYTES NFR BLD AUTO: 43 % (ref 14–44)
MCH RBC QN AUTO: 28.9 PG (ref 26.8–34.3)
MCHC RBC AUTO-ENTMCNC: 34 G/DL (ref 31.4–37.4)
MCV RBC AUTO: 85 FL (ref 82–98)
MONOCYTES # BLD AUTO: 0.36 THOUSAND/ÂΜL (ref 0.17–1.22)
MONOCYTES NFR BLD AUTO: 6 % (ref 4–12)
NEUTROPHILS # BLD AUTO: 3.02 THOUSANDS/ÂΜL (ref 1.85–7.62)
NEUTS SEG NFR BLD AUTO: 48 % (ref 43–75)
NITRITE UR QL STRIP: NEGATIVE
NONHDLC SERPL-MCNC: 179 MG/DL
NRBC BLD AUTO-RTO: 0 /100 WBCS
PH UR STRIP.AUTO: 5.5 [PH]
PLATELET # BLD AUTO: 223 THOUSANDS/UL (ref 149–390)
PMV BLD AUTO: 10.2 FL (ref 8.9–12.7)
POTASSIUM SERPL-SCNC: 3.8 MMOL/L (ref 3.5–5.3)
PROT SERPL-MCNC: 7.2 G/DL (ref 6.4–8.4)
PROT UR STRIP-MCNC: NEGATIVE MG/DL
RBC # BLD AUTO: 5.22 MILLION/UL (ref 3.81–5.12)
SODIUM SERPL-SCNC: 136 MMOL/L (ref 135–147)
SP GR UR STRIP.AUTO: 1.02 (ref 1–1.03)
TRIGL SERPL-MCNC: 122 MG/DL
UROBILINOGEN UR STRIP-ACNC: <2 MG/DL
WBC # BLD AUTO: 6.25 THOUSAND/UL (ref 4.31–10.16)

## 2024-06-15 PROCEDURE — 36415 COLL VENOUS BLD VENIPUNCTURE: CPT

## 2024-06-15 PROCEDURE — 83036 HEMOGLOBIN GLYCOSYLATED A1C: CPT

## 2024-06-15 PROCEDURE — 85025 COMPLETE CBC W/AUTO DIFF WBC: CPT

## 2024-06-15 PROCEDURE — 80061 LIPID PANEL: CPT

## 2024-06-15 PROCEDURE — 83690 ASSAY OF LIPASE: CPT

## 2024-06-15 PROCEDURE — 82150 ASSAY OF AMYLASE: CPT

## 2024-06-15 PROCEDURE — 80053 COMPREHEN METABOLIC PANEL: CPT

## 2024-06-15 PROCEDURE — 81003 URINALYSIS AUTO W/O SCOPE: CPT

## 2024-06-17 ENCOUNTER — TELEPHONE (OUTPATIENT)
Age: 39
End: 2024-06-17

## 2024-06-17 ENCOUNTER — TELEPHONE (OUTPATIENT)
Dept: INTERNAL MEDICINE CLINIC | Facility: CLINIC | Age: 39
End: 2024-06-17

## 2024-06-17 NOTE — TELEPHONE ENCOUNTER
----- Message from Earl Toledo MD sent at 6/16/2024 11:42 AM EDT -----  Please call her on June 17, 2024 that her cholesterol increased from 2 18-2 32.  If she would like to start medication please ask her than I can prescribe medication for high cholesterol.  Advised to continue low-cholesterol low carbs diet.  Other blood tests are unremarkable.  To see me as scheduled next month.

## 2024-06-17 NOTE — TELEPHONE ENCOUNTER
Patient called and is aware of her results.  She will speak with Dr. Toledo next month at her appointment about starting a statin.

## 2024-06-17 NOTE — TELEPHONE ENCOUNTER
LVM- advised patient to call office to discuss. Patient can be transferred to the office to discuss.     My Chart message also sent.

## 2024-06-29 ENCOUNTER — HOSPITAL ENCOUNTER (OUTPATIENT)
Dept: MRI IMAGING | Facility: HOSPITAL | Age: 39
Discharge: HOME | End: 2024-06-29
Attending: INTERNAL MEDICINE
Payer: COMMERCIAL

## 2024-06-29 DIAGNOSIS — K76.9 LIVER LESION: ICD-10-CM

## 2024-06-29 PROCEDURE — A9585 GADOBUTROL INJECTION: HCPCS | Performed by: INTERNAL MEDICINE

## 2024-06-29 PROCEDURE — 74183 MRI ABD W/O CNTR FLWD CNTR: CPT

## 2024-06-29 RX ORDER — GADOBUTROL 604.72 MG/ML
7 INJECTION INTRAVENOUS
Status: COMPLETED | OUTPATIENT
Start: 2024-06-29 | End: 2024-06-29

## 2024-06-29 RX ADMIN — GADOBUTROL 7 ML: 604.72 INJECTION INTRAVENOUS at 12:20

## 2024-07-01 ENCOUNTER — TELEPHONE (OUTPATIENT)
Dept: HEMATOLOGY ONCOLOGY | Facility: CLINIC | Age: 39
End: 2024-07-01

## 2024-07-01 NOTE — TELEPHONE ENCOUNTER
Patient cancelled her appt. With Leah Milian for 8/21/24 via [facility] [employer]; however, when called back she said that she does not want to reschedule another f/u appt. At this time.

## 2024-07-05 ENCOUNTER — TELEPHONE (OUTPATIENT)
Age: 39
End: 2024-07-05

## 2024-07-05 NOTE — TELEPHONE ENCOUNTER
Patient called to follow up on results of MRI abdomen done on 6/29.  Please call patient to review results when available.  Thank you!

## 2024-07-07 ENCOUNTER — APPOINTMENT (EMERGENCY)
Dept: CT IMAGING | Facility: HOSPITAL | Age: 39
End: 2024-07-07
Payer: COMMERCIAL

## 2024-07-07 ENCOUNTER — HOSPITAL ENCOUNTER (EMERGENCY)
Facility: HOSPITAL | Age: 39
Discharge: HOME | End: 2024-07-07
Attending: EMERGENCY MEDICINE
Payer: COMMERCIAL

## 2024-07-07 VITALS
TEMPERATURE: 98.2 F | SYSTOLIC BLOOD PRESSURE: 113 MMHG | RESPIRATION RATE: 22 BRPM | DIASTOLIC BLOOD PRESSURE: 72 MMHG | OXYGEN SATURATION: 99 % | HEART RATE: 105 BPM

## 2024-07-07 DIAGNOSIS — K57.92 DIVERTICULITIS: ICD-10-CM

## 2024-07-07 DIAGNOSIS — K57.32 SIGMOID DIVERTICULITIS: Primary | ICD-10-CM

## 2024-07-07 LAB
ALBUMIN SERPL BCG-MCNC: 4.4 G/DL (ref 3.5–5)
ALP SERPL-CCNC: 70 U/L (ref 34–104)
ALT SERPL W P-5'-P-CCNC: 13 U/L (ref 7–52)
ANION GAP SERPL CALCULATED.3IONS-SCNC: 11 MMOL/L (ref 4–13)
AST SERPL W P-5'-P-CCNC: 16 U/L (ref 13–39)
BASOPHILS # BLD AUTO: 0.03 THOUSANDS/ÂΜL (ref 0–0.1)
BASOPHILS NFR BLD AUTO: 0 % (ref 0–1)
BILIRUB SERPL-MCNC: 0.83 MG/DL (ref 0.2–1)
BILIRUB UR QL STRIP: NEGATIVE
BUN SERPL-MCNC: 12 MG/DL (ref 5–25)
CALCIUM SERPL-MCNC: 9.4 MG/DL (ref 8.4–10.2)
CHLORIDE SERPL-SCNC: 105 MMOL/L (ref 96–108)
CLARITY UR: CLEAR
CO2 SERPL-SCNC: 19 MMOL/L (ref 21–32)
COLOR UR: COLORLESS
CREAT SERPL-MCNC: 0.64 MG/DL (ref 0.6–1.3)
EOSINOPHIL # BLD AUTO: 0.06 THOUSAND/ÂΜL (ref 0–0.61)
EOSINOPHIL NFR BLD AUTO: 1 % (ref 0–6)
ERYTHROCYTE [DISTWIDTH] IN BLOOD BY AUTOMATED COUNT: 12.1 % (ref 11.6–15.1)
EXT PREGNANCY TEST URINE: NEGATIVE
EXT. CONTROL: NORMAL
GFR SERPL CREATININE-BSD FRML MDRD: 113 ML/MIN/1.73SQ M
GLUCOSE SERPL-MCNC: 106 MG/DL (ref 65–140)
GLUCOSE UR STRIP-MCNC: NEGATIVE MG/DL
HCT VFR BLD AUTO: 43.6 % (ref 34.8–46.1)
HGB BLD-MCNC: 15 G/DL (ref 11.5–15.4)
HGB UR QL STRIP.AUTO: NEGATIVE
IMM GRANULOCYTES # BLD AUTO: 0.03 THOUSAND/UL (ref 0–0.2)
IMM GRANULOCYTES NFR BLD AUTO: 0 % (ref 0–2)
KETONES UR STRIP-MCNC: NEGATIVE MG/DL
LEUKOCYTE ESTERASE UR QL STRIP: NEGATIVE
LIPASE SERPL-CCNC: 6 U/L (ref 11–82)
LYMPHOCYTES # BLD AUTO: 2.08 THOUSANDS/ÂΜL (ref 0.6–4.47)
LYMPHOCYTES NFR BLD AUTO: 18 % (ref 14–44)
MCH RBC QN AUTO: 28.8 PG (ref 26.8–34.3)
MCHC RBC AUTO-ENTMCNC: 34.4 G/DL (ref 31.4–37.4)
MCV RBC AUTO: 84 FL (ref 82–98)
MONOCYTES # BLD AUTO: 0.8 THOUSAND/ÂΜL (ref 0.17–1.22)
MONOCYTES NFR BLD AUTO: 7 % (ref 4–12)
NEUTROPHILS # BLD AUTO: 8.33 THOUSANDS/ÂΜL (ref 1.85–7.62)
NEUTS SEG NFR BLD AUTO: 74 % (ref 43–75)
NITRITE UR QL STRIP: NEGATIVE
NRBC BLD AUTO-RTO: 0 /100 WBCS
PH UR STRIP.AUTO: 7 [PH]
PLATELET # BLD AUTO: 206 THOUSANDS/UL (ref 149–390)
PMV BLD AUTO: 10.2 FL (ref 8.9–12.7)
POTASSIUM SERPL-SCNC: 3.7 MMOL/L (ref 3.5–5.3)
PROT SERPL-MCNC: 7.4 G/DL (ref 6.4–8.4)
PROT UR STRIP-MCNC: NEGATIVE MG/DL
RBC # BLD AUTO: 5.2 MILLION/UL (ref 3.81–5.12)
SODIUM SERPL-SCNC: 135 MMOL/L (ref 135–147)
SP GR UR STRIP.AUTO: 1 (ref 1–1.03)
UROBILINOGEN UR STRIP-ACNC: <2 MG/DL
WBC # BLD AUTO: 11.33 THOUSAND/UL (ref 4.31–10.16)

## 2024-07-07 PROCEDURE — 81003 URINALYSIS AUTO W/O SCOPE: CPT | Performed by: EMERGENCY MEDICINE

## 2024-07-07 PROCEDURE — 80053 COMPREHEN METABOLIC PANEL: CPT | Performed by: EMERGENCY MEDICINE

## 2024-07-07 PROCEDURE — 99284 EMERGENCY DEPT VISIT MOD MDM: CPT

## 2024-07-07 PROCEDURE — 36415 COLL VENOUS BLD VENIPUNCTURE: CPT

## 2024-07-07 PROCEDURE — 81025 URINE PREGNANCY TEST: CPT

## 2024-07-07 PROCEDURE — 85025 COMPLETE CBC W/AUTO DIFF WBC: CPT | Performed by: EMERGENCY MEDICINE

## 2024-07-07 PROCEDURE — 96376 TX/PRO/DX INJ SAME DRUG ADON: CPT

## 2024-07-07 PROCEDURE — 83690 ASSAY OF LIPASE: CPT | Performed by: EMERGENCY MEDICINE

## 2024-07-07 PROCEDURE — 74177 CT ABD & PELVIS W/CONTRAST: CPT

## 2024-07-07 PROCEDURE — 99285 EMERGENCY DEPT VISIT HI MDM: CPT | Performed by: EMERGENCY MEDICINE

## 2024-07-07 PROCEDURE — 96375 TX/PRO/DX INJ NEW DRUG ADDON: CPT

## 2024-07-07 PROCEDURE — 96374 THER/PROPH/DIAG INJ IV PUSH: CPT

## 2024-07-07 RX ORDER — ONDANSETRON 2 MG/ML
4 INJECTION INTRAMUSCULAR; INTRAVENOUS ONCE
Status: COMPLETED | OUTPATIENT
Start: 2024-07-07 | End: 2024-07-07

## 2024-07-07 RX ORDER — CIPROFLOXACIN 500 MG/1
500 TABLET, FILM COATED ORAL ONCE
Status: COMPLETED | OUTPATIENT
Start: 2024-07-07 | End: 2024-07-07

## 2024-07-07 RX ORDER — METRONIDAZOLE 500 MG/1
500 TABLET ORAL EVERY 12 HOURS SCHEDULED
Qty: 13 TABLET | Refills: 0 | Status: SHIPPED | OUTPATIENT
Start: 2024-07-08 | End: 2024-07-12 | Stop reason: SDUPTHER

## 2024-07-07 RX ORDER — CIPROFLOXACIN 500 MG/1
500 TABLET, FILM COATED ORAL EVERY 12 HOURS SCHEDULED
Qty: 13 TABLET | Refills: 0 | Status: SHIPPED | OUTPATIENT
Start: 2024-07-08 | End: 2024-07-12 | Stop reason: SDUPTHER

## 2024-07-07 RX ORDER — METRONIDAZOLE 500 MG/1
500 TABLET ORAL ONCE
Status: COMPLETED | OUTPATIENT
Start: 2024-07-07 | End: 2024-07-07

## 2024-07-07 RX ADMIN — MORPHINE SULFATE 2 MG: 2 INJECTION, SOLUTION INTRAMUSCULAR; INTRAVENOUS at 18:18

## 2024-07-07 RX ADMIN — IOHEXOL 70 ML: 350 INJECTION, SOLUTION INTRAVENOUS at 15:25

## 2024-07-07 RX ADMIN — MORPHINE SULFATE 2 MG: 2 INJECTION, SOLUTION INTRAMUSCULAR; INTRAVENOUS at 14:57

## 2024-07-07 RX ADMIN — ONDANSETRON 4 MG: 2 INJECTION INTRAMUSCULAR; INTRAVENOUS at 13:16

## 2024-07-07 RX ADMIN — CIPROFLOXACIN 500 MG: 500 TABLET ORAL at 18:49

## 2024-07-07 RX ADMIN — METRONIDAZOLE 500 MG: 500 TABLET ORAL at 18:49

## 2024-07-07 NOTE — Clinical Note
Layne Mcclellan was seen and treated in our emergency department on 7/7/2024.                Diagnosis:     Layne  may return to work on return date.    She may return on this date: 07/11/2024         If you have any questions or concerns, please don't hesitate to call.      Maida Carr MD    ______________________________           _______________          _______________  Hospital Representative                              Date                                Time

## 2024-07-07 NOTE — DISCHARGE INSTRUCTIONS
Today you were seen for abd pain. With imaging you were found to have sigmoid diverticulitis. We prescribed you 10 days of antibiotics for the diverticulitis. Please complete the full course. Do not take Magnesium supplements while taking antibiotics. You can take Tylenol or Motrin for pain control.    Please return to the emergency room if you develop a fever, have uncontrolled pain with Tylenol/Motrin, are unable to tolerate food or are unable to take your antibiotics.     Please follow-up with your primary care in the next 3-5 days.

## 2024-07-07 NOTE — ED ATTENDING ATTESTATION
2024  IMolly MD, saw and evaluated the patient. I have discussed the patient with the resident/non-physician practitioner and agree with the resident's/non-physician practitioner's findings, Plan of Care, and MDM as documented in the resident's/non-physician practitioner's note, except where noted. All available labs and Radiology studies were reviewed.  I was present for key portions of any procedure(s) performed by the resident/non-physician practitioner and I was immediately available to provide assistance.       At this point I agree with the current assessment done in the Emergency Department.  I have conducted an independent evaluation of this patient a history and physical is as follows:    ED Course  ED Course as of 24 1907   Sun 2024   1420 39-year-old woman presenting to the emergency department with acute onset of lower abdominal pain and abdominal distention.  Past medical history significant for hepatic  lesions, currently being worked up.  Reports pain feels similar to previous diverticulitis.    Vital signs within normal limits.  Her physical exam is remarkable for a notably distended abdomen, no fluid wave noted, globally tender with guarding.    Clinical presentation puts patient at risk for the following differential diagnoses:    Bowel obstruction, ascites, diverticulitis, pancreatitis, GERD, peptic ulcer disease, gastritis.    Lab work and imaging were ordered.    Morphine and Zofran were given for nausea and pain.   1424 WBC(!): 11.33   1424 Carbon Dioxide(!): 19   1424 ANION GAP: 11   1526  from slight leukocytosis and acidemia, CBC, CMP, lipase otherwise grossly unremarkable.  Pending CT of abdomen and pelvis at this time.   174 CT abdomen pelvis with contrast  Acute sigmoid diverticulitis. No loculated fluid collections.   175 On reassessment, patient continues to have pain.  Additional 2 mg of IV morphine given.  Will p.o. challenge.   185 Gerald  ordered for patient.   1858 P.o. challenge passed.    Upon re-assessment, patient feels improved.  Patient remained hemodynamically and clinically stable in the ED.  Strict return precautions given.  Patient verbalized understanding and will follow up as outpatient with PMD.  Upon discharge, patient was AO4, GCS15, and fully ambulatory.             Critical Care Time  Procedures

## 2024-07-07 NOTE — ED PROVIDER NOTES
History  Chief Complaint   Patient presents with    Abdominal Pain     Abdominal pain (lower and epigastric) starting last night. Nausea and dizzy     Layne Hoover is a 39 year old female PMH of liver hemangioma and diverticulitis who is presenting to the ED with severe diffuse lower abd pain and abd distension. Lower abd pain started last night. Pain worsened to 10/10 pain this morning. Pain is equal across lower abd, one side is not worse than another. Pain does not radiate to her sides or back. Moving worsens the pain and sitting still makes it feel better. She states this feels similar to the diverticulitis she has had in the past.    Very nauseous this morning though no emesis. Denies urinary symptoms and had a normal bowel movement last night w/o blood in stool. Denies fever or recent illness. Patient able to ambulate.     Of note, she was seen on  by  with IM for a 4 day hx of epigastric pain. She was started on pantoprazole and has been having intermittent epigastric pain since then. MRI abd was completed on  for previous liver hemangioma and liver lesion.     PSH:  2x        Abdominal Pain  Associated symptoms: nausea    Associated symptoms: no chest pain, no chills, no constipation, no cough, no diarrhea, no dysuria, no fatigue, no fever, no hematuria, no shortness of breath, no sore throat and no vomiting        Prior to Admission Medications   Prescriptions Last Dose Informant Patient Reported? Taking?   Cholecalciferol (Vitamin D3) 250 MCG (10888 UT) TABS  Self Yes No   Sig: Take 10,000 Units by mouth in the morning   SUMAtriptan (IMITREX) 100 mg tablet   No No   Si tab as needed for migraine, may repeat in 2 hours, max 200mg per 24 hours   cyclobenzaprine (FLEXERIL) 5 mg tablet   No No   Sig: Take 1 tablet (5 mg total) by mouth daily at bedtime as needed for muscle spasms   ibuprofen (MOTRIN) 800 mg tablet  Self Yes No   Sig: Take by mouth 2 (two) times a day As  needed   magnesium (MAGTAB) 84 MG (7MEQ) TBCR  Self Yes No   Sig: Take 84 mg by mouth daily   pantoprazole (PROTONIX) 40 mg tablet   No No   Sig: Take 1 tablet (40 mg total) by mouth daily   topiramate (TOPAMAX) 100 mg tablet   No No   Sig: Take 1 tablet (100 mg total) by mouth daily   topiramate (Topamax) 25 mg tablet   No No   Sig: Take one tablet at bedtime for 1 month. If needed increase to 2 tablets at bedtime. In addition to 100mg tablet.      Facility-Administered Medications: None       Past Medical History:   Diagnosis Date    Abnormal Pap smear of cervix     Acute non-recurrent sinusitis 10/25/2021    Arthritis     BMI 27.0-27.9,adult 2022    BRCA1 negative     BRCA2 negative     Constipation, chronic     Cough 2021    COVID-19 virus infection 2021    Depression 2022    Diabetes mellitus (HCC)     Epigastric pain 2024    Fatigue 2021    Gestational diabetes     Gestational hypertension affecting first pregnancy     Hearing loss of right ear 2024    Hyperglycemia 2024    Irritable bowel syndrome     Liver lesion 2024    Migraine     Myalgia 2022    Neck pain 2022    Rhinitis 10/25/2021    Sinus congestion 2021    Sore throat 2021    Tinnitus of right ear 2024    Varicella     Weight loss        Past Surgical History:   Procedure Laterality Date     SECTION  10/06/2017    COLPOSCOPY      VT  DELIVERY ONLY Bilateral 10/6/2017    Procedure:  SECTION ();  Surgeon: Patricia Rodriguez MD;  Location: [facility];  Service: Obstetrics    VT  DELIVERY ONLY N/A 3/22/2019    Procedure:  SECTION () REPEAT;  Surgeon: Patricia Rodriguez MD;  Location: [facility];  Service: Obstetrics    VT COLONOSCOPY FLX DX W/COLLJ SPEC WHEN PFRMD N/A 2016    Procedure: COLONOSCOPY;  Surgeon: July Barton MD;  [facility];  Service: Gastroenterology    WISDOM TOOTH EXTRACTION         Family History    Problem Relation Age of Onset    Arthritis Mother     Irritable bowel syndrome Mother     Breast cancer Maternal Grandmother 80    Heart disease Maternal Grandmother     Arthritis Maternal Grandmother     Diabetes Maternal Grandfather     Breast cancer Paternal Grandmother 40    Heart disease Paternal Grandfather     No Known Problems Sister     No Known Problems Father     No Known Problems Daughter     No Known Problems Son     No Known Problems Sister     No Known Problems Maternal Aunt     No Known Problems Paternal Uncle      I have reviewed and agree with the history as documented.    E-Cigarette/Vaping    E-Cigarette Use Never User      E-Cigarette/Vaping Substances    Nicotine No     THC No     CBD No     Flavoring No     Other No     Unknown No      Social History     Tobacco Use    Smoking status: Never    Smokeless tobacco: Never   Vaping Use    Vaping status: Never Used   Substance Use Topics    Alcohol use: Not Currently    Drug use: No        Review of Systems   Constitutional:  Negative for chills, diaphoresis, fatigue and fever.   HENT:  Negative for congestion, rhinorrhea and sore throat.    Eyes:  Negative for visual disturbance.   Respiratory:  Negative for cough, chest tightness and shortness of breath.    Cardiovascular:  Negative for chest pain and leg swelling.   Gastrointestinal:  Positive for abdominal distention, abdominal pain and nausea. Negative for blood in stool, constipation, diarrhea and vomiting.   Genitourinary:  Negative for dysuria and hematuria.   Musculoskeletal:  Negative for arthralgias and myalgias.   Neurological:  Negative for dizziness, weakness and numbness.       Physical Exam  ED Triage Vitals [07/07/24 1310]   Temperature Pulse Respirations Blood Pressure SpO2   98.2 °F (36.8 °C) 92 22 122/82 100 %      Temp Source Heart Rate Source Patient Position - Orthostatic VS BP Location FiO2 (%)   Oral Monitor -- Left arm --      Pain Score       10 - Worst Possible Pain              Orthostatic Vital Signs  Vitals:    07/07/24 1310   BP: 122/82   Pulse: 92       Physical Exam  Constitutional:       Appearance: She is not toxic-appearing.      Comments: Uncomfortable appearing in stretcher   HENT:      Mouth/Throat:      Mouth: Mucous membranes are moist.      Pharynx: Oropharynx is clear. No pharyngeal swelling.   Eyes:      Extraocular Movements: Extraocular movements intact.      Pupils: Pupils are equal, round, and reactive to light.   Cardiovascular:      Rate and Rhythm: Regular rhythm. Tachycardia present.      Heart sounds: Normal heart sounds.   Pulmonary:      Effort: Pulmonary effort is normal. No respiratory distress.      Breath sounds: No wheezing.   Abdominal:      General: Bowel sounds are normal. There is distension. There are no signs of injury.      Palpations: Abdomen is soft.      Tenderness: There is generalized abdominal tenderness. There is guarding. There is no rebound.   Skin:     General: Skin is warm and dry.      Capillary Refill: Capillary refill takes less than 2 seconds.   Neurological:      Mental Status: She is alert.      Sensory: No sensory deficit.      Motor: No weakness.         ED Medications  Medications   ondansetron (ZOFRAN) injection 4 mg (4 mg Intravenous Given 7/7/24 1316)       Diagnostic Studies  Results Reviewed       Procedure Component Value Units Date/Time    Comprehensive metabolic panel [[id number]]  (Abnormal) Collected: 07/07/24 1316    Lab Status: Final result Specimen: Blood from Arm, Right Updated: 07/07/24 1342     Sodium 135 mmol/L      Potassium 3.7 mmol/L      Chloride 105 mmol/L      CO2 19 mmol/L      ANION GAP 11 mmol/L      BUN 12 mg/dL      Creatinine 0.64 mg/dL      Glucose 106 mg/dL      Calcium 9.4 mg/dL      AST 16 U/L      ALT 13 U/L      Alkaline Phosphatase 70 U/L      Total Protein 7.4 g/dL      Albumin 4.4 g/dL      Total Bilirubin 0.83 mg/dL      eGFR 113 ml/min/1.73sq m     Narrative:      National Kidney  Disease Foundation guidelines for Chronic Kidney Disease (CKD):     Stage 1 with normal or high GFR (GFR > 90 mL/min/1.73 square meters)    Stage 2 Mild CKD (GFR = 60-89 mL/min/1.73 square meters)    Stage 3A Moderate CKD (GFR = 45-59 mL/min/1.73 square meters)    Stage 3B Moderate CKD (GFR = 30-44 mL/min/1.73 square meters)    Stage 4 Severe CKD (GFR = 15-29 mL/min/1.73 square meters)    Stage 5 End Stage CKD (GFR <15 mL/min/1.73 square meters)  Note: GFR calculation is accurate only with a steady state creatinine    Lipase [[id number]]  (Abnormal) Collected: 07/07/24 1316    Lab Status: Final result Specimen: Blood from Arm, Right Updated: 07/07/24 1342     Lipase 6 u/L     CBC and differential [[id number]]  (Abnormal) Collected: 07/07/24 1316    Lab Status: Final result Specimen: Blood from Arm, Right Updated: 07/07/24 1333     WBC 11.33 Thousand/uL      RBC 5.20 Million/uL      Hemoglobin 15.0 g/dL      Hematocrit 43.6 %      MCV 84 fL      MCH 28.8 pg      MCHC 34.4 g/dL      RDW 12.1 %      MPV 10.2 fL      Platelets 206 Thousands/uL      nRBC 0 /100 WBCs      Segmented % 74 %      Immature Grans % 0 %      Lymphocytes % 18 %      Monocytes % 7 %      Eosinophils Relative 1 %      Basophils Relative 0 %      Absolute Neutrophils 8.33 Thousands/µL      Absolute Immature Grans 0.03 Thousand/uL      Absolute Lymphocytes 2.08 Thousands/µL      Absolute Monocytes 0.80 Thousand/µL      Eosinophils Absolute 0.06 Thousand/µL      Basophils Absolute 0.03 Thousands/µL     UA (URINE) with reflex to Scope [[id number]]     Lab Status: No result Specimen: Urine                    No orders to display         Procedures  Procedures      ED Course                             SBIRT 22yo+      Flowsheet Row Most Recent Value   Initial Alcohol Screen: US AUDIT-C     1. How often do you have a drink containing alcohol? 0 Filed at: 07/07/2024 1311   2. How many drinks containing alcohol do you have on a typical day you are drinking?   0 Filed at: 07/07/2024 1311   3a. Male UNDER 65: How often do you have five or more drinks on one occasion? 0 Filed at: 07/07/2024 1311   3b. FEMALE Any Age, or MALE 65+: How often do you have 4 or more drinks on one occassion? 0 Filed at: 07/07/2024 1311   Audit-C Score 0 Filed at: 07/07/2024 1311   [place]: How many times in the past year have you...    Used an illegal drug or used a prescription medication for non-medical reasons? Never Filed at: 07/07/2024 1311                  Medical Decision Making  Differential included SBO, diverticulitis, and pancreatitis. Patient very tender on exam. SBO possible with hx of abd surgeries (c-sections). However, patient has had normal bowel movements making SBO less likely. Will check for diverticulitis considering hx of previous episode and patient stating it feels similar to the last episode.     Labs: CBC, CMP, lipase, UA, urine pregnancy   Imaging: CT abd/pelvis with contrast  Supportive: Zofran and 2mg morphine    Labs unremarkable except for a mildly elevated WBC of 11.3.  CT abd/pelvis showed sigmoid diverticulitis. Started on PO antibiotics of cipro and flagyl while in ED.    Patient's nausea and pain improved with zofran and morphine. Patient was able to hold down cookies, crackers, pretzels and antibiotics while in the ED. Patient felt she could keep pain controlled with Tylenol at home.     Dispo: discharge to home  Discussed results and dispo with patient. Patient agreeable with plan. Instructed to return to ED if she develops a fever, if pain is uncontrolled, if she is unable to eat or keep down her antibiotics. Sent 10 day course of cipro and flagyl to pharmacy. Told to see her PCP in the next 3-5 days for follow-up.     Amount and/or Complexity of Data Reviewed  External Data Reviewed: notes.     Details: Reviewed recent visit with  from  on 6/13/2024.   Labs: ordered.  Radiology: ordered.     Details: CT abdomen/pelvis  w/contrast    Risk  Prescription drug management.          Disposition  Final diagnoses:   None     ED Disposition       None          Follow-up Information    None         Patient's Medications   Discharge Prescriptions    No medications on file     No discharge procedures on file.    PDMP Review       None             ED Provider  Attending physically available and evaluated Layne RENNER Aydenrobbin. I managed the patient along with the ED Attending.    Electronically Signed by           aMida Carr MD  07/07/24 8042

## 2024-07-08 ENCOUNTER — TELEPHONE (OUTPATIENT)
Dept: INTERNAL MEDICINE CLINIC | Facility: CLINIC | Age: 39
End: 2024-07-08

## 2024-07-08 NOTE — TELEPHONE ENCOUNTER
Patient advised. She was seen in the ED yesterday and diagnosed with diverticulitis. She would like to know if she should continue taking pantoprazole? Patient states that she has not been able to follow up with GI yet.

## 2024-07-08 NOTE — TELEPHONE ENCOUNTER
----- Message from Earl Toledo MD sent at 7/7/2024  7:54 PM EDT -----  Please call her on Monday, July 9, 2024 that MRI of liver lesion is nothing to concern about.  I will discuss with her in detail at her next office visit on 7/25/2024.

## 2024-07-09 ENCOUNTER — TELEPHONE (OUTPATIENT)
Age: 39
End: 2024-07-09

## 2024-07-09 ENCOUNTER — TELEPHONE (OUTPATIENT)
Dept: INTERNAL MEDICINE CLINIC | Facility: CLINIC | Age: 39
End: 2024-07-09

## 2024-07-09 DIAGNOSIS — R11.0 NAUSEA: ICD-10-CM

## 2024-07-09 DIAGNOSIS — R11.0 NAUSEA: Primary | ICD-10-CM

## 2024-07-09 RX ORDER — ONDANSETRON 4 MG/1
4 TABLET, FILM COATED ORAL EVERY 8 HOURS PRN
Qty: 20 TABLET | Refills: 0 | Status: SHIPPED | OUTPATIENT
Start: 2024-07-09 | End: 2024-07-09 | Stop reason: SDUPTHER

## 2024-07-09 RX ORDER — ONDANSETRON 4 MG/1
4 TABLET, FILM COATED ORAL EVERY 8 HOURS PRN
Qty: 20 TABLET | Refills: 0 | Status: SHIPPED | OUTPATIENT
Start: 2024-07-09

## 2024-07-09 NOTE — TELEPHONE ENCOUNTER
Patient called stating that she has intense nausea. She was given IV zofran in the ED 7/7/24 which helped but it has since worn off and she was not prescribed any nausea medication. She would like to know if something could be sent to the pharmacy for her?     Her last bowel movement was Saturday but she has only been able to eat a few crackers and a piece of toast since then. She attempted to eat dinner last night but it caused her to vomit. Abdominal pain is improving but still very nauseas.

## 2024-07-09 NOTE — TELEPHONE ENCOUNTER
Patient called and stated she was seen in the ER on 7/7 for abdominal pains and diverticulitis symptoms.Patient called requesting  zofran for the nausea ,patient also stated she vomited yesterday and today ,denies abdominal pain.Warm transferred to the office to further assist patient.

## 2024-07-12 ENCOUNTER — TELEPHONE (OUTPATIENT)
Age: 39
End: 2024-07-12

## 2024-07-12 DIAGNOSIS — K57.32 SIGMOID DIVERTICULITIS: ICD-10-CM

## 2024-07-12 RX ORDER — METRONIDAZOLE 500 MG/1
500 TABLET ORAL EVERY 12 HOURS SCHEDULED
Qty: 6 TABLET | Refills: 0 | Status: SHIPPED | OUTPATIENT
Start: 2024-07-12 | End: 2024-07-15

## 2024-07-12 RX ORDER — CIPROFLOXACIN 500 MG/1
500 TABLET, FILM COATED ORAL EVERY 12 HOURS SCHEDULED
Qty: 6 TABLET | Refills: 0 | Status: SHIPPED | OUTPATIENT
Start: 2024-07-12 | End: 2024-07-15

## 2024-07-12 NOTE — TELEPHONE ENCOUNTER
Patient called was seen in ER on 7/7/2024 diagnosed with Diverticulitis and was told would be given 10 days of antibiotics and only got 7. Is leaving for vacation on Sunday and would like to know if can have remaining 3 days of Cipro and Flagyl sent to [facility] on [address] [facility]. Would like a call back at [phone]

## 2024-07-12 NOTE — TELEPHONE ENCOUNTER
Oh that seems confusing. Is she feeling any better? We can send the same dose script over for 3 more days.

## 2024-07-12 NOTE — TELEPHONE ENCOUNTER
I reviewed the medication orders and they specifically say 7 days on the orders and 10 days on the AVS. Please advise.

## 2024-07-12 NOTE — TELEPHONE ENCOUNTER
LVM for patient to call office. If she returns call please transfer her to the office to further assist.

## 2024-07-12 NOTE — TELEPHONE ENCOUNTER
I spoke with patient. She is feeling better. She said abdomen is still slightly tender when pressing but over all feeling better. She is mostly concerned that she is leaving for vacation on Sunday and will get sick again without completing the 10 days. Please send additional 3 days to [facility].

## 2024-07-23 ENCOUNTER — HOSPITAL ENCOUNTER (OUTPATIENT)
Dept: CT IMAGING | Facility: HOSPITAL | Age: 39
Discharge: HOME | End: 2024-07-23
Attending: INTERNAL MEDICINE
Payer: COMMERCIAL

## 2024-07-23 ENCOUNTER — APPOINTMENT (OUTPATIENT)
Dept: LAB | Facility: AMBULARY SURGERY CENTER | Age: 39
End: 2024-07-23
Payer: COMMERCIAL

## 2024-07-23 ENCOUNTER — HOSPITAL ENCOUNTER (INPATIENT)
Facility: HOSPITAL | Age: 39
LOS: 1 days | Discharge: HOME | DRG: 392 | End: 2024-07-24
Attending: EMERGENCY MEDICINE | Admitting: SURGERY
Payer: COMMERCIAL

## 2024-07-23 ENCOUNTER — OFFICE VISIT (OUTPATIENT)
Dept: INTERNAL MEDICINE CLINIC | Facility: CLINIC | Age: 39
End: 2024-07-23
Payer: COMMERCIAL

## 2024-07-23 VITALS
SYSTOLIC BLOOD PRESSURE: 110 MMHG | BODY MASS INDEX: 26.98 KG/M2 | HEIGHT: 64 IN | TEMPERATURE: 97.9 F | HEART RATE: 123 BPM | RESPIRATION RATE: 18 BRPM | WEIGHT: 158 LBS | DIASTOLIC BLOOD PRESSURE: 74 MMHG | OXYGEN SATURATION: 99 %

## 2024-07-23 DIAGNOSIS — R10.32 LLQ ABDOMINAL PAIN: ICD-10-CM

## 2024-07-23 DIAGNOSIS — K57.32 SIGMOID DIVERTICULITIS: Primary | ICD-10-CM

## 2024-07-23 DIAGNOSIS — R10.13 EPIGASTRIC PAIN: ICD-10-CM

## 2024-07-23 DIAGNOSIS — R10.2 PELVIC PAIN: ICD-10-CM

## 2024-07-23 DIAGNOSIS — K76.9 LIVER LESION: ICD-10-CM

## 2024-07-23 DIAGNOSIS — K57.32 SIGMOID DIVERTICULITIS: ICD-10-CM

## 2024-07-23 DIAGNOSIS — R10.32 LLQ ABDOMINAL PAIN: Primary | ICD-10-CM

## 2024-07-23 DIAGNOSIS — R10.32 LEFT LOWER QUADRANT ABDOMINAL PAIN: ICD-10-CM

## 2024-07-23 LAB
ALBUMIN SERPL BCG-MCNC: 4.5 G/DL (ref 3.5–5)
ALP SERPL-CCNC: 65 U/L (ref 34–104)
ALT SERPL W P-5'-P-CCNC: 16 U/L (ref 7–52)
ANION GAP SERPL CALCULATED.3IONS-SCNC: 12 MMOL/L (ref 4–13)
ANION GAP SERPL CALCULATED.3IONS-SCNC: 9 MMOL/L (ref 4–13)
AST SERPL W P-5'-P-CCNC: 26 U/L (ref 13–39)
BACTERIA UR QL AUTO: NORMAL /HPF
BASOPHILS # BLD AUTO: 0.03 THOUSANDS/ÂΜL (ref 0–0.1)
BASOPHILS # BLD AUTO: 0.04 THOUSANDS/ÂΜL (ref 0–0.1)
BASOPHILS NFR BLD AUTO: 1 % (ref 0–1)
BASOPHILS NFR BLD AUTO: 1 % (ref 0–1)
BILIRUB SERPL-MCNC: 0.54 MG/DL (ref 0.2–1)
BILIRUB UR QL STRIP: NEGATIVE
BUN SERPL-MCNC: 17 MG/DL (ref 5–25)
BUN SERPL-MCNC: 19 MG/DL (ref 5–25)
CALCIUM SERPL-MCNC: 9.3 MG/DL (ref 8.4–10.2)
CALCIUM SERPL-MCNC: 9.4 MG/DL (ref 8.4–10.2)
CHLORIDE SERPL-SCNC: 106 MMOL/L (ref 96–108)
CHLORIDE SERPL-SCNC: 108 MMOL/L (ref 96–108)
CLARITY UR: CLEAR
CO2 SERPL-SCNC: 19 MMOL/L (ref 21–32)
CO2 SERPL-SCNC: 23 MMOL/L (ref 21–32)
COLOR UR: COLORLESS
CREAT SERPL-MCNC: 0.8 MG/DL (ref 0.6–1.3)
CREAT SERPL-MCNC: 0.82 MG/DL (ref 0.6–1.3)
EOSINOPHIL # BLD AUTO: 0.07 THOUSAND/ÂΜL (ref 0–0.61)
EOSINOPHIL # BLD AUTO: 0.08 THOUSAND/ÂΜL (ref 0–0.61)
EOSINOPHIL NFR BLD AUTO: 1 % (ref 0–6)
EOSINOPHIL NFR BLD AUTO: 1 % (ref 0–6)
ERYTHROCYTE [DISTWIDTH] IN BLOOD BY AUTOMATED COUNT: 12.1 % (ref 11.6–15.1)
ERYTHROCYTE [DISTWIDTH] IN BLOOD BY AUTOMATED COUNT: 12.3 % (ref 11.6–15.1)
GFR SERPL CREATININE-BSD FRML MDRD: 91 ML/MIN/1.73SQ M
GFR SERPL CREATININE-BSD FRML MDRD: 93 ML/MIN/1.73SQ M
GLUCOSE SERPL-MCNC: 89 MG/DL (ref 65–140)
GLUCOSE SERPL-MCNC: 93 MG/DL (ref 65–140)
GLUCOSE UR STRIP-MCNC: NEGATIVE MG/DL
HCG SERPL QL: NEGATIVE
HCT VFR BLD AUTO: 40.3 % (ref 34.8–46.1)
HCT VFR BLD AUTO: 43 % (ref 34.8–46.1)
HGB BLD-MCNC: 13.7 G/DL (ref 11.5–15.4)
HGB BLD-MCNC: 14.5 G/DL (ref 11.5–15.4)
HGB UR QL STRIP.AUTO: ABNORMAL
HOLD SPECIMEN: NORMAL
IMM GRANULOCYTES # BLD AUTO: 0.02 THOUSAND/UL (ref 0–0.2)
IMM GRANULOCYTES # BLD AUTO: 0.02 THOUSAND/UL (ref 0–0.2)
IMM GRANULOCYTES NFR BLD AUTO: 0 % (ref 0–2)
IMM GRANULOCYTES NFR BLD AUTO: 0 % (ref 0–2)
KETONES UR STRIP-MCNC: NEGATIVE MG/DL
LEUKOCYTE ESTERASE UR QL STRIP: NEGATIVE
LIPASE SERPL-CCNC: 10 U/L (ref 11–82)
LYMPHOCYTES # BLD AUTO: 1.88 THOUSANDS/ÂΜL (ref 0.6–4.47)
LYMPHOCYTES # BLD AUTO: 2.9 THOUSANDS/ÂΜL (ref 0.6–4.47)
LYMPHOCYTES NFR BLD AUTO: 33 % (ref 14–44)
LYMPHOCYTES NFR BLD AUTO: 42 % (ref 14–44)
MCH RBC QN AUTO: 28.7 PG (ref 26.8–34.3)
MCH RBC QN AUTO: 29.2 PG (ref 26.8–34.3)
MCHC RBC AUTO-ENTMCNC: 33.7 G/DL (ref 31.4–37.4)
MCHC RBC AUTO-ENTMCNC: 34 G/DL (ref 31.4–37.4)
MCV RBC AUTO: 85 FL (ref 82–98)
MCV RBC AUTO: 87 FL (ref 82–98)
MONOCYTES # BLD AUTO: 0.33 THOUSAND/ÂΜL (ref 0.17–1.22)
MONOCYTES # BLD AUTO: 0.36 THOUSAND/ÂΜL (ref 0.17–1.22)
MONOCYTES NFR BLD AUTO: 5 % (ref 4–12)
MONOCYTES NFR BLD AUTO: 6 % (ref 4–12)
NEUTROPHILS # BLD AUTO: 3.34 THOUSANDS/ÂΜL (ref 1.85–7.62)
NEUTROPHILS # BLD AUTO: 3.59 THOUSANDS/ÂΜL (ref 1.85–7.62)
NEUTS SEG NFR BLD AUTO: 51 % (ref 43–75)
NEUTS SEG NFR BLD AUTO: 59 % (ref 43–75)
NITRITE UR QL STRIP: NEGATIVE
NON-SQ EPI CELLS URNS QL MICRO: NORMAL /HPF
NRBC BLD AUTO-RTO: 0 /100 WBCS
NRBC BLD AUTO-RTO: 0 /100 WBCS
PH UR STRIP.AUTO: 5.5 [PH]
PLATELET # BLD AUTO: 285 THOUSANDS/UL (ref 149–390)
PLATELET # BLD AUTO: 287 THOUSANDS/UL (ref 149–390)
PMV BLD AUTO: 10.2 FL (ref 8.9–12.7)
PMV BLD AUTO: 10.8 FL (ref 8.9–12.7)
POTASSIUM SERPL-SCNC: 3.2 MMOL/L (ref 3.5–5.3)
POTASSIUM SERPL-SCNC: 3.9 MMOL/L (ref 3.5–5.3)
PROT SERPL-MCNC: 7.5 G/DL (ref 6.4–8.4)
PROT UR STRIP-MCNC: NEGATIVE MG/DL
RBC # BLD AUTO: 4.77 MILLION/UL (ref 3.81–5.12)
RBC # BLD AUTO: 4.96 MILLION/UL (ref 3.81–5.12)
RBC #/AREA URNS AUTO: NORMAL /HPF
SODIUM SERPL-SCNC: 138 MMOL/L (ref 135–147)
SODIUM SERPL-SCNC: 139 MMOL/L (ref 135–147)
SP GR UR STRIP.AUTO: 1.02 (ref 1–1.03)
UROBILINOGEN UR STRIP-ACNC: <2 MG/DL
WBC # BLD AUTO: 5.67 THOUSAND/UL (ref 4.31–10.16)
WBC # BLD AUTO: 6.99 THOUSAND/UL (ref 4.31–10.16)
WBC #/AREA URNS AUTO: NORMAL /HPF

## 2024-07-23 PROCEDURE — 80053 COMPREHEN METABOLIC PANEL: CPT | Performed by: EMERGENCY MEDICINE

## 2024-07-23 PROCEDURE — 36415 COLL VENOUS BLD VENIPUNCTURE: CPT

## 2024-07-23 PROCEDURE — 99214 OFFICE O/P EST MOD 30 MIN: CPT | Performed by: INTERNAL MEDICINE

## 2024-07-23 PROCEDURE — 87040 BLOOD CULTURE FOR BACTERIA: CPT | Performed by: PHYSICIAN ASSISTANT

## 2024-07-23 PROCEDURE — 80048 BASIC METABOLIC PNL TOTAL CA: CPT

## 2024-07-23 PROCEDURE — 81025 URINE PREGNANCY TEST: CPT

## 2024-07-23 PROCEDURE — 74177 CT ABD & PELVIS W/CONTRAST: CPT

## 2024-07-23 PROCEDURE — 96365 THER/PROPH/DIAG IV INF INIT: CPT

## 2024-07-23 PROCEDURE — 99284 EMERGENCY DEPT VISIT MOD MDM: CPT

## 2024-07-23 PROCEDURE — 83605 ASSAY OF LACTIC ACID: CPT | Performed by: PHYSICIAN ASSISTANT

## 2024-07-23 PROCEDURE — 96375 TX/PRO/DX INJ NEW DRUG ADDON: CPT

## 2024-07-23 PROCEDURE — 83690 ASSAY OF LIPASE: CPT | Performed by: EMERGENCY MEDICINE

## 2024-07-23 PROCEDURE — 85025 COMPLETE CBC W/AUTO DIFF WBC: CPT | Performed by: EMERGENCY MEDICINE

## 2024-07-23 PROCEDURE — 81001 URINALYSIS AUTO W/SCOPE: CPT | Performed by: EMERGENCY MEDICINE

## 2024-07-23 PROCEDURE — 84703 CHORIONIC GONADOTROPIN ASSAY: CPT

## 2024-07-23 PROCEDURE — 85025 COMPLETE CBC W/AUTO DIFF WBC: CPT

## 2024-07-23 RX ORDER — ACETAMINOPHEN 10 MG/ML
1000 INJECTION, SOLUTION INTRAVENOUS ONCE
Status: COMPLETED | OUTPATIENT
Start: 2024-07-23 | End: 2024-07-24

## 2024-07-23 RX ORDER — METRONIDAZOLE 500 MG/100ML
500 INJECTION, SOLUTION INTRAVENOUS ONCE
Status: DISCONTINUED | OUTPATIENT
Start: 2024-07-23 | End: 2024-07-24

## 2024-07-23 RX ORDER — HYDROMORPHONE HCL/PF 1 MG/ML
0.5 SYRINGE (ML) INJECTION ONCE
Status: COMPLETED | OUTPATIENT
Start: 2024-07-23 | End: 2024-07-23

## 2024-07-23 RX ADMIN — ACETAMINOPHEN 1000 MG: 10 INJECTION INTRAVENOUS at 23:42

## 2024-07-23 RX ADMIN — SODIUM CHLORIDE 1000 ML: 0.9 INJECTION, SOLUTION INTRAVENOUS at 23:40

## 2024-07-23 RX ADMIN — IOHEXOL 75 ML: 350 INJECTION, SOLUTION INTRAVENOUS at 19:05

## 2024-07-23 RX ADMIN — HYDROMORPHONE HYDROCHLORIDE 0.5 MG: 1 INJECTION, SOLUTION INTRAMUSCULAR; INTRAVENOUS; SUBCUTANEOUS at 23:40

## 2024-07-23 RX ADMIN — IOHEXOL 50 ML: 240 INJECTION, SOLUTION INTRATHECAL; INTRAVASCULAR; INTRAVENOUS; ORAL at 19:05

## 2024-07-23 NOTE — LETTER
[facility]  [address]  [place] [address]  Dept: [phone]    July 24, 2024     Patient: Layne Mcclellan   YOB: 1985   Date of Visit: 7/23/2024       To Whom it May Concern:    Layne Mcclellan is under my professional care. She was seen in the hospital from 7/23/2024 to 07/24/24. She may return to school on 7/29 without limitations, or earlier as tolerated.    If you have any questions or concerns, please don't hesitate to call.         Sincerely,          Jorden Hernandez MD

## 2024-07-23 NOTE — PATIENT INSTRUCTIONS
Patient was advised to continue present medications. discussed with the patient medications and laboratory data in detail.  Follow-up with me in 3 months or as advised.  If any blood test was ordered please do 1 week prior to next appointment unless advise to get earlier.  If you have any questions please call the office [phone]

## 2024-07-23 NOTE — Clinical Note
Layne Mcclellan was seen and treated in our emergency department on 7/23/2024.    No restrictions            Diagnosis:     Layne  may return to work on return date.    She may return on this date: 07/29/2024         If you have any questions or concerns, please don't hesitate to call.      Marky Marinelli    ______________________________           _______________          _______________  Hospital Representative                              Date                                Time

## 2024-07-23 NOTE — LETTER
[facility]  [address]  [place] [address]  Dept: [phone]    July 24, 2024     Patient: Layne Mcclellan   YOB: 1985   Date of Visit: 7/23/2024       To Whom it May Concern:    Layne Mcclellan is under my professional care. She was seen in the hospital from 7/23/2024 to 07/24/24. She may return to work on 7/29 without limitations, or earlier if tolerated.    If you have any questions or concerns, please don't hesitate to call.         Sincerely,          Jorden Hernandez MD

## 2024-07-23 NOTE — PROGRESS NOTES
Assessment/Plan:    1. LLQ abdominal pain  -     Basic metabolic panel; Future  -     CBC and differential; Future  -     Pregnancy Test (HCG Qualitative); Future  -     CT abdomen pelvis w contrast; Future; Expected date: 07/23/2024  2. Pelvic pain  -     Basic metabolic panel; Future  -     CBC and differential; Future  -     Pregnancy Test (HCG Qualitative); Future  -     CT abdomen pelvis w contrast; Future; Expected date: 07/23/2024  3. Sigmoid diverticulitis  -     CT abdomen pelvis w contrast; Future; Expected date: 07/23/2024  -     Ambulatory Referral to Gastroenterology; Future  4. Liver lesion  -     Ambulatory Referral to Gastroenterology; Future     Discussion/summary/plan:    She went to emergency room on July 7, 2024 with pain in abdomen and was diagnosed with acute diverticulitis of the sigmoid colon.  She was prescribed ciprofloxacin and metronidazole for 7 days.  She took both these medications for a total of 7 days and her pain got better.  She has a recurrence of pain left lower quadrant and pelvic pain at times severe and crampy since past Sunday evening on July 20, 2024.  She had Cipro and metronidazole leftover as she did not took these medications  which was prescribed on July 12, 2024 by my covering physician for 3 days.  She started taking these medications since July 20 2024 evening but still she has a pain.  On exam she is tender on pelvic and left lower abdomen more on the left lower abdomen  Discussed with the patient due to history of diverticulitis and recurrence of pain again cannot rule out any perforation or recurrence of diverticulitis without CT scan.  Patient agreed to have a CT of the abdomen pelvis done today.  Will order CBC, BMP and pregnancy test as well.  She is using IUD for contraception.  After CT scan result will advise accordingly.  She had MRI of the abdomen for liver lesion.  Discussed with the patient MRI result.  Will refer to hepatologist for further evaluation  and recommendation.    Subjective: Pain in lower abdomen.      Patient ID: Layne Mcclellan is a 39 y.o. female.    HPI  39-year-old white female patient presents to discuss about her MRI result of the abdomen as well as she is having again pain in the lower abdomen mainly left lower abdomen and pelvic area since last 3 days.  She went to emergency room in July 7, 2024 with pain in abdomen diagnosed with sigmoid diverticulitis and took for 7 days of Cipro and metronidazole and got better.  She has a recurrence of left lower abdomen and pelvic pain since last Sunday evening on July 20,2024.  She started taking leftover metronidazole and ciprofloxacin which was prescribed on July 12, 2024 by my covering physician.  Despite she is taking these antibiotics still she has a pain at times very crampy and severe.  Denies fever or chills.  Denies nausea vomiting diarrhea.  Has some constipation.  Denies cough sore throat or any UTI symptoms.    The following portions of the patient's history were reviewed and updated as appropriate:     Past Medical History:  She has a past medical history of Abnormal Pap smear of cervix, Acute non-recurrent sinusitis (10/25/2021), Arthritis, BMI 27.0-27.9,adult (05/09/2022), BRCA1 negative, BRCA2 negative, Constipation, chronic, Cough (09/23/2021), COVID-19 virus infection (12/08/2021), Depression (05/09/2022), Diabetes mellitus (HCC), Epigastric pain (06/13/2024), Fatigue (12/08/2021), Gestational diabetes, Gestational hypertension affecting first pregnancy, Hearing loss of right ear (04/23/2024), Hyperglycemia (04/23/2024), Irritable bowel syndrome, Liver lesion (04/23/2024), LLQ abdominal pain (07/23/2024), Migraine, Myalgia (05/09/2022), Neck pain (05/09/2022), Pelvic pain (07/23/2024), Rhinitis (10/25/2021), Sigmoid diverticulitis (07/23/2024), Sinus congestion (09/23/2021), Sore throat (09/23/2021), Tinnitus of right ear (04/23/2024), Varicella, and Weight  loss.,  _______________________________________________________________________  Past Surgical History:   has a past surgical history that includes [place] tooth extraction; pr colonoscopy flx dx w/collj spec when pfrmd (N/A, 2016); Colposcopy; pr  delivery only (Bilateral, 10/6/2017);  section (10/06/2017); and pr  delivery only (N/A, 3/22/2019).,  _______________________________________________________________________  Family History:  family history includes Arthritis in her maternal grandmother and mother; Breast cancer (age of onset: 40) in her paternal grandmother; Breast cancer (age of onset: 80) in her maternal grandmother; Diabetes in her maternal grandfather; Heart disease in her maternal grandmother and paternal grandfather; Irritable bowel syndrome in her mother; No Known Problems in her daughter, father, maternal aunt, paternal uncle, sister, sister, and son.,  _______________________________________________________________________  Social History:   reports that she has never smoked. She has never used smokeless tobacco. She reports that she does not currently use alcohol. She reports that she does not use drugs.,  _______________________________________________________________________  Allergies:  is allergic to amoxicillin and penicillins..  _______________________________________________________________________  Current Outpatient Medications   Medication Sig Dispense Refill   • Cholecalciferol (Vitamin D3) 250 MCG (36132 UT) TABS Take 10,000 Units by mouth in the morning     • ibuprofen (MOTRIN) 800 mg tablet Take by mouth 2 (two) times a day As needed     • magnesium (MAGTAB) 84 MG (7MEQ) TBCR Take 84 mg by mouth daily     • pantoprazole (PROTONIX) 40 mg tablet Take 1 tablet (40 mg total) by mouth daily 90 tablet 0   • SUMAtriptan (IMITREX) 100 mg tablet 1 tab as needed for migraine, may repeat in 2 hours, max 200mg per 24 hours 12 tablet 5   • topiramate (TOPAMAX) 100  "mg tablet Take 1 tablet (100 mg total) by mouth daily 90 tablet 1   • topiramate (Topamax) 25 mg tablet Take one tablet at bedtime for 1 month. If needed increase to 2 tablets at bedtime. In addition to 100mg tablet. 60 tablet 5   • cyclobenzaprine (FLEXERIL) 5 mg tablet Take 1 tablet (5 mg total) by mouth daily at bedtime as needed for muscle spasms (Patient not taking: Reported on 7/23/2024) 15 tablet 1   • ondansetron (ZOFRAN) 4 mg tablet Take 1 tablet (4 mg total) by mouth every 8 (eight) hours as needed for nausea or vomiting (Patient not taking: Reported on 7/23/2024) 20 tablet 0     No current facility-administered medications for this visit.     _______________________________________________________________________  Review of Systems   Constitutional:  Negative for chills and fever.   HENT:  Negative for ear pain and sore throat.    Eyes:  Negative for discharge, redness and visual disturbance.   Respiratory:  Negative for cough, chest tightness and shortness of breath.    Cardiovascular:  Negative for chest pain and palpitations.   Gastrointestinal:  Positive for abdominal pain (Pelvic and left lower abdominal pain). Negative for blood in stool, constipation, diarrhea, nausea and vomiting.   Genitourinary:  Negative for dysuria, flank pain, frequency and hematuria.   Neurological:  Negative for dizziness, speech difficulty, weakness and headaches.   Psychiatric/Behavioral:  Negative for agitation and behavioral problems.          Objective:  Vitals:    07/23/24 1003   BP: 110/74   BP Location: Left arm   Patient Position: Sitting   Cuff Size: Standard   Pulse: (!) 123   Resp: 18   Temp: 97.9 °F (36.6 °C)   TempSrc: Temporal   SpO2: 99%   Weight: 71.7 kg (158 lb)   Height: 5' 4\" (1.626 m)     Body mass index is 27.12 kg/m².     Physical Exam  Vitals and nursing note reviewed.   Constitutional:       General: She is not in acute distress.     Appearance: Normal appearance.   HENT:      Head: Normocephalic " and atraumatic.   Eyes:      General: No scleral icterus.        Right eye: No discharge.         Left eye: No discharge.      Extraocular Movements: Extraocular movements intact.      Conjunctiva/sclera: Conjunctivae normal.   Cardiovascular:      Rate and Rhythm: Normal rate and regular rhythm.      Pulses: Normal pulses.      Heart sounds: Normal heart sounds. No murmur heard.  Pulmonary:      Effort: Pulmonary effort is normal. No respiratory distress.      Breath sounds: Normal breath sounds. No wheezing, rhonchi or rales.   Abdominal:      General: Bowel sounds are normal.      Palpations: Abdomen is soft.      Tenderness: There is abdominal tenderness (Tender on pelvic and left lower abdomen.).   Musculoskeletal:         General: Normal range of motion.      Cervical back: Normal range of motion and neck supple. No muscular tenderness.      Right lower leg: No edema.      Left lower leg: No edema.   Skin:     General: Skin is warm.      Findings: No rash.   Neurological:      General: No focal deficit present.      Mental Status: She is alert and oriented to person, place, and time.      Motor: No weakness.      Coordination: Coordination normal.   Psychiatric:         Mood and Affect: Mood normal.         Behavior: Behavior normal.

## 2024-07-23 NOTE — Clinical Note
Case was discussed with general surgery resident and the patient's admission status was agreed to be Admission Status: observation status to the service of Dr. Anderson, general surgery .

## 2024-07-23 NOTE — LETTER
[facility]  [address]  [place] [address]  Dept: [phone]    July 24, 2024     Patient: Layne Mcclellan   YOB: 1985   Date of Visit: 7/23/2024       To Whom it May Concern:    Layne Mcclellan is under my professional care. She was seen in the hospital from 7/23/2024 to 07/24/24. She may return to work on 7/29 without limitations, or earlier if tolerated.    If you have any questions or concerns, please don't hesitate to call.         Sincerely,          Jorden Hernandez MD

## 2024-07-23 NOTE — Clinical Note
Layne Mccloudleydarobbin was seen and treated in our emergency department on 7/23/2024.                Diagnosis:     Layne  .    She may return on this date:          If you have any questions or concerns, please don't hesitate to call.      Pj Garcia MD    ______________________________           _______________          _______________  Hospital Representative                              Date                                Time

## 2024-07-24 VITALS
DIASTOLIC BLOOD PRESSURE: 57 MMHG | SYSTOLIC BLOOD PRESSURE: 100 MMHG | HEART RATE: 79 BPM | TEMPERATURE: 97.8 F | RESPIRATION RATE: 16 BRPM | OXYGEN SATURATION: 100 %

## 2024-07-24 LAB
ANION GAP SERPL CALCULATED.3IONS-SCNC: 7 MMOL/L (ref 4–13)
BASOPHILS # BLD AUTO: 0.03 THOUSANDS/ÂΜL (ref 0–0.1)
BASOPHILS NFR BLD AUTO: 1 % (ref 0–1)
BUN SERPL-MCNC: 13 MG/DL (ref 5–25)
CALCIUM SERPL-MCNC: 8.5 MG/DL (ref 8.4–10.2)
CHLORIDE SERPL-SCNC: 108 MMOL/L (ref 96–108)
CO2 SERPL-SCNC: 22 MMOL/L (ref 21–32)
CREAT SERPL-MCNC: 0.83 MG/DL (ref 0.6–1.3)
EOSINOPHIL # BLD AUTO: 0.11 THOUSAND/ÂΜL (ref 0–0.61)
EOSINOPHIL NFR BLD AUTO: 2 % (ref 0–6)
ERYTHROCYTE [DISTWIDTH] IN BLOOD BY AUTOMATED COUNT: 12.1 % (ref 11.6–15.1)
EXT PREGNANCY TEST URINE: NEGATIVE
EXT. CONTROL: NORMAL
GFR SERPL CREATININE-BSD FRML MDRD: 89 ML/MIN/1.73SQ M
GLUCOSE SERPL-MCNC: 93 MG/DL (ref 65–140)
HCT VFR BLD AUTO: 36.2 % (ref 34.8–46.1)
HGB BLD-MCNC: 12.6 G/DL (ref 11.5–15.4)
IMM GRANULOCYTES # BLD AUTO: 0.02 THOUSAND/UL (ref 0–0.2)
IMM GRANULOCYTES NFR BLD AUTO: 0 % (ref 0–2)
LACTATE SERPL-SCNC: 0.7 MMOL/L (ref 0.5–2)
LYMPHOCYTES # BLD AUTO: 2.79 THOUSANDS/ÂΜL (ref 0.6–4.47)
LYMPHOCYTES NFR BLD AUTO: 51 % (ref 14–44)
MAGNESIUM SERPL-MCNC: 2 MG/DL (ref 1.9–2.7)
MCH RBC QN AUTO: 29.2 PG (ref 26.8–34.3)
MCHC RBC AUTO-ENTMCNC: 34.8 G/DL (ref 31.4–37.4)
MCV RBC AUTO: 84 FL (ref 82–98)
MONOCYTES # BLD AUTO: 0.48 THOUSAND/ÂΜL (ref 0.17–1.22)
MONOCYTES NFR BLD AUTO: 9 % (ref 4–12)
NEUTROPHILS # BLD AUTO: 1.98 THOUSANDS/ÂΜL (ref 1.85–7.62)
NEUTS SEG NFR BLD AUTO: 37 % (ref 43–75)
NRBC BLD AUTO-RTO: 0 /100 WBCS
PHOSPHATE SERPL-MCNC: 3.6 MG/DL (ref 2.7–4.5)
PLATELET # BLD AUTO: 248 THOUSANDS/UL (ref 149–390)
PMV BLD AUTO: 10.4 FL (ref 8.9–12.7)
POTASSIUM SERPL-SCNC: 3.3 MMOL/L (ref 3.5–5.3)
RBC # BLD AUTO: 4.32 MILLION/UL (ref 3.81–5.12)
SODIUM SERPL-SCNC: 137 MMOL/L (ref 135–147)
WBC # BLD AUTO: 5.41 THOUSAND/UL (ref 4.31–10.16)

## 2024-07-24 PROCEDURE — NC001 PR NO CHARGE: Performed by: SURGERY

## 2024-07-24 PROCEDURE — 85025 COMPLETE CBC W/AUTO DIFF WBC: CPT

## 2024-07-24 PROCEDURE — 84100 ASSAY OF PHOSPHORUS: CPT

## 2024-07-24 PROCEDURE — 36415 COLL VENOUS BLD VENIPUNCTURE: CPT

## 2024-07-24 PROCEDURE — 80048 BASIC METABOLIC PNL TOTAL CA: CPT

## 2024-07-24 PROCEDURE — 99285 EMERGENCY DEPT VISIT HI MDM: CPT | Performed by: PHYSICIAN ASSISTANT

## 2024-07-24 PROCEDURE — 83735 ASSAY OF MAGNESIUM: CPT

## 2024-07-24 PROCEDURE — 96367 TX/PROPH/DG ADDL SEQ IV INF: CPT

## 2024-07-24 PROCEDURE — 99235 HOSP IP/OBS SAME DATE MOD 70: CPT | Performed by: SURGERY

## 2024-07-24 RX ORDER — HEPARIN SODIUM 5000 [USP'U]/ML
5000 INJECTION, SOLUTION INTRAVENOUS; SUBCUTANEOUS EVERY 8 HOURS SCHEDULED
Status: DISCONTINUED | OUTPATIENT
Start: 2024-07-24 | End: 2024-07-24 | Stop reason: HOSPADM

## 2024-07-24 RX ORDER — TOPIRAMATE 100 MG/1
100 TABLET, FILM COATED ORAL DAILY
Status: DISCONTINUED | OUTPATIENT
Start: 2024-07-24 | End: 2024-07-24 | Stop reason: HOSPADM

## 2024-07-24 RX ORDER — HYDROMORPHONE HCL IN WATER/PF 6 MG/30 ML
0.2 PATIENT CONTROLLED ANALGESIA SYRINGE INTRAVENOUS
Status: DISCONTINUED | OUTPATIENT
Start: 2024-07-24 | End: 2024-07-24 | Stop reason: HOSPADM

## 2024-07-24 RX ORDER — SODIUM CHLORIDE, SODIUM GLUCONATE, SODIUM ACETATE, POTASSIUM CHLORIDE, MAGNESIUM CHLORIDE, SODIUM PHOSPHATE, DIBASIC, AND POTASSIUM PHOSPHATE .53; .5; .37; .037; .03; .012; .00082 G/100ML; G/100ML; G/100ML; G/100ML; G/100ML; G/100ML; G/100ML
50 INJECTION, SOLUTION INTRAVENOUS CONTINUOUS
Status: DISCONTINUED | OUTPATIENT
Start: 2024-07-24 | End: 2024-07-24

## 2024-07-24 RX ORDER — POTASSIUM CHLORIDE 20 MEQ/1
40 TABLET, EXTENDED RELEASE ORAL ONCE
Status: COMPLETED | OUTPATIENT
Start: 2024-07-24 | End: 2024-07-24

## 2024-07-24 RX ORDER — PANTOPRAZOLE SODIUM 40 MG/1
40 TABLET, DELAYED RELEASE ORAL
Status: DISCONTINUED | OUTPATIENT
Start: 2024-07-24 | End: 2024-07-24 | Stop reason: HOSPADM

## 2024-07-24 RX ORDER — ONDANSETRON 2 MG/ML
4 INJECTION INTRAMUSCULAR; INTRAVENOUS EVERY 6 HOURS PRN
Status: DISCONTINUED | OUTPATIENT
Start: 2024-07-24 | End: 2024-07-24 | Stop reason: HOSPADM

## 2024-07-24 RX ORDER — ACETAMINOPHEN 325 MG/1
650 TABLET ORAL EVERY 6 HOURS PRN
Status: DISCONTINUED | OUTPATIENT
Start: 2024-07-24 | End: 2024-07-24 | Stop reason: HOSPADM

## 2024-07-24 RX ORDER — AMOXICILLIN AND CLAVULANATE POTASSIUM 875; 125 MG/1; MG/1
1 TABLET, FILM COATED ORAL EVERY 12 HOURS SCHEDULED
Qty: 14 TABLET | Refills: 0 | Status: SHIPPED | OUTPATIENT
Start: 2024-07-24 | End: 2024-07-24

## 2024-07-24 RX ORDER — OXYCODONE HYDROCHLORIDE 5 MG/1
5 TABLET ORAL EVERY 4 HOURS PRN
Status: DISCONTINUED | OUTPATIENT
Start: 2024-07-24 | End: 2024-07-24 | Stop reason: HOSPADM

## 2024-07-24 RX ORDER — AMOXICILLIN AND CLAVULANATE POTASSIUM 875; 125 MG/1; MG/1
1 TABLET, FILM COATED ORAL EVERY 12 HOURS SCHEDULED
Qty: 20 TABLET | Refills: 0 | Status: SHIPPED | OUTPATIENT
Start: 2024-07-24 | End: 2024-08-03

## 2024-07-24 RX ADMIN — PIPERACILLIN SODIUM AND TAZOBACTAM SODIUM 4.5 G: 36; 4.5 INJECTION, POWDER, LYOPHILIZED, FOR SOLUTION INTRAVENOUS at 01:19

## 2024-07-24 RX ADMIN — PANTOPRAZOLE SODIUM 40 MG: 40 TABLET, DELAYED RELEASE ORAL at 05:21

## 2024-07-24 RX ADMIN — HEPARIN SODIUM 5000 UNITS: 5000 INJECTION INTRAVENOUS; SUBCUTANEOUS at 05:22

## 2024-07-24 RX ADMIN — HYDROMORPHONE HYDROCHLORIDE 0.2 MG: 0.2 INJECTION, SOLUTION INTRAMUSCULAR; INTRAVENOUS; SUBCUTANEOUS at 01:25

## 2024-07-24 RX ADMIN — CEFTRIAXONE SODIUM 1000 MG: 10 INJECTION, POWDER, FOR SOLUTION INTRAVENOUS at 00:24

## 2024-07-24 RX ADMIN — POTASSIUM CHLORIDE 40 MEQ: 1500 TABLET, EXTENDED RELEASE ORAL at 08:14

## 2024-07-24 RX ADMIN — SODIUM CHLORIDE, SODIUM GLUCONATE, SODIUM ACETATE, POTASSIUM CHLORIDE, MAGNESIUM CHLORIDE, SODIUM PHOSPHATE, DIBASIC, AND POTASSIUM PHOSPHATE 100 ML/HR: .53; .5; .37; .037; .03; .012; .00082 INJECTION, SOLUTION INTRAVENOUS at 02:10

## 2024-07-24 RX ADMIN — PIPERACILLIN SODIUM AND TAZOBACTAM SODIUM 4.5 G: 36; 4.5 INJECTION, POWDER, LYOPHILIZED, FOR SOLUTION INTRAVENOUS at 05:27

## 2024-07-24 RX ADMIN — PIPERACILLIN SODIUM AND TAZOBACTAM SODIUM 4.5 G: 36; 4.5 INJECTION, POWDER, LYOPHILIZED, FOR SOLUTION INTRAVENOUS at 13:53

## 2024-07-24 RX ADMIN — TOPIRAMATE 100 MG: 100 TABLET, FILM COATED ORAL at 09:56

## 2024-07-24 NOTE — DISCHARGE SUMMARY
Discharge Summary - General Surgery  Layne Mcclellan 39 y.o. female MRN: [id number]  Unit/Bed#: ED-19 Encounter: [id number]    Admission Date: 7/23/2024     Discharge Date: 7/24/2024     Admitting Diagnosis: No admission diagnoses are documented for this encounter.    Discharge Diagnosis: Active Problems:    * No active hospital problems. *  Resolved Problems:    * No resolved hospital problems. *      Attending and Service:   Jackie Cabral MD    Hospital Course:   Layne Mcclellan 39 y.o. female hx of recent diverticulitis, found to have incompletely resolved uncomplicated microperforated diverticulitis.  For her recent previous episode, she was treated with a course of ciprofloxacin and Flagyl.  Here, she was treated with 1 infusion dose of IV Zosyn.    She was discharged on a 10-day course of Augmentin.    Patient was discharged on HD#1. On the day of discharge, the patient was voiding spontaneously, ambulating at baseline, and pain was well controlled. She understood all instructions for discharge. She was also given the names and numbers of the providers as well as instructions for follow up appointments.     Condition at Discharge: good     Discharge instructions/Information to patient and family:   See after visit summary for information provided to patient and family.      Provisions for Follow-Up Care:  See after visit summary for information related to follow-up care and any pertinent home health orders.      Disposition: Home    Planned Readmission: No    Discharge Statement   I spent 30 minutes discharging the patient. This time was spent on the day of discharge. I had direct contact with the patient on the day of discharge. Additional documentation is required if more than 30 minutes were spent on discharge.     Discharge Medications:  See after visit summary for reconciled discharge medications provided to patient and family.      Jorden Hernandez MD  7/24/2024  9:32 PM

## 2024-07-24 NOTE — ED NOTES
Tolerating PO fluids     Mitra Mcadams, Nursing Student  07/24/24 1120       Mitra Mcadams  07/24/24 1129

## 2024-07-24 NOTE — PROGRESS NOTES
Progress Note - General Surgery   Layne Mcclellan 39 y.o. female MRN: [id number]  Unit/Bed#: ED-19 Encounter: [id number]    Assessment:  39F with sigmoid diverticulitis - clinically improved with mild residual tenderness    Plan:  Diet as tolerated  Recommended fiber supplementation such as benefiber/metamucil or an equivalent  Transition to PO augmentin for 10d course  Stable for d/c home    Subjective/Objective   Subjective: no acute events. Feels better since admission. Tolerating diet without worsening pain.    Objective:     Blood pressure 100/57, pulse 79, temperature 97.8 °F (36.6 °C), resp. rate 16, last menstrual period 07/16/2024, SpO2 100%, not currently breastfeeding.,There is no height or weight on file to calculate BMI.      Intake/Output Summary (Last 24 hours) at 7/24/2024 1635  Last data filed at 7/24/2024 0212  Gross per 24 hour   Intake 1250 ml   Output --   Net 1250 ml       Invasive Devices       Peripheral Intravenous Line  Duration             Peripheral IV 07/23/24 Right;Ventral (anterior) Forearm <1 day                    Physical Exam:   NAD, aox4  Breathing without distress  Abdomen soft, ND, mild LLQ TTP. No rebound/guarding    Lab, Imaging and other studies:I have personally reviewed pertinent lab results.  , CBC:   Lab Results   Component Value Date    WBC 5.41 07/24/2024    HGB 12.6 07/24/2024    HCT 36.2 07/24/2024    MCV 84 07/24/2024     07/24/2024    RBC 4.32 07/24/2024    MCH 29.2 07/24/2024    MCHC 34.8 07/24/2024    RDW 12.1 07/24/2024    MPV 10.4 07/24/2024    NRBC 0 07/24/2024   , CMP:   Lab Results   Component Value Date    SODIUM 137 07/24/2024    K 3.3 (L) 07/24/2024     07/24/2024    CO2 22 07/24/2024    BUN 13 07/24/2024    CREATININE 0.83 07/24/2024    CALCIUM 8.5 07/24/2024    AST 26 07/23/2024    ALT 16 07/23/2024    ALKPHOS 65 07/23/2024    EGFR 89 07/24/2024

## 2024-07-24 NOTE — ED PROVIDER NOTES
History  Chief Complaint   Patient presents with    Abdominal Pain     Reports sent in for micro perforations found on CT scan, sent in for admission per her Dr. Toledo. Reports intermittent abdominal pain. Had blood work earlier today.      Patient is a 39-year-old female with a history of diabetes mellitus, migraines, sigmoid diverticulitis, surgical history of  section, presents to the emergency department with persistent worsening nonradiating left lower quadrant abdominal pain symptoms for 3 days.  Patient has associate symptomatology of nausea and vomiting symptoms beginning with the coronary presentation of left-sided lower abdominal pain.  Patient was recently evaluated Emergency Department on 2024, diagnosed with acute diverticulitis, prescribed 8-year-old 7 days, discharged to home care after pain controlled in the ED, reached out to her PCP on 2024 reported continued pain, with additional ciprofloxacin and Flagyl prescribed by her PCP extending to full 10 days of antibiotic treatment for acute diverticulitis.  Patient with visit to PCP yesterday to family physician, with CT abdomen pelvis ordered with results suspicious for sigmoid diverticulitis with perforation and family physician advised patient to report to the emergency department for evaluation and treatment of this.  Patient denies palliative factors with provocative factors of pressure to left lower quadrant abdomen.  Patient denies not effective treatment.  Patient denies fevers, chills, diarrhea, constipation and urinary symptoms.  Patient has recent fall and recent trauma.  Patient denies sick contacts and recent travel.  Patient denies chest pain or shortness of breath.        History provided by:  Patient   used: No    Abdominal Pain  Associated symptoms: nausea and vomiting    Associated symptoms: no chest pain, no chills, no cough, no dysuria, no fever, no hematuria, no shortness of breath and no sore  throat        Prior to Admission Medications   Prescriptions Last Dose Informant Patient Reported? Taking?   Cholecalciferol (Vitamin D3) 250 MCG (53712 UT) TABS  Self Yes No   Sig: Take 10,000 Units by mouth in the morning   SUMAtriptan (IMITREX) 100 mg tablet   No No   Si tab as needed for migraine, may repeat in 2 hours, max 200mg per 24 hours   cyclobenzaprine (FLEXERIL) 5 mg tablet   No No   Sig: Take 1 tablet (5 mg total) by mouth daily at bedtime as needed for muscle spasms   Patient not taking: Reported on 2024   ibuprofen (MOTRIN) 800 mg tablet  Self Yes No   Sig: Take by mouth 2 (two) times a day As needed   magnesium (MAGTAB) 84 MG (7MEQ) TBCR  Self Yes No   Sig: Take 84 mg by mouth daily   ondansetron (ZOFRAN) 4 mg tablet   No No   Sig: Take 1 tablet (4 mg total) by mouth every 8 (eight) hours as needed for nausea or vomiting   Patient not taking: Reported on 2024   pantoprazole (PROTONIX) 40 mg tablet   No No   Sig: Take 1 tablet (40 mg total) by mouth daily   topiramate (TOPAMAX) 100 mg tablet   No No   Sig: Take 1 tablet (100 mg total) by mouth daily   topiramate (Topamax) 25 mg tablet   No No   Sig: Take one tablet at bedtime for 1 month. If needed increase to 2 tablets at bedtime. In addition to 100mg tablet.      Facility-Administered Medications: None       Past Medical History:   Diagnosis Date    Abnormal Pap smear of cervix     Acute non-recurrent sinusitis 10/25/2021    Arthritis     BMI 27.0-27.9,adult 2022    BRCA1 negative     BRCA2 negative     Constipation, chronic     Cough 2021    COVID-19 virus infection 2021    Depression 2022    Diabetes mellitus (HCC)     Epigastric pain 2024    Fatigue 2021    Gestational diabetes     Gestational hypertension affecting first pregnancy     Hearing loss of right ear 2024    Hyperglycemia 2024    Irritable bowel syndrome     Liver lesion 2024    LLQ abdominal pain 2024     Migraine     Myalgia 2022    Neck pain 2022    Pelvic pain 2024    Rhinitis 10/25/2021    Sigmoid diverticulitis 2024    Sinus congestion 2021    Sore throat 2021    Tinnitus of right ear 2024    Varicella     Weight loss        Past Surgical History:   Procedure Laterality Date     SECTION  10/06/2017    COLPOSCOPY      AL  DELIVERY ONLY Bilateral 10/6/2017    Procedure:  SECTION ();  Surgeon: Patricia Rodriguez MD;  Location: [facility];  Service: Obstetrics    AL  DELIVERY ONLY N/A 3/22/2019    Procedure:  SECTION () REPEAT;  Surgeon: Patricia Rodriguez MD;  Location: [facility];  Service: Obstetrics    AL COLONOSCOPY FLX DX W/COLLJ SPEC WHEN PFRMD N/A 2016    Procedure: COLONOSCOPY;  Surgeon: July Barton MD;  Location: [facility];  Service: Gastroenterology    WISDOM TOOTH EXTRACTION         Family History   Problem Relation Age of Onset    Arthritis Mother     Irritable bowel syndrome Mother     Breast cancer Maternal Grandmother 80    Heart disease Maternal Grandmother     Arthritis Maternal Grandmother     Diabetes Maternal Grandfather     Breast cancer Paternal Grandmother 40    Heart disease Paternal Grandfather     No Known Problems Sister     No Known Problems Father     No Known Problems Daughter     No Known Problems Son     No Known Problems Sister     No Known Problems Maternal Aunt     No Known Problems Paternal Uncle      I have reviewed and agree with the history as documented.    E-Cigarette/Vaping    E-Cigarette Use Never User      E-Cigarette/Vaping Substances    Nicotine No     THC No     CBD No     Flavoring No     Other No     Unknown No      Social History     Tobacco Use    Smoking status: Never    Smokeless tobacco: Never   Vaping Use    Vaping status: Never Used   Substance Use Topics    Alcohol use: Not Currently    Drug use: No       Review of Systems   Constitutional:  Negative for chills and fever.    HENT:  Negative for ear pain and sore throat.    Eyes:  Negative for pain and visual disturbance.   Respiratory:  Negative for cough and shortness of breath.    Cardiovascular:  Negative for chest pain and palpitations.   Gastrointestinal:  Positive for abdominal pain, nausea and vomiting.   Genitourinary:  Negative for dysuria and hematuria.   Musculoskeletal:  Negative for arthralgias and back pain.   Skin:  Negative for color change and rash.   Neurological:  Negative for seizures and syncope.   All other systems reviewed and are negative.      Physical Exam  Physical Exam  Vitals and nursing note reviewed.   Constitutional:       General: She is awake.      Appearance: Normal appearance. She is well-developed and normal weight. She is not ill-appearing, toxic-appearing or diaphoretic.      Comments: /67   Pulse 60   Temp 97.8 °F (36.6 °C)   Resp 14   LMP 07/16/2024   SpO2 98%      HENT:      Head: Normocephalic and atraumatic.      Jaw: There is normal jaw occlusion.      Right Ear: Hearing, tympanic membrane and external ear normal. No decreased hearing noted. No drainage, swelling or tenderness. No mastoid tenderness.      Left Ear: Hearing, tympanic membrane and external ear normal. No decreased hearing noted. No drainage, swelling or tenderness. No mastoid tenderness.      Nose: Nose normal.      Mouth/Throat:      Lips: Pink.      Mouth: Mucous membranes are moist.      Pharynx: Oropharynx is clear. Uvula midline.   Eyes:      General: Lids are normal. Vision grossly intact. Gaze aligned appropriately.         Right eye: No discharge.         Left eye: No discharge.      Extraocular Movements: Extraocular movements intact.      Conjunctiva/sclera: Conjunctivae normal.      Pupils: Pupils are equal, round, and reactive to light.   Neck:      Vascular: No JVD.      Trachea: Trachea and phonation normal. No tracheal tenderness or tracheal deviation.   Cardiovascular:      Rate and Rhythm: Normal  rate and regular rhythm.      Pulses: Normal pulses.           Radial pulses are 2+ on the right side and 2+ on the left side.        Posterior tibial pulses are 2+ on the right side and 2+ on the left side.      Heart sounds: Normal heart sounds.   Pulmonary:      Effort: Pulmonary effort is normal.      Breath sounds: Normal breath sounds and air entry. No stridor. No decreased breath sounds, wheezing, rhonchi or rales.   Chest:      Chest wall: No tenderness.   Abdominal:      General: Abdomen is flat. Bowel sounds are normal. There is no distension.      Palpations: Abdomen is soft. Abdomen is not rigid.      Tenderness: There is abdominal tenderness in the left lower quadrant. There is no right CVA tenderness, left CVA tenderness, guarding or rebound.   Musculoskeletal:         General: Normal range of motion.      Cervical back: Full passive range of motion without pain, normal range of motion and neck supple. No rigidity. No spinous process tenderness or muscular tenderness. Normal range of motion.   Feet:      Right foot:      Toenail Condition: Right toenails are normal.      Left foot:      Toenail Condition: Left toenails are normal.   Lymphadenopathy:      Head:      Right side of head: No submental, submandibular, tonsillar, preauricular, posterior auricular or occipital adenopathy.      Left side of head: No submental, submandibular, tonsillar, preauricular, posterior auricular or occipital adenopathy.      Cervical: No cervical adenopathy.      Right cervical: No superficial, deep or posterior cervical adenopathy.     Left cervical: No superficial, deep or posterior cervical adenopathy.   Skin:     General: Skin is warm.      Capillary Refill: Capillary refill takes less than 2 seconds.      Findings: No rash.   Neurological:      General: No focal deficit present.      Mental Status: She is alert and oriented to person, place, and time. Mental status is at baseline.      GCS: GCS eye subscore is 4.  GCS verbal subscore is 5. GCS motor subscore is 6.      Sensory: No sensory deficit.      Deep Tendon Reflexes: Reflexes are normal and symmetric.      Reflex Scores:       Patellar reflexes are 2+ on the right side and 2+ on the left side.  Psychiatric:         Attention and Perception: Attention normal.         Mood and Affect: Mood normal.         Speech: Speech normal.         Behavior: Behavior normal. Behavior is cooperative.         Thought Content: Thought content normal.         Judgment: Judgment normal.         Vital Signs  ED Triage Vitals   Temperature Pulse Respirations Blood Pressure SpO2   07/23/24 2120 07/23/24 2120 07/23/24 2120 07/23/24 2120 07/23/24 2120   97.8 °F (36.6 °C) 78 18 128/82 98 %      Temp src Heart Rate Source Patient Position - Orthostatic VS BP Location FiO2 (%)   -- 07/23/24 2335 -- -- --    Monitor         Pain Score       07/23/24 2340       5           Vitals:    07/24/24 0000 07/24/24 0124 07/24/24 0200 07/24/24 0230   BP: 127/84 124/72 108/67 94/63   Pulse: 74 70 60 60         Visual Acuity      ED Medications  Medications   multi-electrolyte (PLASMALYTE-A/ISOLYTE-S PH 7.4) IV solution (100 mL/hr Intravenous New Bag 7/24/24 0210)   ondansetron (ZOFRAN) injection 4 mg (has no administration in time range)   heparin (porcine) subcutaneous injection 5,000 Units (has no administration in time range)   acetaminophen (TYLENOL) tablet 650 mg (has no administration in time range)   oxyCODONE (ROXICODONE) split tablet 2.5 mg (has no administration in time range)   oxyCODONE (ROXICODONE) IR tablet 5 mg (has no administration in time range)   HYDROmorphone HCl (DILAUDID) injection 0.2 mg (0.2 mg Intravenous Given 7/24/24 0125)   piperacillin-tazobactam (ZOSYN) IVPB 4.5 g (0 g Intravenous Stopped 7/24/24 0210)     Followed by   piperacillin-tazobactam (ZOSYN) IVPB (EXTENDED INFUSION) 4.5 g (has no administration in time range)   pantoprazole (PROTONIX) EC tablet 40 mg (has no  administration in time range)   topiramate (TOPAMAX) tablet 100 mg (has no administration in time range)   sodium chloride 0.9 % bolus 1,000 mL (0 mL Intravenous Stopped 7/24/24 0212)   HYDROmorphone (DILAUDID) injection 0.5 mg (0.5 mg Intravenous Given 7/23/24 2340)   acetaminophen (Ofirmev) injection 1,000 mg (0 mg Intravenous Stopped 7/24/24 0026)   ceftriaxone (ROCEPHIN) 1 g/50 mL in dextrose IVPB (0 mg Intravenous Stopped 7/24/24 0116)       Diagnostic Studies  Results Reviewed       Procedure Component Value Units Date/Time    POCT pregnancy, urine [[id number]]  (Normal) Resulted: 07/24/24 0057    Lab Status: Final result Updated: 07/24/24 0057     EXT Preg Test, Ur Negative     Control Valid    Lactic acid, plasma (w/reflex if result > 2.0) [[id number]]  (Normal) Collected: 07/23/24 2351    Lab Status: Final result Specimen: Blood from Arm, Right Updated: 07/24/24 0019     LACTIC ACID 0.7 mmol/L     Narrative:      Result may be elevated if tourniquet was used during collection.    Blood culture #2 [[id number]] Collected: 07/23/24 2351    Lab Status: In process Specimen: Blood from Arm, Right Updated: 07/23/24 2356    Blood culture #1 [[id number]] Collected: 07/23/24 2344    Lab Status: In process Specimen: Blood from Arm, Right Updated: 07/23/24 2356    Urine Microscopic [[id number]]  (Normal) Collected: 07/23/24 2305    Lab Status: Final result Specimen: Urine, Clean Catch Updated: 07/23/24 2333     RBC, UA None Seen /hpf      WBC, UA 1-2 /hpf      Epithelial Cells Occasional /hpf      Bacteria, UA Occasional /hpf     UA (URINE) with reflex to Scope [[id number]]  (Abnormal) Collected: 07/23/24 2305    Lab Status: Final result Specimen: Urine, Clean Catch Updated: 07/23/24 2331     Color, UA Colorless     Clarity, UA Clear     Specific Gravity, UA 1.016     pH, UA 5.5     Leukocytes, UA Negative     Nitrite, UA Negative     Protein, UA Negative mg/dl      Glucose, UA Negative mg/dl      Ketones, UA Negative mg/dl       Urobilinogen, UA <2.0 mg/dl      Bilirubin, UA Negative     Occult Blood, UA Moderate    [place] draw [[id number]] Collected: 07/23/24 2124    Lab Status: Final result Specimen: Blood from Arm, Left Updated: 07/23/24 2302    Narrative:      The following orders were created for panel order [place] draw.  Procedure                               Abnormality         Status                     ---------                               -----------         ------                     Light Blue Top on hold[[id number]]                           Final result               Green / Yellow tube on hold[[id number]]                      Final result               Green / Black tube on hold[[id number]]                       Final result               Lavender Top 3 ml on hold[[id number]]                        Final result                 Please view results for these tests on the individual orders.    Comprehensive metabolic panel [[id number]]  (Abnormal) Collected: 07/23/24 2124    Lab Status: Final result Specimen: Blood from Arm, Left Updated: 07/23/24 2202     Sodium 138 mmol/L      Potassium 3.2 mmol/L      Chloride 106 mmol/L      CO2 23 mmol/L      ANION GAP 9 mmol/L      BUN 17 mg/dL      Creatinine 0.82 mg/dL      Glucose 89 mg/dL      Calcium 9.4 mg/dL      AST 26 U/L      ALT 16 U/L      Alkaline Phosphatase 65 U/L      Total Protein 7.5 g/dL      Albumin 4.5 g/dL      Total Bilirubin 0.54 mg/dL      eGFR 91 ml/min/1.73sq m     Narrative:      [employer] guidelines for Chronic Kidney Disease (CKD):     Stage 1 with normal or high GFR (GFR > 90 mL/min/1.73 square meters)    Stage 2 Mild CKD (GFR = 60-89 mL/min/1.73 square meters)    Stage 3A Moderate CKD (GFR = 45-59 mL/min/1.73 square meters)    Stage 3B Moderate CKD (GFR = 30-44 mL/min/1.73 square meters)    Stage 4 Severe CKD (GFR = 15-29 mL/min/1.73 square meters)    Stage 5 End Stage CKD (GFR <15 mL/min/1.73 square meters)  Note: GFR calculation  is accurate only with a steady state creatinine    Lipase [[id number]]  (Abnormal) Collected: 07/23/24 2124    Lab Status: Final result Specimen: Blood from Arm, Left Updated: 07/23/24 2202     Lipase 10 u/L     CBC and differential [[id number]] Collected: 07/23/24 2124    Lab Status: Final result Specimen: Blood from Arm, Left Updated: 07/23/24 2153     WBC 6.99 Thousand/uL      RBC 4.77 Million/uL      Hemoglobin 13.7 g/dL      Hematocrit 40.3 %      MCV 85 fL      MCH 28.7 pg      MCHC 34.0 g/dL      RDW 12.1 %      MPV 10.2 fL      Platelets 287 Thousands/uL      nRBC 0 /100 WBCs      Segmented % 51 %      Immature Grans % 0 %      Lymphocytes % 42 %      Monocytes % 5 %      Eosinophils Relative 1 %      Basophils Relative 1 %      Absolute Neutrophils 3.59 Thousands/µL      Absolute Immature Grans 0.02 Thousand/uL      Absolute Lymphocytes 2.90 Thousands/µL      Absolute Monocytes 0.36 Thousand/µL      Eosinophils Absolute 0.08 Thousand/µL      Basophils Absolute 0.04 Thousands/µL                    No orders to display              Procedures  Procedures         ED Course  ED Course as of 07/24/24 0324   Tue Jul 23, 2024   2340 Texted surgery resident and placed bedside consultation.   Wed Jul 24, 2024   0045 General surgery resident with placing admitting orders for patient at this time under the care of Dr. Anderson, general surgery                                 SBIRT 20yo+      Flowsheet Row Most Recent Value   Initial Alcohol Screen: US AUDIT-C     1. How often do you have a drink containing alcohol? 0 Filed at: 07/23/2024 2138   2. How many drinks containing alcohol do you have on a typical day you are drinking?  0 Filed at: 07/23/2024 2138   3a. Male UNDER 65: How often do you have five or more drinks on one occasion? 0 Filed at: 07/23/2024 2138   3b. FEMALE Any Age, or MALE 65+: How often do you have 4 or more drinks on one occassion? 0 Filed at: 07/23/2024 2138   Audit-C Score 0 Filed at: 07/23/2024     [place]: How many times in the past year have you...    Used an illegal drug or used a prescription medication for non-medical reasons? Never Filed at: 2024                      Medical Decision Making    Patient is a 39-year-old female with a history of diabetes mellitus, migraines, sigmoid diverticulitis, surgical history of  section, presents to the emergency department with persistent worsening nonradiating left lower quadrant abdominal pain symptoms for 3 days.    Patient hemodynamically stable and afebrile  No sirs  No leukocytosis, no bandemia; no lactic acidosis; blood cultures x 2 in process  Urinalysis positive for occult blood, no evidence of urinary tract infection, negative urine pregnancy  Outpatient CT abdomen pelvis suspicious for sigmoid diverticulitis with microperforation  Hypokalemia 3.2; normal kidney function  Rocephin and Flagyl started, IV fluids, antiemetics, multimodal pain control  Discussion with general surgery resident about agreed to place patient on inpatient status under the care of Dr. Anderson general surgery; status post bedside evaluation  Patient with verbal understanding of clinical laboratory and imaging findings, admission instructions and verbalized agreement with patient current treatment plan.    Patient demonstrates verbal understanding of all clinical laboratory and imaging findings, admission instructions, follow-up, and verbally agrees with current treatment plan with teach back    *Due to voice recognition software, sound alike and misspelled words may be contained in the documentation*  \    Amount and/or Complexity of Data Reviewed  Labs: ordered. Decision-making details documented in ED Course.  Radiology: ordered and independent interpretation performed. Decision-making details documented in ED Course.    Risk  Prescription drug management.  Decision regarding hospitalization.                 Disposition  Final diagnoses:   Sigmoid  diverticulitis - With microperforation   Left lower quadrant abdominal pain     Time reflects when diagnosis was documented in both MDM as applicable and the Disposition within this note       Time User Action Codes Description Comment    7/23/2024 11:29 PM Kulwinder Chapin [K57.32] Sigmoid diverticulitis     7/24/2024 12:44 AM Kulwinder Chapin Modify [K57.32] Sigmoid diverticulitis With microperforation    7/24/2024 12:44 AM Kulwinder Chapin [R10.32] Left lower quadrant abdominal pain           ED Disposition       ED Disposition   Admit    Condition   Stable    Date/Time   Wed Jul 24, 2024 0045    Comment   Case was discussed with general surgery resident and the patient's admission status was agreed to be Admission Status: observation status to the service of Dr. Anderson, general surgery .               Follow-up Information    None         Patient's Medications   Discharge Prescriptions    No medications on file       No discharge procedures on file.    PDMP Review         Value Time User    PDMP Reviewed  Yes 7/24/2024  3:24 AM Kulwinder Chapin PA-C            ED Provider  Electronically Signed by             Kulwinder Chapin PA-C  07/24/24 1259

## 2024-07-24 NOTE — QUICK NOTE
See H&P.     Consult to surgery general  Consult performed by: Melonie Wilhelm MD  Consult ordered by: IRAJ Day MD  7/24/2024 12:52 AM

## 2024-07-24 NOTE — H&P
Acute Care Surgery  History and Physical  Layne Mcclellan 39 y.o. female MRN: [id number]  Unit/Bed#: ED-19 Encounter: [id number]    Assessment:  40 yo female here with LLQ abdominal pain. Hx of recurrent diverticulitis, chronic constipation, IBS, hepatic hemangioma, migraines. Colonoscopy from 2016 for constipation had normal findings however CT scan from 2016 shows sigmoid diverticulitis. Recent episode diverticulitis 7/72024 treated with 7 day course of abx. Patient raised concerns of persistent LLQ pain at PCP f/u which prompted instruction to go to ED for evaluation. Tender in LLQ with deep palpation, non-distended. VS stable on RA, WBC 6.99. CT AP shows acute diverticulitis of the proximal sigmoid colon with minimal associated microperforation. Concerns for Hinchey 1 acute diverticulitis.        Plan:  -Admit to service  -Clear liquid diet  -IV fluids  -IV Zosyn  -F/u blood cx  -Pain/nausea control PRN  -Will need colonoscopy 6-8 weeks after resolution of diverticulitis      History of Present Illness   HPI:  Layne Mcclellan is a 39 y.o. female who presents with LLQ abdominal pain. Recent episode diverticulitis 7/72024 treated with 7 day course of abx. Patient raised concerns of persistent LLQ pain at PCP f/u which prompted instruction to go to ED for evaluation.  Patient denies blood in stool.  No nausea vomiting fevers chills chest pain or shortness of breath. Colonoscopy from 2016 for constipation had normal findings however CT scan from 2016 shows sigmoid diverticulitis. Hx of recurrent diverticulitis, chronic constipation, IBS, hepatic hemangioma, migraines.  Surgical history of C-sections x 2.  Not on anticoagulation.    Review of Systems   Constitutional:  Negative for chills and fever.   Respiratory:  Negative for shortness of breath.    Cardiovascular:  Negative for chest pain.   Gastrointestinal:  Positive for abdominal pain. Negative for blood in stool, nausea and vomiting.       Historical  Information   Past Medical History:   Diagnosis Date    Abnormal Pap smear of cervix     Acute non-recurrent sinusitis 10/25/2021    Arthritis     BMI 27.0-27.9,adult 2022    BRCA1 negative     BRCA2 negative     Constipation, chronic     Cough 2021    COVID-19 virus infection 2021    Depression 2022    Diabetes mellitus (HCC)     Epigastric pain 2024    Fatigue 2021    Gestational diabetes     Gestational hypertension affecting first pregnancy     Hearing loss of right ear 2024    Hyperglycemia 2024    Irritable bowel syndrome     Liver lesion 2024    LLQ abdominal pain 2024    Migraine     Myalgia 2022    Neck pain 2022    Pelvic pain 2024    Rhinitis 10/25/2021    Sigmoid diverticulitis 2024    Sinus congestion 2021    Sore throat 2021    Tinnitus of right ear 2024    Varicella     Weight loss      Past Surgical History:   Procedure Laterality Date     SECTION  10/06/2017    COLPOSCOPY      CO  DELIVERY ONLY Bilateral 10/6/2017    Procedure:  SECTION ();  Surgeon: Patricia Rodriguez MD;  [facility]: [facility];  Service: Obstetrics    CO  DELIVERY ONLY N/A 3/22/2019    Procedure:  SECTION () REPEAT;  Surgeon: Patricia Rodriguez MD;  Location: [facility];  Service: Obstetrics    CO COLONOSCOPY FLX DX W/COLLJ SPEC WHEN PFRMD N/A 2016    Procedure: COLONOSCOPY;  Surgeon: July Barton MD;  [facility];  Service: Gastroenterology    WISDOM TOOTH EXTRACTION       Social History   Social History     Substance and Sexual Activity   Alcohol Use Not Currently     Social History     Substance and Sexual Activity   Drug Use No     Social History     Tobacco Use   Smoking Status Never   Smokeless Tobacco Never     Family History:   Family History   Problem Relation Age of Onset    Arthritis Mother     Irritable bowel syndrome Mother     Breast cancer Maternal Grandmother 80     Heart disease Maternal Grandmother     Arthritis Maternal Grandmother     Diabetes Maternal Grandfather     Breast cancer Paternal Grandmother 40    Heart disease Paternal Grandfather     No Known Problems Sister     No Known Problems Father     No Known Problems Daughter     No Known Problems Son     No Known Problems Sister     No Known Problems Maternal Aunt     No Known Problems Paternal Uncle        Meds/Allergies   all medications and allergies reviewed  Allergies   Allergen Reactions    Amoxicillin Rash    Penicillins Rash       Objective   First Vitals:   Blood Pressure: 128/82 (07/23/24 2120)  Pulse: 78 (07/23/24 2120)  Temperature: 97.8 °F (36.6 °C) (07/23/24 2120)  Respirations: 18 (07/23/24 2120)  SpO2: 98 % (07/23/24 2120)    Current Vitals:   Blood Pressure: 140/88 (07/23/24 2335)  Pulse: 69 (07/23/24 2335)  Temperature: 97.8 °F (36.6 °C) (07/23/24 2120)  Respirations: 16 (07/23/24 2335)  SpO2: 99 % (07/23/24 2335)    No intake or output data in the 24 hours ending 07/24/24 0016    Invasive Devices       Peripheral Intravenous Line  Duration             Peripheral IV 07/23/24 Right;Ventral (anterior) Forearm <1 day                    Physical Exam:  General: No acute distress  Neuro: Awake, Alert and Oriented x 3  HEENT:  Normocephalic, atraumatic, moist mucous membranes  CV: Regular rate and rhythm  Lungs: Normal work of breathing, no increased respiratory effort  Abdomen: Soft, tender in LLQ with deep palpation, nondistended  Extremities: No edema, clubbing or cyanosis  Skin: Warm, dry    Lab Results: I have personally reviewed pertinent lab results.  , CBC:   Lab Results   Component Value Date    WBC 6.99 07/23/2024    HGB 13.7 07/23/2024    HCT 40.3 07/23/2024    MCV 85 07/23/2024     07/23/2024    RBC 4.77 07/23/2024    MCH 28.7 07/23/2024    MCHC 34.0 07/23/2024    RDW 12.1 07/23/2024    MPV 10.2 07/23/2024    NRBC 0 07/23/2024   , CMP:   Lab Results   Component Value Date    SODIUM 138  07/23/2024    K 3.2 (L) 07/23/2024     07/23/2024    CO2 23 07/23/2024    BUN 17 07/23/2024    CREATININE 0.82 07/23/2024    CALCIUM 9.4 07/23/2024    AST 26 07/23/2024    ALT 16 07/23/2024    ALKPHOS 65 07/23/2024    EGFR 91 07/23/2024     Imaging: I have personally reviewed pertinent reports.    EKG, Pathology, and Other Studies: I have personally reviewed pertinent reports.      Code Status: Prior  Advance Directive and Living Will:      Power of :    POLST:      Counseling / Coordination of Care  Total floor / unit time spent today 25 minutes. This involved direct patient contact where I performed a full history and physical, reviewed previous records, and reviewed laboratory and other diagnostic studies. Greater than 50% of total time was spent with the patient and / or family counseling and / or coordination of care.    Melonie Wilhelm MD  General Surgery PGY-1  7/24/2024

## 2024-07-24 NOTE — ED NOTES
"Patient brought back to room in the emergency department. Patient states, \"Aren't you taking me upstairs to a room because I am supposed to be directly admitted to the hospital.\" Explained to patient that she will have to be evaluated in the emergency department prior to being admitted to the hospital. Charge nurse aware.      Gail Alvarez RN  07/23/24 9790    "

## 2024-07-24 NOTE — DISCHARGE INSTR - AVS FIRST PAGE
Discharge instructions    Follow-up colonoscopy  Schedule a colonoscopy around 4 weeks from now.  You can coordinate with the surgery team or your primary care doctor on outpatient follow-up.    Medications  Take the prescribed antibiotics as directed.  Complete the full course.  This includes Augmentin 2 times per day for 7 days.    Pain management  Take over-the-counter pain relievers such as Tylenol.  Avoid nonsteroidal anti-inflammatory drugs like ibuprofen or Motrin as they can irritate the stomach.    Diet  Gradually introduce low fiber foods (white rice, white bread, eggs, dairy products) as tolerated.  Avoid solid foods and high-fiber foods during the initial healing phase until symptoms decrease.  As symptoms decrease, gradually reintroduce high-fiber foods (fruits, vegetables, whole grains.  Aim for a high-fiber diet long-term to prevent further episodes.    Activity  Gradually resume normal activities as tolerated    Warning signs  Monitor for symptoms such as increasing abdominal pain, fever, chills, nausea, vomiting, or changes in bowel habit.  If you experience any of these please call our office.  [phone]

## 2024-07-25 ENCOUNTER — TRANSITIONAL CARE MANAGEMENT (OUTPATIENT)
Dept: INTERNAL MEDICINE CLINIC | Facility: CLINIC | Age: 39
End: 2024-07-25

## 2024-07-25 NOTE — UTILIZATION REVIEW
Initial Clinical Review    Admission: Date/Time/Statement:   Admission Orders (From admission, onward)       Ordered        07/24/24 0046  Inpatient Admission  Once                          Orders Placed This Encounter   Procedures    Inpatient Admission     Standing Status:   Standing     Number of Occurrences:   1     Order Specific Question:   Level of Care     Answer:   Med Surg [16]     Order Specific Question:   Estimated length of stay     Answer:   Not Applicable     ED Arrival Information       Expected   -    Arrival   7/23/2024 21:00    Acuity   Urgent              Means of arrival   Walk-In    Escorted by   Self    Service   Surgery-General    Admission type   Emergency              Arrival complaint   Abd Pain             Chief Complaint   Patient presents with    Abdominal Pain     Reports sent in for micro perforations found on CT scan, sent in for admission per her Dr. Toledo. Reports intermittent abdominal pain. Had blood work earlier today.        Initial Presentation: 39 y.o. female who presents with LLQ abdominal pain. Recent episode diverticulitis 7/72024 treated with 7 day course of abx. Patient raised concerns of persistent LLQ pain at PCP f/u which prompted instruction to go to ED for evaluation. Plan: Inpatient admission for evaluation and treatment of LLQ abdominal pain: clear liquid diet, IV fluids, IV Zosyn, follow blood cultures, pain and nausea control.     Sigmoid diverticulitis - clinically improved with mild residual tenderness. Diet as tolerated. Transition to PO augmentin for 10d course. Recommended fiber supplementation such as benefiber/metamucil or an equivalent. Stable for discharge home.       ED Triage Vitals   Temperature Pulse Respirations Blood Pressure SpO2 Pain Score   07/23/24 2120 07/23/24 2120 07/23/24 2120 07/23/24 2120 07/23/24 2120 07/23/24 2340   97.8 °F (36.6 °C) 78 18 128/82 98 % 5     Weight (last 2 days) before discharge       None            Vital Signs  (last 3 days) before discharge       Date/Time Temp Pulse Resp BP MAP (mmHg) SpO2 O2 Device Patient Position - Orthostatic VS [place] Coma Scale Score Pain    07/24/24 1537 -- 79 16 100/57 74 100 % -- -- -- --    07/24/24 0957 -- 83 -- 108/70 84 100 % None (Room air) Sitting -- No Pain    07/24/24 0815 -- 64 -- 109/67 83 99 % None (Room air) Sitting -- --    07/24/24 0230 -- 60 14 94/63 75 96 % None (Room air) -- -- --    07/24/24 0200 -- 60 14 108/67 83 98 % -- -- -- --    07/24/24 0154 -- -- -- -- -- -- -- -- -- 1    07/24/24 0130 -- -- -- -- -- -- -- -- 15 --    07/24/24 0124 -- 70 16 124/72 -- 99 % -- -- -- 4    07/24/24 0010 -- -- -- -- -- -- -- -- -- 2    07/24/24 0000 -- 74 16 127/84 101 99 % -- -- -- --    07/23/24 2340 -- -- -- -- -- -- -- -- -- 5    07/23/24 2335 -- 69 16 140/88 -- 99 % None (Room air) -- -- --    07/23/24 2120 97.8 °F (36.6 °C) 78 18 128/82 -- 98 % -- -- -- --              Pertinent Labs/Diagnostic Test Results:   Radiology:  No orders to display     Cardiology:  No orders to display     GI:  No orders to display           Results from last 7 days   Lab Units 07/24/24 0526 07/23/24 2124 07/23/24  1103   WBC Thousand/uL 5.41 6.99 5.67   HEMOGLOBIN g/dL 12.6 13.7 14.5   HEMATOCRIT % 36.2 40.3 43.0   PLATELETS Thousands/uL 248 287 285   TOTAL NEUT ABS Thousands/µL 1.98 3.59 3.34         Results from last 7 days   Lab Units 07/24/24  0526 07/23/24 2124 07/23/24  1103   SODIUM mmol/L 137 138 139   POTASSIUM mmol/L 3.3* 3.2* 3.9   CHLORIDE mmol/L 108 106 108   CO2 mmol/L 22 23 19*   ANION GAP mmol/L 7 9 12   BUN mg/dL 13 17 19   CREATININE mg/dL 0.83 0.82 0.80   EGFR ml/min/1.73sq m 89 91 93   CALCIUM mg/dL 8.5 9.4 9.3   MAGNESIUM mg/dL 2.0  --   --    PHOSPHORUS mg/dL 3.6  --   --      Results from last 7 days   Lab Units 07/23/24 2124   AST U/L 26   ALT U/L 16   ALK PHOS U/L 65   TOTAL PROTEIN g/dL 7.5   ALBUMIN g/dL 4.5   TOTAL BILIRUBIN mg/dL 0.54         Results from last 7 days   Lab  Units 07/24/24  0526 07/23/24 2124 07/23/24  1103   GLUCOSE RANDOM mg/dL 93 89 93           Results from last 7 days   Lab Units 07/23/24  2351   LACTIC ACID mmol/L 0.7           Results from last 7 days   Lab Units 07/23/24  2124   LIPASE u/L 10*                 Results from last 7 days   Lab Units 07/23/24  2305   CLARITY UA  Clear   COLOR UA  Colorless   SPEC GRAV UA  1.016   PH UA  5.5   GLUCOSE UA mg/dl Negative   KETONES UA mg/dl Negative   BLOOD UA  Moderate*   PROTEIN UA mg/dl Negative   NITRITE UA  Negative   BILIRUBIN UA  Negative   UROBILINOGEN UA (BE) mg/dl <2.0   LEUKOCYTES UA  Negative   WBC UA /hpf 1-2   RBC UA /hpf None Seen   BACTERIA UA /hpf Occasional   EPITHELIAL CELLS WET PREP /hpf Occasional           Results from last 7 days   Lab Units 07/23/24  2351 07/23/24  2344   BLOOD CULTURE  No Growth at 24 hrs. No Growth at 24 hrs.         ED Treatment-Medication Administration from 07/23/2024 2100 to 07/25/2024 1627         Date/Time Order Dose Route Action     07/23/2024 2340 sodium chloride 0.9 % bolus 1,000 mL 1,000 mL Intravenous New Bag     07/23/2024 2340 HYDROmorphone (DILAUDID) injection 0.5 mg 0.5 mg Intravenous Given     07/23/2024 2342 acetaminophen (Ofirmev) injection 1,000 mg 1,000 mg Intravenous New Bag     07/24/2024 0024 ceftriaxone (ROCEPHIN) 1 g/50 mL in dextrose IVPB 1,000 mg Intravenous New Bag     07/24/2024 0210 multi-electrolyte (PLASMALYTE-A/ISOLYTE-S PH 7.4) IV solution 100 mL/hr Intravenous New Bag     07/24/2024 0522 heparin (porcine) subcutaneous injection 5,000 Units 5,000 Units Subcutaneous Given     07/24/2024 0125 HYDROmorphone HCl (DILAUDID) injection 0.2 mg 0.2 mg Intravenous Given     07/24/2024 0119 piperacillin-tazobactam (ZOSYN) IVPB 4.5 g 4.5 g Intravenous New Bag     07/24/2024 0527 piperacillin-tazobactam (ZOSYN) IVPB (EXTENDED INFUSION) 4.5 g 4.5 g Intravenous Given     07/24/2024 0876 piperacillin-tazobactam (ZOSYN) IVPB (EXTENDED INFUSION) 4.5 g 4.5 g  Intravenous Given     07/24/2024 0521 pantoprazole (PROTONIX) EC tablet 40 mg 40 mg Oral Given     07/24/2024 0956 topiramate (TOPAMAX) tablet 100 mg 100 mg Oral Given     07/24/2024 0814 potassium chloride (Klor-Con M20) CR tablet 40 mEq 40 mEq Oral Given            Past Medical History:   Diagnosis Date    Abnormal Pap smear of cervix     Acute non-recurrent sinusitis 10/25/2021    Arthritis     BMI 27.0-27.9,adult 05/09/2022    BRCA1 negative     BRCA2 negative     Constipation, chronic     Cough 09/23/2021    COVID-19 virus infection 12/08/2021    Depression 05/09/2022    Diabetes mellitus (HCC)     Epigastric pain 06/13/2024    Fatigue 12/08/2021    Gestational diabetes     Gestational hypertension affecting first pregnancy     Hearing loss of right ear 04/23/2024    Hyperglycemia 04/23/2024    Irritable bowel syndrome     Liver lesion 04/23/2024    LLQ abdominal pain 07/23/2024    Migraine     Myalgia 05/09/2022    Neck pain 05/09/2022    Pelvic pain 07/23/2024    Rhinitis 10/25/2021    Sigmoid diverticulitis 07/23/2024    Sinus congestion 09/23/2021    Sore throat 09/23/2021    Tinnitus of right ear 04/23/2024    Varicella     Weight loss      Present on Admission:  **None**      Admitting Diagnosis: No admission diagnoses are documented for this encounter.  Age/Sex: 39 y.o. female  Admission Orders:  Scheduled Medications:    piperacillin-tazobactam (ZOSYN) IVPB (EXTENDED INFUSION) 4.5 g  Dose: 4.5 g  Freq: Every 8 hours Route: IV  Start: 07/24/24 0515 End: 07/24/24 1946    Continuous IV Infusions:    multi-electrolyte (PLASMALYTE-A/ISOLYTE-S PH 7.4) IV solution  Rate: 50 mL/hr Dose: 50 mL/hr  Freq: Continuous Route: IV  Indications of Use: IV Hydration  Last Dose: Stopped (07/24/24 1511)  Start: 07/24/24 0100 End: 07/24/24 1400    PRN Meds:  No current facility-administered medications for this encounter.      IP CONSULT TO ACUTE CARE SURGERY    Network Utilization Review Department  ATTENTION: Please call  with any questions or concerns to [phone] and carefully listen to the prompts so that you are directed to the right person. All voicemails are confidential.   For Discharge needs, contact Care Management DC Support Team at [phone] opt. 2  Send all requests for admission clinical reviews, approved or denied determinations and any other requests to dedicated fax number below belonging to the campus where the patient is receiving treatment. List of dedicated fax numbers for the Facilities:  FACILITY NAME UR FAX NUMBER   ADMISSION DENIALS (Administrative/Medical Necessity) [phone]   DISCHARGE SUPPORT TEAM (NETWORK) [phone]   PARENT CHILD HEALTH (Maternity/NICU/Pediatrics) [phone]   [facility] [phone]   [facility] [phone]   [facility] [phone]   [facility] [phone]   [facility] [phone]   [facility] [phone]   [facility] [phone]   [facility] [phone]   [facility] [phone]   [facility] [phone]   [facility] [phone]   [facility] [place] [phone]

## 2024-07-26 NOTE — UTILIZATION REVIEW
NOTIFICATION OF INPATIENT ADMISSION   AUTHORIZATION REQUEST   SERVICING FACILITY:   [address] [place] [address]  Tax ID: [id number]  NPI: [id number]   ATTENDING PROVIDER:  Attending Name and NPI#: Alonzo Anderson Md [[id number]]  Address: [address] [place] [address]  Phone: [phone]     ADMISSION INFORMATION:  Place of Service: Inpatient [facility]  Place of Service Code: 21  Inpatient Admission Date/Time: 7/24/24 12:47 AM  Discharge Date/Time: 7/24/2024  5:45 PM  Admitting Diagnosis Code/Description:  No admission diagnoses are documented for this encounter.     UTILIZATION REVIEW CONTACT:  Cat Rojas Utilization   Network Utilization Review Department  Phone: [phone]  Fax: [phone]  Email: [email or web address]  Contact for approvals/pending authorizations, clinical reviews, and discharge.     PHYSICIAN ADVISORY SERVICES:  Medical Necessity Denial & [place] Review  Phone: [phone]  Fax: [phone]  Email: [email or web address]     DISCHARGE SUPPORT TEAM:  For Patients Discharge Needs & Updates  Phone: [phone] opt. 2 Fax: [phone]  Email: [email or web address]

## 2024-07-28 LAB
BACTERIA BLD CULT: NORMAL
BACTERIA BLD CULT: NORMAL

## 2024-07-29 LAB
BACTERIA BLD CULT: NORMAL
BACTERIA BLD CULT: NORMAL

## 2024-08-01 ENCOUNTER — NURSE TRIAGE (OUTPATIENT)
Age: 39
End: 2024-08-01

## 2024-08-01 DIAGNOSIS — R21 SKIN RASH: Primary | ICD-10-CM

## 2024-08-01 RX ORDER — METHYLPREDNISOLONE 4 MG/1
TABLET ORAL
Qty: 21 EACH | Refills: 0 | Status: SHIPPED | OUTPATIENT
Start: 2024-08-01

## 2024-08-01 NOTE — TELEPHONE ENCOUNTER
"Pt was admitted to the hospital from 7/23-7/24 for sigmoid diverticulitis. Pt started Augmentin on 7/25. Pt states that yesterday she noticed a rash start to develop that has gotten worse today. Pt states that her rash is red circles that vary in size and itch. Pt states that they are located on her legs, arms and stomach. Pt does have a known penicillin allergy. Pt last dose was this morning. Please have PCP advise. Pt will hold of on taking next dose until then. Pt also going to send pictures through [facility]. Pt denies chest pain or SOB. Please advise.     Reason for Disposition   Caller has URGENT medicine question about med that PCP or specialist prescribed and triager unable to answer question    Answer Assessment - Initial Assessment Questions  1. NAME of MEDICATION: \"What medicine are you calling about?\"      Augmentin  2. QUESTION: \"What is your question?\" (e.g., medication refill, side effect)      Side  3. PRESCRIBING HCP: \"Who prescribed it?\" Reason: if prescribed by specialist, call should be referred to that group.      Hospital from admission  4. SYMPTOMS: \"Do you have any symptoms?\"      Rash- red circles is variety of size. Itchy. B/l legs and arms. stomach  5. SEVERITY: If symptoms are present, ask \"Are they mild, moderate or severe?\"      Moderate  6. PREGNANCY:  \"Is there any chance that you are pregnant?\" \"When was your last menstrual period?\"      NA    Protocols used: Medication Question Call-ADULT-[place]    "

## 2024-08-06 ENCOUNTER — OFFICE VISIT (OUTPATIENT)
Dept: INTERNAL MEDICINE CLINIC | Facility: CLINIC | Age: 39
End: 2024-08-06
Payer: COMMERCIAL

## 2024-08-06 VITALS
TEMPERATURE: 98.5 F | WEIGHT: 157 LBS | BODY MASS INDEX: 26.8 KG/M2 | RESPIRATION RATE: 16 BRPM | DIASTOLIC BLOOD PRESSURE: 70 MMHG | OXYGEN SATURATION: 98 % | HEIGHT: 64 IN | SYSTOLIC BLOOD PRESSURE: 112 MMHG | HEART RATE: 98 BPM

## 2024-08-06 DIAGNOSIS — Z76.89 ENCOUNTER FOR SUPPORT AND COORDINATION OF TRANSITION OF CARE: Primary | ICD-10-CM

## 2024-08-06 DIAGNOSIS — L27.0 DRUG-INDUCED SKIN RASH: ICD-10-CM

## 2024-08-06 DIAGNOSIS — K57.32 SIGMOID DIVERTICULITIS: ICD-10-CM

## 2024-08-06 DIAGNOSIS — N76.0 ACUTE VAGINITIS: ICD-10-CM

## 2024-08-06 DIAGNOSIS — E78.2 MIXED HYPERLIPIDEMIA: ICD-10-CM

## 2024-08-06 DIAGNOSIS — G43.009 MIGRAINE WITHOUT AURA AND WITHOUT STATUS MIGRAINOSUS, NOT INTRACTABLE: ICD-10-CM

## 2024-08-06 PROCEDURE — 99495 TRANSJ CARE MGMT MOD F2F 14D: CPT | Performed by: INTERNAL MEDICINE

## 2024-08-06 RX ORDER — FLUCONAZOLE 150 MG/1
150 TABLET ORAL
Qty: 2 TABLET | Refills: 0 | Status: SHIPPED | OUTPATIENT
Start: 2024-08-06 | End: 2024-08-08

## 2024-08-06 NOTE — PATIENT INSTRUCTIONS
Patient was advised to continue present medications. discussed with the patient medications and laboratory data in detail.  Follow-up with me in 6 months or as advised.  If any blood test was ordered please do 1 week prior to next appointment unless advise to get earlier.  If you have any questions please call the office [phone]

## 2024-08-06 NOTE — PROGRESS NOTES
Assessment/Plan:    1. Encounter for support and coordination of transition of care  2. Migraine without aura and without status migrainosus, not intractable  3. Mixed hyperlipidemia  -     Lipid panel; Future  4. Sigmoid diverticulitis  5. Acute vaginitis  -     fluconazole (DIFLUCAN) 150 mg tablet; Take 1 tablet (150 mg total) by mouth daily in the early morning for 2 doses  6. BMI 26.0-26.9,adult  7. Drug-induced skin rash     Discussion/summary/plan:    She was admitted in the hospital with uncomplicated microperforated diverticulitis.  She was treated with intravenous antibiotic and was discharged on Augmentin.  She developed generalized skin rash so Augmentin was treated with Medrol Dosepak was treated with Medrol Dosepak for allergic reaction.  Her skin rash is all resolved.  She also finished ciprofloxacin and metronidazole as well.  Discussed with the patient about diet for diverticulitis.  She complained of having possible yeast infection per vagina from taking antibiotics.  I prescribed Diflucan which she has taken in the past.  Her diverticulitis is clinically appears to be resolved.  Advised to see GI specialist as scheduled.  Cholesterol 232, triglyceride 122, HDL 52,  at present she does not any medication.  She will watch her diet very closely.  Advised for low-cholesterol low-carb diet we will follow lipid panel.  Discussed with the patient consequences of the persistently elevated cholesterol.  She has been seen and followed by neurologist for management of her migraine.  Advised to exercise watch diet and try to lose weight.      Subjective: Patient presents for transfer of care management.      Patient ID: Layne Mcclellan is a 39 y.o. female.    HPI  39-year-old white female patient presents for transfer of care management.  She was admitted in the hospital with microperforated diverticulitis.  She received IV antibiotic and was discharged on Augmentin to take by mouth.  She developed  skin rash on Augmentin so Augmentin was discontinued and she took a rest of the days Cipro and metronidazole.  She was prescribed Medrol Dosepak for skin rash which is all better.  Pain in abdomen is better.  Denies cough fever chills .denies nausea vomiting diarrhea. denies chest pain shortness of breath.  Complain of yeast infection per vagina.    The following portions of the patient's history were reviewed and updated as appropriate:     Past Medical History:  She has a past medical history of Abnormal Pap smear of cervix, Acute non-recurrent sinusitis (10/25/2021), Arthritis, BMI 26.0-26.9,adult (2024), BMI 27.0-27.9,adult (2022), BRCA1 negative, BRCA2 negative, Constipation, chronic, Cough (2021), COVID-19 virus infection (2021), Depression (2022), Drug-induced skin rash (2024), Epigastric pain (2024), Fatigue (2021), Gestational diabetes, Gestational hypertension affecting first pregnancy, Hearing loss of right ear (2024), Hyperglycemia (2024), Irritable bowel syndrome, Liver lesion (2024), LLQ abdominal pain (2024), Migraine, Myalgia (2022), Neck pain (2022), Pelvic pain (2024), Rhinitis (10/25/2021), Sigmoid diverticulitis (2024), Sinus congestion (2021), Sore throat (2021), Tinnitus of right ear (2024), Varicella, and Weight loss.,  _______________________________________________________________________  Past Surgical History:   has a past surgical history that includes [place] tooth extraction; pr colonoscopy flx dx w/collj spec when pfrmd (N/A, 2016); Colposcopy; pr  delivery only (Bilateral, 10/6/2017);  section (10/06/2017); and pr  delivery only (N/A, 3/22/2019).,  _______________________________________________________________________  Family History:  family history includes Arthritis in her maternal grandmother and mother; Breast cancer (age of onset: 40) in  her paternal grandmother; Breast cancer (age of onset: 80) in her maternal grandmother; Diabetes in her maternal grandfather; Heart disease in her maternal grandmother and paternal grandfather; Irritable bowel syndrome in her mother; No Known Problems in her daughter, father, maternal aunt, paternal uncle, sister, sister, and son.,  _______________________________________________________________________  Social History:   reports that she has never smoked. She has never used smokeless tobacco. She reports that she does not currently use alcohol. She reports that she does not use drugs.,  _______________________________________________________________________  Allergies:  is allergic to amoxicillin, augmentin [amoxicillin-pot clavulanate], and penicillins..  _______________________________________________________________________  Current Outpatient Medications   Medication Sig Dispense Refill   • fluconazole (DIFLUCAN) 150 mg tablet Take 1 tablet (150 mg total) by mouth daily in the early morning for 2 doses 2 tablet 0   • methylPREDNISolone 4 MG tablet therapy pack Use as directed on package 21 each 0   • pantoprazole (PROTONIX) 40 mg tablet Take 1 tablet (40 mg total) by mouth daily 90 tablet 0   • topiramate (TOPAMAX) 100 mg tablet Take 1 tablet (100 mg total) by mouth daily 90 tablet 1   • topiramate (Topamax) 25 mg tablet Take one tablet at bedtime for 1 month. If needed increase to 2 tablets at bedtime. In addition to 100mg tablet. 60 tablet 5     No current facility-administered medications for this visit.     _______________________________________________________________________  Review of Systems   Constitutional:  Negative for chills and fever.   HENT:  Negative for congestion, ear pain, hearing loss, nosebleeds, sinus pain, sore throat and trouble swallowing.    Eyes:  Negative for discharge, redness and visual disturbance.   Respiratory:  Negative for cough, chest tightness and shortness of breath.   "  Cardiovascular:  Negative for chest pain and palpitations.   Gastrointestinal:  Negative for abdominal pain, blood in stool, constipation, diarrhea, nausea and vomiting.   Genitourinary:  Negative for dysuria, flank pain and hematuria.   Musculoskeletal:  Negative for arthralgias, myalgias and neck pain.   Skin:  Negative for color change and rash.   Neurological:  Negative for dizziness, speech difficulty, weakness and headaches.   Hematological:  Does not bruise/bleed easily.   Psychiatric/Behavioral:  Negative for agitation and behavioral problems.          Objective:  Vitals:    08/06/24 1537   BP: 112/70   BP Location: Left arm   Patient Position: Sitting   Cuff Size: Standard   Pulse: 98   Resp: 16   Temp: 98.5 °F (36.9 °C)   TempSrc: Temporal   SpO2: 98%   Weight: 71.2 kg (157 lb)   Height: 5' 4\" (1.626 m)     Body mass index is 26.95 kg/m².     Physical Exam  Vitals and nursing note reviewed.   Constitutional:       General: She is not in acute distress.     Appearance: Normal appearance.   HENT:      Head: Normocephalic and atraumatic.      Right Ear: Ear canal and external ear normal.      Left Ear: Ear canal and external ear normal.      Nose: Nose normal.      Mouth/Throat:      Mouth: Mucous membranes are moist.   Eyes:      General: No scleral icterus.        Right eye: No discharge.         Left eye: No discharge.      Extraocular Movements: Extraocular movements intact.      Conjunctiva/sclera: Conjunctivae normal.   Cardiovascular:      Rate and Rhythm: Normal rate and regular rhythm.      Pulses: Normal pulses.      Heart sounds: Normal heart sounds. No murmur heard.  Pulmonary:      Effort: Pulmonary effort is normal. No respiratory distress.      Breath sounds: Normal breath sounds. No wheezing, rhonchi or rales.   Abdominal:      General: Bowel sounds are normal. There is no distension.      Palpations: Abdomen is soft.      Tenderness: There is no abdominal tenderness.   Musculoskeletal:    "      General: Normal range of motion.      Cervical back: Normal range of motion and neck supple. No muscular tenderness.      Right lower leg: No edema.      Left lower leg: No edema.   Skin:     General: Skin is warm.      Findings: No rash.   Neurological:      General: No focal deficit present.      Mental Status: She is alert and oriented to person, place, and time.      Motor: No weakness.      Coordination: Coordination normal.   Psychiatric:         Mood and Affect: Mood normal.         Behavior: Behavior normal.

## 2024-08-07 NOTE — TELEPHONE ENCOUNTER
Wound Care Supplies      Supply Company:     [employer] [address] [place] [address] f: [phone] f: [phone] p: [phone] [email or web address]      Ordering Center:     [facility]  [address] [place] [address], [address] [address]    Phone: [phone]  Fax: [phone]    Patient Information:      Gia Krishna  [address] [facility] [address]   [phone]   : 1987  AGE: 37 y.o.     GENDER: male   EPISODE DATE: 2024    Insurance:      PRIMARY INSURANCE:  Plan: BCBS OUT OF STATE  Coverage: BCBS  Effective Date: 2024  Group Number: [unfilled]  Subscriber Number: [id number] - ([place] [employer])    Payer/Plan Subscr  Sex Relation Sub. Ins. ID Effective Group Num   1. BCBS - BCBS O* GIA KRISHNA 1987 Male Self [id number]* 24                                    [address]       Patient Wound Information:      Problem List Items Addressed This Visit    None      WOUNDS REQUIRING DRESSING SUPPLIES:     Wound 24 Toe (Comment  which one) Left;Plantar #1 great toe (Active)   Wound Image   24 0808   Wound Etiology Diabetic Barry 3 24 0808   Dressing Status Clean;Dry;Intact 24 0808   Wound Cleansed Cleansed with saline 24 0808   Wound Length (cm) 0.4 cm 24 0808   Wound Width (cm) 0.3 cm 24 0808   Wound Depth (cm) 0.9 cm 24 0808   Wound Surface Area (cm^2) 0.12 cm^2 24 0808   Wound Volume (cm^3) 0.108 cm^3 24 0808   Post-Procedure Length (cm) 1.4 cm 24 0901   Post-Procedure Width (cm) 1.3 cm 24 0901   Post-Procedure Depth (cm) 1 cm 24 09   Post-Procedure Surface Area (cm^2) 1.82 cm^2 24 0901   Post-Procedure Volume (cm^3) 1.82 cm^3 24 0901   Undermining Starts ___ O'Clock 12 24 0808   Undermining Ends___ O'Clock 24 0808   Undermining Maxium Distance (cm) 0.6 24 0808   Wound Assessment Pale granulation 
3 month Refill placed, if she can try to come for annual gyn exam in the next 3 months
Left detailed message for pt making her aware
Boot [] CROW Boot     [] Diabetic Shoes    [] Offloading heel boot  [x] Other: Knee scooter    Contact Cast:  Apply: [] Total Contact Cast Applied in Clinic to []RightLeg []Left Leg   [] Do not get cast wet.  Contact wound center or go to emergency room if there is a foul odor or becomes uncomfortable due to feeling tight or swelling.  Do not use objects inside of cast to scratch.      Dietary:  [] Diet as tolerated: [x] Diabetic Diet: [] No Added Salt:  [x] Increase Protein: [] Other:     Activity:  [] Activity as tolerated  [] Patient has no activity restrictions      [] Strict Bedrest   [x] Remain off Work:  we will re-evaluation on 7/14     [] May return to full duty work                                    [] Return to work with restrictions:     Physician:  [] Dr. Shelby Choudhury  [x] Dr. Ghazal Ernst   [] Dr. Sunshine Dennis  [] Marcel Orozco PA-C  [] Dr. Edith Wilson  [] Dr. Karthik Iyer  [] Dr. Carey Reed    Nurse Case Manger:  Rebeca      Wound Care Center Information: Should you experience any significant changes in your wound(s) or have questions about your wound care, please contact the Wound Center at [phone]. Our hours are Monday - Friday 8am - 4:30pm, closed all major holidays. If you need help with your wound outside these hours and cannot wait until we are again available, contact your PCP or go to the hospital emergency room.     PLEASE NOTE: IF YOU ARE UNABLE TO OBTAIN WOUND SUPPLIES, CONTINUE TO USE THE SUPPLIES YOU HAVE AVAILABLE UNTIL YOU ARE ABLE TO REACH US. IT IS MOST IMPORTANT TO KEEP THE WOUND COVERED AT ALL TIMES.

## 2024-08-16 ENCOUNTER — TELEPHONE (OUTPATIENT)
Age: 39
End: 2024-08-16

## 2024-08-16 DIAGNOSIS — N76.0 ACUTE VAGINITIS: Primary | ICD-10-CM

## 2024-08-16 RX ORDER — FLUCONAZOLE 150 MG/1
150 TABLET ORAL ONCE
Qty: 1 TABLET | Refills: 0 | Status: SHIPPED | OUTPATIENT
Start: 2024-08-16 | End: 2024-08-16

## 2024-08-16 NOTE — TELEPHONE ENCOUNTER
Outpatient Medications Marked as Taking for the 8/24/20 encounter (Refill) with Zak Cordova, DO   Medication Sig Dispense Refill   • finasteride (PROSCAR) 5 MG tablet Take 1 tablet by mouth daily. 90 tablet 0        Ok to leave detailed Message: Yes  Informed caller of refill policy- 24-48 hours: Yes  No call back needed unless nurse has questions.     Pharmacy: [facility] in [place]    Patient called states yeast infection is a little better but is still having mild itching (inside vagina) would like to know if pill or cream or both can be sent to [facility] on [address]

## 2024-10-01 ENCOUNTER — TELEPHONE (OUTPATIENT)
Dept: NEUROLOGY | Facility: CLINIC | Age: 39
End: 2024-10-01

## 2024-10-01 DIAGNOSIS — G43.009 MIGRAINE WITHOUT AURA AND WITHOUT STATUS MIGRAINOSUS, NOT INTRACTABLE: Primary | ICD-10-CM

## 2024-10-01 RX ORDER — SUMATRIPTAN SUCCINATE 100 MG/1
100 TABLET ORAL AS NEEDED
Qty: 9 TABLET | Refills: 3 | Status: SHIPPED | OUTPATIENT
Start: 2024-10-01

## 2024-10-01 NOTE — TELEPHONE ENCOUNTER
Patient is calling to request refill of Imitrex. Reports this is a medication she gets prescribed regularly and always has refills. Medication no longer on active med list and marked discontinued by Jorden Hernandez MD on 7/24/2024 16:01 - no discontinuation reason listed. Please review and let patient know  if she is supposed to be discontinuing this medication or if a new script can be sent to her pharmacy. Patient uses [facility]   oral

## 2024-10-19 ENCOUNTER — HOSPITAL ENCOUNTER (OUTPATIENT)
Dept: RADIOLOGY | Facility: HOSPITAL | Age: 39
Discharge: HOME | End: 2024-10-19
Payer: COMMERCIAL

## 2024-10-19 DIAGNOSIS — M54.50 LOW BACK PAIN, UNSPECIFIED BACK PAIN LATERALITY, UNSPECIFIED CHRONICITY, UNSPECIFIED WHETHER SCIATICA PRESENT: ICD-10-CM

## 2024-10-19 PROCEDURE — 72100 X-RAY EXAM L-S SPINE 2/3 VWS: CPT

## 2024-10-24 ENCOUNTER — OFFICE VISIT (OUTPATIENT)
Dept: INTERNAL MEDICINE CLINIC | Facility: CLINIC | Age: 39
End: 2024-10-24
Payer: COMMERCIAL

## 2024-10-24 VITALS
HEART RATE: 99 BPM | RESPIRATION RATE: 18 BRPM | BODY MASS INDEX: 28.17 KG/M2 | DIASTOLIC BLOOD PRESSURE: 78 MMHG | OXYGEN SATURATION: 99 % | SYSTOLIC BLOOD PRESSURE: 110 MMHG | HEIGHT: 64 IN | WEIGHT: 165 LBS | TEMPERATURE: 97.9 F

## 2024-10-24 DIAGNOSIS — M41.9 SCOLIOSIS OF THORACOLUMBAR SPINE, UNSPECIFIED SCOLIOSIS TYPE: ICD-10-CM

## 2024-10-24 DIAGNOSIS — M54.41 ACUTE RIGHT-SIDED LOW BACK PAIN WITH RIGHT-SIDED SCIATICA: Primary | ICD-10-CM

## 2024-10-24 PROCEDURE — 99213 OFFICE O/P EST LOW 20 MIN: CPT | Performed by: INTERNAL MEDICINE

## 2024-10-24 RX ORDER — METHYLPREDNISOLONE 4 MG/1
TABLET ORAL
Qty: 21 EACH | Refills: 0 | Status: SHIPPED | OUTPATIENT
Start: 2024-10-24

## 2024-10-24 RX ORDER — IBUPROFEN 200 MG
800 TABLET ORAL EVERY 12 HOURS PRN
COMMUNITY

## 2024-10-24 NOTE — PROGRESS NOTES
Assessment/Plan:    1. Acute right-sided low back pain with right-sided sciatica  -     methylPREDNISolone 4 MG tablet therapy pack; Use as directed on package  -     Ambulatory Referral to Physical Therapy; Future  -     Ambulatory Referral to Orthopedic Surgery; Future  2. Scoliosis of thoracolumbar spine, unspecified scoliosis type  -     Ambulatory Referral to Orthopedic Surgery; Future        Discussion/summary/plan:    She developed low back pain about 2 weeks ago.  Denies any injury-year-old fall.  Pain is mainly on the right side and some numbness in the right leg.  She has been taking ibuprofen 800 mg twice a day as needed.  She tried the Flexeril but she got very sick and sleepy so she does not want any muscle relaxant.  She already went to chiropractor twice a day x-ray of the lower back.  X-ray revealed slight levoscoliosis of the thoracolumbar spine and mild narrowing of the L5-S1 disc space.  Tiptoe walking normal SLR negative at present.  No gross motor deficit.  Has some tenderness right lower back.  No skin rash noted.  Will start Medrol Dosepak.  Advised to continue ibuprofen as needed for pain.  Take pantoprazole while being on these medications to avoid any GI side effect.  Will refer for physical therapy as well as to orthopedic specialist.    Subjective: Low back pain      Patient ID: Layne Mcclellan is a 39 y.o. female.    Back Pain  Pertinent negatives include no abdominal pain, chest pain, fever or weakness.     39-year-old white female patient presents with complaint of low back pain for last 2 days.  It happened without any injury.  Has been seeing chiropractor had two visits with chiropractor but not getting better.  Has been taking ibuprofen 800 mg twice a day as needed for pain.  Took Flexeril 1 time but she got very sick with the Flexeril.    The following portions of the patient's history were reviewed and updated as appropriate:     Past Medical History:  She has a past medical  history of Abnormal Pap smear of cervix, Acute non-recurrent sinusitis (10/25/2021), Arthritis, BMI 26.0-26.9,adult (2024), BMI 27.0-27.9,adult (2022), BRCA1 negative, BRCA2 negative, Constipation, chronic, Cough (2021), COVID-19 virus infection (2021), Depression (2022), Drug-induced skin rash (2024), Epigastric pain (2024), Fatigue (2021), Gestational diabetes, Gestational hypertension affecting first pregnancy, Hearing loss of right ear (2024), Hyperglycemia (2024), Irritable bowel syndrome, Liver lesion (2024), LLQ abdominal pain (2024), Migraine, Myalgia (2022), Neck pain (2022), Pelvic pain (2024), Rhinitis (10/25/2021), Scoliosis of thoracolumbar spine (10/24/2024), Sigmoid diverticulitis (2024), Sinus congestion (2021), Sore throat (2021), Tinnitus of right ear (2024), Varicella, and Weight loss.,  _______________________________________________________________________  Past Surgical History:   has a past surgical history that includes [place] tooth extraction; pr colonoscopy flx dx w/collj spec when pfrmd (N/A, 2016); Colposcopy; pr  delivery only (Bilateral, 10/6/2017);  section (10/06/2017); and pr  delivery only (N/A, 3/22/2019).,  _______________________________________________________________________  Family History:  family history includes Arthritis in her maternal grandmother and mother; Breast cancer (age of onset: 40) in her paternal grandmother; Breast cancer (age of onset: 80) in her maternal grandmother; Diabetes in her maternal grandfather; Heart disease in her maternal grandmother and paternal grandfather; Irritable bowel syndrome in her mother; No Known Problems in her daughter, father, maternal aunt, paternal uncle, sister, sister, and son.,  _______________________________________________________________________  Social History:   reports that she  has never smoked. She has never used smokeless tobacco. She reports that she does not currently use alcohol. She reports that she does not use drugs.,  _______________________________________________________________________  Allergies:  is allergic to amoxicillin, augmentin [amoxicillin-pot clavulanate], and penicillins..  _______________________________________________________________________  Current Outpatient Medications   Medication Sig Dispense Refill    ibuprofen (MOTRIN) 200 mg tablet Take 800 mg by mouth every 12 (twelve) hours as needed for mild pain      methylPREDNISolone 4 MG tablet therapy pack Use as directed on package 21 each 0    SUMAtriptan (IMITREX) 100 mg tablet Take 1 tablet (100 mg total) by mouth as needed for migraine May repeat in 2 hours. Limit of 3/week or 9/month 9 tablet 3    topiramate (TOPAMAX) 100 mg tablet Take 1 tablet (100 mg total) by mouth daily 90 tablet 1    topiramate (Topamax) 25 mg tablet Take one tablet at bedtime for 1 month. If needed increase to 2 tablets at bedtime. In addition to 100mg tablet. 60 tablet 5    methylPREDNISolone 4 MG tablet therapy pack Use as directed on package 21 each 0    pantoprazole (PROTONIX) 40 mg tablet Take 1 tablet (40 mg total) by mouth daily 90 tablet 0     No current facility-administered medications for this visit.     _______________________________________________________________________  Review of Systems   Constitutional:  Negative for chills and fever.   Respiratory:  Negative for cough and shortness of breath.    Cardiovascular:  Negative for chest pain.   Gastrointestinal:  Negative for abdominal pain, diarrhea, nausea and vomiting.   Musculoskeletal:  Positive for back pain. Negative for gait problem.   Neurological:  Negative for dizziness and weakness.   Psychiatric/Behavioral:  Negative for agitation and behavioral problems.          Objective:  Vitals:    10/24/24 1309   BP: 110/78   BP Location: Left arm   Patient Position:  "Sitting   Cuff Size: Large   Pulse: 99   Resp: 18   Temp: 97.9 °F (36.6 °C)   TempSrc: Temporal   SpO2: 99%   Weight: 74.8 kg (165 lb)   Height: 5' 4\" (1.626 m)     Body mass index is 28.32 kg/m².     Physical Exam  Vitals and nursing note reviewed.   Constitutional:       General: She is not in acute distress.     Appearance: Normal appearance.   HENT:      Head: Normocephalic and atraumatic.      Right Ear: External ear normal.      Left Ear: External ear normal.   Eyes:      General: No scleral icterus.        Right eye: No discharge.         Left eye: No discharge.      Extraocular Movements: Extraocular movements intact.      Conjunctiva/sclera: Conjunctivae normal.   Cardiovascular:      Rate and Rhythm: Normal rate and regular rhythm.      Pulses: Normal pulses.      Heart sounds: Normal heart sounds. No murmur heard.  Pulmonary:      Effort: Pulmonary effort is normal. No respiratory distress.      Breath sounds: Normal breath sounds.   Abdominal:      General: Bowel sounds are normal.      Palpations: Abdomen is soft.      Tenderness: There is no abdominal tenderness.   Musculoskeletal:         General: Tenderness (Has some tenderness right lower back.  SLR negative tiptoe walking normal at present.  Forward bending slightly decreased.) present. Normal range of motion.      Cervical back: Normal range of motion and neck supple. No muscular tenderness.      Right lower leg: No edema.      Left lower leg: No edema.   Skin:     General: Skin is warm.      Findings: No rash.   Neurological:      General: No focal deficit present.      Mental Status: She is alert and oriented to person, place, and time.      Motor: No weakness.      Coordination: Coordination normal.   Psychiatric:         Mood and Affect: Mood normal.         Behavior: Behavior normal.         "

## 2024-10-24 NOTE — PATIENT INSTRUCTIONS
Patient was advised to continue present medications. discussed with the patient medications and laboratory data in detail.  Follow-up with me in as advised.  If any blood test was ordered please do 1 week prior to next appointment unless advise to get earlier.  If you have any questions please call the office [phone]

## 2024-10-29 ENCOUNTER — EVALUATION (OUTPATIENT)
Dept: PHYSICAL THERAPY | Facility: CLINIC | Age: 39
End: 2024-10-29
Payer: COMMERCIAL

## 2024-10-29 DIAGNOSIS — M54.41 ACUTE RIGHT-SIDED LOW BACK PAIN WITH RIGHT-SIDED SCIATICA: ICD-10-CM

## 2024-10-29 PROCEDURE — 97162 PT EVAL MOD COMPLEX 30 MIN: CPT | Performed by: PHYSICAL THERAPIST

## 2024-10-29 PROCEDURE — 97530 THERAPEUTIC ACTIVITIES: CPT | Performed by: PHYSICAL THERAPIST

## 2024-10-29 NOTE — PROGRESS NOTES
PT Evaluation     Today's date: 10/29/2024  Patient name: Layne Mcclellan  : 1985  MRN: [id number]  Referring provider: Earl Toledo*  Dx:   Encounter Diagnosis     ICD-10-CM    1. Acute right-sided low back pain with right-sided sciatica  M54.41 Ambulatory Referral to Physical Therapy          Start Time: 0800  Stop Time: 0845  Total time in clinic (min): 45 minutes    Assessment  Impairments: abnormal gait, abnormal or restricted ROM, activity intolerance, impaired balance, impaired physical strength, lacks appropriate home exercise program, pain with function, weight-bearing intolerance, poor posture , poor body mechanics, participation limitations and activity limitations  Functional limitations: prolonged sitting, prolonged standing, sleeping, LE, lifting heavy objects    Assessment details: Patient is a 39 y.o. female who presents to outpatient PT with reports of chronic low back pain with radicular symptoms (R>L). Patient reports recent increase in symptoms over the past year, with recent flare-up of pain in the 2-3 weeks. 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 She reports pain primarily with prolonged sitting, but also notices pain with prolonged standing, increased activity, bending forward, lifting heavier objects, LE dressing, sleeping, etc. Some tightness in bilateral hamstring with standing lumbar flexion AROM, however pain and limitations noted with lumbar extension ROM. She tolerates prone on elbows sustained extension stretch with some decrease in pain and decrease in radicular symptoms. Patient noted to have some pain with supine bridging and posterior pelvic tilts, however responds well to manual SKTC and flexion stretching. Patient responds well to manual LAD of Right LE or bilateral LEs. Assessed for potential LLD, with apparent LLD noted with supine to long-sit testing. Noted possible Right LE anterior innominate due to muscle imbalances  bilaterally. Her current symptoms and impairments affect her function and ease with ADLs and work tasks as mentioned above. Signs and symptoms suggest possible neural tension and possible nerve compression from foraminal space narrowing causing lower back and LE pain/symptoms. Patient will benefit from skilled PT to improve pain and radicular symptoms, improve core and LE strength, and improve overall mechanics and ease with ADLs and work tasks to improve her overall quality of life. Patient would benefit from skilled PT services to address these impairments and to maximize function.  Thank you for the referral.    Barriers to therapy: Chronicity of symptoms  Understanding of Dx/Px/POC: good     Prognosis: good    Goals  Impairment Goals 4-6 weeks   In order to maximize function patient will be able to...   - Decrease intensity/duration/frequency of pain to 4/10  - Demonstrate symmetrical lumbar AROM without pain  - Increase hip strength to 4/5 throughout  - Demonstrate improved hip flexibility as demonstrated by increased ROM through therapeutic exercise    Functional Goals 6-8 weeks  In order to return to prior level of function patient will be able to...   - Participate in ADL's/IADL's/sport specific activities with no greater than 2/10 pain.    - Increase Functional Status Measure (FOTO) to: anticipated at discharge  - Demonstrate independence and compliant with HEP  - Demonstrate functional activities with good core and glute strength without compensation/pain/difficulty   - Patient will report better prolonged sitting tolerance without pain to improve ability to tolerance driving, working at computer, etc.    Plan  Patient would benefit from: skilled PT  Planned modality interventions: cryotherapy and electrical stimulation/Russian stimulation  Other planned modality interventions: moist heat    Planned therapy interventions: joint mobilization, manual therapy, neuromuscular re-education, patient education,  "strengthening, stretching, therapeutic activities, therapeutic exercise, home exercise program, functional ROM exercises, Gonzales taping, postural training, balance/weight bearing training, body mechanics training, flexibility, IASTM, kinesiology taping, massage and nerve gliding    Frequency: 1-2x week  Duration in weeks: 4  Treatment plan discussed with: patient, PTA and referring physician      Subjective Evaluation    History of Present Illness  Mechanism of injury: Layne Mcclellan is a 39 y.o. year-old female who presents to outpatient PT with reports of chronic low back pain over the past 7-8 years, that has been intermittent over the years. Patient reports in the past year, she noticed her pain worsening. She reports she had gone to chiropractor, which helped temporarily. However, she had a flare-up of symptoms about 2.5 weeks ago, and was not able to tolerate attempting chiropractic treatment. She reports radicular symptoms/pain in bilateral posterior LE, but more in Right LE recently. She saw her PCP who prescribed her Prednisone, which patient has been taking since. She received x-ray on 10/19 that revealed \"Slight levoscoliosis of the thoracolumbar spine. Mild narrowing of the L5-S1 disc space.\" She was then recommended for PT.   Quality of life: good    Patient Goals  Patient goals for therapy: decreased pain, increased motion, improved balance, increased strength, independence with ADLs/IADLs, return to sport/leisure activities and return to work    Pain  Current pain ratin  At best pain ratin  At worst pain ratin  Location: low back, bilateral LE (R>L)  Quality: dull ache and radiating (soreness in Right leg)  Relieving factors: ice  Aggravating factors: sitting, standing, stair climbing, walking and lifting  Progression: worsening    Social Support  Steps to enter house: yes  Stairs in house: yes   Lives with: spouse and young children    Employment status: working (nurse; triage " (computer work primarily))    Diagnostic Tests  X-ray: abnormal  Treatments  Current treatment: physical therapy      Objective     Concurrent Complaints  Positive for disturbed sleep. Negative for night pain, bladder dysfunction, bowel dysfunction and saddle (S4) numbness    Postural Observations    Additional Postural Observation Details  Standing Posture: decreased lumbar lordosis with anterior pelvic tilt, Left foot pronated with slight Left hip drop    Palpation     Right   Muscle spasm in the erector spinae, lumbar paraspinals and quadratus lumborum.   Tenderness of the erector spinae, lumbar paraspinals and quadratus lumborum.     Tenderness     Lumbar Spine  Tenderness in the spinous process.     Right Hip   Tenderness in the PSIS.     Neurological Testing     Sensation     Lumbar   Left   Intact: light touch    Right   Intact: light touch    Active Range of Motion     Lumbar   Flexion: 40 degrees  Restriction level: moderate  Extension: 8 degrees  with pain Restriction level: moderate  Left lateral flexion:  WFL  Right lateral flexion:  WFL  Left rotation:  WFL  Right rotation:  WFL    Passive Range of Motion     Additional Passive Range of Motion Details  Passive SKTC, hamstring, piriformis mild to mod tightness noted, but no increase in pain  Mechanical Assessment    Cervical      Thoracic      Lumbar    Standing flexion: repeated movements   Pain location:no change  Standing extension: repeated movements  Pain location: no change  Lying extension: sustained positions  Pain location: centralized  Pain intensity: better  Pain level: decreased    Strength/Myotome Testing     Left Hip   Planes of Motion   Flexion: 4-  Extension: 3-  Abduction: 4-  Adduction: 3+  External rotation: 3+  Internal rotation: 3+    Right Hip   Planes of Motion   Flexion: 4-  Extension: 3-  Abduction: 3+  Adduction: 3+  External rotation: 3+  Internal rotation: 3+    Left Knee   Flexion: 4  Extension: 4-    Right Knee   Flexion:  4  Extension: 4-    Muscle Activation   Patient able to activate left transverse abdominals and right transverse abdominals.   Patient unable to activate left multifidus and right multifidus.     Tests     Lumbar     Left   Negative crossed SLR, passive SLR and slump test.     Right   Negative crossed SLR, passive SLR and slump test.     Left Pelvic Girdle/Sacrum   Negative: active SLR test.     Right Pelvic Girdle/Sacrum   Negative: active SLR test.     Left Hip   Positive long sit.   Negative DG and FADIR.     Right Hip   Positive long sit.   Negative DG and FADIR.     Additional Tests Details  LLD in supine: 83 cm bilaterally    Supine to Long-sit test: Right LE long to short (ALS)              POC Expires Auth Status Start Date Expiration Date PT Visit Limit    11/29/2024 No Auth Req.   BOMN   Date 10/29/2024       Used 1       Remaining           Diagnosis: chronic low back pain, radicular symptoms   Precautions: IBS, neck pain, anxiety/depression   Next Physician's Appt:   [employer] HEP:   Manuals 10/29       STM        Joint Mobs                        Neuro Re-Ed        Nerve glides        TA ball press        Glute sets        Hip ADD ball squeeze        BKFO / clams        Wobble Board        Heel raises        Standing Hip ABD, Ext                        Ther Ex        HS stretch        LTRs        SKTC        DKTC with ball        POEs        Seated p-ball rollouts                                 Ther Activity             Bike for LE mobility        Leg Press        Sulema walkouts        Paloff Press                             Pt Ed HEP, POC       Re-Evaluation             Modalities             Heat/ice (PRN)         mechanical traction

## 2024-11-01 ENCOUNTER — TELEPHONE (OUTPATIENT)
Dept: INTERNAL MEDICINE CLINIC | Facility: CLINIC | Age: 39
End: 2024-11-01

## 2024-11-05 ENCOUNTER — OFFICE VISIT (OUTPATIENT)
Dept: PHYSICAL THERAPY | Facility: CLINIC | Age: 39
End: 2024-11-05
Payer: COMMERCIAL

## 2024-11-05 DIAGNOSIS — M54.41 ACUTE RIGHT-SIDED LOW BACK PAIN WITH RIGHT-SIDED SCIATICA: Primary | ICD-10-CM

## 2024-11-05 PROCEDURE — 97140 MANUAL THERAPY 1/> REGIONS: CPT

## 2024-11-05 PROCEDURE — 97112 NEUROMUSCULAR REEDUCATION: CPT

## 2024-11-05 PROCEDURE — 97110 THERAPEUTIC EXERCISES: CPT

## 2024-11-05 PROCEDURE — 97012 MECHANICAL TRACTION THERAPY: CPT

## 2024-11-05 NOTE — PROGRESS NOTES
"Daily Note     Today's date: 2024  Patient name: Layne Mcclellan  : 1985  MRN: [id number]  Referring provider: Earl Toledo*  Dx:   Encounter Diagnosis     ICD-10-CM    1. Acute right-sided low back pain with right-sided sciatica  M54.41                      Subjective: Pt states that she has been doing the ex's, she isn't sure if they are helping.  She woke up having increased LBP this morning.      Objective: See treatment diary below      Assessment: Tolerated treatment well.  Focused on muscle activation in supine with fair tolerance.  She was challenged with avoiding holding her breath while performing TA.  Cues given with pt able to better perform.  Pt did note having increased low back soreness from laying supine and having the pressure on her back.  Fair tolerance to prone on elbows.  After approx 2 mins pt reports having increased B/L buttock pain.  LAD appeared to alleviate some sx's so mechanical traction was trialed in supine.  Pt felt relief and a good stretch, however she did have increased LBP from being supine and the pressure on her back.  Dicussed HEP and progressions, and things to try at home to reduce her sx's.  Pt reports understanding.  Will re-assess and progress as able.      Plan: Continue per plan of care.       POC Expires Auth Status Start Date Expiration Date PT Visit Limit    2024 No Auth Req.   BOMN   Date 10/29/2024 11/5      Used 1 2      Remaining           Diagnosis: chronic low back pain, radicular symptoms   Precautions: IBS, neck pain, anxiety/depression   Next Physician's Appt:   [employer] HEP:   Manuals 10/29 11/5      STM        Joint Mobs                        Neuro Re-Ed        Nerve glides        TA ball press  No ball 5\"x15      Glute sets  5\"x15      Hip ADD ball squeeze  5\"x15      BKFO / clams        Wobble Board        Heel raises        Standing Hip ABD, Ext                        Ther Ex        HS stretch        LTRs  10x    " "  SKTC  10x      DKTC with ball        POEs  2' started to increase buttock pain      Seated p-ball rollouts        Hip flexor stretch  EOT 10x10\" ea                       Ther Activity             Bike for LE mobility        Leg Press        Sulema walkouts        Paloff Press                             Pt Ed HEP, POC       Re-Evaluation             Modalities             Heat/ice (PRN)         mechanical traction    75# 7 mins supine                                "

## 2024-11-08 ENCOUNTER — OFFICE VISIT (OUTPATIENT)
Dept: URGENT CARE | Facility: CLINIC | Age: 39
End: 2024-11-08
Payer: COMMERCIAL

## 2024-11-08 ENCOUNTER — NURSE TRIAGE (OUTPATIENT)
Age: 39
End: 2024-11-08

## 2024-11-08 VITALS
DIASTOLIC BLOOD PRESSURE: 70 MMHG | SYSTOLIC BLOOD PRESSURE: 142 MMHG | WEIGHT: 165 LBS | HEIGHT: 64 IN | TEMPERATURE: 98.3 F | HEART RATE: 109 BPM | RESPIRATION RATE: 14 BRPM | BODY MASS INDEX: 28.17 KG/M2 | OXYGEN SATURATION: 99 %

## 2024-11-08 DIAGNOSIS — G43.009 MIGRAINE WITHOUT AURA AND WITHOUT STATUS MIGRAINOSUS, NOT INTRACTABLE: ICD-10-CM

## 2024-11-08 DIAGNOSIS — J02.0 STREP PHARYNGITIS: Primary | ICD-10-CM

## 2024-11-08 LAB — S PYO AG THROAT QL: POSITIVE

## 2024-11-08 PROCEDURE — 87880 STREP A ASSAY W/OPTIC: CPT | Performed by: NURSE PRACTITIONER

## 2024-11-08 PROCEDURE — 99213 OFFICE O/P EST LOW 20 MIN: CPT | Performed by: NURSE PRACTITIONER

## 2024-11-08 PROCEDURE — 87636 SARSCOV2 & INF A&B AMP PRB: CPT | Performed by: NURSE PRACTITIONER

## 2024-11-08 RX ORDER — AZITHROMYCIN 250 MG/1
TABLET, FILM COATED ORAL
Qty: 6 TABLET | Refills: 0 | Status: SHIPPED | OUTPATIENT
Start: 2024-11-08 | End: 2024-11-12

## 2024-11-08 NOTE — PATIENT INSTRUCTIONS
\Azithromycin daily for 5 days   Tylenol/Motrin as needed for pain/fever  Throat lozenge, throat spray, honey, salt water garlges  Increase fluid intake   Discard toothbrush 24 hours after starting antibiotic and again after finishing antibiotics  Follow up with your PCP for worsening symptoms    Patient Education     Strep throat in adults   The Basics   Written by the doctors and editors at [facility]   What is strep throat? -- Strep throat is an infection caused by a certain type of bacteria. It leads to a sore throat.  Most sore throats are caused by a virus, and are not strep throat. Only about 1 in 10 adults who seek medical care for sore throat have strep throat. But if you do have strep throat, you will need treatment with antibiotics.  How can I tell if I have strep throat? -- It is hard to tell the difference between strep throat and a sore throat caused by a virus. But there are some clues that you can look for.  People who have strep throat often have:   Severe throat pain   Fever (temperature higher than 100.4°F or 38°C)   Swollen glands in the neck  You might also be able to see redness on the roof of your mouth, or white patches in the back of your throat (figure 1).  People who have strep throat usually do not have a cough, runny nose, or itchy or red eyes. These symptoms are more common when the sore throat is caused by a virus.  Is there a test for strep throat? -- Yes. If you think that you might have strep throat, a doctor or nurse can easily check for it. They can run a swab along the back of your throat, and test it for the bacteria that cause strep throat.  Do I need antibiotics? -- Yes. If a test shows that you have strep throat, then you need antibiotics. Antibiotics can help reduce your symptoms and keep the infection from spreading to other people as easily.  Antibiotics can also prevent problems that strep throat can sometimes cause. These can happen if:   The body reacts to the infection -  This can cause symptoms like skin rash, joint pain, and even organ damage. In some cases, this can be serious.   The bacteria spread to nearby areas - For example, this could cause an ear, sinus, or skin infection. It could also cause swelling or abscesses (pockets of pus) in the throat.  You will probably be prescribed antibiotics to take for 10 days. It's important to take all of your antibiotics, even if you start to feel better sooner.  What can I do to feel better? -- Follow your doctor's instructions for taking your antibiotics.  There are also other ways to help relieve symptoms:   Take over-the-counter pain medicine - Acetaminophen (sample brand name: Tylenol) or ibuprofen (sample brand names: Advil, Motrin) can help with throat pain.   Use sore throat lozenges or sprays - Using medicated sore throat lozenges or throat sprays can temporarily reduce throat pain.   Suck on hard candies, ice chips, or ice pops.   Gargle with salt water - Some people find that this helps with throat pain.   Use a cool mist humidifier - This adds moisture to the air to keep the throat from getting too dry and might help with pain.   Avoid smoking or being around people who are smoking - Smoke can make throat pain worse.  When can I go back to work or school? -- Doctors usually recommend waiting 1 day after starting antibiotics before returning to work or school. By then, you will be a lot less likely to spread the infection to others.  What problems should I watch for? -- If strep throat is not treated with antibiotics, it can lead to other problems.  Call your doctor or nurse for advice if:   You are having trouble getting enough to eat or drink.   You still have symptoms after you finish your antibiotics.   You develop a red rash or peeling skin.   You develop joint pain within 1 month of having strep throat.   Your urine becomes red or brown.   You start having new symptoms.  What can I do to prevent getting strep throat  again? -- Wash your hands often with soap and water (figure 2). This is one of the best ways to prevent the spread of infection.  All topics are updated as new evidence becomes available and our peer review process is complete.  This topic retrieved from [employer] on: Feb 26, 2024.  Topic 767181 Version 2.0  Release: 32.2.4 - C32.56  © 2024 UpToDate, Inc. and/or its affiliates. All rights reserved.  figure 1: Strep throat     Strep throat can make the roof of your mouth turn red and your tonsils white. It can also make your uvula swell.  Graphic 42692 Version 6.0  figure 2: How to wash your hands     Wet your hands with clean water, and apply a small amount of soap. Lather and rub hands together for at least 20 seconds. Clean your wrists, palms, backs of your hands, between your fingers, tips of your fingers, thumbs, and under and around your nails. Rinse well, and dry your hands using a clean towel.  Graphic 603906 Version 7.0  Consumer Information Use and Disclaimer   Disclaimer: This generalized information is a limited summary of diagnosis, treatment, and/or medication information. It is not meant to be comprehensive and should be used as a tool to help the user understand and/or assess potential diagnostic and treatment options. It does NOT include all information about conditions, treatments, medications, side effects, or risks that may apply to a specific patient. It is not intended to be medical advice or a substitute for the medical advice, diagnosis, or treatment of a health care provider based on the health care provider's examination and assessment of a patient's specific and unique circumstances. Patients must speak with a health care provider for complete information about their health, medical questions, and treatment options, including any risks or benefits regarding use of medications. This information does not endorse any treatments or medications as safe, effective, or approved for treating a  specific patient. UpToDate, Inc. and its affiliates disclaim any warranty or liability relating to this information or the use thereof.The use of this information is governed by the Terms of Use, available at https://www.woltersLucidMediauwer.com/en/know/clinical-effectiveness-terms. 2024© UpToDate, Inc. and its affiliates and/or licensors. All rights reserved.  Copyright   © 2024 UpToDate, Inc. and/or its affiliates. All rights reserved.

## 2024-11-08 NOTE — LETTER
November 8, 2024     Patient: Layne Mcclellan   YOB: 1985   Date of Visit: 11/8/2024       To Whom it May Concern:    Layne Mcclellan was seen in my clinic on 11/8/2024. She may return to work on when fever free for 24 hours without the use of fever reducing medications .    If you have any questions or concerns, please don't hesitate to call.         Sincerely,          CAS Harp        CC: No Recipients

## 2024-11-08 NOTE — TELEPHONE ENCOUNTER
Topiramate 100 mg refill request to be sent to [facility] in [facility]    LOV 5/30/24 Peng CARDOZO  Next OV 11/19/24 Peng CARDOZO    Routed to provider to see pended med and send if agreeable.

## 2024-11-08 NOTE — PROGRESS NOTES
[facility]        NAME: Layne Mcclellan is a 39 y.o. female  : 1985    MRN: [id number]  DATE: 2024  TIME: 12:34 PM    Assessment and Plan   Strep pharyngitis [J02.0]  1. Strep pharyngitis  azithromycin (ZITHROMAX) 250 mg tablet    POCT rapid ANTIGEN strepA    Covid/Flu- Office Collect Normal        Rapid strep is positive. Azithromycin sent to pharmacy (amox/pcn allergy). Covid/flu swab sent to lab at patient request. Advised that it will not change current medical management.    Patient Instructions   \Azithromycin daily for 5 days   Tylenol/Motrin as needed for pain/fever  Throat lozenge, throat spray, honey, salt water garlges  Increase fluid intake   Discard toothbrush 24 hours after starting antibiotic and again after finishing antibiotics  Follow up with your PCP for worsening symptoms    Follow up with PCP in 3-5 days.  Proceed to  ER if symptoms worsen.    Chief Complaint     Chief Complaint   Patient presents with    Fever     Fever with bodyaches, sinus pressure and headache.          History of Present Illness       Patient is a 39 year old female presenting with 1 days of fever, congestion, headache, sore throat, and myalgias. She took ibuprofen with improvement of symptoms. She has some nausea. Denies V/D.         Review of Systems   Review of Systems   Constitutional:  Positive for fatigue and fever. Negative for activity change and chills.   HENT:  Positive for congestion, rhinorrhea and sore throat. Negative for ear pain, sinus pressure and sinus pain.    Respiratory:  Negative for cough.    Gastrointestinal:  Positive for nausea. Negative for abdominal pain.   Musculoskeletal:  Positive for myalgias. Negative for arthralgias.   Neurological:  Positive for headaches.         Current Medications       Current Outpatient Medications:     azithromycin (ZITHROMAX) 250 mg tablet, Take 2 tablets today then 1 tablet daily x 4 days, Disp: 6 tablet, Rfl: 0    ibuprofen  (MOTRIN) 200 mg tablet, Take 800 mg by mouth every 12 (twelve) hours as needed for mild pain, Disp: , Rfl:     methylPREDNISolone 4 MG tablet therapy pack, Use as directed on package, Disp: 21 each, Rfl: 0    methylPREDNISolone 4 MG tablet therapy pack, Use as directed on package, Disp: 21 each, Rfl: 0    pantoprazole (PROTONIX) 40 mg tablet, Take 1 tablet (40 mg total) by mouth daily, Disp: 90 tablet, Rfl: 0    SUMAtriptan (IMITREX) 100 mg tablet, Take 1 tablet (100 mg total) by mouth as needed for migraine May repeat in 2 hours. Limit of 3/week or 9/month, Disp: 9 tablet, Rfl: 3    topiramate (TOPAMAX) 100 mg tablet, Take 1 tablet (100 mg total) by mouth daily, Disp: 90 tablet, Rfl: 1    topiramate (Topamax) 25 mg tablet, Take one tablet at bedtime for 1 month. If needed increase to 2 tablets at bedtime. In addition to 100mg tablet., Disp: 60 tablet, Rfl: 5    Current Allergies     Allergies as of 11/08/2024 - Reviewed 11/08/2024   Allergen Reaction Noted    Amoxicillin Rash 02/23/2016    Augmentin [amoxicillin-pot clavulanate] Rash 08/01/2024    Penicillins Rash 09/09/2017            The following portions of the patient's history were reviewed and updated as appropriate: allergies, current medications, past family history, past medical history, past social history, past surgical history and problem list.     Past Medical History:   Diagnosis Date    Abnormal Pap smear of cervix     Acute non-recurrent sinusitis 10/25/2021    Arthritis     BMI 26.0-26.9,adult 08/06/2024    BMI 27.0-27.9,adult 05/09/2022    BRCA1 negative     BRCA2 negative     Constipation, chronic     Cough 09/23/2021    COVID-19 virus infection 12/08/2021    Depression 05/09/2022    Drug-induced skin rash 08/06/2024    Epigastric pain 06/13/2024    Fatigue 12/08/2021    Gestational diabetes     Gestational hypertension affecting first pregnancy     Hearing loss of right ear 04/23/2024    Hyperglycemia 04/23/2024    Irritable bowel syndrome      "Liver lesion 2024    LLQ abdominal pain 2024    Migraine     Myalgia 2022    Neck pain 2022    Pelvic pain 2024    Rhinitis 10/25/2021    Scoliosis of thoracolumbar spine 10/24/2024    Sigmoid diverticulitis 2024    Sinus congestion 2021    Sore throat 2021    Tinnitus of right ear 2024    Varicella     Weight loss        Past Surgical History:   Procedure Laterality Date     SECTION  10/06/2017    COLPOSCOPY      ME  DELIVERY ONLY Bilateral 10/6/2017    Procedure:  SECTION ();  Surgeon: Patricia Rodriguez MD;  Location: [facility];  Service: Obstetrics    ME  DELIVERY ONLY N/A 3/22/2019    Procedure:  SECTION () REPEAT;  Surgeon: Patricia Rodriguez MD;  Location: [facility];  Service: Obstetrics    ME COLONOSCOPY FLX DX W/COLLJ SPEC WHEN PFRMD N/A 2016    Procedure: COLONOSCOPY;  Surgeon: July Barton MD;  Location: [facility];  Service: Gastroenterology    WISDOM TOOTH EXTRACTION         Family History   Problem Relation Age of Onset    Arthritis Mother     Irritable bowel syndrome Mother     Breast cancer Maternal Grandmother 80    Heart disease Maternal Grandmother     Arthritis Maternal Grandmother     Diabetes Maternal Grandfather     Breast cancer Paternal Grandmother 40    Heart disease Paternal Grandfather     No Known Problems Sister     No Known Problems Father     No Known Problems Daughter     No Known Problems Son     No Known Problems Sister     No Known Problems Maternal Aunt     No Known Problems Paternal Uncle          Medications have been verified.        Objective   /70   Pulse (!) 109   Temp 98.3 °F (36.8 °C)   Resp 14   Ht 5' 4\" (1.626 m)   Wt 74.8 kg (165 lb)   SpO2 99%   BMI 28.32 kg/m²        Physical Exam     Physical Exam  Vitals reviewed.   Constitutional:       General: She is awake. She is not in acute distress.     Appearance: Normal appearance. She is normal weight.   HENT: "      Head: Normocephalic.      Right Ear: Hearing, tympanic membrane, ear canal and external ear normal.      Left Ear: Hearing, tympanic membrane, ear canal and external ear normal.      Nose: Nose normal.      Mouth/Throat:      Pharynx: Posterior oropharyngeal erythema present.   Cardiovascular:      Rate and Rhythm: Normal rate and regular rhythm.      Heart sounds: Normal heart sounds, S1 normal and S2 normal.   Pulmonary:      Effort: Pulmonary effort is normal.      Breath sounds: Normal breath sounds. No decreased breath sounds, wheezing or rhonchi.   Skin:     General: Skin is warm and moist.   Neurological:      General: No focal deficit present.      Mental Status: She is alert and oriented to person, place, and time.   Psychiatric:         Behavior: Behavior is cooperative.

## 2024-11-08 NOTE — TELEPHONE ENCOUNTER
"Reason for Disposition   Patient wants to be seen    Answer Assessment - Initial Assessment Questions  1. TEMPERATURE: \"What is the most recent temperature?\"  \"How was it measured?\"       101.5 TYMPANIC ,THIS MORNING   2. ONSET: \"When did the fever start?\"       YESTERDAY   3. CHILLS: \"Do you have chills?\" If yes: \"How bad are they?\"  (e.g., none, mild, moderate, severe)      MODERATE   4. OTHER SYMPTOMS: \"Do you have any other symptoms besides the fever?\"  (e.g., abdomen pain, cough, diarrhea, earache, headache, sore throat, urination pain)      SORE THROAT ,BODY ACHES,SINUS PRESSURE AND HEADACHE   5. CAUSE: If there are no symptoms, ask: \"What do you think is causing the fever?\"       UNKNOWN   6. CONTACTS: \"Does anyone else in the family have an infection?\"      DAUGHTER HAS STUFFY NOSE   7. TREATMENT: \"What have you done so far to treat this fever?\" (e.g., OTC fever medicines)      OTC ,MOTRIN AT 7:00  8. IMMUNOCOMPROMISE: \"Do you have of the following: diabetes, HIV positive, splenectomy, cancer chemotherapy, chronic steroid treatment, transplant patient, etc.?\"      NO   9. PREGNANCY: \"Is there any chance you are pregnant?\" \"When was your last menstrual period?\"      NO   10. TRAVEL: \"Have you traveled out of the country in the last month?\" (e.g., travel history, exposures)        NO  PROVIDER NOT IN OFFICE TODAY  PATIENT ADVISED TO BE EVALUATED AT     Protocols used: Fever-Adult-[place]    "

## 2024-11-09 RX ORDER — TOPIRAMATE 100 MG/1
100 TABLET, FILM COATED ORAL DAILY
Qty: 90 TABLET | Refills: 1 | Status: SHIPPED | OUTPATIENT
Start: 2024-11-09 | End: 2024-11-14 | Stop reason: SDUPTHER

## 2024-11-11 LAB
FLUAV RNA RESP QL NAA+PROBE: NEGATIVE
FLUBV RNA RESP QL NAA+PROBE: NEGATIVE
SARS-COV-2 RNA RESP QL NAA+PROBE: NEGATIVE

## 2024-11-12 ENCOUNTER — APPOINTMENT (OUTPATIENT)
Dept: PHYSICAL THERAPY | Facility: CLINIC | Age: 39
End: 2024-11-12
Payer: COMMERCIAL

## 2024-11-13 ENCOUNTER — TELEPHONE (OUTPATIENT)
Age: 39
End: 2024-11-13

## 2024-11-13 NOTE — TELEPHONE ENCOUNTER
Patient stated that she was recently put on ABX and she now has a yeast infection and would like diflucan sent to pharmacy [facility].  Shelley Ruiz   Please call patient and ok to leave detaIL vm THAT IT WAS SENT

## 2024-11-14 DIAGNOSIS — G43.009 MIGRAINE WITHOUT AURA AND WITHOUT STATUS MIGRAINOSUS, NOT INTRACTABLE: ICD-10-CM

## 2024-11-14 DIAGNOSIS — N76.0 ACUTE VAGINITIS: Primary | ICD-10-CM

## 2024-11-14 RX ORDER — TOPIRAMATE 100 MG/1
100 TABLET, FILM COATED ORAL DAILY
Qty: 90 TABLET | Refills: 1 | Status: SHIPPED | OUTPATIENT
Start: 2024-11-14

## 2024-11-14 RX ORDER — FLUCONAZOLE 150 MG/1
150 TABLET ORAL
Qty: 2 TABLET | Refills: 0 | Status: SHIPPED | OUTPATIENT
Start: 2024-11-14 | End: 2024-11-16

## 2024-11-14 NOTE — TELEPHONE ENCOUNTER
Not a duplicate, pharmacy change        Reason for call:   [x] Refill   [] Prior Auth  [] Other:     Office:   [] PCP/Provider -   [x] Specialty/Provider - Neuro    Medication: topiramate 100 mg, take 1 tablet by mouth daily      Pharmacy: [facility]    Does the patient have enough for 3 days?   [x] Yes   [] No - Send as HP to POD

## 2024-11-19 ENCOUNTER — OFFICE VISIT (OUTPATIENT)
Dept: PHYSICAL THERAPY | Facility: CLINIC | Age: 39
End: 2024-11-19
Payer: COMMERCIAL

## 2024-11-19 ENCOUNTER — TELEMEDICINE (OUTPATIENT)
Dept: NEUROLOGY | Facility: CLINIC | Age: 39
End: 2024-11-19
Payer: COMMERCIAL

## 2024-11-19 DIAGNOSIS — G43.009 MIGRAINE WITHOUT AURA AND WITHOUT STATUS MIGRAINOSUS, NOT INTRACTABLE: ICD-10-CM

## 2024-11-19 DIAGNOSIS — M54.41 ACUTE RIGHT-SIDED LOW BACK PAIN WITH RIGHT-SIDED SCIATICA: Primary | ICD-10-CM

## 2024-11-19 PROCEDURE — 97112 NEUROMUSCULAR REEDUCATION: CPT

## 2024-11-19 PROCEDURE — 97110 THERAPEUTIC EXERCISES: CPT

## 2024-11-19 PROCEDURE — 97012 MECHANICAL TRACTION THERAPY: CPT

## 2024-11-19 PROCEDURE — 99213 OFFICE O/P EST LOW 20 MIN: CPT | Performed by: NURSE PRACTITIONER

## 2024-11-19 RX ORDER — TOPIRAMATE 50 MG/1
TABLET, FILM COATED ORAL
Qty: 90 TABLET | Refills: 1 | Status: SHIPPED | OUTPATIENT
Start: 2024-11-19

## 2024-11-19 NOTE — PROGRESS NOTES
"Daily Note     Today's date: 2024  Patient name: Layne Mcclellan  : 1985  MRN: [id number]  Referring provider: Earl Toledo*  Dx:   Encounter Diagnosis     ICD-10-CM    1. Acute right-sided low back pain with right-sided sciatica  M54.41                      Subjective: Patient states compliance with press ups. States she wakes up feeling very stiff in low back and feels like she can't move. Her pain and stiffness is worse in the morning. She notes having a very firm mattress.       Objective: See treatment diary below      Assessment: Cont with outlined POC as charted below with good tolerance. No increased radicular sx throughout session. Added PPT which she noted feeling at end range but did not linger between sets. Discussed performing mobility exercises in bed in morning to work into restriction. Updated HEP to include mobility. Discussed stopping if symptoms get worse, she verbalized understanding. Traction performed in prone as she has ext bias and pain in supine.  Patient demonstrated fatigue post treatment and would benefit from continued PT      Plan: Progress treatment as tolerated.          POC Expires Auth Status Start Date Expiration Date PT Visit Limit    2024 No Auth Req.   BOMN   Date 10/29/2024 11/5 11/19     Used 1 2 3     Remaining           Diagnosis: chronic low back pain, radicular symptoms   Precautions: IBS, neck pain, anxiety/depression   Next Physician's Appt:   [employer] HEP: Access Code [email or web address]    Manuals 10/29 11/5 11/19     STM        Joint Mobs                        Neuro Re-Ed        Nerve glides        TA ball press  No ball 5\"x15 No ball 5\"x15      Glute sets  5\"x15      Hip ADD ball squeeze  5\"x15 W/ PPT 10x   5\"      BKFO / clams   10x ea BKFO      Wobble Board        Heel raises        Standing Hip ABD, Ext        PPT    In HL 10x               Ther Ex        HS stretch        LTRs  10x 10x      SKTC  10x 10x ea      DKTC with ball      " "  POEs  2' started to increase buttock pain      Seated p-ball rollouts        Hip flexor stretch  EOT 10x10\" ea                       Ther Activity             Bike for LE mobility        Leg Press        Torreon walkouts        Paloff Press                             Pt Ed HEP, POC       Re-Evaluation             Modalities             Heat/ice (PRN)         mechanical traction    75# 7 mins supine  prone 75# 10 min static                                 "

## 2024-11-19 NOTE — PATIENT INSTRUCTIONS
Increase topamax to 150mg/day; restart magnesium supplement  Can still use as needed imitrex/motrin  Repeat cmp/magnesium level in 2 weeks time  Make sure to hydrate well with at least 80 oz/water/day  Let me know in 1 month how you are doing; if continued frequency increase I would suggest possible different preventative agent such as injectable medication like emgality.  Follow up in 6 months or sooner if needed.

## 2024-11-19 NOTE — PROGRESS NOTES
"Virtual Regular Visit  Name: Layne Mcclellan      : 1985      MRN: [id number]  Encounter Provider: CAS Bello  Encounter Date: 2024   Encounter department: [facility]      Verification of patient location:  Patient is located at Home in the following state in which I hold an active license [place] :  Assessment & Plan      1. Migraine without aura and without status migrainosus, not intractable  topiramate (Topamax) 50 MG tablet    Comprehensive metabolic panel    Magnesium        Patient Instructions:  Increase topamax to 150mg/day; restart magnesium supplement  Can still use as needed imitrex/motrin  Repeat cmp/magnesium level in 2 weeks time  Make sure to hydrate well with at least 80 oz/water/day  Let me know in 1 month how you are doing; if continued frequency increase I would suggest possible different preventative agent such as injectable medication like emgality.  Follow up in 6 months or sooner if needed.    Encounter provider CAS Bello    The patient was identified by name and date of birth. Layne Mcclellan was informed that this is a telemedicine visit and that the visit is being conducted through the Epic Embedded platform. She agrees to proceed..  My office door was closed. No one else was in the room.  She acknowledged consent and understanding of privacy and security of the video platform. The patient has agreed to participate and understands they can discontinue the visit at any time.    Patient is aware this is a billable service.     History of Present Illness   Layne presents for virtual  migraine follow up, last visit was 2024.  She states no change to headache location; headaches are happening \"often\"; she works MWF and seems to have more headaches on days off; no change to neck pain. She had one headache recently that lasted 3 days in duration -she states this was related to being sick/dehydrated. She has plans to " follow up with orthopedics. She has not had an eye exam recently. She had been using magnesium threonate but stopped for a period of time and her topamax daily she has been consistent with. She states flexeril made her too tired. Imitrex/motrin are helpful.     Previous History:  the migraine frequency increased because of life changes/stress-she states she started a new job and her  changed career paths.   She does state mild vision difficulty and states she was given glasses in her 20's which she no longer wears and has not been to an eye doctor since that time. She continues with use of paraguard iud. No other new neurological symptoms- no balance concerns, no upper/lower extremity paresthesias, no speech change, no bowel/bladder change.     Temporal Pattern of Headaches:  They have had migraines for as long as they can remember but started worsening in their early 20s     Additional Relevant History:  Do you have any family history of headache? migraine headaches in mother and sister  Family history of aneurysms? no     Is there any particular time of day that is worse: No specific time  Do they awaken from sleep?: Not overnight but awaken early in the am with it  'Clustering' of HA's over time? no  Have you found any triggers? stress, menses (previoulsy, now on OCP and does not get a period), on progesterone only version), changes in sleep, too much wine sometimes     Description of Headaches:  Do you have a specific aura? None     What is the typical location of pain: Usually starts occipital and can radiate forward.  Switches sides but is typically unilateral.  Pain is occipital, behind the eye, temporal/paretal, top of the head, base of the skull.     What is the character of pain: pressure like     How severe is the pain for a typical headache? 7  What is the maximum severity of pain? 9     Accompanying symptoms:sonophobia, photophobia     Rapidity of onset: She will let it go for an hour or 2 before  taking meds     Typical duration of individual headache: 2-3days     Frequency of headaches 10 days per month with additional headaches on other days that are somewhat different     Headache type 2: Cervical and tension headaches.  Tension in the neck up into the head.  Painful but not additional migraine symptoms.  At times takes Motrin with variable effect.  Recently this is happening somewhat often.  Has seen a chiropractor who talked to her about posture. Approx freq nearly daily at this point.     Therapeutics:  CURRENT Abortive meds? Imitrex (works if she takes motrin with it, may improve it so that she can function, but Imitrex used to work on its own, used 10x in Nov, Dec had Covid and lots of headache), Motrin (using a lot, at least every other day and at times more often),      CURRENT/prior Preventive meds? topiramate      HPI  Review of Systems   Constitutional: Negative.  Negative for chills and fever.   HENT: Negative.  Negative for ear pain and sore throat.    Eyes: Negative.  Negative for pain and visual disturbance.   Respiratory: Negative.  Negative for cough and shortness of breath.    Cardiovascular: Negative.  Negative for chest pain and palpitations.   Gastrointestinal: Negative.  Negative for abdominal pain and vomiting.   Endocrine: Negative.    Genitourinary: Negative.  Negative for dysuria and hematuria.   Musculoskeletal:  Positive for neck pain (ongoing/daily, no better or worse - chiro didn't help). Negative for arthralgias and back pain.   Skin: Negative.  Negative for color change and rash.   Allergic/Immunologic: Negative.    Neurological:  Positive for headaches (often/weekly, on going). Negative for seizures and syncope.   Hematological: Negative.    Psychiatric/Behavioral: Negative.     All other systems reviewed and are negative.    Migraines last one was last week  Duration: lasted 3 days  Frequency: often/weekly    Neckpain is a daily struggle - went to a chiropracter but didn't  go long enough to see improvement. Pt still working on stress of life....   ROS was reviewed and updated as appropriate    Objective   There were no vitals taken for this visit.    Physical Exam  Constitutional:       General: She is not in acute distress.  HENT:      Head: Normocephalic and atraumatic.      Right Ear: External ear normal.      Left Ear: External ear normal.      Nose: Nose normal.      Mouth/Throat:      Pharynx: Oropharynx is clear.   Eyes:      Extraocular Movements: Extraocular movements intact.   Pulmonary:      Effort: Pulmonary effort is normal.   Musculoskeletal:      Cervical back: Normal range of motion.   Neurological:      Mental Status: She is alert. Mental status is at baseline.      Cranial Nerves: No cranial nerve deficit.      Motor: Motor function is intact.      Gait: Gait is intact.   Psychiatric:         Mood and Affect: Mood normal.         Visit Time  Total Visit Duration: 22 minutes

## 2024-12-17 ENCOUNTER — RESULTS FOLLOW-UP (OUTPATIENT)
Dept: NEUROLOGY | Facility: CLINIC | Age: 39
End: 2024-12-17

## 2024-12-17 ENCOUNTER — APPOINTMENT (OUTPATIENT)
Age: 39
End: 2024-12-17
Payer: COMMERCIAL

## 2024-12-17 ENCOUNTER — OFFICE VISIT (OUTPATIENT)
Age: 39
End: 2024-12-17
Payer: COMMERCIAL

## 2024-12-17 VITALS
WEIGHT: 165 LBS | SYSTOLIC BLOOD PRESSURE: 142 MMHG | DIASTOLIC BLOOD PRESSURE: 70 MMHG | HEIGHT: 64 IN | BODY MASS INDEX: 28.17 KG/M2

## 2024-12-17 DIAGNOSIS — G43.009 MIGRAINE WITHOUT AURA AND WITHOUT STATUS MIGRAINOSUS, NOT INTRACTABLE: ICD-10-CM

## 2024-12-17 DIAGNOSIS — M54.50 ACUTE MIDLINE LOW BACK PAIN WITHOUT SCIATICA: ICD-10-CM

## 2024-12-17 LAB
ALBUMIN SERPL BCG-MCNC: 4.5 G/DL (ref 3.5–5)
ALP SERPL-CCNC: 66 U/L (ref 34–104)
ALT SERPL W P-5'-P-CCNC: 9 U/L (ref 7–52)
ANION GAP SERPL CALCULATED.3IONS-SCNC: 9 MMOL/L (ref 4–13)
AST SERPL W P-5'-P-CCNC: 16 U/L (ref 13–39)
BILIRUB SERPL-MCNC: 0.32 MG/DL (ref 0.2–1)
BUN SERPL-MCNC: 25 MG/DL (ref 5–25)
CALCIUM SERPL-MCNC: 9.6 MG/DL (ref 8.4–10.2)
CHLORIDE SERPL-SCNC: 108 MMOL/L (ref 96–108)
CO2 SERPL-SCNC: 21 MMOL/L (ref 21–32)
CREAT SERPL-MCNC: 0.78 MG/DL (ref 0.6–1.3)
GFR SERPL CREATININE-BSD FRML MDRD: 96 ML/MIN/1.73SQ M
GLUCOSE P FAST SERPL-MCNC: 107 MG/DL (ref 65–99)
MAGNESIUM SERPL-MCNC: 1.8 MG/DL (ref 1.9–2.7)
POTASSIUM SERPL-SCNC: 4.1 MMOL/L (ref 3.5–5.3)
PROT SERPL-MCNC: 7.1 G/DL (ref 6.4–8.4)
SODIUM SERPL-SCNC: 138 MMOL/L (ref 135–147)

## 2024-12-17 PROCEDURE — 36415 COLL VENOUS BLD VENIPUNCTURE: CPT

## 2024-12-17 PROCEDURE — 80053 COMPREHEN METABOLIC PANEL: CPT

## 2024-12-17 PROCEDURE — 99214 OFFICE O/P EST MOD 30 MIN: CPT | Performed by: ORTHOPAEDIC SURGERY

## 2024-12-17 PROCEDURE — 83735 ASSAY OF MAGNESIUM: CPT

## 2024-12-17 NOTE — PROGRESS NOTES
"[facility] ORTHOPEDIC SPINE SURGERY  DR. CELIA GODOY MD  [phone] [address]  [place], [address]  [place] [address]      HISTORY OF PRESENT ILLNESS:    Layne Mcclellan is a 39 y.o. female who presents for initial evaluation of lumbar spine. The patient has been experiencing pain for years and exasperated in October. She was having difficulty ambulating. Around that time she began attending the gym for the first time in years. Pain is worse first thing in the morning and is worse if she lays flat on her back. Pain is localized centrally and occasionally travels into bilateral buttocks with some pain into the proximal hamstrings described as an ache. She denies any numbness, tingling, burning or cramping. She is using Motrin, ice, Tiger Balm as needed for symptoms. She has attended 2 sessions of physical therapy without improvement of symptoms. She denies any previous injections or surgeries to her spine. She did have 2 caesarians and had 2 epidurals prior to 1 caesarian. She notes she never had back pain prior to having children. The patient has tried chiropractic care.        Diabetic: No  Nicotine use: No  BMI: Estimated body mass index is 28.32 kg/m² as calculated from the following:    Height as of this encounter: 5' 4\" (1.626 m).    Weight as of this encounter: 74.8 kg (165 lb).  Blood thinners: None    ALLERGIES:   Allergies   Allergen Reactions    Amoxicillin Rash    Augmentin [Amoxicillin-Pot Clavulanate] Rash    Penicillins Rash       MEDICATIONS:    Current Outpatient Medications:     ibuprofen (MOTRIN) 200 mg tablet, Take 800 mg by mouth every 12 (twelve) hours as needed for mild pain, Disp: , Rfl:     SUMAtriptan (IMITREX) 100 mg tablet, Take 1 tablet (100 mg total) by mouth as needed for migraine May repeat in 2 hours. Limit of 3/week or 9/month, Disp: 9 tablet, Rfl: 3    topiramate (TOPAMAX) 100 mg tablet, Take 1 tablet (100 mg total) by mouth daily, Disp: " 90 tablet, Rfl: 1    topiramate (Topamax) 50 MG tablet, Take one tablet daily., Disp: 90 tablet, Rfl: 1    pantoprazole (PROTONIX) 40 mg tablet, Take 1 tablet (40 mg total) by mouth daily, Disp: 90 tablet, Rfl: 0     PAST MEDICAL HISTORY:   Past Medical History:   Diagnosis Date    Abnormal Pap smear of cervix     Acute non-recurrent sinusitis 10/25/2021    Arthritis     BMI 26.0-26.9,adult 2024    BMI 27.0-27.9,adult 2022    BRCA1 negative     BRCA2 negative     Constipation, chronic     Cough 2021    COVID-19 virus infection 2021    Depression 2022    Drug-induced skin rash 2024    Epigastric pain 2024    Fatigue 2021    Gestational diabetes     Gestational hypertension affecting first pregnancy     Hearing loss of right ear 2024    Hyperglycemia 2024    Irritable bowel syndrome     Liver lesion 2024    LLQ abdominal pain 2024    Migraine     Myalgia 2022    Neck pain 2022    Pelvic pain 2024    Rhinitis 10/25/2021    Scoliosis of thoracolumbar spine 10/24/2024    Sigmoid diverticulitis 2024    Sinus congestion 2021    Sore throat 2021    Tinnitus of right ear 2024    Varicella     Weight loss        PAST SURGICAL HISTORY:  Past Surgical History:   Procedure Laterality Date     SECTION  10/06/2017    COLPOSCOPY      [place]  DELIVERY ONLY Bilateral 10/6/2017    Procedure:  SECTION ();  Surgeon: Patricia Rodriguez MD;  Location: [facility];  Service: Obstetrics    [place]  DELIVERY ONLY N/A 3/22/2019    Procedure:  SECTION () REPEAT;  Surgeon: Patricia Rodriguez MD;  Location: [facility];  Service: Obstetrics    [place] COLONOSCOPY FLX DX W/COLLJ SPEC WHEN PFRMD N/A 2016    Procedure: COLONOSCOPY;  Surgeon: July Barton MD;  Location: [facility];  Service: Gastroenterology    WISDOM TOOTH EXTRACTION         SOCIAL HISTORY:  Social History     Tobacco Use   Smoking Status  Never   Smokeless Tobacco Never          PHYSICAL EXAM:  39 y.o. female sitting comfortably on exam chair in no apparent distress.   Patient ambulates with normal gait.   Normal sagittal and coronal balance.    No TTP thoracic, or lumbar spine.     Sensation intact to light touch left L2 through S1 dermatomes.   Sensation intact to light touch right L2 through S1 dermatomes     Left Motor: 5/5 iliopsoas, 5/5 quadriceps, 5/5 tibialis anterior, 5/5 extensor hallucis longus, and 5/5 gastrocsoleus.   Right Motor: 5/5 iliopsoas, 5/5 quadriceps, 5/5 tibialis anterior, 5/5 extensor hallucis longus, and 5/5 gastrocsoleus.     ROM:  Forward flexion to roughly 20 cm from the ground   Extension to approximately 25%     Symmetric deep tendon reflexes bilateral lower extremities     Absent knees and quadriceps   2+ ankles    Straight leg raise test is negative Bilateral   Manoj's test negative Bilateral   ASIS compression negative Bilateral   The patient is well perfused distally.        RADIOGRAPHIC STUDIES:  CT, abdomen pelvis w contrast, 7/23/2024: Severe degenerative changes at L5-S1 with minimal vacuum disc formation.  Alignment is stable.  There is no evidence of clinically significant facet disease.  There is notes of instability  XR, lumbar spine, 10/19/2024: Severe degenerative changes at L5-S1.  There is notes of instability.        ASSESSMENT:  1. Acute midline low back pain without sciatica  -     Ambulatory Referral to Orthopedic Surgery  -     Ambulatory Referral to Physical Therapy; Future      PLAN:  39 y.o. female with acute on chronic low back pain with degenerative changes at L5-S1    The patient's x-rays and CT of abdomen were reviewed today. There are degenerative changes at L5-S1.     The natural history of low back pain was discussed with the patient today. The best plan of care for the patient is to attend physical therapy. The patient was referred to physical therpay.  The patient was also advised she  can continue with chiropractic care for pain. It was discussed physical therapy is the best course of treatment to help prevent recurring episodes.    I did discuss with the patient at a chiropractic care he is effective in treating low back pain but physical therapy exercises, activity modification and core strengthening is necessary to decrease the frequency of these episodes and also to decrease the magnitude of the pain.    I will see the patient back as needed.            Scribe Attestation      I,:  Risa Molina am acting as a scribe while in the presence of the attending physician.:       I,:  Gerard Lara MD personally performed the services described in this documentation    as scribed in my presence.:

## 2025-01-17 ENCOUNTER — NURSE TRIAGE (OUTPATIENT)
Age: 40
End: 2025-01-17

## 2025-01-17 ENCOUNTER — TELEPHONE (OUTPATIENT)
Dept: NEUROLOGY | Facility: CLINIC | Age: 40
End: 2025-01-17

## 2025-01-17 ENCOUNTER — TELEMEDICINE (OUTPATIENT)
Dept: OTHER | Facility: HOSPITAL | Age: 40
End: 2025-01-17
Payer: COMMERCIAL

## 2025-01-17 DIAGNOSIS — N30.00 ACUTE CYSTITIS WITHOUT HEMATURIA: Primary | ICD-10-CM

## 2025-01-17 PROBLEM — H91.91 HEARING LOSS OF RIGHT EAR: Status: RESOLVED | Noted: 2024-04-23 | Resolved: 2025-01-17

## 2025-01-17 PROBLEM — R10.13 EPIGASTRIC PAIN: Status: RESOLVED | Noted: 2024-06-13 | Resolved: 2025-01-17

## 2025-01-17 PROBLEM — Z76.89 ENCOUNTER FOR SUPPORT AND COORDINATION OF TRANSITION OF CARE: Status: RESOLVED | Noted: 2019-05-01 | Resolved: 2025-01-17

## 2025-01-17 PROBLEM — R10.2 PELVIC PAIN: Status: RESOLVED | Noted: 2024-07-23 | Resolved: 2025-01-17

## 2025-01-17 PROBLEM — H93.11 TINNITUS OF RIGHT EAR: Status: RESOLVED | Noted: 2024-04-23 | Resolved: 2025-01-17

## 2025-01-17 PROBLEM — L27.0 DRUG-INDUCED SKIN RASH: Status: RESOLVED | Noted: 2024-08-06 | Resolved: 2025-01-17

## 2025-01-17 PROBLEM — N76.0 ACUTE VAGINITIS: Status: RESOLVED | Noted: 2018-08-28 | Resolved: 2025-01-17

## 2025-01-17 PROBLEM — R73.9 HYPERGLYCEMIA: Status: RESOLVED | Noted: 2024-04-23 | Resolved: 2025-01-17

## 2025-01-17 PROBLEM — R10.32 LLQ ABDOMINAL PAIN: Status: RESOLVED | Noted: 2024-07-23 | Resolved: 2025-01-17

## 2025-01-17 PROCEDURE — 99213 OFFICE O/P EST LOW 20 MIN: CPT | Performed by: PHYSICIAN ASSISTANT

## 2025-01-17 RX ORDER — NITROFURANTOIN 25; 75 MG/1; MG/1
100 CAPSULE ORAL 2 TIMES DAILY
Qty: 14 CAPSULE | Refills: 0 | Status: SHIPPED | OUTPATIENT
Start: 2025-01-17 | End: 2025-01-24

## 2025-01-17 RX ORDER — PHENAZOPYRIDINE HYDROCHLORIDE 200 MG/1
200 TABLET, FILM COATED ORAL
Qty: 6 TABLET | Refills: 0 | Status: SHIPPED | OUTPATIENT
Start: 2025-01-17 | End: 2025-01-19

## 2025-01-17 NOTE — PROGRESS NOTES
Virtual Regular Visit  Name: Layne Mcclellan      : 1985      MRN: [id number]  Encounter Provider: Shannon D Severino, PA-C  Encounter Date: 2025   Encounter department: VIRTUAL CARE       Verification of patient location:  Patient is located at Other in the following state in which I hold an active license [place] :  Assessment & Plan        Encounter provider Shannon D Severino, PA-C    The patient was identified by name and date of birth. Layne Mcclellan was informed that this is a telemedicine visit and that the visit is being conducted through the Epic Embedded platform. She agrees to proceed..  My office door was closed. No one else was in the room.  She acknowledged consent and understanding of privacy and security of the video platform. The patient has agreed to participate and understands they can discontinue the visit at any time.    Patient is aware this is a billable service.     History was obtained from: History obtained from: patient  History of Present Illness     Pt reports UTI starting this morning. Has dysuria, frequency, pelvic pain, foul-smelling urine, voiding small amounts. Nothing tried. Denies hematuria, back pain or fever, changes to vaginal discharge, concern for pregnancy or concern for STI.       Review of Systems   Constitutional:  Negative for fever.   HENT:  Negative for nosebleeds.    Eyes:  Negative for redness.   Respiratory:  Negative for shortness of breath.    Cardiovascular:  Negative for chest pain.   Gastrointestinal:  Negative for blood in stool.   Genitourinary:  Positive for decreased urine volume, dysuria and frequency. Negative for hematuria and vaginal bleeding.   Musculoskeletal:  Negative for gait problem.   Skin:  Negative for rash.   Neurological:  Negative for seizures.   Psychiatric/Behavioral:  Negative for behavioral problems.        Objective   There were no vitals taken for this visit.    Physical Exam  Constitutional:       General: She is  not in acute distress.     Appearance: Normal appearance. She is not toxic-appearing.   HENT:      Head: Normocephalic and atraumatic.      Nose: No rhinorrhea.      Mouth/Throat:      Mouth: Mucous membranes are moist.   Eyes:      Conjunctiva/sclera: Conjunctivae normal.   Pulmonary:      Effort: Pulmonary effort is normal. No respiratory distress.      Breath sounds: No wheezing (no gross audible wheeze through computer).   Musculoskeletal:      Cervical back: Normal range of motion.   Skin:     Findings: No rash (on face or neck).   Neurological:      Mental Status: She is alert.      Cranial Nerves: No dysarthria or facial asymmetry.   Psychiatric:         Mood and Affect: Mood normal.         Behavior: Behavior normal.         Visit Time  Total Visit Duration: 8 minutes not including the time spent for establishing the audio/video connection.

## 2025-01-17 NOTE — TELEPHONE ENCOUNTER
"    Patient calls in with urinary frequency and burning.  Denies blood, fever or flank pain.  Started this morning.  She is requesting that an antibiotic be called in.    Please advise.     Preferred Pharmacy:   [id number] [address] Phone: [phone]   Fax: [phone]        Reason for Disposition   Patient wants to be seen    Answer Assessment - Initial Assessment Questions  1. SYMPTOM: \"What's the main symptom you're concerned about?\" (e.g., frequency, incontinence)      Frequency and burning.    2. ONSET: \"When did the  symptoms  start?\"      This morning   3. PAIN: \"Is there any pain?\" If Yes, ask: \"How bad is it?\" (Scale: 1-10; mild, moderate, severe)      Burning   4. CAUSE: \"What do you think is causing the symptoms?\"      UTI  5. OTHER SYMPTOMS: \"Do you have any other symptoms?\" (e.g., blood in urine, fever, flank pain, pain with urination)      Denies   6. PREGNANCY: \"Is there any chance you are pregnant?\" \"When was your last menstrual period?\"      Denies    Protocols used: Urinary Symptoms-Adult-[place]    "

## 2025-01-17 NOTE — TELEPHONE ENCOUNTER
Patient returned call and RN reviewed office recommendations to go to urgent care OR see Dr. Rodriguez at [facility]. Patient was not familiar with provider. Requested order for antibiotic.RN advised that order for urine specimen needs to be ordered and PCP is not in the office to order. Patient could not decide if going to urgent care. Advised to call back and let us know.

## 2025-01-17 NOTE — TELEPHONE ENCOUNTER
LVM- Dr. Bal is not in the office on Fridays. She can either go to  or transfer her to the office and we can schedule her to be seen today by Dr. Rodriguez.

## 2025-01-20 DIAGNOSIS — G43.009 MIGRAINE WITHOUT AURA AND WITHOUT STATUS MIGRAINOSUS, NOT INTRACTABLE: ICD-10-CM

## 2025-01-20 RX ORDER — TOPIRAMATE 50 MG/1
TABLET, FILM COATED ORAL
Qty: 90 TABLET | Refills: 1 | Status: SHIPPED | OUTPATIENT
Start: 2025-01-20

## 2025-02-28 DIAGNOSIS — G43.009 MIGRAINE WITHOUT AURA AND WITHOUT STATUS MIGRAINOSUS, NOT INTRACTABLE: ICD-10-CM

## 2025-02-28 RX ORDER — SUMATRIPTAN SUCCINATE 100 MG/1
100 TABLET ORAL AS NEEDED
Qty: 9 TABLET | Refills: 3 | Status: SHIPPED | OUTPATIENT
Start: 2025-02-28

## 2025-02-28 NOTE — TELEPHONE ENCOUNTER
SUMAtriptan (IMITREX) 100 mg tablet  -     Take 1 tablet (100 mg total) by mouth as needed for migraine May repeat in 2 hours. Limit of 3/week or 9/month           Pt has one pill left - needs [facility] - [place], [place] - [address]    Authorizing Provider:  CAS Prieto- Neurology med

## 2025-02-28 NOTE — TELEPHONE ENCOUNTER
Called pt to schedule her follow up appt. Pt is scheduled for March 4 @ 10am. Pt is looking to make this appt a virtual appt.     ADD-ON / f/u

## 2025-03-04 ENCOUNTER — TELEMEDICINE (OUTPATIENT)
Dept: NEUROLOGY | Facility: CLINIC | Age: 40
End: 2025-03-04
Payer: COMMERCIAL

## 2025-03-04 ENCOUNTER — TELEPHONE (OUTPATIENT)
Dept: NEUROLOGY | Facility: CLINIC | Age: 40
End: 2025-03-04

## 2025-03-04 VITALS — BODY MASS INDEX: 27.31 KG/M2 | WEIGHT: 160 LBS | HEIGHT: 64 IN

## 2025-03-04 DIAGNOSIS — G43.009 MIGRAINE WITHOUT AURA AND WITHOUT STATUS MIGRAINOSUS, NOT INTRACTABLE: Primary | ICD-10-CM

## 2025-03-04 PROCEDURE — 99213 OFFICE O/P EST LOW 20 MIN: CPT | Performed by: NURSE PRACTITIONER

## 2025-03-04 NOTE — TELEPHONE ENCOUNTER
Left message for patient to call back and schedule a Return in about 6 months (around 9/4/2025) with Peng.

## 2025-03-04 NOTE — PROGRESS NOTES
Name: Layne Mcclellan      : 1985      MRN: [id number]  Encounter Provider: CAS Bello  Encounter Date: 3/4/2025   Encounter department: [facility] [place]  :  Administrative Statements   Encounter provider CAS Bello    The Patient is located at Home and in the following state in which I hold an active license [place].    The patient was identified by name and date of birth. Layne Mcclellan was informed that this is a telemedicine visit and that the visit is being conducted through the Epic Embedded platform. She agrees to proceed..  My office door was closed. No one else was in the room.  She acknowledged consent and understanding of privacy and security of the video platform. The patient has agreed to participate and understands they can discontinue the visit at any time.    I have spent a total time of  20 minutes in caring for this patient on the day of the visit/encounter including Risks and benefits of tx options, Instructions for management, and Documenting in the medical record, not including the time spent for establishing the audio/video connection.      Assessment & Plan  Migraine without aura and without status migrainosus, not intractable           Patient Instructions   Continue current medication/good hydration etc  Can consider scheduled ibuprofen as discussed for mini prevention of menstrual migraine  Good to hear doing better  Follow up in 6 months time     History of Present Illness   Layne presents for headache follow up, last office visit 2024; at that time her topiramate was increased and she was to restart magnesium. She states today these changes were beneficial with a current migraine frequency 2-3x/month usually associated with menstrual period. She states she is adjusting to her work schedule.     Lab Results   Component Value Date    SODIUM 138 2024    K 4.1 2024     2024    CO2 21 2024    AGAP  "9 12/17/2024    BUN 25 12/17/2024    CREATININE 0.78 12/17/2024    GLUC 93 07/24/2024    GLUF 107 (H) 12/17/2024    CALCIUM 9.6 12/17/2024    AST 16 12/17/2024    ALT 9 12/17/2024    ALKPHOS 66 12/17/2024    TP 7.1 12/17/2024    TBILI 0.32 12/17/2024    EGFR 96 12/17/2024     Mg 1.8    Previous History:  She states no change to headache location; headaches are happening \"often\"; she works MWF and seems to have more headaches on days off; no change to neck pain. She had one headache recently that lasted 3 days in duration -she states this was related to being sick/dehydrated. She has plans to follow up with orthopedics. She has not had an eye exam recently. She had been using magnesium threonate but stopped for a period of time and her topamax daily she has been consistent with. She states flexeril made her too tired     the migraine frequency increased because of life changes/stress-she states she started a new job and her  changed career paths.   She does state mild vision difficulty and states she was given glasses in her 20's which she no longer wears and has not been to an eye doctor since that time. She continues with use of paraguard iud. No other new neurological symptoms- no balance concerns, no upper/lower extremity paresthesias, no speech change, no bowel/bladder change.      Temporal Pattern of Headaches:  They have had migraines for as long as they can remember but started worsening in their early 20s     Additional Relevant History:  Do you have any family history of headache? migraine headaches in mother and sister  Family history of aneurysms? no     Is there any particular time of day that is worse: No specific time  Do they awaken from sleep?: Not overnight but awaken early in the am with it  'Clustering' of HA's over time? no  Have you found any triggers? stress, menses (previoulsy, now on OCP and does not get a period), on progesterone only version), changes in sleep, too much wine " sometimes     Description of Headaches:  Do you have a specific aura? None     What is the typical location of pain: Usually starts occipital and can radiate forward.  Switches sides but is typically unilateral.  Pain is occipital, behind the eye, temporal/paretal, top of the head, base of the skull.     What is the character of pain: pressure like     How severe is the pain for a typical headache? 7  What is the maximum severity of pain? 9     Accompanying symptoms:sonophobia, photophobia     Rapidity of onset: She will let it go for an hour or 2 before taking meds     Typical duration of individual headache: 2-3days     Frequency of headaches 10 days per month with additional headaches on other days that are somewhat different     Headache type 2: Cervical and tension headaches.  Tension in the neck up into the head.  Painful but not additional migraine symptoms.  At times takes Motrin with variable effect.  Recently this is happening somewhat often.  Has seen a chiropractor who talked to her about posture. Approx freq nearly daily at this point.     Therapeutics:  CURRENT Abortive meds? Imitrex (works if she takes motrin with it, may improve it so that she can function, but Imitrex used to work on its own, used 10x in Nov, Dec had Covid and lots of headache), Motrin (using a lot, at least every other day and at times more often),     HPI   Review of Systems   Constitutional: Negative.    HENT: Negative.     Eyes: Negative.    Respiratory: Negative.     Cardiovascular: Negative.    Gastrointestinal: Negative.    Endocrine: Negative.    Genitourinary: Negative.    Musculoskeletal:  Positive for neck pain.   Skin: Negative.    Allergic/Immunologic: Negative.    Neurological:  Positive for headaches (around menstrual period).   Hematological: Negative.    Psychiatric/Behavioral: Negative.            I have personally reviewed the MA's review of systems and made changes as necessary.    Current Outpatient Medications  "on File Prior to Visit   Medication Sig Dispense Refill    ibuprofen (MOTRIN) 200 mg tablet Take 800 mg by mouth every 12 (twelve) hours as needed for mild pain      SUMAtriptan (IMITREX) 100 mg tablet Take 1 tablet (100 mg total) by mouth as needed for migraine May repeat in 2 hours. Limit of 3/week or 9/month 9 tablet 3    topiramate (TOPAMAX) 100 mg tablet Take 1 tablet (100 mg total) by mouth daily 90 tablet 1    topiramate (Topamax) 50 MG tablet Take one tablet daily. 90 tablet 1     No current facility-administered medications on file prior to visit.      Social History     Tobacco Use    Smoking status: Never    Smokeless tobacco: Never   Vaping Use    Vaping status: Never Used   Substance and Sexual Activity    Alcohol use: Not Currently    Drug use: No    Sexual activity: Yes     Partners: Male     Birth control/protection: I.U.D.        Objective   Ht 5' 4\" (1.626 m)   Wt 72.6 kg (160 lb)   BMI 27.46 kg/m²     Physical Exam  Constitutional:       General: She is not in acute distress.  HENT:      Head: Normocephalic and atraumatic.      Right Ear: External ear normal.      Left Ear: External ear normal.      Mouth/Throat:      Pharynx: Oropharynx is clear.   Eyes:      General:         Right eye: No discharge.         Left eye: No discharge.      Extraocular Movements: Extraocular movements intact.   Pulmonary:      Effort: Pulmonary effort is normal.   Musculoskeletal:      Cervical back: Normal range of motion.   Neurological:      General: No focal deficit present.      Mental Status: She is alert. Mental status is at baseline.      Cranial Nerves: No cranial nerve deficit.      Motor: No weakness.      Gait: Gait normal.   Psychiatric:         Mood and Affect: Mood normal.         Behavior: Behavior normal.         "

## 2025-03-04 NOTE — PATIENT INSTRUCTIONS
Continue current medication/good hydration etc  Can consider scheduled ibuprofen as discussed for mini prevention of menstrual migraine  Good to hear doing better  Follow up in 6 months time

## 2025-03-06 ENCOUNTER — OFFICE VISIT (OUTPATIENT)
Dept: OBGYN CLINIC | Facility: CLINIC | Age: 40
End: 2025-03-06
Payer: COMMERCIAL

## 2025-03-06 ENCOUNTER — TELEPHONE (OUTPATIENT)
Dept: CARDIAC SURGERY | Facility: CLINIC | Age: 40
End: 2025-03-06

## 2025-03-06 VITALS
SYSTOLIC BLOOD PRESSURE: 114 MMHG | DIASTOLIC BLOOD PRESSURE: 68 MMHG | HEIGHT: 64 IN | BODY MASS INDEX: 27.71 KG/M2 | WEIGHT: 162.3 LBS

## 2025-03-06 DIAGNOSIS — Z80.3 FAMILY HISTORY OF BREAST CANCER: ICD-10-CM

## 2025-03-06 DIAGNOSIS — Z97.5 IUD CONTRACEPTION: ICD-10-CM

## 2025-03-06 DIAGNOSIS — Z12.4 CERVICAL CANCER SCREENING: ICD-10-CM

## 2025-03-06 DIAGNOSIS — Z12.31 ENCOUNTER FOR SCREENING MAMMOGRAM FOR BREAST CANCER: ICD-10-CM

## 2025-03-06 DIAGNOSIS — Z01.419 ENCNTR FOR GYN EXAM (GENERAL) (ROUTINE) W/O ABN FINDINGS: Primary | ICD-10-CM

## 2025-03-06 DIAGNOSIS — Z91.89 INCREASED RISK OF BREAST CANCER: ICD-10-CM

## 2025-03-06 PROCEDURE — G0476 HPV COMBO ASSAY CA SCREEN: HCPCS | Performed by: OBSTETRICS & GYNECOLOGY

## 2025-03-06 PROCEDURE — G0145 SCR C/V CYTO,THINLAYER,RESCR: HCPCS | Performed by: OBSTETRICS & GYNECOLOGY

## 2025-03-06 PROCEDURE — S0612 ANNUAL GYNECOLOGICAL EXAMINA: HCPCS | Performed by: OBSTETRICS & GYNECOLOGY

## 2025-03-06 NOTE — TELEPHONE ENCOUNTER
Chart reviewed due to referral placed to Surgical Oncology.  Patient has family hx of breast cancer,  PGM=40, MGM=85.  Patient is at increased risk of breast cancer.

## 2025-03-06 NOTE — PROGRESS NOTES
Assessment        Diagnoses and all orders for this visit:    Encntr for gyn exam (general) (routine) w/o abn findings    IUD contraception    Cervical cancer screening  -     Liquid-based pap, screening    Increased risk of breast cancer  -     Mammo screening bilateral w 3d and cad; Future  -     Ambulatory Referral to Surgical Oncology; Future    Family history of breast cancer  -     Mammo screening bilateral w 3d and cad; Future  -     Ambulatory Referral to Surgical Oncology; Future    Encounter for screening mammogram for breast cancer  -     Mammo screening bilateral w 3d and cad; Future             Plan      All questions answered.  Await pap smear results.  Contraception: IUD.  Educational material distributed.  Follow up in 1 year.  Follow up as needed.  Mammogram.  Pap smear.   Patient referred back to surgical oncology to discuss supplemental screening due to elevated breast cancer risk.  Patient advised to monitor symptoms of possible PMS or PMDD symptoms due to irritability around the time of ovulation.    Brannon Mcclellan is a 39 y.o. female who presents for annual exam.      Chief Complaint   Patient presents with    Gynecologic Exam     Yearly.  No concerns     Patient underwent genetic testing which was negative, VUS of MSH6.   She states periods are normal, lasting 4 days    Irritable around periods      Last Pap: 2022  Last mammogram: 2024  Colorectal cancer screening: Cologuard: Not on file Colonoscopy: has not had a colonoscopy - seeing PCP soon  DEXA: Not on file     Current contraception: IUD PARAGARD 22  History of abnormal Pap smear: yes   History of abnormal mammogram: no  Family history of uterine or ovarian cancer: no  Family history of breast cancer: yes - MGM (85) PGM (40s)  Family history of colon cancer: no    OB History    Para Term  AB Living   2 2 2 0 0 2   SAB IAB Ectopic Multiple Live Births   0 0 0 0 2      # Outcome Date GA  Lbr Vish/2nd Weight Sex Type Anes PTL Lv   2 Term 19 39w0d  3050 g (6 lb 11.6 oz) M CS-LTranv Spinal N IGNACIO      Name: WESTONBABY BOY  (SIRIA)      Apgar1: 9  Apgar5: 9   1 Term 10/06/17 39w0d  3232 g (7 lb 2 oz) F CS-LTranv EPI N IGNACIO      Complications: Fetal Intolerance, Failure to Progress in First Stage      Name: TEDDY ONTIVEROS      Apgar1: 8  Apgar5: 9       Menstrual History:  OB History          2    Para   2    Term   2            AB        Living   2         SAB        IAB        Ectopic        Multiple   0    Live Births   2                Menarche age: 13  Patient's last menstrual period was 2025 (exact date).       Social History     Substance and Sexual Activity   Sexual Activity Yes    Partners: Male    Birth control/protection: I.U.D.          Past Medical History:   Diagnosis Date    Abnormal Pap smear of cervix     Acute non-recurrent sinusitis 10/25/2021    Arthritis     BMI 26.0-26.9,adult 2024    BMI 27.0-27.9,adult 2022    BRCA1 negative     BRCA2 negative     Constipation, chronic     Cough 2021    COVID-19 virus infection 2021    Depression 2022    Diabetes mellitus (HCC)     Gestational    Drug-induced skin rash 2024    Epigastric pain 2024    Fatigue 2021    Gestational diabetes     Gestational hypertension affecting first pregnancy     Hearing loss of right ear 2024    Hyperglycemia 2024    Irritable bowel syndrome     Liver lesion 2024    LLQ abdominal pain 2024    Migraine     Myalgia 2022    Neck pain 2022    Pelvic pain 2024    Rhinitis 10/25/2021    Scoliosis of thoracolumbar spine 10/24/2024    Sigmoid diverticulitis 2024    Sinus congestion 2021    Sore throat 2021    Tinnitus of right ear 2024    Varicella     Weight loss      Past Surgical History:   Procedure Laterality Date    COLPOSCOPY      [facility]  DELIVERY ONLY  Bilateral 10/06/2017    Procedure:  SECTION ();  Surgeon: Patricia Rodriguez MD;  Location: [facility];  Service: Obstetrics    TX  DELIVERY ONLY N/A 2019    Procedure:  SECTION () REPEAT;  Surgeon: Patricia Rodriguez MD;  Location: [facility];  Service: Obstetrics    TX COLONOSCOPY FLX DX W/COLLJ SPEC WHEN PFRMD N/A 2016    Procedure: COLONOSCOPY;  Surgeon: July Barton MD;  Location: [facility];  Service: Gastroenterology    WISDOM TOOTH EXTRACTION       Family History   Problem Relation Age of Onset    Arthritis Mother     Irritable bowel syndrome Mother     Breast cancer Maternal Grandmother 80    Heart disease Maternal Grandmother     Arthritis Maternal Grandmother     Diabetes Maternal Grandfather     Breast cancer Paternal Grandmother 40    Cancer Paternal Grandmother     Heart disease Paternal Grandfather     No Known Problems Sister     No Known Problems Father     No Known Problems Daughter     No Known Problems Son     No Known Problems Sister     No Known Problems Maternal Aunt     No Known Problems Paternal Uncle        Social History     Tobacco Use    Smoking status: Never    Smokeless tobacco: Never   Vaping Use    Vaping status: Never Used   Substance Use Topics    Alcohol use: Not Currently    Drug use: No          Current Outpatient Medications:     ibuprofen (MOTRIN) 200 mg tablet, Take 800 mg by mouth every 12 (twelve) hours as needed for mild pain, Disp: , Rfl:     SUMAtriptan (IMITREX) 100 mg tablet, Take 1 tablet (100 mg total) by mouth as needed for migraine May repeat in 2 hours. Limit of 3/week or 9/month, Disp: 9 tablet, Rfl: 3    topiramate (TOPAMAX) 100 mg tablet, Take 1 tablet (100 mg total) by mouth daily, Disp: 90 tablet, Rfl: 1    topiramate (Topamax) 50 MG tablet, Take one tablet daily., Disp: 90 tablet, Rfl: 1    Allergies   Allergen Reactions    Augmentin [Amoxicillin-Pot Clavulanate] Rash    Penicillins Rash           Review of Systems  "  Constitutional: Negative.    HENT: Negative.     Eyes: Negative.    Respiratory: Negative.     Cardiovascular: Negative.    Gastrointestinal: Negative.    Endocrine: Negative.    Genitourinary:         As noted in HPI   Musculoskeletal: Negative.    Skin: Negative.    Allergic/Immunologic: Negative.    Neurological: Negative.    Hematological: Negative.    Psychiatric/Behavioral: Negative.         /68 (BP Location: Right arm, Patient Position: Sitting, Cuff Size: Standard)   Ht 5' 4\" (1.626 m)   Wt 73.6 kg (162 lb 4.8 oz)   LMP 02/23/2025 (Exact Date)   BMI 27.86 kg/m²         Physical Exam  Constitutional:       Appearance: She is well-developed.   Genitourinary:      Vulva, bladder and rectum normal.      No lesions in the vagina.      Genitourinary Comments:         Right Labia: No rash, tenderness, lesions, skin changes or Bartholin's cyst.     Left Labia: No tenderness, lesions, skin changes, Bartholin's cyst or rash.     No inguinal adenopathy present in the right or left side.     No vaginal discharge, tenderness or bleeding.      No vaginal prolapse present.     No vaginal atrophy present.       Right Adnexa: not tender, not full and no mass present.     Left Adnexa: not tender, not full and no mass present.     No cervical motion tenderness, friability, lesion or polyp.      IUD strings visualized.      Uterus is not enlarged or tender.      Pelvic exam was performed with patient in the lithotomy position.   Rectum:      No external hemorrhoid.   Breasts:     Right: No mass, nipple discharge, skin change or tenderness.      Left: No mass, nipple discharge, skin change or tenderness.   HENT:      Head: Normocephalic.      Nose: Nose normal.   Eyes:      Conjunctiva/sclera: Conjunctivae normal.   Neck:      Thyroid: No thyromegaly.   Cardiovascular:      Rate and Rhythm: Normal rate and regular rhythm.      Heart sounds: Normal heart sounds. No murmur heard.  Pulmonary:      Effort: Pulmonary " effort is normal. No respiratory distress.      Breath sounds: Normal breath sounds. No wheezing or rales.   Abdominal:      General: There is no distension.      Palpations: Abdomen is soft. There is no mass.      Tenderness: There is no abdominal tenderness. There is no guarding or rebound.   Musculoskeletal:         General: No tenderness.      Cervical back: Neck supple. No muscular tenderness.   Lymphadenopathy:      Cervical: No cervical adenopathy.      Lower Body: No right inguinal adenopathy. No left inguinal adenopathy.   Neurological:      Mental Status: She is alert and oriented to person, place, and time.   Skin:     General: Skin is warm and dry.   Psychiatric:         Mood and Affect: Mood normal.         Behavior: Behavior normal.   Vitals and nursing note reviewed. Exam conducted with a chaperone present.           Future Appointments   Date Time Provider Department Center   4/1/2025  9:00 AM MD LEANDRA Rosa  [facility]

## 2025-03-07 ENCOUNTER — RESULTS FOLLOW-UP (OUTPATIENT)
Dept: LABOR AND DELIVERY | Facility: HOSPITAL | Age: 40
End: 2025-03-07

## 2025-03-12 LAB
LAB AP GYN PRIMARY INTERPRETATION: NORMAL
Lab: NORMAL

## 2025-03-27 ENCOUNTER — NURSE TRIAGE (OUTPATIENT)
Age: 40
End: 2025-03-27

## 2025-03-27 DIAGNOSIS — G43.019 INTRACTABLE MIGRAINE WITHOUT AURA AND WITHOUT STATUS MIGRAINOSUS: Primary | ICD-10-CM

## 2025-03-27 RX ORDER — DEXAMETHASONE 2 MG/1
TABLET ORAL
Qty: 6 TABLET | Refills: 0 | Status: SHIPPED | OUTPATIENT
Start: 2025-03-27

## 2025-03-27 NOTE — TELEPHONE ENCOUNTER
"Spoke w/pt. Advised her of note below. She is agreeable to trying Decadron.     Per chart review, rx for dexamethasone reflecting as \"phone in.\" Called [facility]. Spoke w/Ania-Pharmacist. Provided verbal order to match 3/27/25 rx.       "

## 2025-03-27 NOTE — TELEPHONE ENCOUNTER
FOLLOW UP: Peng CARDOZO: Please advise, thank you!    REASON FOR CONVERSATION: Migraine    SYMPTOMS: Migraine reoccurring for a week and a half.     OTHER: Tressa confirmed her best call back number is [phone] and a detailed voicemail may be left.     DISPOSITION: Home Care    Reason for Disposition   Similar to previously diagnosed migraine headaches    Answer Assessment - Initial Assessment Questions  .MIGRAINEHA   When did migraine start? A week and a half ago. She does not always wake up with it but sometimes it starts in the afternoon. Almost daily.     Location/Description: Patient said they start in her neck and are one sided behind her eye. The side switches. The pain is a pressure pain. Tressa said the pain scale varies, yesterday it 4-5 out 10. Today she does not have it yet but is concerned she will get one. She has pain in her neck today that is a slight pressure/tightness. Patient said these migraines are similar to past ones.     Associated symptoms: Patient denies.    Precipitating factors: Started when she got the flu, her flu symptoms have now resolved but the migraine continued. Patient currently is menstruating.      Alleviating factors: Sumatriptan but she knows she is using it too often.     What medications have you tried for this migraine headache? Motrin helped but did not take it away SUMAtriptan (IMITREX) 100 mg tablet patient estimates she has taken it five days in the last week and a half. She said more than she knows she should. A few times like 2 days she had to repeat the dose. It varies on how effective it is.     Current migraine medications are confirmed as: Topamax 150 mg total daily, Sumatriptan, motrin.     Medications tried in the past? Patient said she has not had issues in a while. Tressa has never tried steroids or Depakote to break a migraine cycle. Patient is questioning if her topamax needs to be increased.     *Patient states if anything is ordered to please  send to   [facility] in [facility]:   [address], [place] [address] [phone]    Protocols used: Headache-Adult-[place]

## 2025-03-27 NOTE — TELEPHONE ENCOUNTER
CAS Prieto to Whitney Alvarado, STEPHY     3/27/25 12:27 PM  She was doing well earlier this month, so I dont think I would jump to increasing topamax at this time; instead short course of low dose steroid to break the headache cycle and she can keep us updated if increase in frequency continues.  Thanks  Peng

## 2025-04-01 ENCOUNTER — OFFICE VISIT (OUTPATIENT)
Dept: INTERNAL MEDICINE CLINIC | Facility: CLINIC | Age: 40
End: 2025-04-01
Payer: COMMERCIAL

## 2025-04-01 ENCOUNTER — APPOINTMENT (OUTPATIENT)
Dept: LAB | Facility: AMBULARY SURGERY CENTER | Age: 40
End: 2025-04-01
Payer: COMMERCIAL

## 2025-04-01 ENCOUNTER — RESULTS FOLLOW-UP (OUTPATIENT)
Dept: INTERNAL MEDICINE CLINIC | Facility: CLINIC | Age: 40
End: 2025-04-01

## 2025-04-01 VITALS
DIASTOLIC BLOOD PRESSURE: 68 MMHG | OXYGEN SATURATION: 99 % | HEART RATE: 84 BPM | RESPIRATION RATE: 16 BRPM | HEIGHT: 64 IN | WEIGHT: 160 LBS | BODY MASS INDEX: 27.31 KG/M2 | TEMPERATURE: 98.4 F | SYSTOLIC BLOOD PRESSURE: 106 MMHG

## 2025-04-01 DIAGNOSIS — R53.83 OTHER FATIGUE: ICD-10-CM

## 2025-04-01 DIAGNOSIS — E78.2 MIXED HYPERLIPIDEMIA: ICD-10-CM

## 2025-04-01 DIAGNOSIS — Z00.00 ANNUAL PHYSICAL EXAM: Primary | ICD-10-CM

## 2025-04-01 DIAGNOSIS — Z00.00 ANNUAL PHYSICAL EXAM: ICD-10-CM

## 2025-04-01 DIAGNOSIS — G43.009 MIGRAINE WITHOUT AURA AND WITHOUT STATUS MIGRAINOSUS, NOT INTRACTABLE: ICD-10-CM

## 2025-04-01 LAB
ALBUMIN SERPL BCG-MCNC: 4.2 G/DL (ref 3.5–5)
ALP SERPL-CCNC: 76 U/L (ref 34–104)
ALT SERPL W P-5'-P-CCNC: 13 U/L (ref 7–52)
ANION GAP SERPL CALCULATED.3IONS-SCNC: 9 MMOL/L (ref 4–13)
AST SERPL W P-5'-P-CCNC: 16 U/L (ref 13–39)
BASOPHILS # BLD AUTO: 0.04 THOUSANDS/ÂΜL (ref 0–0.1)
BASOPHILS NFR BLD AUTO: 1 % (ref 0–1)
BILIRUB SERPL-MCNC: 0.23 MG/DL (ref 0.2–1)
BUN SERPL-MCNC: 19 MG/DL (ref 5–25)
CALCIUM SERPL-MCNC: 9 MG/DL (ref 8.4–10.2)
CHLORIDE SERPL-SCNC: 109 MMOL/L (ref 96–108)
CHOLEST SERPL-MCNC: 215 MG/DL (ref ?–200)
CO2 SERPL-SCNC: 20 MMOL/L (ref 21–32)
CREAT SERPL-MCNC: 0.7 MG/DL (ref 0.6–1.3)
EOSINOPHIL # BLD AUTO: 0.12 THOUSAND/ÂΜL (ref 0–0.61)
EOSINOPHIL NFR BLD AUTO: 1 % (ref 0–6)
ERYTHROCYTE [DISTWIDTH] IN BLOOD BY AUTOMATED COUNT: 12.4 % (ref 11.6–15.1)
EST. AVERAGE GLUCOSE BLD GHB EST-MCNC: 114 MG/DL
GFR SERPL CREATININE-BSD FRML MDRD: 110 ML/MIN/1.73SQ M
GLUCOSE P FAST SERPL-MCNC: 112 MG/DL (ref 65–99)
HBA1C MFR BLD: 5.6 %
HCT VFR BLD AUTO: 41.5 % (ref 34.8–46.1)
HDLC SERPL-MCNC: 50 MG/DL
HGB BLD-MCNC: 13.9 G/DL (ref 11.5–15.4)
IMM GRANULOCYTES # BLD AUTO: 0.05 THOUSAND/UL (ref 0–0.2)
IMM GRANULOCYTES NFR BLD AUTO: 1 % (ref 0–2)
LDLC SERPL CALC-MCNC: 153 MG/DL (ref 0–100)
LYMPHOCYTES # BLD AUTO: 2.91 THOUSANDS/ÂΜL (ref 0.6–4.47)
LYMPHOCYTES NFR BLD AUTO: 35 % (ref 14–44)
MCH RBC QN AUTO: 28.9 PG (ref 26.8–34.3)
MCHC RBC AUTO-ENTMCNC: 33.5 G/DL (ref 31.4–37.4)
MCV RBC AUTO: 86 FL (ref 82–98)
MONOCYTES # BLD AUTO: 0.47 THOUSAND/ÂΜL (ref 0.17–1.22)
MONOCYTES NFR BLD AUTO: 6 % (ref 4–12)
NEUTROPHILS # BLD AUTO: 4.81 THOUSANDS/ÂΜL (ref 1.85–7.62)
NEUTS SEG NFR BLD AUTO: 56 % (ref 43–75)
NONHDLC SERPL-MCNC: 165 MG/DL
NRBC BLD AUTO-RTO: 0 /100 WBCS
PLATELET # BLD AUTO: 227 THOUSANDS/UL (ref 149–390)
PMV BLD AUTO: 11 FL (ref 8.9–12.7)
POTASSIUM SERPL-SCNC: 3.9 MMOL/L (ref 3.5–5.3)
PROT SERPL-MCNC: 6.6 G/DL (ref 6.4–8.4)
RBC # BLD AUTO: 4.81 MILLION/UL (ref 3.81–5.12)
SODIUM SERPL-SCNC: 138 MMOL/L (ref 135–147)
TRIGL SERPL-MCNC: 58 MG/DL (ref ?–150)
TSH SERPL DL<=0.05 MIU/L-ACNC: 1.27 UIU/ML (ref 0.45–4.5)
WBC # BLD AUTO: 8.4 THOUSAND/UL (ref 4.31–10.16)

## 2025-04-01 PROCEDURE — 99395 PREV VISIT EST AGE 18-39: CPT | Performed by: INTERNAL MEDICINE

## 2025-04-01 PROCEDURE — 36415 COLL VENOUS BLD VENIPUNCTURE: CPT

## 2025-04-01 PROCEDURE — 85025 COMPLETE CBC W/AUTO DIFF WBC: CPT

## 2025-04-01 PROCEDURE — 80053 COMPREHEN METABOLIC PANEL: CPT

## 2025-04-01 PROCEDURE — 80061 LIPID PANEL: CPT

## 2025-04-01 PROCEDURE — 83036 HEMOGLOBIN GLYCOSYLATED A1C: CPT

## 2025-04-01 PROCEDURE — 84443 ASSAY THYROID STIM HORMONE: CPT

## 2025-04-01 NOTE — ASSESSMENT & PLAN NOTE
Advised to decrease calorie intake, decrease sugar and carbs intake and exercise to lose weight.

## 2025-04-01 NOTE — ASSESSMENT & PLAN NOTE
She has been seen and followed by GYN for GYN examination.  She does not want any immunizations.  Orders:  •  CBC and differential; Future  •  Comprehensive metabolic panel; Future  •  Lipid panel; Future  •  Hemoglobin A1C; Future

## 2025-04-01 NOTE — PROGRESS NOTES
Adult Annual Physical  Name: Layne Mcclellan      : 1985      MRN: [id number]  Encounter Provider: Earl Toledo MD  Encounter Date: 2025   Encounter department: [facility] INTERNAL MEDICINE    Assessment & Plan  Annual physical exam  She has been seen and followed by GYN for GYN examination.  She does not want any immunizations.  Orders:  •  CBC and differential; Future  •  Comprehensive metabolic panel; Future  •  Lipid panel; Future  •  Hemoglobin A1C; Future    Mixed hyperlipidemia  Last cholesterol 232, triglyceride 122, HDL 53, .  Advised to continue low-cholesterol low-carb diet.  Will order lipid panel.  If persistently elevated discussed with the patient may need to start medication.  Orders:  •  Lipid panel; Future  •  TSH, 3rd generation; Future    Migraine without aura and without status migrainosus, not intractable  She has been seen and followed by neurologist recently has a flareup required steroid therapy.  On Imitrex as well as Topamax.  Orders:  •  CBC and differential; Future  •  Comprehensive metabolic panel; Future    BMI 27.0-27.9,adult  Advised to decrease calorie intake, decrease sugar and carbs intake and exercise to lose weight.       Other fatigue  Patient complain of feeling tired.  Will obtain metabolic workup.  Orders:  •  CBC and differential; Future  •  Comprehensive metabolic panel; Future  •  TSH, 3rd generation; Future    Preventive Screenings:    - Cervical cancer screening: screening up-to-date     Immunizations:  - Immunizations due: Influenza and Tdap         History of Present Illness     Adult Annual Physical:  Patient presents for annual physical.     Diet and Physical Activity:  - Diet/Nutrition: no special diet, well balanced diet, portion control, limited junk food, low calorie diet, low carb diet, consuming 3-5 servings of fruits/vegetables daily and adequate fiber intake.  - Exercise: walking, moderate cardiovascular  "exercise, strength training exercises, 3-4 times a week on average and less than 30 minutes on average.    General Health:  - Sleep: sleeps well, 4-6 hours of sleep on average, daytime hypersomnolence and unrefreshing sleep.  - Hearing: normal hearing bilateral ears and tinnitus.  - Vision: most recent eye exam < 1 year ago and no vision problems.  - Dental: regular dental visits, brushes teeth twice daily and floss regularly.    /GYN Health:  - Follows with GYN: yes.   - Menopause: premenopausal.   - Last menstrual cycle: 3/21/2025.   - History of STDs: no  - Contraception: IUD placement.      Advanced Care Planning:  - Has an advanced directive?: no    - Has a durable medical POA?: no    - ACP document given to patient?: yes      Review of Systems   Constitutional:  Positive for fatigue. Negative for chills and fever.   HENT:  Negative for congestion, ear pain, hearing loss, nosebleeds, sinus pain, sore throat and trouble swallowing.    Eyes:  Negative for discharge, redness and visual disturbance.   Respiratory:  Negative for cough, chest tightness and shortness of breath.    Cardiovascular:  Negative for chest pain and palpitations.   Gastrointestinal:  Negative for abdominal pain, blood in stool, constipation, diarrhea, nausea and vomiting.   Genitourinary:  Negative for dysuria, flank pain, frequency and hematuria.   Musculoskeletal:  Negative for arthralgias, myalgias and neck pain.   Skin:  Negative for color change and rash.   Neurological:  Negative for dizziness, speech difficulty, weakness and headaches.   Hematological:  Does not bruise/bleed easily.   Psychiatric/Behavioral:  Negative for agitation and behavioral problems.            Objective   /68 (BP Location: Left arm, Patient Position: Sitting, Cuff Size: Standard)   Pulse 84   Temp 98.4 °F (36.9 °C) (Temporal)   Resp 16   Ht 5' 4\" (1.626 m)   Wt 72.6 kg (160 lb)   LMP 02/23/2025 (Exact Date)   SpO2 99%   BMI 27.46 kg/m² "     Physical Exam  Vitals and nursing note reviewed.   Constitutional:       General: She is not in acute distress.     Appearance: Normal appearance. She is well-developed.   HENT:      Head: Normocephalic and atraumatic.      Right Ear: Tympanic membrane, ear canal and external ear normal. There is no impacted cerumen.      Left Ear: Tympanic membrane, ear canal and external ear normal. There is no impacted cerumen.      Nose: Nose normal.      Mouth/Throat:      Mouth: Mucous membranes are moist.      Pharynx: Oropharynx is clear. No oropharyngeal exudate or posterior oropharyngeal erythema.   Eyes:      General: No scleral icterus.        Right eye: No discharge.         Left eye: No discharge.      Extraocular Movements: Extraocular movements intact.      Conjunctiva/sclera: Conjunctivae normal.      Pupils: Pupils are equal, round, and reactive to light.   Cardiovascular:      Rate and Rhythm: Normal rate and regular rhythm.      Pulses: Normal pulses.      Heart sounds: Normal heart sounds. No murmur heard.  Pulmonary:      Effort: Pulmonary effort is normal. No respiratory distress.      Breath sounds: Normal breath sounds. No wheezing, rhonchi or rales.   Abdominal:      General: Bowel sounds are normal. There is no distension.      Palpations: Abdomen is soft.      Tenderness: There is no abdominal tenderness.   Musculoskeletal:         General: No swelling. Normal range of motion.      Cervical back: Normal range of motion and neck supple.      Right lower leg: No edema.      Left lower leg: No edema.   Skin:     General: Skin is warm and dry.      Capillary Refill: Capillary refill takes less than 2 seconds.      Findings: No rash.   Neurological:      General: No focal deficit present.      Mental Status: She is alert and oriented to person, place, and time.      Motor: No weakness.      Coordination: Coordination normal.   Psychiatric:         Mood and Affect: Mood normal.         Behavior: Behavior  normal.         Thought Content: Thought content normal.         Judgment: Judgment normal.

## 2025-04-01 NOTE — ASSESSMENT & PLAN NOTE
She has been seen and followed by neurologist recently has a flareup required steroid therapy.  On Imitrex as well as Topamax.  Orders:  •  CBC and differential; Future  •  Comprehensive metabolic panel; Future

## 2025-04-01 NOTE — ASSESSMENT & PLAN NOTE
Patient complain of feeling tired.  Will obtain metabolic workup.  Orders:  •  CBC and differential; Future  •  Comprehensive metabolic panel; Future  •  TSH, 3rd generation; Future

## 2025-04-01 NOTE — ASSESSMENT & PLAN NOTE
Last cholesterol 232, triglyceride 122, HDL 53, .  Advised to continue low-cholesterol low-carb diet.  Will order lipid panel.  If persistently elevated discussed with the patient may need to start medication.  Orders:  •  Lipid panel; Future  •  TSH, 3rd generation; Future

## 2025-04-18 ENCOUNTER — TELEMEDICINE (OUTPATIENT)
Dept: OTHER | Facility: HOSPITAL | Age: 40
End: 2025-04-18
Payer: COMMERCIAL

## 2025-04-18 DIAGNOSIS — J01.40 ACUTE NON-RECURRENT PANSINUSITIS: Primary | ICD-10-CM

## 2025-04-18 DIAGNOSIS — N76.0 RECURRENT VAGINITIS: ICD-10-CM

## 2025-04-18 PROCEDURE — 99213 OFFICE O/P EST LOW 20 MIN: CPT | Performed by: PHYSICIAN ASSISTANT

## 2025-04-18 RX ORDER — GUAIFENESIN, PSEUDOEPHEDRINE HYDROCHLORIDE 600; 60 MG/1; MG/1
1 TABLET, EXTENDED RELEASE ORAL EVERY 12 HOURS
Qty: 20 TABLET | Refills: 0 | Status: SHIPPED | OUTPATIENT
Start: 2025-04-18

## 2025-04-18 RX ORDER — FLUCONAZOLE 150 MG/1
150 TABLET ORAL ONCE
Qty: 1 TABLET | Refills: 1 | Status: SHIPPED | OUTPATIENT
Start: 2025-04-18 | End: 2025-04-18

## 2025-04-18 RX ORDER — DOXYCYCLINE HYCLATE 100 MG
100 TABLET ORAL 2 TIMES DAILY
Qty: 14 TABLET | Refills: 0 | Status: SHIPPED | OUTPATIENT
Start: 2025-04-18 | End: 2025-04-25

## 2025-04-18 NOTE — PROGRESS NOTES
Virtual Regular Visit  Name: Layne Mcclellan      : 1985      MRN: [id number]  Encounter Provider: Shannon D Severino, PA-C  Encounter Date: 2025   Encounter department: [facility]ARE       Verification of patient location:  Patient is located at Home in the following state in which I hold an active license [place] :  Assessment & Plan  Acute non-recurrent pansinusitis    Orders:    doxycycline hyclate (VIBRA-TABS) 100 mg tablet; Take 1 tablet (100 mg total) by mouth 2 (two) times a day for 7 days    pseudoephedrine-guaifenesin (MUCINEX D)  MG per tablet; Take 1 tablet by mouth every 12 (twelve) hours    Recurrent vaginitis    Orders:    fluconazole (DIFLUCAN) 150 mg tablet; Take 1 tablet (150 mg total) by mouth once for 1 dose        Encounter provider Shannon D Severino, PA-C    The patient was identified by name and date of birth. Layne Mcclellan was informed that this is a telemedicine visit and that the visit is being conducted through the Epic Embedded platform. She agrees to proceed..  My office door was closed. No one else was in the room. She acknowledged consent and understanding of privacy and security of the video platform. The patient has agreed to participate and understands they can discontinue the visit at any time.    Patient is aware this is a billable service.     History obtained from: patient  History of Present Illness     Pt reports concern for sinus infection. Has pressure and congestion x 5 days- frontal and maxillary region, HA, slight cough from PND. Took sudafed and motrin without relief. No fevers, CP, SOB. No known sick contacts.       Review of Systems   Constitutional:  Negative for fever.   HENT:  Positive for congestion, sinus pressure and sinus pain. Negative for nosebleeds.    Eyes:  Negative for redness.   Respiratory:  Negative for shortness of breath.    Cardiovascular:  Negative for chest pain.   Gastrointestinal:  Negative for blood in stool.    Genitourinary:  Negative for hematuria.   Musculoskeletal:  Negative for gait problem.   Skin:  Negative for rash.   Neurological:  Positive for headaches. Negative for seizures.   Psychiatric/Behavioral:  Negative for behavioral problems.        Objective   There were no vitals taken for this visit.    Physical Exam  Constitutional:       General: She is not in acute distress.     Appearance: Normal appearance. She is not toxic-appearing.   HENT:      Head: Normocephalic and atraumatic.      Nose: No rhinorrhea.      Mouth/Throat:      Mouth: Mucous membranes are moist.   Eyes:      Conjunctiva/sclera: Conjunctivae normal.   Pulmonary:      Effort: Pulmonary effort is normal. No respiratory distress.      Breath sounds: No wheezing (no gross audible wheeze through computer).   Musculoskeletal:      Cervical back: Normal range of motion.   Skin:     Findings: No rash (on face or neck).   Neurological:      Mental Status: She is alert.      Cranial Nerves: No dysarthria or facial asymmetry.   Psychiatric:         Mood and Affect: Mood normal.         Behavior: Behavior normal.         Visit Time  Total Visit Duration: 8 minutes not including the time spent for establishing the audio/video connection.

## 2025-04-18 NOTE — PATIENT INSTRUCTIONS
"As discussed, sinus infections are typically viral and will get better on their own in 7-10 days. You should try symptomatic relief in the meantime with OTC antihistamine (Claritin or Zyrtec), Afrin for up to 3 days then switch to Flonase, and Sudafed (behind the counter) and mucinex. You can also try sinus rinses with a neti pot or nasal lavage (be sure to use distilled water.) Sleep with a cool mist humidifier at your bedside. If your symptoms do not improve after 7-10 days, you may need antibiotics because it is more likely to be bacterial at that point.  Follow-up with your primary care physician for recheck in 2-3 business days. Go to the ER for sudden severe headache, high fevers, change in vision, seizure activity or anything else that is concerning.    [facility] Phone number is [phone] if you need assistance or have further questions  Note for work/school can be found in \"Letters\" section of [employer]   "

## 2025-05-06 ENCOUNTER — HOSPITAL ENCOUNTER (OUTPATIENT)
Facility: HOSPITAL | Age: 40
Discharge: HOME | End: 2025-05-06
Attending: OBSTETRICS & GYNECOLOGY
Payer: COMMERCIAL

## 2025-05-06 VITALS — BODY MASS INDEX: 27.31 KG/M2 | WEIGHT: 160 LBS | HEIGHT: 64 IN

## 2025-05-06 DIAGNOSIS — Z80.3 FAMILY HISTORY OF BREAST CANCER: ICD-10-CM

## 2025-05-06 DIAGNOSIS — Z91.89 INCREASED RISK OF BREAST CANCER: ICD-10-CM

## 2025-05-06 DIAGNOSIS — Z12.31 ENCOUNTER FOR SCREENING MAMMOGRAM FOR BREAST CANCER: ICD-10-CM

## 2025-05-06 PROCEDURE — 77063 BREAST TOMOSYNTHESIS BI: CPT

## 2025-05-06 PROCEDURE — 77067 SCR MAMMO BI INCL CAD: CPT

## 2025-05-08 ENCOUNTER — RESULTS FOLLOW-UP (OUTPATIENT)
Dept: OBGYN CLINIC | Facility: CLINIC | Age: 40
End: 2025-05-08

## 2025-05-21 ENCOUNTER — HOSPITAL ENCOUNTER (OUTPATIENT)
Dept: RADIOLOGY | Age: 40
Discharge: HOME | End: 2025-05-21
Attending: OBSTETRICS & GYNECOLOGY
Payer: COMMERCIAL

## 2025-05-21 ENCOUNTER — RESULTS FOLLOW-UP (OUTPATIENT)
Dept: OBGYN CLINIC | Facility: CLINIC | Age: 40
End: 2025-05-21

## 2025-05-21 ENCOUNTER — NURSE TRIAGE (OUTPATIENT)
Age: 40
End: 2025-05-21

## 2025-05-21 DIAGNOSIS — R10.2 PELVIC PAIN: Primary | ICD-10-CM

## 2025-05-21 DIAGNOSIS — R10.2 PELVIC PAIN: ICD-10-CM

## 2025-05-21 PROCEDURE — 76830 TRANSVAGINAL US NON-OB: CPT

## 2025-05-21 PROCEDURE — 76856 US EXAM PELVIC COMPLETE: CPT

## 2025-05-21 NOTE — TELEPHONE ENCOUNTER
"FOLLOW UP: STAT US ordered, appt made for Friday in office    REASON FOR CONVERSATION: Pelvic Pain    SYMPTOMS: sudden onset right lower pelvic pain, back pain since last night    OTHER: called with complaints of new onset sudden right lower pelvic pain, back pain that started last night.  Had light pink spotting and now bright red which is not normal.  Has IUD.  Recommended tylenol/motrin to help with pain    ESC to Dr. Rodriguez: okay to order STAT US    DISPOSITION: See Today or Tomorrow in Office      Reason for Disposition   Patient wants to be seen    Answer Assessment - Initial Assessment Questions  1. LOCATION: \"Where does it hurt?\"       Right lower pelvic area  2. RADIATION: \"Does the pain shoot anywhere else?\" (e.g., lower back, groin, thighs)      Started in lower back   3. ONSET: \"When did the pain begin?\" (e.g., minutes, hours or days ago)       Back pain last night, pelvic pain this morning  4. SUDDEN: \"Gradual or sudden onset?\"      sudden  5. PATTERN \"Does the pain come and go, or is it constant?\"      constat  6. SEVERITY: \"How bad is the pain?\"  (e.g., Scale 1-10; mild, moderate, or severe)      4-5/10  8. CAUSE: \"What do you think is causing the pelvic pain?\"      Unsure - does have an IUD  10. OTHER SYMPTOMS: \"Has there been any other symptoms?\" (e.g., fever, constipation, diarrhea, urine problems, vaginal bleeding, vaginal discharge, or vomiting?\"        Started with pink spotting, now red blood    Protocols used: Pelvic Pain - Female-Adult-[place]    "

## 2025-05-23 ENCOUNTER — APPOINTMENT (OUTPATIENT)
Dept: LAB | Facility: CLINIC | Age: 40
End: 2025-05-23
Payer: COMMERCIAL

## 2025-05-23 ENCOUNTER — OFFICE VISIT (OUTPATIENT)
Dept: OBGYN CLINIC | Facility: CLINIC | Age: 40
End: 2025-05-23

## 2025-05-23 VITALS
HEART RATE: 76 BPM | SYSTOLIC BLOOD PRESSURE: 108 MMHG | HEIGHT: 64 IN | WEIGHT: 153 LBS | BODY MASS INDEX: 26.12 KG/M2 | DIASTOLIC BLOOD PRESSURE: 72 MMHG

## 2025-05-23 DIAGNOSIS — N93.9 ABNORMAL UTERINE BLEEDING (AUB): ICD-10-CM

## 2025-05-23 DIAGNOSIS — N93.9 ABNORMAL UTERINE BLEEDING (AUB): Primary | ICD-10-CM

## 2025-05-23 DIAGNOSIS — R10.2 PELVIC PAIN: ICD-10-CM

## 2025-05-23 DIAGNOSIS — Z30.431 IUD CHECK UP: ICD-10-CM

## 2025-05-23 DIAGNOSIS — R45.86 MOOD CHANGES: ICD-10-CM

## 2025-05-23 LAB
B-HCG SERPL-ACNC: 2.5 MIU/ML (ref 0–5)
BACTERIA UR QL AUTO: ABNORMAL /HPF
BILIRUB UR QL STRIP: NEGATIVE
CLARITY UR: CLEAR
COLOR UR: COLORLESS
ERYTHROCYTE [DISTWIDTH] IN BLOOD BY AUTOMATED COUNT: 12.8 % (ref 11.6–15.1)
ESTRADIOL SERPL-MCNC: 73.5 PG/ML
FSH SERPL-ACNC: 7.9 MIU/ML
GLUCOSE UR STRIP-MCNC: NEGATIVE MG/DL
HCT VFR BLD AUTO: 42.7 % (ref 34.8–46.1)
HGB BLD-MCNC: 14.7 G/DL (ref 11.5–15.4)
HGB UR QL STRIP.AUTO: ABNORMAL
KETONES UR STRIP-MCNC: NEGATIVE MG/DL
LEUKOCYTE ESTERASE UR QL STRIP: NEGATIVE
LH SERPL-ACNC: 3.8 MIU/ML
MCH RBC QN AUTO: 30 PG (ref 26.8–34.3)
MCHC RBC AUTO-ENTMCNC: 34.4 G/DL (ref 31.4–37.4)
MCV RBC AUTO: 87 FL (ref 82–98)
NITRITE UR QL STRIP: NEGATIVE
NON-SQ EPI CELLS URNS QL MICRO: ABNORMAL /HPF
PH UR STRIP.AUTO: 6 [PH]
PLATELET # BLD AUTO: 206 THOUSANDS/UL (ref 149–390)
PMV BLD AUTO: 11.1 FL (ref 8.9–12.7)
PROLACTIN SERPL-MCNC: 3.65 NG/ML (ref 3.34–26.72)
PROT UR STRIP-MCNC: NEGATIVE MG/DL
RBC # BLD AUTO: 4.9 MILLION/UL (ref 3.81–5.12)
RBC #/AREA URNS AUTO: ABNORMAL /HPF
SP GR UR STRIP.AUTO: 1.01 (ref 1–1.03)
TSH SERPL DL<=0.05 MIU/L-ACNC: 1.05 UIU/ML (ref 0.45–4.5)
UROBILINOGEN UR STRIP-ACNC: <2 MG/DL
WBC # BLD AUTO: 5.68 THOUSAND/UL (ref 4.31–10.16)
WBC #/AREA URNS AUTO: ABNORMAL /HPF

## 2025-05-23 PROCEDURE — 83001 ASSAY OF GONADOTROPIN (FSH): CPT

## 2025-05-23 PROCEDURE — 82670 ASSAY OF TOTAL ESTRADIOL: CPT

## 2025-05-23 PROCEDURE — 84702 CHORIONIC GONADOTROPIN TEST: CPT

## 2025-05-23 PROCEDURE — 84146 ASSAY OF PROLACTIN: CPT

## 2025-05-23 PROCEDURE — 83002 ASSAY OF GONADOTROPIN (LH): CPT

## 2025-05-23 PROCEDURE — 84443 ASSAY THYROID STIM HORMONE: CPT

## 2025-05-23 PROCEDURE — 36415 COLL VENOUS BLD VENIPUNCTURE: CPT

## 2025-05-23 PROCEDURE — 84402 ASSAY OF FREE TESTOSTERONE: CPT

## 2025-05-23 PROCEDURE — 85027 COMPLETE CBC AUTOMATED: CPT

## 2025-05-23 PROCEDURE — 81001 URINALYSIS AUTO W/SCOPE: CPT | Performed by: OBSTETRICS & GYNECOLOGY

## 2025-05-23 PROCEDURE — 82627 DEHYDROEPIANDROSTERONE: CPT

## 2025-05-23 PROCEDURE — 84403 ASSAY OF TOTAL TESTOSTERONE: CPT

## 2025-05-23 PROCEDURE — 87086 URINE CULTURE/COLONY COUNT: CPT | Performed by: OBSTETRICS & GYNECOLOGY

## 2025-05-23 NOTE — PROGRESS NOTES
Assessment/Plan:  Problem List Items Addressed This Visit    None  Visit Diagnoses         Abnormal uterine bleeding (AUB)    -  Primary    Relevant Orders    hCG, quantitative    CBC    TSH, 3rd generation with Free T4 reflex    Follicle stimulating hormone    Cortisol    Estradiol    Luteinizing hormone    Prolactin    Testosterone, free, total    DHEA-sulfate      IUD check up          Pelvic pain        Relevant Orders    TSH, 3rd generation with Free T4 reflex    Urine culture    Urinalysis with microscopic      Mood changes        Relevant Orders    TSH, 3rd generation with Free T4 reflex    Follicle stimulating hormone    Cortisol    Estradiol    Luteinizing hormone    Prolactin    Testosterone, free, total    DHEA-sulfate               Irregular bleeding and cramping could occur within the 1st few months after Intrauterine device placement.  Usually, by 6 months following insertion, most patients would report resolution of the symptoms and improvement in bleeding patterns.  Patient's with continued bleeding after this period of time may need evaluation to rule malposition, expulsion, pregnancy, infection, and cervical dysplasia .  Treatment options may include NSAIDs,  or addition of OCP.   Medical treatment can be offered to women who continue to have bothersome bleeding up to 3 months from IUD insertion,  Ibuprofen, 600 mg every 6 hours for 5 days may be used.  Another alternative is to use low-dose combined hormonal contraceptive pill.  Patient agrees to use short course of NSAIDs for 5 days for abnormal bleeding most likely related to IUD as well as pelvic pain.  She declines removal and less necessary.  Also discussed option for removal without replacement or with replacement with Mirena.  She will consider if her symptoms continue monthly for the next few months.  I advised patient completion of blood work to rule out pregnancy, evaluate hormonal changes and check for anemia as well as thyroid  "disorder.    Subjective   Patient ID: Layne Mcclellan is a 40 y.o. female.    Patient is here for a problem visit.    Chief Complaint   Patient presents with    Gynecology Problem     STRING CHECK - ABDOMINAL AND BACK PAIN, BLEEDING       Last few times, she had some pinkish spotting with ovulation. She had heavier bleeding. She states LMP: 25.  States her period is lighter today.  She is having pain intermittently, right lower quadrant.  And lower back pain.    She has mood changes, no hot flashes  Declines STD screening    Requesting hormone testing      Menstrual History:  OB History          2    Para   2    Term   2            AB        Living   2         SAB        IAB        Ectopic        Multiple   0    Live Births   2                Menarche age: 13  No LMP recorded. Patient has had an implant.         Past Medical History[1]    Past Surgical History[2]    Social History[3]     Allergies   Allergen Reactions    Augmentin [Amoxicillin-Pot Clavulanate] Rash    Penicillins Rash       Current Medications[4]      Pertinent laboratory testing, imaging studies and prior external records reviewed    Review of Systems      /72   Pulse 76   Ht 5' 4\" (1.626 m)   Wt 69.4 kg (153 lb)   BMI 26.26 kg/m²       Physical Exam  Constitutional:       General: She is not in acute distress.     Appearance: She is well-developed.   Genitourinary:      Bladder and urethral meatus normal.      No lesions in the vagina.      Right Labia: No rash, tenderness, lesions, skin changes or Bartholin's cyst.     Left Labia: No tenderness, lesions, skin changes, Bartholin's cyst or rash.     Vaginal bleeding present.      No vaginal discharge, erythema or tenderness.      No vaginal prolapse present.     No vaginal atrophy present.       Right Adnexa: not tender, not full and no mass present.     Left Adnexa: not tender, not full and no mass present.     No cervical polyp.      IUD strings visualized.      " Uterus is tender.      Uterus is not enlarged.      No uterine mass detected.     Pelvic exam was performed with patient in the lithotomy position.   Rectum:      No tenderness.     Cardiovascular:      Rate and Rhythm: Normal rate.   Pulmonary:      Effort: Pulmonary effort is normal.   Abdominal:      General: There is no distension.      Palpations: Abdomen is soft.      Tenderness: There is no abdominal tenderness.     Neurological:      Mental Status: She is alert and oriented to person, place, and time.     Skin:     General: Skin is warm and dry.     Psychiatric:         Behavior: Behavior normal.   Vitals and nursing note reviewed. Exam conducted with a chaperone present.                 Future Appointments   Date Time Provider Department Center   7/10/2025  1:00 PM [facility]             [1]   Past Medical History:  Diagnosis Date    Abnormal Pap smear of cervix     Acute non-recurrent sinusitis 10/25/2021    Arthritis     BMI 26.0-26.9,adult 08/06/2024    BMI 27.0-27.9,adult 05/09/2022    BMI 27.0-27.9,adult 04/01/2025    BRCA1 negative     BRCA2 negative     Constipation, chronic     Cough 09/23/2021    COVID-19 virus infection 12/08/2021    Depression 05/09/2022    Diabetes mellitus (HCC)     Gestational    Drug-induced skin rash 08/06/2024    Epigastric pain 06/13/2024    Fatigue 12/08/2021    Gestational diabetes     Gestational hypertension affecting first pregnancy     Hearing loss of right ear 04/23/2024    Hyperglycemia 04/23/2024    Irritable bowel syndrome     Liver lesion 04/23/2024    LLQ abdominal pain 07/23/2024    Migraine     Myalgia 05/09/2022    Neck pain 05/09/2022    Pelvic pain 07/23/2024    Rhinitis 10/25/2021    Scoliosis of thoracolumbar spine 10/24/2024    Sigmoid diverticulitis 07/23/2024    Sinus congestion 09/23/2021    Sore throat 09/23/2021    Tinnitus of right ear 04/23/2024    Varicella     Weight loss    [2]   Past Surgical  History:  Procedure Laterality Date     SECTION  10/06/2017    COLPOSCOPY      [place]  DELIVERY ONLY Bilateral 10/06/2017    Procedure:  SECTION ();  Surgeon: Patricia Rodriguez MD;  Location: [facility] ;  Service: Obstetrics    [place]  DELIVERY ONLY N/A 2019    Procedure:  SECTION () REPEAT;  Surgeon: Patricia Rodriguez MD;  Location: [facility];  Service: Obstetrics    [place] COLONOSCOPY FLX DX W/COLLJ SPEC WHEN PFRMD N/A 2016    Procedure: COLONOSCOPY;  Surgeon: July Barton MD;  [facility];  Service: Gastroenterology    WISDOM TOOTH EXTRACTION     [3]   Social History  Tobacco Use    Smoking status: Never    Smokeless tobacco: Never   Vaping Use    Vaping status: Never Used   Substance Use Topics    Alcohol use: Not Currently    Drug use: No   [4]   Current Outpatient Medications:     ibuprofen (MOTRIN) 200 mg tablet, Take 800 mg by mouth every 12 (twelve) hours as needed for mild pain, Disp: , Rfl:     SUMAtriptan (IMITREX) 100 mg tablet, Take 1 tablet (100 mg total) by mouth as needed for migraine May repeat in 2 hours. Limit of 3/week or 9/month, Disp: 9 tablet, Rfl: 3    topiramate (TOPAMAX) 100 mg tablet, Take 1 tablet (100 mg total) by mouth daily, Disp: 90 tablet, Rfl: 1    topiramate (Topamax) 50 MG tablet, Take one tablet daily., Disp: 90 tablet, Rfl: 1

## 2025-05-24 ENCOUNTER — RESULTS FOLLOW-UP (OUTPATIENT)
Dept: LABOR AND DELIVERY | Facility: HOSPITAL | Age: 40
End: 2025-05-24

## 2025-05-24 LAB
BACTERIA UR CULT: NORMAL
DHEA-S SERPL-MCNC: 125 UG/DL (ref 57.3–279.2)
TESTOST FREE SERPL-MCNC: 0.9 PG/ML (ref 0–4.2)
TESTOST SERPL-MCNC: 12 NG/DL (ref 8–60)

## 2025-05-29 ENCOUNTER — APPOINTMENT (OUTPATIENT)
Dept: LAB | Facility: AMBULARY SURGERY CENTER | Age: 40
End: 2025-05-29
Payer: COMMERCIAL

## 2025-05-29 LAB — CORTIS SERPL-MCNC: 5.8 UG/DL

## 2025-05-29 PROCEDURE — 82533 TOTAL CORTISOL: CPT

## 2025-05-29 PROCEDURE — 36415 COLL VENOUS BLD VENIPUNCTURE: CPT

## 2025-06-23 DIAGNOSIS — G43.009 MIGRAINE WITHOUT AURA AND WITHOUT STATUS MIGRAINOSUS, NOT INTRACTABLE: ICD-10-CM

## 2025-06-23 RX ORDER — TOPIRAMATE 50 MG/1
TABLET, FILM COATED ORAL
Qty: 90 TABLET | Refills: 1 | Status: SHIPPED | OUTPATIENT
Start: 2025-06-23

## 2025-06-23 RX ORDER — TOPIRAMATE 100 MG/1
100 TABLET, FILM COATED ORAL DAILY
Qty: 90 TABLET | Refills: 1 | Status: SHIPPED | OUTPATIENT
Start: 2025-06-23

## 2025-06-23 NOTE — TELEPHONE ENCOUNTER
Reason for call:   [x] Refill   [] Prior Auth  [x] Other: Requesting medication be sent high priority    Office:   [] PCP/Provider -   [x] Specialty/Provider - [facility]     Medication: topiramate (Topamax) 50 MG tablet    Dose/Frequency: Take one tablet daily.     Quantity: 90 tablet    Medication:  topiramate (TOPAMAX) 100 mg tablet    Dose/Frequency: Take 1 tablet (100 mg total) by mouth daily,     Quantity: 90 tablet    Pharmacy: [facility] [place] [place] - [facility] [phone]    Local Pharmacy   Does the patient have enough for 3 days?   [] Yes   [] No - Send as HP to POD    Mail Away Pharmacy   Does the patient have enough for 10 days?   [x] Yes   [] No - Send as HP to POD

## 2025-07-11 DIAGNOSIS — G43.009 MIGRAINE WITHOUT AURA AND WITHOUT STATUS MIGRAINOSUS, NOT INTRACTABLE: ICD-10-CM

## 2025-07-11 RX ORDER — SUMATRIPTAN SUCCINATE 100 MG/1
100 TABLET ORAL AS NEEDED
Qty: 9 TABLET | Refills: 3 | Status: SHIPPED | OUTPATIENT
Start: 2025-07-11

## 2025-07-11 NOTE — TELEPHONE ENCOUNTER
Reason for call:   [x] Refill   [] Prior Auth  [x] Other: Patient declined short term supply    Office:   [] PCP/Provider -   [x] Specialty/Provider - [facility]     Medication: SUMAtriptan (IMITREX) 100 mg tablet     Dose/Frequency: Take 1 tablet (100 mg total) by mouth as needed for migraine May repeat in 2 hours. Limit of 3/week or 9/month     Quantity: 9 tablet    Pharmacy: [facility] [place] - [facility] [phone]    Local Pharmacy   Does the patient have enough for 3 days?   [] Yes   [] No - Send as HP to POD    Mail Away Pharmacy   Does the patient have enough for 10 days?   [] Yes   [x] No - Send as HP to POD

## (undated) DEVICE — PACK C-SECTION PBDS

## (undated) DEVICE — SUT VICRYL 0 CT-1 36 IN J946H

## (undated) DEVICE — SUT VICRYL 0 CTX 36 IN J978H

## (undated) DEVICE — SUT MONOCRYL 4-0 PS-2 27 IN Y426H

## (undated) DEVICE — GLOVE INDICATOR PI UNDERGLOVE SZ 6.5 BLUE

## (undated) DEVICE — SCD SEQUENTIAL COMPRESSION COMFORT SLEEVE MEDIUM KNEE LENGTH: Brand: KENDALL SCD

## (undated) DEVICE — SKIN MARKER DUAL TIP WITH RULER CAP, FLEXIBLE RULER AND LABELS: Brand: DEVON

## (undated) DEVICE — CHLORAPREP HI-LITE 10.5ML ORANGE

## (undated) DEVICE — SUT VICRYL 3-0 CT-1 36 IN J944H

## (undated) DEVICE — GLOVE SRG BIOGEL ECLIPSE 6.5